# Patient Record
Sex: MALE | Race: WHITE
[De-identification: names, ages, dates, MRNs, and addresses within clinical notes are randomized per-mention and may not be internally consistent; named-entity substitution may affect disease eponyms.]

---

## 2019-07-09 ENCOUNTER — HOSPITAL ENCOUNTER (INPATIENT)
Dept: HOSPITAL 61 - ER | Age: 29
LOS: 14 days | Discharge: HOME | DRG: 871 | End: 2019-07-23
Attending: INTERNAL MEDICINE | Admitting: INTERNAL MEDICINE
Payer: SELF-PAY

## 2019-07-09 VITALS — BODY MASS INDEX: 36.98 KG/M2 | HEIGHT: 68 IN | WEIGHT: 244.01 LBS

## 2019-07-09 DIAGNOSIS — Z87.11: ICD-10-CM

## 2019-07-09 DIAGNOSIS — N49.2: ICD-10-CM

## 2019-07-09 DIAGNOSIS — G93.41: ICD-10-CM

## 2019-07-09 DIAGNOSIS — K21.9: ICD-10-CM

## 2019-07-09 DIAGNOSIS — Z82.49: ICD-10-CM

## 2019-07-09 DIAGNOSIS — N50.89: ICD-10-CM

## 2019-07-09 DIAGNOSIS — L03.113: ICD-10-CM

## 2019-07-09 DIAGNOSIS — N30.01: ICD-10-CM

## 2019-07-09 DIAGNOSIS — E87.1: ICD-10-CM

## 2019-07-09 DIAGNOSIS — T79.6XXA: ICD-10-CM

## 2019-07-09 DIAGNOSIS — I13.2: ICD-10-CM

## 2019-07-09 DIAGNOSIS — E88.2: ICD-10-CM

## 2019-07-09 DIAGNOSIS — F17.210: ICD-10-CM

## 2019-07-09 DIAGNOSIS — N17.9: ICD-10-CM

## 2019-07-09 DIAGNOSIS — T23.001A: ICD-10-CM

## 2019-07-09 DIAGNOSIS — F14.10: ICD-10-CM

## 2019-07-09 DIAGNOSIS — T23.321A: ICD-10-CM

## 2019-07-09 DIAGNOSIS — F03.90: ICD-10-CM

## 2019-07-09 DIAGNOSIS — E83.51: ICD-10-CM

## 2019-07-09 DIAGNOSIS — N43.3: ICD-10-CM

## 2019-07-09 DIAGNOSIS — F15.10: ICD-10-CM

## 2019-07-09 DIAGNOSIS — F32.9: ICD-10-CM

## 2019-07-09 DIAGNOSIS — F41.9: ICD-10-CM

## 2019-07-09 DIAGNOSIS — I50.9: ICD-10-CM

## 2019-07-09 DIAGNOSIS — E87.5: ICD-10-CM

## 2019-07-09 DIAGNOSIS — N18.6: ICD-10-CM

## 2019-07-09 DIAGNOSIS — K76.7: ICD-10-CM

## 2019-07-09 DIAGNOSIS — G47.00: ICD-10-CM

## 2019-07-09 DIAGNOSIS — A41.9: Primary | ICD-10-CM

## 2019-07-09 DIAGNOSIS — E43: ICD-10-CM

## 2019-07-09 LAB
% LYMPHS: 5 % (ref 24–48)
% MONOS: 5 % (ref 0–10)
% SEGS: 90 % (ref 35–66)
ALBUMIN SERPL-MCNC: 3.4 G/DL (ref 3.4–5)
ALBUMIN/GLOB SERPL: 0.9 {RATIO} (ref 1–1.7)
ALP SERPL-CCNC: 89 U/L (ref 46–116)
ALT SERPL-CCNC: 818 U/L (ref 16–63)
AMORPH SED URNS QL MICRO: PRESENT /HPF
AMPHETAMINE/METHAMPHETAMINE: (no result)
ANION GAP SERPL CALC-SCNC: 27 MMOL/L (ref 6–14)
ANISOCYTOSIS BLD QL SMEAR: SLIGHT
APTT BLD: 31 SEC (ref 24–38)
APTT PPP: (no result) S
BACTERIA #/AREA URNS HPF: 0 /HPF
BARBITURATES UR-MCNC: (no result) UG/ML
BASOPHILS # BLD AUTO: 0 X10^3/UL (ref 0–0.2)
BASOPHILS NFR BLD: 0 % (ref 0–3)
BENZODIAZ UR-MCNC: (no result) UG/L
BILIRUB SERPL-MCNC: 0.9 MG/DL (ref 0.2–1)
BILIRUB UR QL STRIP: (no result)
BUN SERPL-MCNC: 110 MG/DL (ref 8–26)
BUN/CREAT SERPL: 8 (ref 6–20)
CALCIUM SERPL-MCNC: 5.4 MG/DL (ref 8.5–10.1)
CANNABINOIDS UR-MCNC: (no result) UG/L
CHLORIDE SERPL-SCNC: 73 MMOL/L (ref 98–107)
CO2 SERPL-SCNC: 12 MMOL/L (ref 21–32)
COCAINE UR-MCNC: (no result) NG/ML
CREAT SERPL-MCNC: 13.4 MG/DL (ref 0.7–1.3)
EOSINOPHIL NFR BLD: 0 % (ref 0–3)
EOSINOPHIL NFR BLD: 0 X10^3/UL (ref 0–0.7)
ERYTHROCYTE [DISTWIDTH] IN BLOOD BY AUTOMATED COUNT: 13 % (ref 11.5–14.5)
FIBRINOGEN PPP-MCNC: CLEAR MG/DL
GFR SERPLBLD BASED ON 1.73 SQ M-ARVRAT: 4.4 ML/MIN
GLOBULIN SER-MCNC: 3.6 G/DL (ref 2.2–3.8)
GLUCOSE SERPL-MCNC: 81 MG/DL (ref 70–99)
HCT VFR BLD CALC: 39 % (ref 39–53)
HGB BLD-MCNC: 13.9 G/DL (ref 13–17.5)
LYMPHOCYTES # BLD: 0.5 X10^3/UL (ref 1–4.8)
LYMPHOCYTES NFR BLD AUTO: 3 % (ref 24–48)
MCH RBC QN AUTO: 29 PG (ref 25–35)
MCHC RBC AUTO-ENTMCNC: 36 G/DL (ref 31–37)
MCV RBC AUTO: 82 FL (ref 79–100)
METHADONE SERPL-MCNC: (no result) NG/ML
MONO #: 0.7 X10^3/UL (ref 0–1.1)
MONOCYTES NFR BLD: 4 % (ref 0–9)
NEUT #: 15.7 X10^3UL (ref 1.8–7.7)
NEUTROPHILS NFR BLD AUTO: 92 % (ref 31–73)
NITRITE UR QL STRIP: POSITIVE
OPIATES UR-MCNC: (no result) NG/ML
PCP SERPL-MCNC: (no result) MG/DL
PH UR STRIP: 6 [PH]
PLATELET # BLD AUTO: 320 X10^3/UL (ref 140–400)
PLATELET # BLD EST: ADEQUATE 10*3/UL
POLYCHROMASIA BLD QL SMEAR: SLIGHT
POTASSIUM SERPL-SCNC: 7 MMOL/L (ref 3.5–5.1)
PROT SERPL-MCNC: 7 G/DL (ref 6.4–8.2)
PROT UR STRIP-MCNC: >=300 MG/DL
PROTHROMBIN TIME: 14.7 SEC (ref 11.7–14)
RBC # BLD AUTO: 4.77 X10^6/UL (ref 4.3–5.7)
RBC #/AREA URNS HPF: (no result) /HPF (ref 0–2)
SODIUM SERPL-SCNC: 112 MMOL/L (ref 136–145)
SQUAMOUS #/AREA URNS LPF: (no result) /LPF
UROBILINOGEN UR-MCNC: 0.2 MG/DL
WBC # BLD AUTO: 17 X10^3/UL (ref 4–11)
WBC #/AREA URNS HPF: (no result) /HPF (ref 0–4)

## 2019-07-09 PROCEDURE — 85025 COMPLETE CBC W/AUTO DIFF WBC: CPT

## 2019-07-09 PROCEDURE — 87086 URINE CULTURE/COLONY COUNT: CPT

## 2019-07-09 PROCEDURE — 76937 US GUIDE VASCULAR ACCESS: CPT

## 2019-07-09 PROCEDURE — 83605 ASSAY OF LACTIC ACID: CPT

## 2019-07-09 PROCEDURE — 84484 ASSAY OF TROPONIN QUANT: CPT

## 2019-07-09 PROCEDURE — 62270 DX LMBR SPI PNXR: CPT

## 2019-07-09 PROCEDURE — 84300 ASSAY OF URINE SODIUM: CPT

## 2019-07-09 PROCEDURE — 87040 BLOOD CULTURE FOR BACTERIA: CPT

## 2019-07-09 PROCEDURE — 96375 TX/PRO/DX INJ NEW DRUG ADDON: CPT

## 2019-07-09 PROCEDURE — 80307 DRUG TEST PRSMV CHEM ANLYZR: CPT

## 2019-07-09 PROCEDURE — 36415 COLL VENOUS BLD VENIPUNCTURE: CPT

## 2019-07-09 PROCEDURE — 76870 US EXAM SCROTUM: CPT

## 2019-07-09 PROCEDURE — 89051 BODY FLUID CELL COUNT: CPT

## 2019-07-09 PROCEDURE — C1769 GUIDE WIRE: HCPCS

## 2019-07-09 PROCEDURE — 86705 HEP B CORE ANTIBODY IGM: CPT

## 2019-07-09 PROCEDURE — 83935 ASSAY OF URINE OSMOLALITY: CPT

## 2019-07-09 PROCEDURE — 85651 RBC SED RATE NONAUTOMATED: CPT

## 2019-07-09 PROCEDURE — 83690 ASSAY OF LIPASE: CPT

## 2019-07-09 PROCEDURE — 82570 ASSAY OF URINE CREATININE: CPT

## 2019-07-09 PROCEDURE — C9113 INJ PANTOPRAZOLE SODIUM, VIA: HCPCS

## 2019-07-09 PROCEDURE — 86704 HEP B CORE ANTIBODY TOTAL: CPT

## 2019-07-09 PROCEDURE — 36556 INSERT NON-TUNNEL CV CATH: CPT

## 2019-07-09 PROCEDURE — 86709 HEPATITIS A IGM ANTIBODY: CPT

## 2019-07-09 PROCEDURE — 87521 HEPATITIS C PROBE&RVRS TRNSC: CPT

## 2019-07-09 PROCEDURE — 86703 HIV-1/HIV-2 1 RESULT ANTBDY: CPT

## 2019-07-09 PROCEDURE — 76705 ECHO EXAM OF ABDOMEN: CPT

## 2019-07-09 PROCEDURE — 93005 ELECTROCARDIOGRAM TRACING: CPT

## 2019-07-09 PROCEDURE — 83916 OLIGOCLONAL BANDS: CPT

## 2019-07-09 PROCEDURE — 85730 THROMBOPLASTIN TIME PARTIAL: CPT

## 2019-07-09 PROCEDURE — 84443 ASSAY THYROID STIM HORMONE: CPT

## 2019-07-09 PROCEDURE — 84100 ASSAY OF PHOSPHORUS: CPT

## 2019-07-09 PROCEDURE — 71045 X-RAY EXAM CHEST 1 VIEW: CPT

## 2019-07-09 PROCEDURE — 80048 BASIC METABOLIC PNL TOTAL CA: CPT

## 2019-07-09 PROCEDURE — 72148 MRI LUMBAR SPINE W/O DYE: CPT

## 2019-07-09 PROCEDURE — 85027 COMPLETE CBC AUTOMATED: CPT

## 2019-07-09 PROCEDURE — 86038 ANTINUCLEAR ANTIBODIES: CPT

## 2019-07-09 PROCEDURE — 82607 VITAMIN B-12: CPT

## 2019-07-09 PROCEDURE — 72146 MRI CHEST SPINE W/O DYE: CPT

## 2019-07-09 PROCEDURE — 72141 MRI NECK SPINE W/O DYE: CPT

## 2019-07-09 PROCEDURE — 80053 COMPREHEN METABOLIC PANEL: CPT

## 2019-07-09 PROCEDURE — 81001 URINALYSIS AUTO W/SCOPE: CPT

## 2019-07-09 PROCEDURE — 99152 MOD SED SAME PHYS/QHP 5/>YRS: CPT

## 2019-07-09 PROCEDURE — 73130 X-RAY EXAM OF HAND: CPT

## 2019-07-09 PROCEDURE — 96374 THER/PROPH/DIAG INJ IV PUSH: CPT

## 2019-07-09 PROCEDURE — 87340 HEPATITIS B SURFACE AG IA: CPT

## 2019-07-09 PROCEDURE — 36581 REPLACE TUNNELED CV CATH: CPT

## 2019-07-09 PROCEDURE — 94640 AIRWAY INHALATION TREATMENT: CPT

## 2019-07-09 PROCEDURE — 82945 GLUCOSE OTHER FLUID: CPT

## 2019-07-09 PROCEDURE — C1892 INTRO/SHEATH,FIXED,PEEL-AWAY: HCPCS

## 2019-07-09 PROCEDURE — 85610 PROTHROMBIN TIME: CPT

## 2019-07-09 PROCEDURE — 82550 ASSAY OF CK (CPK): CPT

## 2019-07-09 PROCEDURE — 85014 HEMATOCRIT: CPT

## 2019-07-09 PROCEDURE — 85520 HEPARIN ASSAY: CPT

## 2019-07-09 PROCEDURE — 82310 ASSAY OF CALCIUM: CPT

## 2019-07-09 PROCEDURE — 83735 ASSAY OF MAGNESIUM: CPT

## 2019-07-09 PROCEDURE — 87071 CULTURE AEROBIC QUANT OTHER: CPT

## 2019-07-09 PROCEDURE — 84157 ASSAY OF PROTEIN OTHER: CPT

## 2019-07-09 PROCEDURE — 99153 MOD SED SAME PHYS/QHP EA: CPT

## 2019-07-09 PROCEDURE — 80069 RENAL FUNCTION PANEL: CPT

## 2019-07-09 PROCEDURE — 87641 MR-STAPH DNA AMP PROBE: CPT

## 2019-07-09 PROCEDURE — 77001 FLUOROGUIDE FOR VEIN DEVICE: CPT

## 2019-07-09 PROCEDURE — 85007 BL SMEAR W/DIFF WBC COUNT: CPT

## 2019-07-09 PROCEDURE — 85018 HEMOGLOBIN: CPT

## 2019-07-09 PROCEDURE — 82787 IGG 1 2 3 OR 4 EACH: CPT

## 2019-07-09 PROCEDURE — 70450 CT HEAD/BRAIN W/O DYE: CPT

## 2019-07-09 PROCEDURE — 96361 HYDRATE IV INFUSION ADD-ON: CPT

## 2019-07-09 PROCEDURE — 82962 GLUCOSE BLOOD TEST: CPT

## 2019-07-09 PROCEDURE — 86803 HEPATITIS C AB TEST: CPT

## 2019-07-09 PROCEDURE — 87075 CULTR BACTERIA EXCEPT BLOOD: CPT

## 2019-07-09 PROCEDURE — C1750 CATH, HEMODIALYSIS,LONG-TERM: HCPCS

## 2019-07-09 NOTE — PHYS DOC
Past Medical History


Past Medical History:  Anxiety, Depression


Past Surgical History:  No Surgical History


Smoking:  Quit Greater Than 1 Year


Alcohol Use:  None


Drug Use:  Methamphetamine





Adult General


Chief Complaint


Chief Complaint:  MULTIPLE COMPLAINTS





HPI


HPI





Patient is a 29  year old male who presents to the ER with multiple complaints. 

Patient is a poor historian. The patient states he got out of [facility] on Friday, 

and then went and smoked meth with a girl. Patient states he was sitting on the 

ground and on his leg and started having numbness going down his legs. The 

patient when asked again states he fell on Friday. States today he is having 

bilateral pain down his legs with reduced sensation, also states he's not had a 

bowel movement, or been able to urinate since Saturday. If his pain is 10 out of

10 in severity and pins and needles. Lower back pain is also having reduced 

sensation around that area. He mentions prior to arrival.





Review of Systems


Review of Systems





Constitutional: Denies fever or chills []


Eyes: Denies change in visual acuity, redness, or eye pain []


HENT: Denies nasal congestion or sore throat []


Respiratory: Denies cough or shortness of breath []


Cardiovascular: No additional information not addressed in HPI []


GI: Denies abdominal pain, nausea, vomiting, bloody stools or diarrhea []


: Denies dysuria or hematuria []


Musculoskeletal: Reports lower back pain, and reduced sensation bilaterally on 

legs.


Integument: Denies rash or skin lesions []


Neurologic: Denies headache, has weakness and sensory changes bilateral lower 

extremity. 


Endocrine: Denies polyuria or polydipsia []





Complete systems were reviewed and found to be within normal limits, except as 

documented in this note.





Current Medications


Current Medications





Current Medications








 Medications


  (Trade)  Dose


 Ordered  Sig/Travis  Start Time


 Stop Time Status Last Admin


Dose Admin


 


 Albuterol Sulfate


  (Ventolin Neb


 Soln)  10 mg  1X  ONCE  19 23:00


 19 23:01 DC 7/10/19 00:30


10 MG


 


 Calcium Gluconate


  (Calcium


 Gluconate)  1,000 mg  1X  ONCE  19 23:00


 19 23:01 DC 19 22:43


1,000 MG


 


 Ceftriaxone Sodium


  (Rocephin)  1 gm  1X  ONCE  19 23:00


 19 23:01 DC 19 22:43


1 GM


 


 Dextrose


  (Dextrose


 50%-Water Syringe)  25 gm  1X  ONCE  19 23:00


 19 23:01 DC 19 22:42


25 GM


 


 Insulin Human


 Regular


  (HumuLIN R VIAL)  10 unit  1X  ONCE  19 23:00


 19 23:01 DC 19 23:51


10 UNIT


 


 Morphine Sulfate


  (Morphine


 Sulfate)  4 mg  1X  ONCE  19 21:30


 19 21:31 DC 19 21:20


4 MG


 


 Sodium Bicarbonate


  (Sodium Bicarb


 Adult 8.4% Syr)  50 meq  1X  ONCE  19 23:00


 19 23:01 DC 19 23:49


50 MEQ


 


 Sodium Chloride  1,000 ml @ 


 1,000 mls/hr  1X  ONCE  19 23:00


 19 23:59 DC 19 23:50


1,000 MLS/HR











Allergies


Allergies





Allergies








Coded Allergies Type Severity Reaction Last Updated Verified


 


  No Known Drug Allergies    19 No











Physical Exam


Physical Exam





Constitutional: Well developed, well nourished, no acute distress, non-toxic 

appearance. []


HENT: Normocephalic, atraumatic, bilateral external ears normal, oropharynx 

moist, no oral exudates, nose normal. []


Eyes: PERRLA, EOMI, conjunctiva normal, no discharge. [] 


Neck: Normal range of motion, no tenderness, supple, no stridor. [] 


Cardiovascular:Heart rate regular rhythm, no murmur []


Lungs & Thorax:  Bilateral breath sounds clear to auscultation []


Abdomen: Bowel sounds normal, soft, no tenderness, no masses, no pulsatile 

masses. [] 


Skin: Warm, dry, no erythema, no rash. [] 


Back: Lumbar Tenderness, no CVA tenderness. [] 


Extremities: No tenderness, no cyanosis, no clubbing, ROM intact, no edema. [] 


Neurologic: Alert and oriented X 3, focal weakness walking fell today, reduced 

sensory bilateral legs, and perianal area. Reduced rectal tone. +Babinski 

reflex.


Psychologic: Affect normal, judgement normal, mood normal. []





Current Patient Data


Vital Signs





                                   Vital Signs








  Date Time  Temp Pulse Resp B/P (MAP) Pulse Ox O2 Delivery O2 Flow Rate FiO2


 


7/10/19 00:32      Room Air  


 


19 21:20   16  100   


 


19 20:19 98.1 103  116/78 (91)    





 98.1       








Lab Values





                                Laboratory Tests








Test


 19


20:50 19


21:00


 


White Blood Count


 17.0 x10^3/uL


(4.0-11.0)  H 





 


Red Blood Count


 4.77 x10^6/uL


(4.30-5.70) 





 


Hemoglobin


 13.9 g/dL


(13.0-17.5) 





 


Hematocrit


 39.0 %


(39.0-53.0) 





 


Mean Corpuscular Volume


 82 fL ()


 





 


Mean Corpuscular Hemoglobin 29 pg (25-35)   


 


Mean Corpuscular Hemoglobin


Concent 36 g/dL


(31-37) 





 


Red Cell Distribution Width


 13.0 %


(11.5-14.5) 





 


Platelet Count


 320 x10^3/uL


(140-400) 





 


Neutrophils (%) (Auto) 92 % (31-73)  H 


 


Lymphocytes (%) (Auto) 3 % (24-48)  L 


 


Monocytes (%) (Auto) 4 % (0-9)   


 


Eosinophils (%) (Auto) 0 % (0-3)   


 


Basophils (%) (Auto) 0 % (0-3)   


 


Neutrophils # (Auto)


 15.7 x10^3uL


(1.8-7.7)  H 





 


Lymphocytes # (Auto)


 0.5 x10^3/uL


(1.0-4.8)  L 





 


Monocytes # (Auto)


 0.7 x10^3/uL


(0.0-1.1) 





 


Eosinophils # (Auto)


 0.0 x10^3/uL


(0.0-0.7) 





 


Basophils # (Auto)


 0.0 x10^3/uL


(0.0-0.2) 





 


Segmented Neutrophils % 90 % (35-66)  H 


 


Lymphocytes % 5 % (24-48)  L 


 


Monocytes % 5 % (0-10)   


 


Platelet Estimate


 Adequate


(ADEQUATE) 





 


Polychromasia Slight   


 


Anisocytosis Slight   


 


Prothrombin Time


 14.7 SEC


(11.7-14.0)  H 





 


Prothrombin Time INR


 1.2 (0.8-1.1)


H 





 


PTT


 31 SEC (24-38)


 





 


Sodium Level


 112 mmol/L


(136-145)  *L 





 


Potassium Level


 7.0 mmol/L


(3.5-5.1)  *H 





 


Chloride Level


 73 mmol/L


()  L 





 


Carbon Dioxide Level


 12 mmol/L


(21-32)  L 





 


Anion Gap 27 (6-14)  H 


 


Blood Urea Nitrogen


 110 mg/dL


(8-26)  H 





 


Creatinine


 13.4 mg/dL


(0.7-1.3)  H 





 


Estimated GFR


(Cockcroft-Gault) 4.4  


 





 


BUN/Creatinine Ratio 8 (6-20)   


 


Glucose Level


 81 mg/dL


(70-99) 





 


Lactic Acid Level


 1.1 mmol/L


(0.4-2.0) 





 


Calcium Level


 5.4 mg/dL


(8.5-10.1)  *L 





 


Magnesium Level


 3.0 mg/dL


(1.8-2.4)  H 





 


Total Bilirubin


 0.9 mg/dL


(0.2-1.0) 





 


Aspartate Amino Transferase


(AST) 2923 U/L


(15-37)  H 





 


Alanine Aminotransferase (ALT)


 818 U/L


(16-63)  H 





 


Alkaline Phosphatase


 89 U/L


() 





 


Creatine Kinase


 > 160885 U/L


()  H 





 


Troponin I Quantitative


 0.352 ng/mL


(0.000-0.055) 





 


Total Protein


 7.0 g/dL


(6.4-8.2) 





 


Albumin


 3.4 g/dL


(3.4-5.0) 





 


Albumin/Globulin Ratio


 0.9 (1.0-1.7)


L 





 


Lipase


 972 U/L


()  H 





 


Ethyl Alcohol Level


 < 10 mg/dL


(0-10) 





 


Urine Color  Red  


 


Urine Clarity  Clear  


 


Urine pH  6.0  


 


Urine Specific Gravity  1.020  


 


Urine Protein


 


 >=300 mg/dL


(NEG-TRACE)


 


Urine Glucose (UA)


 


 100 mg/dL


(NEG)


 


Urine Ketones (Stick)


 


 Trace mg/dL


(NEG)


 


Urine Blood  Large (NEG)  


 


Urine Nitrite


 


 Positive (NEG)





 


Urine Bilirubin


 


 Moderate (NEG)





 


Urine Urobilinogen Dipstick


 


 0.2 mg/dL (0.2


mg/dL)


 


Urine Leukocyte Esterase  Small (NEG)  


 


Urine RBC


 


 3-5 /HPF (0-2)





 


Urine WBC


 


 Occ /HPF (0-4)





 


Urine Squamous Epithelial


Cells 


 None /LPF  





 


Urine Amorphous Sediment  Present /HPF  


 


Urine Bacteria


 


 0 /HPF (0-FEW)





 


Urine Random Creatinine


 


 199.6 mg/dL


(Not Establ.)


 


Urine Opiates Screen  Neg (NEG)  


 


Urine Methadone Screen  Neg (NEG)  


 


Urine Barbiturates  Neg (NEG)  


 


Urine Phencyclidine Screen  Neg (NEG)  


 


Urine


Amphetamine/Methamphetamine 


 Pos (NEG)  





 


Urine Benzodiazepines Screen  Neg (NEG)  


 


Urine Cocaine Screen  Neg (NEG)  


 


Urine Cannabinoids Screen  Neg (NEG)  


 


Urine Ethyl Alcohol  Neg (NEG)  





                                Laboratory Tests


19 20:50








                                Laboratory Tests


19 20:50











EKG


EKG


EKG interpreted by Dr. Salter 





Shows tachycardia with peaked t-waves with rate of around 120.[]





Radiology/Procedures


Radiology/Procedures


[]PATIENT: JONI GREENECCOUNT: [id number]#: [id number]


: 1990           LOCATION: ER              AGE: 29


SEX: M                    EXAM DT: 19         ACCESSION#: [id number]


STATUS: REG ER            ORD. PHYSICIAN: MERLIN SEPULVEDA   


REASON: elevated liver enzymes, abd pain


PROCEDURE: ABDOMEN LTD





Ultrasound the abdomen limited.


 


HISTORY: Elevated liver enzymes, abdominal pain


 


Ultrasound was used to evaluate the abdomen. Pancreas is poorly 


visualized. Liver is difficult to completely evaluate. A focal liver 


lesion was not identified. Liver is not enlarged. Gallbladder was 


distended. Gallstones were not identified. There was mild sludge in the 


gallbladder. Gallbladder wall was upper normal in thickness. Right kidney 


was 12.4 cm in length without a mass or hydronephrosis. Aorta and vena 


cava were poorly evaluated. Common duct was normal measuring 3.6 mm.


 


IMPRESSION:


1. Sludge noted in the gallbladder without gallstones.


2. Common duct normal in size.


3. Limited evaluation of the liver.


 


Electronically signed by: Watson Peters MD (2019 10:51 PM) [facility]














DICTATED and SIGNED BY:     WATSON PETERS MD


DATE:     19 2251








PATIENT: NBA GREENE    ACCOUNT: [id number]     MRN#: [id number]


: 1990           LOCATION: ER              AGE: 29


SEX: M                    EXAM DT: 19         ACCESSION#: [id number]


STATUS: REG ER            ORD. PHYSICIAN: MERLIN SEPULVEDA   


REASON: unable to urinate, back pain, decreased rectal tone,call [id number]

[id number]


PROCEDURE: LUMBAR SPINE WO CONTRAST





 


Indication: Back pain with difficulty with urination


 


Procedure: Multiplanar multisequence MRI images obtained through the 


lumbar spine without intravenous contrast.


 


Comparison: None.


 


Findings:


 


Edema to the subcutaneous soft tissues posteriorly.


No definite acute fracture.


There is prominent epidural fat identified at L5 as well as within the 


sacral region.


There is some regions of high T2 signal within the paraspinal musculature 


and right psoas.


 


T12-L1: No disc bulge.  No significant central canal or neuroforaminal 


stenosis. 


L1-L2: No disc bulge.  No significant central canal or neuroforaminal 


stenosis. Facet hypertrophy.


L2-L3: No disc bulge.  No significant central canal or neuroforaminal 


stenosis. Facet hypertrophy.


L3-L4:  No disc bulge.  No significant central canal or neuroforaminal 


stenosis. Facet hypertrophy with small joint effusion. Mild disc 


osteophyte complex. Thecal sac is 10 mm anterior to posterior.


L4-L5: Posterior disc protrusion with osteophyte formation and annular 


tear suspected. Ligament of flavum and facet hypertrophy with facet joint 


effusions. Effacement of the bilateral lateral recess. Neural foramina 


patent. Mild central canal narrowing. 


L5-S1:  Facet hypertrophy with facet joint effusions and ligamentum flavum


hypertrophy. Large amount of epidural fat with small size of thecal sac at


this level. Disc protrusion and annular tear. Mild right and mild to 


moderate left neural foraminal stenosis. 


 


Impression: 


 


There is evidence of degenerative changes the spine with disc protrusions 


and annular tears at L4-5 and L5-S1 as well as osteophyte formation at 


these vertebral body endplates and facet hypertrophy. This contributes to 


central canal and neural foraminal stenosis as detailed above.


 


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 


extending into the sacrum with resultant small size of the thecal sac.


 


High T2 signal is identified within the right greater than left paraspinal


musculature and psoas which can be seen with edema to these regions. Can 


be seen with causes such as myositis, muscle strain or neurological in 


nature from causes such as denervation associated edema.


 


Electronically signed by: Marcus Patiño MD (7/10/2019 12:04 AM) [facility]














DICTATED and SIGNED BY:     MARCUS PATIÑO MD


DATE:     07/10/19 0004





Course & Med Decision Making


Course & Med Decision Making


Pertinent Labs and Imaging studies reviewed. (See chart for details)





Patient is a poor historian, seems to have fallen while doing Meth on Friday. 

Has not urinated or had a bowel movement since Saturday. Fell again today. 

Bladder scan showed 901 mL of retained urine. Will have nursing place C[facility]. 

Will get MRI to rule out Cauda Equina as patient has reduced rectal tone and 

bowel/bladder changes. Will also get labs.





Labs show hepatorenal syndrome. Elevated liver enzymes, elevated BUN/Creatinine,

 potassium of 7.0, sodium of 112. 





Will page nephrology. Discussed case with Dr. Navarro and Dr. Powell. Dr. Powell will

 admit. Dr. Navarro (Nephrology) requests giving fluids, and then rechecking labs. 





Will page Dr. Garrett. Dr. Garrett's nurse practitioner Concetta called back at 

12:30. Discussed case with her who also discussed with Dr. Garrett. I discussed

 my concern for Cauda Equina. They requested Cervical and Thoracic spine MRI. 





Will admit to ICU. Will order 3rd liter of fluid and maintenance at 175/hr.





Dragon Disclaimer


Dragon Disclaimer


This electronic medical record was generated, in whole or in part, using a voice

 recognition dictation system.





Departure


Departure


Impression:  


   Primary Impression:  


   Hepatorenal syndrome


   Additional Impressions:  


   Elevated troponin


   Urinary tract infection


   Methamphetamine abuse


   Hyperkalemia


   Hyponatremia


   Urinary retention


   Rhabdomyolysis


   Neurological complaint


Disposition:   ADMITTED AS INPATIENT


Admitting Physician:  HIMS


Condition:  STABLE


Referrals:  


NO PCP (PCP)





Problem Qualifiers








   Additional Impressions:  


   Urinary tract infection


   Urinary tract infection type:  acute cystitis  Hematuria presence:  with 

   hematuria  Qualified Codes:  N30.01 - Acute cystitis with hematuria


   Rhabdomyolysis


   Rhabdomyolysis type:  traumatic  Encounter type:  initial encounter  

   Qualified Codes:  T79.6XXA - Traumatic ischemia of muscle, initial encounter








MERLIN SEPULVEDA           2019 21:04

## 2019-07-09 NOTE — RAD
Ultrasound the abdomen limited.

 

HISTORY: Elevated liver enzymes, abdominal pain

 

Ultrasound was used to evaluate the abdomen. Pancreas is poorly 

visualized. Liver is difficult to completely evaluate. A focal liver 

lesion was not identified. Liver is not enlarged. Gallbladder was 

distended. Gallstones were not identified. There was mild sludge in the 

gallbladder. Gallbladder wall was upper normal in thickness. Right kidney 

was 12.4 cm in length without a mass or hydronephrosis. Aorta and vena 

cava were poorly evaluated. Common duct was normal measuring 3.6 mm.

 

IMPRESSION:

1. Sludge noted in the gallbladder without gallstones.

2. Common duct normal in size.

3. Limited evaluation of the liver.

 

Electronically signed by: Watson Peters MD (7/9/2019 10:51 PM) [facility]

## 2019-07-10 VITALS — DIASTOLIC BLOOD PRESSURE: 78 MMHG | SYSTOLIC BLOOD PRESSURE: 135 MMHG

## 2019-07-10 VITALS — SYSTOLIC BLOOD PRESSURE: 129 MMHG | DIASTOLIC BLOOD PRESSURE: 68 MMHG

## 2019-07-10 VITALS — SYSTOLIC BLOOD PRESSURE: 146 MMHG | DIASTOLIC BLOOD PRESSURE: 95 MMHG

## 2019-07-10 VITALS — DIASTOLIC BLOOD PRESSURE: 54 MMHG | SYSTOLIC BLOOD PRESSURE: 109 MMHG

## 2019-07-10 VITALS — DIASTOLIC BLOOD PRESSURE: 52 MMHG | SYSTOLIC BLOOD PRESSURE: 102 MMHG

## 2019-07-10 VITALS — SYSTOLIC BLOOD PRESSURE: 141 MMHG | DIASTOLIC BLOOD PRESSURE: 76 MMHG

## 2019-07-10 VITALS — SYSTOLIC BLOOD PRESSURE: 103 MMHG | DIASTOLIC BLOOD PRESSURE: 48 MMHG

## 2019-07-10 VITALS — DIASTOLIC BLOOD PRESSURE: 79 MMHG | SYSTOLIC BLOOD PRESSURE: 130 MMHG

## 2019-07-10 VITALS — DIASTOLIC BLOOD PRESSURE: 76 MMHG | SYSTOLIC BLOOD PRESSURE: 139 MMHG

## 2019-07-10 VITALS — DIASTOLIC BLOOD PRESSURE: 55 MMHG | SYSTOLIC BLOOD PRESSURE: 109 MMHG

## 2019-07-10 VITALS — DIASTOLIC BLOOD PRESSURE: 49 MMHG | SYSTOLIC BLOOD PRESSURE: 108 MMHG

## 2019-07-10 VITALS — SYSTOLIC BLOOD PRESSURE: 110 MMHG | DIASTOLIC BLOOD PRESSURE: 59 MMHG

## 2019-07-10 VITALS — DIASTOLIC BLOOD PRESSURE: 74 MMHG | SYSTOLIC BLOOD PRESSURE: 133 MMHG

## 2019-07-10 VITALS — DIASTOLIC BLOOD PRESSURE: 50 MMHG | SYSTOLIC BLOOD PRESSURE: 102 MMHG

## 2019-07-10 VITALS — SYSTOLIC BLOOD PRESSURE: 114 MMHG | DIASTOLIC BLOOD PRESSURE: 72 MMHG

## 2019-07-10 LAB
ALBUMIN SERPL-MCNC: 2.6 G/DL (ref 3.4–5)
ALBUMIN/GLOB SERPL: 0.7 {RATIO} (ref 1–1.7)
ALP SERPL-CCNC: 71 U/L (ref 46–116)
ALT SERPL-CCNC: 603 U/L (ref 16–63)
ANION GAP SERPL CALC-SCNC: 18 MMOL/L (ref 6–14)
ANION GAP SERPL CALC-SCNC: 27 MMOL/L (ref 6–14)
ANION GAP SERPL CALC-SCNC: 27 MMOL/L (ref 6–14)
BASOPHILS # BLD AUTO: 0 X10^3/UL (ref 0–0.2)
BASOPHILS NFR BLD: 0 % (ref 0–3)
BILIRUB SERPL-MCNC: 0.6 MG/DL (ref 0.2–1)
BUN SERPL-MCNC: 112 MG/DL (ref 8–26)
BUN SERPL-MCNC: 115 MG/DL (ref 8–26)
BUN SERPL-MCNC: 69 MG/DL (ref 8–26)
BUN/CREAT SERPL: 8 (ref 6–20)
CALCIUM SERPL-MCNC: 5 MG/DL (ref 8.5–10.1)
CALCIUM SERPL-MCNC: 5.6 MG/DL (ref 8.5–10.1)
CALCIUM SERPL-MCNC: < 5 MG/DL (ref 8.5–10.1)
CHLORIDE SERPL-SCNC: 80 MMOL/L (ref 98–107)
CHLORIDE SERPL-SCNC: 81 MMOL/L (ref 98–107)
CHLORIDE SERPL-SCNC: 88 MMOL/L (ref 98–107)
CLARITY CSF: CLEAR
CO2 SERPL-SCNC: 10 MMOL/L (ref 21–32)
CO2 SERPL-SCNC: 21 MMOL/L (ref 21–32)
CO2 SERPL-SCNC: 9 MMOL/L (ref 21–32)
COLOR CSF: COLORLESS
CREAT SERPL-MCNC: 13.2 MG/DL (ref 0.7–1.3)
CREAT SERPL-MCNC: 13.4 MG/DL (ref 0.7–1.3)
CREAT SERPL-MCNC: 8.6 MG/DL (ref 0.7–1.3)
CSF RBC COUNT: 2 /CMM
CSF WBC COUNT: 3 /CMM
EOSINOPHIL NFR BLD: 0 % (ref 0–3)
EOSINOPHIL NFR BLD: 0 X10^3/UL (ref 0–0.7)
ERYTHROCYTE [DISTWIDTH] IN BLOOD BY AUTOMATED COUNT: 12.8 % (ref 11.5–14.5)
GFR SERPLBLD BASED ON 1.73 SQ M-ARVRAT: 4.4 ML/MIN
GFR SERPLBLD BASED ON 1.73 SQ M-ARVRAT: 4.5 ML/MIN
GFR SERPLBLD BASED ON 1.73 SQ M-ARVRAT: 7.3 ML/MIN
GLOBULIN SER-MCNC: 3.6 G/DL (ref 2.2–3.8)
GLUCOSE CSF-MCNC: 52 MG/DL (ref 37–70)
GLUCOSE SERPL-MCNC: 70 MG/DL (ref 70–99)
GLUCOSE SERPL-MCNC: 70 MG/DL (ref 70–99)
GLUCOSE SERPL-MCNC: 81 MG/DL (ref 70–99)
HCT VFR BLD CALC: 35.7 % (ref 39–53)
HGB BLD-MCNC: 12.8 G/DL (ref 13–17.5)
LYMPHOCYTES # BLD: 0.8 X10^3/UL (ref 1–4.8)
LYMPHOCYTES NFR BLD AUTO: 7 % (ref 24–48)
MCH RBC QN AUTO: 29 PG (ref 25–35)
MCHC RBC AUTO-ENTMCNC: 36 G/DL (ref 31–37)
MCV RBC AUTO: 82 FL (ref 79–100)
MONO #: 0.5 X10^3/UL (ref 0–1.1)
MONOCYTES NFR BLD: 4 % (ref 0–9)
NEUT #: 9.7 X10^3UL (ref 1.8–7.7)
NEUTROPHILS NFR BLD AUTO: 89 % (ref 31–73)
PLATELET # BLD AUTO: 217 X10^3/UL (ref 140–400)
POTASSIUM SERPL-SCNC: 3.5 MMOL/L (ref 3.5–5.1)
POTASSIUM SERPL-SCNC: 5.6 MMOL/L (ref 3.5–5.1)
POTASSIUM SERPL-SCNC: 6.6 MMOL/L (ref 3.5–5.1)
PROT CSF-MCNC: 53.2 MG/DL (ref 15–45)
PROT SERPL-MCNC: 6.2 G/DL (ref 6.4–8.2)
RBC # BLD AUTO: 4.37 X10^6/UL (ref 4.3–5.7)
SODIUM SERPL-SCNC: 117 MMOL/L (ref 136–145)
SODIUM SERPL-SCNC: 117 MMOL/L (ref 136–145)
SODIUM SERPL-SCNC: 127 MMOL/L (ref 136–145)
WBC # BLD AUTO: 11 X10^3/UL (ref 4–11)

## 2019-07-10 PROCEDURE — 5A1D70Z PERFORMANCE OF URINARY FILTRATION, INTERMITTENT, LESS THAN 6 HOURS PER DAY: ICD-10-PCS | Performed by: FAMILY MEDICINE

## 2019-07-10 PROCEDURE — 009U3ZX DRAINAGE OF SPINAL CANAL, PERCUTANEOUS APPROACH, DIAGNOSTIC: ICD-10-PCS

## 2019-07-10 PROCEDURE — B01B1ZZ FLUOROSCOPY OF SPINAL CORD USING LOW OSMOLAR CONTRAST: ICD-10-PCS

## 2019-07-10 RX ADMIN — DEXMEDETOMIDINE HYDROCHLORIDE PRN MLS/HR: 100 INJECTION, SOLUTION, CONCENTRATE INTRAVENOUS at 16:57

## 2019-07-10 RX ADMIN — SILVER SULFADIAZINE SCH APP: 10 CREAM TOPICAL at 20:27

## 2019-07-10 RX ADMIN — DEXMEDETOMIDINE HYDROCHLORIDE PRN MLS/HR: 100 INJECTION, SOLUTION, CONCENTRATE INTRAVENOUS at 14:47

## 2019-07-10 RX ADMIN — POLYETHYLENE GLYCOL 3350 SCH GM: 17 POWDER, FOR SOLUTION ORAL at 13:30

## 2019-07-10 RX ADMIN — SILVER SULFADIAZINE SCH APP: 10 CREAM TOPICAL at 12:49

## 2019-07-10 RX ADMIN — MORPHINE SULFATE PRN MG: 4 INJECTION, SOLUTION INTRAMUSCULAR; INTRAVENOUS at 15:49

## 2019-07-10 RX ADMIN — DEXMEDETOMIDINE HYDROCHLORIDE PRN MLS/HR: 100 INJECTION, SOLUTION, CONCENTRATE INTRAVENOUS at 20:27

## 2019-07-10 RX ADMIN — HALOPERIDOL LACTATE PRN MG: 5 INJECTION, SOLUTION INTRAMUSCULAR at 14:22

## 2019-07-10 RX ADMIN — MORPHINE SULFATE PRN MG: 4 INJECTION, SOLUTION INTRAMUSCULAR; INTRAVENOUS at 09:18

## 2019-07-10 RX ADMIN — MORPHINE SULFATE PRN MG: 4 INJECTION, SOLUTION INTRAMUSCULAR; INTRAVENOUS at 19:49

## 2019-07-10 NOTE — CONS
DATE OF CONSULTATION:  



ATTENDING PHYSICIAN:  Dr. Powell.



REASON FOR CONSULTATION:  Critical care management of metabolic acidosis,

rhabdomyolysis.



HISTORY OF PRESENT ILLNESS:  The patient is a 29-year-old male who has history

of substance abuse including marijuana and meth.  He was found smoking meth with

a girl.  The patient was noted to have numbness down his legs.  He became weak

as well and reportedly fell.  He has no shortness of breath, no cough, no fever,

no chills.  The patient's labs were highly abnormal with a bicarbonate of 10,

sodium of 117 and a BUN of 112 and creatinine of 13.  His bilirubin was 0.6 with

an AST of 2204.  CPK was 100,000.  He has been hydrated aggressively, so far

received 3 liters of IV fluids.  No chest x-ray has been done so far.  He had

thoracic spine MRI, which was abnormal for which Neurosurgery was consulted and

they did not think that there is anything surgically needs to be done. 

Neurology has been consulted and the lumbar puncture has been ordered.  I have

been asked to see him for further evaluation.  He is on room air and no obvious

respiratory distress.



PAST MEDICAL HISTORY:  History of anxiety, depression and polysubstance abuse.



PAST SURGICAL HISTORY:  No recent surgery.



ALLERGIES:  None.



MEDICATIONS:  Reviewed, as listed in the MRAD including antibiotics that he

received in the ER.



REVIEW OF SYSTEMS:  Twelve-point system obtained.  Pertinent positives discussed

in my history of present illness, otherwise noncontributory.  All systems that

were negative were reviewed as well.



SOCIAL HISTORY:  History of marijuana and meth use.  Denies IV cocaine use.



PHYSICAL EXAMINATION:

VITAL SIGNS:  Reviewed.  Blood pressure 146/95, pulse ox 98% on room air,

afebrile.

HEENT:  Sclerae nonicteric.

NECK:  Supple.

LUNGS:  Clear.

CARDIOVASCULAR:  Regular rate.

ABDOMEN:  Soft.

EXTREMITIES:  With edema in the right hand.  He has multiple punctate skin

lesions.  He has some ulceration in one of the right hand finger.  He has some

edema in the lower extremities.



LABORATORY DATA:  Reviewed.  Sodium 117, bicarbonate 10, BUN and creatinine of

112 and 13.  Lactic acid is 1.1.  Calcium is 5.0.  Albumin level of 2.6.  INR

1.2.  White cell count was 17,000; now 11.0.



IMPRESSION:

1.  Status post fall with acute rhabdomyolysis with markedly high CPKs and acute

renal failure.

2.  Acute kidney injury secondary to rhabdomyolysis.

3.  Severe metabolic acidosis secondary to acute kidney injury.  Clinically,

less likely sepsis.  Lactic acid is 1.1

4.  Hyponatremia.

5.  Moderate protein-calorie malnutrition.

6.  Leukocytosis, likely reactive.

7.  Status post fall with abnormal thoracic MRI.  Neurosurgery following.

8.  Abnormal liver function test secondary to hypoperfusion and rhabdomyolysis.

9.  Hypocalcemia.



RECOMMENDATIONS:

1.  Continue with present fluid resuscitation and monitor urine output closely.

2.  Follow Renal's recommendation.

3.  We will also recommend giving 2 amps of bicarbonate.

4.  Monitor sodium level.

5.  Follow Neurology and Neurosurgery's recommendation.

6.  Continue empiric antibiotic, although clinical suspicion for infection is

low.

7.  Follow CPKs.

8.  Correct calcium and follow the levels.

9.  Obtain chest x-ray.

10.  Discussed with RN and will follow along with you.



Critical care time 35 minutes.

 



______________________________

SIENA KHALIL MD



DR:  SVITLANA/osmin  JOB#:  [id number] / [id number]

DD:  07/10/2019 09:35  DT:  07/10/2019 10:03

## 2019-07-10 NOTE — PDOC2
GI CONSULT


Reason For Consult:


Transaminitis





HPI:


HPI:


28 y/o male who was recently released from [facility], then did meth, then had 

weakness/numbness in legs, fell, and came to ER.  Has multiple lab 

abnormalities.  Had multiple imaging studies and LP.





H/o GERD on pantoprazole QD.  Reports h/o "stomach ulcer" on EGD in [place], [place] ~2 years ago.  "They told me to take it easy."  No dysphagia.  No n/v.  No 

abd pain.  No diarrhea, hematochezia, or melena.  H/o intermittent constipation 

that is untreated.  Last stooled on Friday.  No weight loss.  No previous 

colonoscopy.  Thinks someone told him once there was something abnormal with his

liver - details unclear, he says "they did an x-ray."  Also unclear if any GB or

pancreas history - I asked and he said "yeah, they told me I had 'em and needed 

to watch 'em."





PMH:


PMH:


GERD, PUD, substance abuse


lumbar puncture





FH:


Family History:  Other (mother and father - back surgeries)





Social History:


Smoke:  <1 pack per day


ALCOHOL:  occassional


Drugs:  Crystal meth, Other (h/o IVDU)





ROS:


Per HPI.





Vitals:


Vitals:





                                   Vital Signs








  Date Time  Temp Pulse Resp B/P (MAP) Pulse Ox O2 Delivery O2 Flow Rate FiO2


 


7/10/19 09:18   20   Room Air  


 


7/10/19 05:00  105  146/95 (112) 98   


 


7/10/19 04:45 98.3      98.0 





 98.3       











Labs:


Labs:





Laboratory Tests








Test


 7/9/19


20:50 7/9/19


21:00 7/10/19


05:15 7/10/19


09:39


 


White Blood Count


 17.0 x10^3/uL


(4.0-11.0) 


 11.0 x10^3/uL


(4.0-11.0) 





 


Red Blood Count


 4.77 x10^6/uL


(4.30-5.70) 


 4.37 x10^6/uL


(4.30-5.70) 





 


Hemoglobin


 13.9 g/dL


(13.0-17.5) 


 12.8 g/dL


(13.0-17.5) 





 


Hematocrit


 39.0 %


(39.0-53.0) 


 35.7 %


(39.0-53.0) 





 


Mean Corpuscular Volume 82 fL ()   82 fL ()  


 


Mean Corpuscular Hemoglobin 29 pg (25-35)   29 pg (25-35)  


 


Mean Corpuscular Hemoglobin


Concent 36 g/dL


(31-37) 


 36 g/dL


(31-37) 





 


Red Cell Distribution Width


 13.0 %


(11.5-14.5) 


 12.8 %


(11.5-14.5) 





 


Platelet Count


 320 x10^3/uL


(140-400) 


 217 x10^3/uL


(140-400) 





 


Neutrophils (%) (Auto) 92 % (31-73)   89 % (31-73)  


 


Lymphocytes (%) (Auto) 3 % (24-48)   7 % (24-48)  


 


Monocytes (%) (Auto) 4 % (0-9)   4 % (0-9)  


 


Eosinophils (%) (Auto) 0 % (0-3)   0 % (0-3)  


 


Basophils (%) (Auto) 0 % (0-3)   0 % (0-3)  


 


Neutrophils # (Auto)


 15.7 x10^3uL


(1.8-7.7) 


 9.7 x10^3uL


(1.8-7.7) 





 


Lymphocytes # (Auto)


 0.5 x10^3/uL


(1.0-4.8) 


 0.8 x10^3/uL


(1.0-4.8) 





 


Monocytes # (Auto)


 0.7 x10^3/uL


(0.0-1.1) 


 0.5 x10^3/uL


(0.0-1.1) 





 


Eosinophils # (Auto)


 0.0 x10^3/uL


(0.0-0.7) 


 0.0 x10^3/uL


(0.0-0.7) 





 


Basophils # (Auto)


 0.0 x10^3/uL


(0.0-0.2) 


 0.0 x10^3/uL


(0.0-0.2) 





 


Segmented Neutrophils % 90 % (35-66)    


 


Lymphocytes % 5 % (24-48)    


 


Monocytes % 5 % (0-10)    


 


Platelet Estimate


 Adequate


(ADEQUATE) 


 


 





 


Polychromasia Slight    


 


Anisocytosis Slight    


 


Prothrombin Time


 14.7 SEC


(11.7-14.0) 


 


 





 


Prothromb Time International


Ratio 1.2 (0.8-1.1) 


 


 


 





 


Activated Partial


Thromboplast Time 31 SEC (24-38) 


 


 


 





 


Sodium Level


 112 mmol/L


(136-145) 


 117 mmol/L


(136-145) 117 mmol/L


(136-145)


 


Potassium Level


 7.0 mmol/L


(3.5-5.1) 


 5.6 mmol/L


(3.5-5.1) 6.6 mmol/L


(3.5-5.1)


 


Chloride Level


 73 mmol/L


() 


 80 mmol/L


() 81 mmol/L


()


 


Carbon Dioxide Level


 12 mmol/L


(21-32) 


 10 mmol/L


(21-32) 9 mmol/L


(21-32)


 


Anion Gap 27 (6-14)   27 (6-14)  27 (6-14) 


 


Blood Urea Nitrogen


 110 mg/dL


(8-26) 


 112 mg/dL


(8-26) 115 mg/dL


(8-26)


 


Creatinine


 13.4 mg/dL


(0.7-1.3) 


 13.2 mg/dL


(0.7-1.3) 13.4 mg/dL


(0.7-1.3)


 


Estimated GFR


(Cockcroft-Gault) 4.4 


 


 4.5 


 4.4 





 


BUN/Creatinine Ratio 8 (6-20)   8 (6-20)  


 


Glucose Level


 81 mg/dL


(70-99) 


 70 mg/dL


(70-99) 81 mg/dL


(70-99)


 


Lactic Acid Level


 1.1 mmol/L


(0.4-2.0) 


 


 





 


Calcium Level


 5.4 mg/dL


(8.5-10.1) 


 5.0 mg/dL


(8.5-10.1) < 5.0 mg/dL


(8.5-10.1)


 


Magnesium Level


 3.0 mg/dL


(1.8-2.4) 


 


 





 


Total Bilirubin


 0.9 mg/dL


(0.2-1.0) 


 0.6 mg/dL


(0.2-1.0) 





 


Aspartate Amino Transf


(AST/SGOT) 2923 U/L


(15-37) 


 2204 U/L


(15-37) 





 


Alanine Aminotransferase


(ALT/SGPT) 818 U/L


(16-63) 


 603 U/L


(16-63) 





 


Alkaline Phosphatase


 89 U/L


() 


 71 U/L


() 





 


Creatine Kinase


 > 312656 U/L


() 


 


 





 


Troponin I Quantitative


 0.352 ng/mL


(0.000-0.055) 


 


 





 


Total Protein


 7.0 g/dL


(6.4-8.2) 


 6.2 g/dL


(6.4-8.2) 





 


Albumin


 3.4 g/dL


(3.4-5.0) 


 2.6 g/dL


(3.4-5.0) 





 


Albumin/Globulin Ratio 0.9 (1.0-1.7)   0.7 (1.0-1.7)  


 


Lipase


 972 U/L


() 


 


 





 


Ethyl Alcohol Level


 < 10 mg/dL


(0-10) 


 


 





 


Urine Color  Red   


 


Urine Clarity  Clear   


 


Urine pH  6.0   


 


Urine Specific Gravity  1.020   


 


Urine Protein


 


 >=300 mg/dL


(NEG-TRACE) 


 





 


Urine Glucose (UA)


 


 100 mg/dL


(NEG) 


 





 


Urine Ketones (Stick)


 


 Trace mg/dL


(NEG) 


 





 


Urine Blood  Large (NEG)   


 


Urine Nitrite  Positive (NEG)   


 


Urine Bilirubin  Moderate (NEG)   


 


Urine Urobilinogen Dipstick


 


 0.2 mg/dL (0.2


mg/dL) 


 





 


Urine Leukocyte Esterase  Small (NEG)   


 


Urine RBC  3-5 /HPF (0-2)   


 


Urine WBC  Occ /HPF (0-4)   


 


Urine Squamous Epithelial


Cells 


 None /LPF 


 


 





 


Urine Amorphous Sediment  Present /HPF   


 


Urine Bacteria  0 /HPF (0-FEW)   


 


Urine Random Creatinine


 


 199.6 mg/dL


(Not Establ.) 


 





 


Urine Opiates Screen  Neg (NEG)   


 


Urine Methadone Screen  Neg (NEG)   


 


Urine Barbiturates  Neg (NEG)   


 


Urine Phencyclidine Screen  Neg (NEG)   


 


Urine


Amphetamine/Methamphetamine 


 Pos (NEG) 


 


 





 


Urine Benzodiazepines Screen  Neg (NEG)   


 


Urine Cocaine Screen  Neg (NEG)   


 


Urine Cannabinoids Screen  Neg (NEG)   


 


Urine Ethyl Alcohol  Neg (NEG)   


 


Erythrocyte Sedimentation Rate    35 (0-15) 


 


Vitamin B12 Level


 


 


 


 430 pg/mL


(247-911)


 


Thyroid Stimulating Hormone


(TSH) 


 


 


 2.175 uIU/mL


(0.358-3.74)


 


Test


 7/10/19


11:19 


 


 





 


CSF Glucose


 52 mg/dL


(37-70) 


 


 





 


CSF Total Protein


 53.2 mg/dL


(15.0-45.0) 


 


 














Allergies:


Coded Allergies:  


     No Known Drug Allergies (Unverified , 7/9/19)





Medications:





Current Medications








 Medications


  (Trade)  Dose


 Ordered  Sig/Travis


 Route


 PRN Reason  Start Time


 Stop Time Status Last Admin


Dose Admin


 


 Morphine Sulfate


  (Morphine


 Sulfate)  4 mg  1X  ONCE


 IV


   7/9/19 21:30


 7/9/19 21:31 DC 7/9/19 21:20





 


 Sodium Chloride  1,000 ml @ 


 1,000 mls/hr  1X  ONCE


 IV


   7/9/19 22:30


 7/9/19 23:29 DC 7/9/19 22:30





 


 Calcium Gluconate


  (Calcium


 Gluconate)  1,000 mg  1X  ONCE


 IVP


   7/9/19 23:00


 7/9/19 23:01 DC 7/9/19 22:43





 


 Insulin Human


 Regular


  (HumuLIN R VIAL)  10 unit  1X  ONCE


 IV


   7/9/19 23:00


 7/9/19 23:01 DC 7/9/19 23:51





 


 Dextrose


  (Dextrose


 50%-Water Syringe)  25 gm  1X  ONCE


 IV


   7/9/19 23:00


 7/9/19 23:01 DC 7/9/19 22:42





 


 Sodium Bicarbonate


  (Sodium Bicarb


 Adult 8.4% Syr)  50 meq  1X  ONCE


 IV


   7/9/19 23:00


 7/9/19 23:01 DC 7/9/19 23:49





 


 Sodium Chloride  1,000 ml @ 


 1,000 mls/hr  1X  ONCE


 IV


   7/9/19 23:00


 7/9/19 23:59 DC 7/9/19 23:50





 


 Albuterol Sulfate


  (Ventolin Neb


 Soln)  10 mg  1X  ONCE


 CONT NEB


   7/9/19 23:00


 7/9/19 23:01 DC 7/10/19 00:30





 


 Ceftriaxone Sodium


  (Rocephin)  1 gm  1X  ONCE


 IVP


   7/9/19 23:00


 7/9/19 23:01 DC 7/9/19 22:43





 


 Sodium Chloride  1,000 ml @ 


 1,000 mls/hr  1X  ONCE


 IV


   7/10/19 01:00


 7/10/19 01:59 DC 7/10/19 01:00





 


 Lorazepam


  (Ativan Inj)  0.5 mg  PRN Q6HRS  PRN


 IV


 ANXIETY / AGITATION  7/10/19 01:15


    7/10/19 11:00





 


 Morphine Sulfate


  (Morphine


 Sulfate)  4 mg  PRN Q4HRS  PRN


 IV


 SEVERE PAIN  7/10/19 09:00


    7/10/19 09:18





 


 Sodium Bicarbonate


  (Sodium Bicarb


 Adult 8.4% Syr)  100 meq  1X  ONCE


 IV


   7/10/19 10:00


 7/10/19 10:01 DC 7/10/19 10:02





 


 Sodium Chloride  1,000 ml @ 


 1,000 mls/hr  1X  ONCE


 IV


   7/10/19 10:00


 7/10/19 10:59 DC 7/10/19 09:56





 


 Ceftriaxone Sodium


  (Rocephin)  1 gm  Q24H


 IVP


   7/10/19 13:00


    7/10/19 12:49





 


 Silver


 Sulfadiazine


  (Silvadene)  1 mack  BID


 TP


   7/10/19 13:00


    7/10/19 12:49





 


 Sodium Chloride  1,000 ml @ 


 1,000 mls/hr  1X  ONCE


 IV


   7/10/19 12:45


 7/10/19 13:44  7/10/19 12:47














Imaging:


Imaging:


L-spine MRI


Impression: 


There is evidence of degenerative changes the spine with disc protrusions and 

annular tears at L4-5 and L5-S1 as well as osteophyte formation at these 

vertebral body endplates and facet hypertrophy. This contributes to central 

canal and neural foraminal stenosis as detailed above. Epidural lipomatosis is 

identified most severe in the lower lumbar spine extending into the sacrum with 

resultant small size of the thecal sac. High T2 signal is identified within the 

right greater than left paraspinal musculature and psoas which can be seen with 

edema to these regions. Can be seen with causes such as myositis, muscle strain 

or neurological in nature from causes such as denervation associated edema.





RUQ US


IMPRESSION:


1. Sludge noted in the gallbladder without gallstones.


2. Common duct normal in size.


3. Limited evaluation of the liver.





T-spine MRI


IMPRESSION:


1.   Very limited examination secondary to a large amount of patient motion. 

This examination can be repeated at a later time to better assess given this 

limitation.


2.  At the upper thoracic spine there is some regions of low T2 signal at the 

anterior aspect of the central canal. Is difficult to tell this is artifactual 

in nature or if there is a pathologic process within the region such as soft 

tissue or blood within the anterior aspect of the central canal contributing to 

this appearance. Would repeat this examination when the patient can better 

tolerate.  Would also recommend thin section axial T2 images when the patient 

can better tolerate.


3.  There is some regions of possible high T2 signal within the spinal cord. 

Again this examination is limited secondary to motion artifact however recommend

that the study be repeated with contrast to ensure that this does not persist to

suggest edema and also to ensure that there is no


foci of enhancement within the spinal cord to suggest causes such as demyelinat

ion


4.  There is some suspected degenerative changes with facet hypertrophy as well 

as disc osteophyte complex.


5.  There is some high T2 signal seen within the paraspinal musculature. Again 

this could be secondary to causes such as myositis, muscle tear or neurological 

in nature from causes such as denervation associated edema.


6.  At the posterior elements of the thoracic spine at the mid thoracic spine 

there is some apparent edema identified which also involves the adjacent 

paraspinal musculature. Again there is a large amount of motion therefore 

difficult to assess this region however posttraumatic etiology such as fracture 

or soft tissue remains within the differential  and further imaging is 

warranted. This could be obtained with repeat thoracic spine MRI when the 

patient can better tolerate and thoracic spine CT is an option to further 

evaluate for associated fracture.


7.  Focus of high T2 signal is seen at the right upper thorax posterior medially

as well. Could be secondary to some edema or fluid within the region and a 

follow-up CT could BE obtained to further evaluate to assess for fracture within

the region. This is near the region of the transverse process as well as the 

right rib.





C-spine MRI


IMPRESSION:


1. Exam is limited due to motion as well as very limited sequences able to be 

obtained. There is no significant cervical spinal stenosis, likely mild 

narrowing at C3-4 on developmental basis. No defined or expansile cervical cord 

signal abnormality is identified.





CXR


Impression: No evidence of acute cardiopulmonary process.





PE:





GEN: NAD


HEENT: Atraumatic, PERRL


LUNGS: room air, clear anteriorly


HEART: tachycardic


ABD: NABS, S/ND/NT


EXTREMITY: No edema


SKIN: wound/burn on index finger


NEURO/PSYCH: A & O 3, poor historian





A/P:


A/P:


+meth/substance abuse


LE weakness, fall - ?related to meth, neuro ruling out transverse myelitis


Leukocytosis (resolved), rhabdo, EL, hyponatremia, hyperkalemia, hypocalcemia, 

UTI


Transaminitis (better), mildly elevated lipase (no symptoms of pancreatitis)


GERD, h/o PUD - on PPI, says had EGD in [place], [place] ~2 years ago


CRC screen - average risk


H/o intermittent constipation


GB sludge





--


US unrevealing, AST and ALT improved, bili and AP remain WNL.


Hepatitis panel ordered per primary.


Add acid-reducer w/ h/o GERD and ulcer.











LIZ GARCIA         Jul 10, 2019 13:19

## 2019-07-10 NOTE — NUR
IV finally secured inLt AC and fluids restarted. Pt awake, occ confused but cooperative. MRI 
results were called to DR Garrett who ordered neuro consult,Dr Arrington here. LP ordered 
and pt agreeable signed consent. Pt with decreased movement andf sensation to lower ext. C/o 
pain back lower. DR ventura paged. DR Kelley consult placed. as well as DR Wild. Pt states 
no family but only friend at this time. Did not want him called.

## 2019-07-10 NOTE — PDOC2
CONSULT


Date of Consult


Date of Consult


DATE: 7/10/19 


TIME: 10:32





Reason for Consult


Reason for Consult:


Per ER provider" Hepatorenal Syndrome"





Referring Physician


Referring Physician:


ER- Physician assistant





Source


Source:  Chart review, Patient





History of Present Illness


Reason for Visit:


 The patient is a 29-year-old CM  with Hx substance abuse including marijuana 

and meth.  He was found smoking meth with


a girl. He  was noted to have numbness down his legs, became weak and reportedly

fell. Denies  shortness of breath, no cough, no fever,


no chills. 


Per ER provider last night- "Pt stable, BUN, Cr elevated, Increased K- No EKG 

changes. Nephrology consulted for " Hepatorenal syndrome" 


Bladder scan showed urine retention of 900 ml, helms was Placed. Advised 

aggressive IV Hydration 





This morning on review of chart Pt noted to have very high  CPK was 

>100,000,elevated LFT's, Low Bicarb  and Hx of  Meth use  . 


He has received 3 lts of fluid . No CxR done in the ER 





   He had thoracic spine MRI, which was abnormal for which Neurosurgery was 

consulted and


they did not think that there is anything surgically needs to be done. Neurology

has been consulted and the lumbar puncture has been ordered.  


Pt is being taken to Radiology for LP now . He is on room air and no obvious 

respiratory distress.





Family History


Family History:  Hypertension





Social History


<1 pack per day


ALCOHOL:  occassional


Drugs:  Crystal meth





Current Problem List


Problem List


Problems


Medical Problems:


(1) Elevated troponin


Status: Acute  





(2) Hepatorenal syndrome


Status: Acute  





(3) Hyperkalemia


Status: Acute  





(4) Hyponatremia


Status: Acute  





(5) Methamphetamine abuse


Status: Acute  





(6) Neurological complaint


Status: Acute  





(7) Rhabdomyolysis


Status: Acute  





(8) Urinary retention


Status: Acute  





(9) Urinary tract infection


Status: Acute  











Current Medications


Current Medications





Current Medications


Morphine Sulfate (Morphine Sulfate) 4 mg 1X  ONCE IV  Last administered on 

7/9/19at 21:20;  Start 7/9/19 at 21:30;  Stop 7/9/19 at 21:31;  Status DC


Sodium Chloride 1,000 ml @  1,000 mls/hr 1X  ONCE IV  Last administered on 

7/9/19at 22:30;  Start 7/9/19 at 22:30;  Stop 7/9/19 at 23:29;  Status DC


Calcium Gluconate (Calcium Gluconate) 1,000 mg 1X  ONCE IVP  Last administered 

on 7/9/19at 22:43;  Start 7/9/19 at 23:00;  Stop 7/9/19 at 23:01;  Status DC


Insulin Human Regular (HumuLIN R VIAL) 10 unit 1X  ONCE IV  Last administered on

7/9/19at 23:51;  Start 7/9/19 at 23:00;  Stop 7/9/19 at 23:01;  Status DC


Dextrose (Dextrose 50%-Water Syringe) 25 gm 1X  ONCE IV  Last administered on 

7/9/19at 22:42;  Start 7/9/19 at 23:00;  Stop 7/9/19 at 23:01;  Status DC


Sodium Bicarbonate (Sodium Bicarb Adult 8.4% Syr) 50 meq 1X  ONCE IV  Last 

administered on 7/9/19at 23:49;  Start 7/9/19 at 23:00;  Stop 7/9/19 at 23:01;  

Status DC


Sodium Chloride 1,000 ml @  1,000 mls/hr 1X  ONCE IV  Last administered on 

7/9/19at 23:50;  Start 7/9/19 at 23:00;  Stop 7/9/19 at 23:59;  Status DC


Albuterol Sulfate (Ventolin Neb Soln) 10 mg 1X  ONCE CONT NEB  Last administered

on 7/10/19at 00:30;  Start 7/9/19 at 23:00;  Stop 7/9/19 at 23:01;  Status DC


Ceftriaxone Sodium (Rocephin) 1 gm 1X  ONCE IVP  Last administered on 7/9/19at 

22:43;  Start 7/9/19 at 23:00;  Stop 7/9/19 at 23:01;  Status DC


Sodium Chloride 1,000 ml @  1,000 mls/hr 1X  ONCE IV  Last administered on 

7/10/19at 01:00;  Start 7/10/19 at 01:00;  Stop 7/10/19 at 01:59;  Status DC


Lorazepam (Ativan Inj) 0.5 mg PRN Q6HRS  PRN IV ANXIETY / AGITATION Last 

administered on 7/10/19at 03:05;  Start 7/10/19 at 01:15


Ondansetron HCl (Zofran) 4 mg PRN Q6HRS  PRN IV NAUSEA/VOMITING 1ST CHOICE;  

Start 7/10/19 at 01:15;  Stop 7/10/19 at 01:22;  Status DC


Sodium Chloride (Normal Saline Flush) 3 ml QSHIFT  PRN IV AFTER MEDS AND BLOOD 

DRAWS;  Start 7/10/19 at 01:15


Sodium Chloride 1,000 ml @  175 mls/hr Q5H43M IV ;  Start 7/10/19 at 01:08;  

Stop 7/10/19 at 01:23;  Status DC


Ondansetron HCl (Zofran) 4 mg PRN Q8HRS  PRN IV NAUSEA/VOMITING  1ST CHOICE;  

Start 7/10/19 at 01:15;  Stop 7/11/19 at 01:14


Sodium Chloride 1,000 ml @  175 mls/hr Q5H43M IV ;  Start 7/10/19 at 01:30;  

Stop 7/11/19 at 01:29


Morphine Sulfate (Morphine Sulfate) 4 mg PRN Q4HRS  PRN IV SEVERE PAIN Last 

administered on 7/10/19at 09:18;  Start 7/10/19 at 09:00


Sodium Bicarbonate (Sodium Bicarb Adult 8.4% Syr) 100 meq 1X  ONCE IV  Last 

administered on 7/10/19at 10:02;  Start 7/10/19 at 10:00;  Stop 7/10/19 at 

10:01;  Status DC


Sodium Chloride 1,000 ml @  1,000 mls/hr 1X  ONCE IV  Last administered on 

7/10/19at 09:56;  Start 7/10/19 at 10:00;  Stop 7/10/19 at 10:59





Allergies


Allergies:  


Coded Allergies:  


     No Known Drug Allergies (Unverified , 7/9/19)





ROS


Review of System


   Per HPI





Physical Exam


Physical Exam


GEN:    NAD 


HEENT:  Sclerae nonicteric, OM moist  


NECK:  Supple.


LUNGS:  Clear, No accessory muscle use 


CARDIOVASCULAR:  Regular rate.


ABDOMEN:  Soft, NT 


EXTREMITIES:   edema in the right hand, LE edema trace  


SKIN-He has multiple punctate skin,lesions.  He has some ulceration in one of 

the right hand finger.  


- Helms +


NEURO- per Neurologist exam , AXOX3


Vital Signs





Vital Signs








  Date Time  Temp Pulse Resp B/P (MAP) Pulse Ox O2 Delivery O2 Flow Rate FiO2


 


7/10/19 09:18   20   Room Air  


 


7/10/19 05:00  105  146/95 (112) 98   


 


7/10/19 04:45 98.3      98.0 





 98.3       








Assessment & Plan


EL - ATN2/2 Rhabdo , Meth use  


Uinary retention at Presentation, Decreased UP since 


Recd IVF, No signifciant improvement in renal function


HD today 





Rhabdo- Very high  CK 


Follow CPK, No significant diuresis with IVF  


HD  for multiple Metabolic abnorm  





Hyperkalemia- HD 





Hyponatremia- some improvement with IVF 





Severe Metabolic acidosis- 2/2 all above 





Transaminitis 





Hypocalcemia- severe 2/2  Rhabdo


Replace cautiously and Monitor closely for Hypercalcemia during recovery phase 





Discussed with Pt and RN at bedside





Labs


Labs





Laboratory Tests








Test


 7/9/19


20:50 7/9/19


21:00 7/10/19


05:15 7/10/19


09:39


 


White Blood Count


 17.0 x10^3/uL


(4.0-11.0) 


 11.0 x10^3/uL


(4.0-11.0) 





 


Red Blood Count


 4.77 x10^6/uL


(4.30-5.70) 


 4.37 x10^6/uL


(4.30-5.70) 





 


Hemoglobin


 13.9 g/dL


(13.0-17.5) 


 12.8 g/dL


(13.0-17.5) 





 


Hematocrit


 39.0 %


(39.0-53.0) 


 35.7 %


(39.0-53.0) 





 


Mean Corpuscular Volume 82 fL ()   82 fL ()  


 


Mean Corpuscular Hemoglobin 29 pg (25-35)   29 pg (25-35)  


 


Mean Corpuscular Hemoglobin


Concent 36 g/dL


(31-37) 


 36 g/dL


(31-37) 





 


Red Cell Distribution Width


 13.0 %


(11.5-14.5) 


 12.8 %


(11.5-14.5) 





 


Platelet Count


 320 x10^3/uL


(140-400) 


 217 x10^3/uL


(140-400) 





 


Neutrophils (%) (Auto) 92 % (31-73)   89 % (31-73)  


 


Lymphocytes (%) (Auto) 3 % (24-48)   7 % (24-48)  


 


Monocytes (%) (Auto) 4 % (0-9)   4 % (0-9)  


 


Eosinophils (%) (Auto) 0 % (0-3)   0 % (0-3)  


 


Basophils (%) (Auto) 0 % (0-3)   0 % (0-3)  


 


Neutrophils # (Auto)


 15.7 x10^3uL


(1.8-7.7) 


 9.7 x10^3uL


(1.8-7.7) 





 


Lymphocytes # (Auto)


 0.5 x10^3/uL


(1.0-4.8) 


 0.8 x10^3/uL


(1.0-4.8) 





 


Monocytes # (Auto)


 0.7 x10^3/uL


(0.0-1.1) 


 0.5 x10^3/uL


(0.0-1.1) 





 


Eosinophils # (Auto)


 0.0 x10^3/uL


(0.0-0.7) 


 0.0 x10^3/uL


(0.0-0.7) 





 


Basophils # (Auto)


 0.0 x10^3/uL


(0.0-0.2) 


 0.0 x10^3/uL


(0.0-0.2) 





 


Segmented Neutrophils % 90 % (35-66)    


 


Lymphocytes % 5 % (24-48)    


 


Monocytes % 5 % (0-10)    


 


Platelet Estimate


 Adequate


(ADEQUATE) 


 


 





 


Polychromasia Slight    


 


Anisocytosis Slight    


 


Prothrombin Time


 14.7 SEC


(11.7-14.0) 


 


 





 


Prothromb Time International


Ratio 1.2 (0.8-1.1) 


 


 


 





 


Activated Partial


Thromboplast Time 31 SEC (24-38) 


 


 


 





 


Sodium Level


 112 mmol/L


(136-145) 


 117 mmol/L


(136-145) 117 mmol/L


(136-145)


 


Potassium Level


 7.0 mmol/L


(3.5-5.1) 


 5.6 mmol/L


(3.5-5.1) 6.6 mmol/L


(3.5-5.1)


 


Chloride Level


 73 mmol/L


() 


 80 mmol/L


() 81 mmol/L


()


 


Carbon Dioxide Level


 12 mmol/L


(21-32) 


 10 mmol/L


(21-32) 9 mmol/L


(21-32)


 


Anion Gap 27 (6-14)   27 (6-14)  27 (6-14) 


 


Blood Urea Nitrogen


 110 mg/dL


(8-26) 


 112 mg/dL


(8-26) 115 mg/dL


(8-26)


 


Creatinine


 13.4 mg/dL


(0.7-1.3) 


 13.2 mg/dL


(0.7-1.3) 13.4 mg/dL


(0.7-1.3)


 


Estimated GFR


(Cockcroft-Gault) 4.4 


 


 4.5 


 4.4 





 


BUN/Creatinine Ratio 8 (6-20)   8 (6-20)  


 


Glucose Level


 81 mg/dL


(70-99) 


 70 mg/dL


(70-99) 81 mg/dL


(70-99)


 


Lactic Acid Level


 1.1 mmol/L


(0.4-2.0) 


 


 





 


Calcium Level


 5.4 mg/dL


(8.5-10.1) 


 5.0 mg/dL


(8.5-10.1) < 5.0 mg/dL


(8.5-10.1)


 


Magnesium Level


 3.0 mg/dL


(1.8-2.4) 


 


 





 


Total Bilirubin


 0.9 mg/dL


(0.2-1.0) 


 0.6 mg/dL


(0.2-1.0) 





 


Aspartate Amino Transf


(AST/SGOT) 2923 U/L


(15-37) 


 2204 U/L


(15-37) 





 


Alanine Aminotransferase


(ALT/SGPT) 818 U/L


(16-63) 


 603 U/L


(16-63) 





 


Alkaline Phosphatase


 89 U/L


() 


 71 U/L


() 





 


Creatine Kinase


 > 618888 U/L


() 


 


 





 


Troponin I Quantitative


 0.352 ng/mL


(0.000-0.055) 


 


 





 


Total Protein


 7.0 g/dL


(6.4-8.2) 


 6.2 g/dL


(6.4-8.2) 





 


Albumin


 3.4 g/dL


(3.4-5.0) 


 2.6 g/dL


(3.4-5.0) 





 


Albumin/Globulin Ratio 0.9 (1.0-1.7)   0.7 (1.0-1.7)  


 


Lipase


 972 U/L


() 


 


 





 


Ethyl Alcohol Level


 < 10 mg/dL


(0-10) 


 


 





 


Urine Color  Red   


 


Urine Clarity  Clear   


 


Urine pH  6.0   


 


Urine Specific Gravity  1.020   


 


Urine Protein


 


 >=300 mg/dL


(NEG-TRACE) 


 





 


Urine Glucose (UA)


 


 100 mg/dL


(NEG) 


 





 


Urine Ketones (Stick)


 


 Trace mg/dL


(NEG) 


 





 


Urine Blood  Large (NEG)   


 


Urine Nitrite  Positive (NEG)   


 


Urine Bilirubin  Moderate (NEG)   


 


Urine Urobilinogen Dipstick


 


 0.2 mg/dL (0.2


mg/dL) 


 





 


Urine Leukocyte Esterase  Small (NEG)   


 


Urine RBC  3-5 /HPF (0-2)   


 


Urine WBC  Occ /HPF (0-4)   


 


Urine Squamous Epithelial


Cells 


 None /LPF 


 


 





 


Urine Amorphous Sediment  Present /HPF   


 


Urine Bacteria  0 /HPF (0-FEW)   


 


Urine Random Creatinine


 


 199.6 mg/dL


(Not Establ.) 


 





 


Urine Opiates Screen  Neg (NEG)   


 


Urine Methadone Screen  Neg (NEG)   


 


Urine Barbiturates  Neg (NEG)   


 


Urine Phencyclidine Screen  Neg (NEG)   


 


Urine


Amphetamine/Methamphetamine 


 Pos (NEG) 


 


 





 


Urine Benzodiazepines Screen  Neg (NEG)   


 


Urine Cocaine Screen  Neg (NEG)   


 


Urine Cannabinoids Screen  Neg (NEG)   


 


Urine Ethyl Alcohol  Neg (NEG)   


 


Thyroid Stimulating Hormone


(TSH) 


 


 


 2.175 uIU/mL


(0.358-3.74)








Laboratory Tests








Test


 7/9/19


20:50 7/9/19


21:00 7/10/19


05:15 7/10/19


09:39


 


White Blood Count


 17.0 x10^3/uL


(4.0-11.0) 


 11.0 x10^3/uL


(4.0-11.0) 





 


Red Blood Count


 4.77 x10^6/uL


(4.30-5.70) 


 4.37 x10^6/uL


(4.30-5.70) 





 


Hemoglobin


 13.9 g/dL


(13.0-17.5) 


 12.8 g/dL


(13.0-17.5) 





 


Hematocrit


 39.0 %


(39.0-53.0) 


 35.7 %


(39.0-53.0) 





 


Mean Corpuscular Volume 82 fL ()   82 fL ()  


 


Mean Corpuscular Hemoglobin 29 pg (25-35)   29 pg (25-35)  


 


Mean Corpuscular Hemoglobin


Concent 36 g/dL


(31-37) 


 36 g/dL


(31-37) 





 


Red Cell Distribution Width


 13.0 %


(11.5-14.5) 


 12.8 %


(11.5-14.5) 





 


Platelet Count


 320 x10^3/uL


(140-400) 


 217 x10^3/uL


(140-400) 





 


Neutrophils (%) (Auto) 92 % (31-73)   89 % (31-73)  


 


Lymphocytes (%) (Auto) 3 % (24-48)   7 % (24-48)  


 


Monocytes (%) (Auto) 4 % (0-9)   4 % (0-9)  


 


Eosinophils (%) (Auto) 0 % (0-3)   0 % (0-3)  


 


Basophils (%) (Auto) 0 % (0-3)   0 % (0-3)  


 


Neutrophils # (Auto)


 15.7 x10^3uL


(1.8-7.7) 


 9.7 x10^3uL


(1.8-7.7) 





 


Lymphocytes # (Auto)


 0.5 x10^3/uL


(1.0-4.8) 


 0.8 x10^3/uL


(1.0-4.8) 





 


Monocytes # (Auto)


 0.7 x10^3/uL


(0.0-1.1) 


 0.5 x10^3/uL


(0.0-1.1) 





 


Eosinophils # (Auto)


 0.0 x10^3/uL


(0.0-0.7) 


 0.0 x10^3/uL


(0.0-0.7) 





 


Basophils # (Auto)


 0.0 x10^3/uL


(0.0-0.2) 


 0.0 x10^3/uL


(0.0-0.2) 





 


Segmented Neutrophils % 90 % (35-66)    


 


Lymphocytes % 5 % (24-48)    


 


Monocytes % 5 % (0-10)    


 


Platelet Estimate


 Adequate


(ADEQUATE) 


 


 





 


Polychromasia Slight    


 


Anisocytosis Slight    


 


Prothrombin Time


 14.7 SEC


(11.7-14.0) 


 


 





 


Prothromb Time International


Ratio 1.2 (0.8-1.1) 


 


 


 





 


Activated Partial


Thromboplast Time 31 SEC (24-38) 


 


 


 





 


Sodium Level


 112 mmol/L


(136-145) 


 117 mmol/L


(136-145) 117 mmol/L


(136-145)


 


Potassium Level


 7.0 mmol/L


(3.5-5.1) 


 5.6 mmol/L


(3.5-5.1) 6.6 mmol/L


(3.5-5.1)


 


Chloride Level


 73 mmol/L


() 


 80 mmol/L


() 81 mmol/L


()


 


Carbon Dioxide Level


 12 mmol/L


(21-32) 


 10 mmol/L


(21-32) 9 mmol/L


(21-32)


 


Anion Gap 27 (6-14)   27 (6-14)  27 (6-14) 


 


Blood Urea Nitrogen


 110 mg/dL


(8-26) 


 112 mg/dL


(8-26) 115 mg/dL


(8-26)


 


Creatinine


 13.4 mg/dL


(0.7-1.3) 


 13.2 mg/dL


(0.7-1.3) 13.4 mg/dL


(0.7-1.3)


 


Estimated GFR


(Cockcroft-Gault) 4.4 


 


 4.5 


 4.4 





 


BUN/Creatinine Ratio 8 (6-20)   8 (6-20)  


 


Glucose Level


 81 mg/dL


(70-99) 


 70 mg/dL


(70-99) 81 mg/dL


(70-99)


 


Lactic Acid Level


 1.1 mmol/L


(0.4-2.0) 


 


 





 


Calcium Level


 5.4 mg/dL


(8.5-10.1) 


 5.0 mg/dL


(8.5-10.1) < 5.0 mg/dL


(8.5-10.1)


 


Magnesium Level


 3.0 mg/dL


(1.8-2.4) 


 


 





 


Total Bilirubin


 0.9 mg/dL


(0.2-1.0) 


 0.6 mg/dL


(0.2-1.0) 





 


Aspartate Amino Transf


(AST/SGOT) 2923 U/L


(15-37) 


 2204 U/L


(15-37) 





 


Alanine Aminotransferase


(ALT/SGPT) 818 U/L


(16-63) 


 603 U/L


(16-63) 





 


Alkaline Phosphatase


 89 U/L


() 


 71 U/L


() 





 


Creatine Kinase


 > 303703 U/L


() 


 


 





 


Troponin I Quantitative


 0.352 ng/mL


(0.000-0.055) 


 


 





 


Total Protein


 7.0 g/dL


(6.4-8.2) 


 6.2 g/dL


(6.4-8.2) 





 


Albumin


 3.4 g/dL


(3.4-5.0) 


 2.6 g/dL


(3.4-5.0) 





 


Albumin/Globulin Ratio 0.9 (1.0-1.7)   0.7 (1.0-1.7)  


 


Lipase


 972 U/L


() 


 


 





 


Ethyl Alcohol Level


 < 10 mg/dL


(0-10) 


 


 





 


Urine Color  Red   


 


Urine Clarity  Clear   


 


Urine pH  6.0   


 


Urine Specific Gravity  1.020   


 


Urine Protein


 


 >=300 mg/dL


(NEG-TRACE) 


 





 


Urine Glucose (UA)


 


 100 mg/dL


(NEG) 


 





 


Urine Ketones (Stick)


 


 Trace mg/dL


(NEG) 


 





 


Urine Blood  Large (NEG)   


 


Urine Nitrite  Positive (NEG)   


 


Urine Bilirubin  Moderate (NEG)   


 


Urine Urobilinogen Dipstick


 


 0.2 mg/dL (0.2


mg/dL) 


 





 


Urine Leukocyte Esterase  Small (NEG)   


 


Urine RBC  3-5 /HPF (0-2)   


 


Urine WBC  Occ /HPF (0-4)   


 


Urine Squamous Epithelial


Cells 


 None /LPF 


 


 





 


Urine Amorphous Sediment  Present /HPF   


 


Urine Bacteria  0 /HPF (0-FEW)   


 


Urine Random Creatinine


 


 199.6 mg/dL


(Not Establ.) 


 





 


Urine Opiates Screen  Neg (NEG)   


 


Urine Methadone Screen  Neg (NEG)   


 


Urine Barbiturates  Neg (NEG)   


 


Urine Phencyclidine Screen  Neg (NEG)   


 


Urine


Amphetamine/Methamphetamine 


 Pos (NEG) 


 


 





 


Urine Benzodiazepines Screen  Neg (NEG)   


 


Urine Cocaine Screen  Neg (NEG)   


 


Urine Cannabinoids Screen  Neg (NEG)   


 


Urine Ethyl Alcohol  Neg (NEG)   


 


Thyroid Stimulating Hormone


(TSH) 


 


 


 2.175 uIU/mL


(0.358-3.74)








Review


All relevant outside records, renal labs, imaging studies, telemetry/EKG's were 

reviewed.











BENIGNO JOHNSON MD                Jul 10, 2019 10:32

## 2019-07-10 NOTE — RAD
INDICATION:  Loss of rectal tone with concern for neurologic origin.

 

COMPARISON: MRI lumbar earlier same day.

 

TECHNIQUE: Multiplanar, multisequence MRI images are obtained through the 

thoracic spine without intravenous contrast.

 

FINDINGS:

 

There is patient motion on numerous sequences since the patient was having

difficulty tolerating the examination at this time.

At the upper thoracic spine on the T2 sagittal images there is apparent 

low T2 signal at the anterior aspect of the central canal however this is 

in a region of motion artifact.

On the axial T2-weighted images there is also some low T2 signal intensity

material at the anterior aspect of the central canal at the upper thoracic

spine including at least the T2-T5 level.

There is some suspected facet hypertrophy as well as well. Suspected disc 

protrusion or osteophyte formation at the T3-4 level on the right.

There is some possible mild high T2 signal seen within the thoracic spinal

cord with patchy appearance.

Prominent epidural fat is identified throughout the thoracic spine.

 

There is high T2 signal seen within the right paraspinal musculature 

within the thoracic region as well.

 

IMPRESSION:

 

1.   Very limited examination secondary to a large amount of patient 

motion. This examination can be repeated at a later time to better assess 

given this limitation.

 

2.  At the upper thoracic spine there is some regions of low T2 signal at 

the anterior aspect of the central canal. Is difficult to tell this is 

artifactual in nature or if there is a pathologic process within the 

region such as soft tissue or blood within the anterior aspect of the 

central canal contributing to this appearance. Would repeat this 

examination when the patient can better tolerate.  Would also recommend 

thin section axial T2 images when the patient can better tolerate.

 

3.  There is some regions of possible high T2 signal within the spinal 

cord. Again this examination is limited secondary to motion artifact 

however recommend that the study be repeated with contrast to ensure that 

this does not persist to suggest edema and also to ensure that there is no

foci of enhancement within the spinal cord to suggest causes such as 

demyelination

 

4.  There is some suspected degenerative changes with facet hypertrophy as

well as disc osteophyte complex.

 

5.  There is some high T2 signal seen within the paraspinal musculature. 

Again this could be secondary to causes such as myositis, muscle tear or 

neurological in nature from causes such as denervation associated edema.

 

6.  At the posterior elements of the thoracic spine at the mid thoracic 

spine there is some apparent edema identified which also involves the 

adjacent paraspinal musculature. Again there is a large amount of motion 

therefore difficult to assess this region however posttraumatic etiology 

such as fracture or soft tissue remains within the differential  and 

further imaging is warranted. This could be obtained with repeat thoracic 

spine MRI when the patient can better tolerate and thoracic spine CT is an

option to further evaluate for associated fracture.

7.  Focus of high T2 signal is seen at the right upper thorax posterior 

medially as well. Could be secondary to some edema or fluid within the 

region and a follow-up CT could BE obtained to further evaluate to assess 

for fracture within the region. This is near the region of the transverse 

process as well as the right rib.

8.  Report was called to the patient's nurse on the floor at the time of 

dictation.

 

Electronically signed by: Bahman Hart MD (7/10/2019 6:30 AM) [id number]

## 2019-07-10 NOTE — RAD
Chest radiograph 7/10/2019 12:00 AM

 

INDICATION: Catheter placement

 

COMPARISON: July 10, 2019

 

TECHNIQUE: Frontal view of the chest is provided.

 

FINDINGS:

 

The cardiomediastinal silhouette is within normal limits. Double lumen 

right IJ central venous catheter is identified with the distal tip 

projecting over the right atrium.

 

There are no pleural effusions. Mild pulmonary vascular congestion. There 

is no pneumothorax.

 

The lungs are clear.

 

No significant osseous abnormality is identified.

 

IMPRESSION:

 

Mild pulmonary vascular congestion as may be seen with fluid overload 

state.

 

Right IJ double lumen central venous catheter is identified at the distal 

tip projecting over the right atrium.

 

Electronically signed by: Hollie Junior MD (7/10/2019 2:45 PM) 

[id number]

## 2019-07-10 NOTE — NUR
SS following for discharge planning. SS reviewed pt chart. Per chart pt is homeless and is 
currently on room air. Pt is self pay. [facility] following for self pay status. SS will continue 
to follow for discharge planning.

## 2019-07-10 NOTE — RAD
Indication: Back pain with difficulty with urination

 

Procedure: Multiplanar multisequence MRI images obtained through the 

lumbar spine without intravenous contrast.

 

Comparison: None.

 

Findings:

 

Edema to the subcutaneous soft tissues posteriorly.

No definite acute fracture.

There is prominent epidural fat identified at L5 as well as within the 

sacral region.

There is some regions of high T2 signal within the paraspinal musculature 

and right psoas.

 

T12-L1: No disc bulge.  No significant central canal or neuroforaminal 

stenosis. 

L1-L2: No disc bulge.  No significant central canal or neuroforaminal 

stenosis. Facet hypertrophy.

L2-L3: No disc bulge.  No significant central canal or neuroforaminal 

stenosis. Facet hypertrophy.

L3-L4:  No disc bulge.  No significant central canal or neuroforaminal 

stenosis. Facet hypertrophy with small joint effusion. Mild disc 

osteophyte complex. Thecal sac is 10 mm anterior to posterior.

L4-L5: Posterior disc protrusion with osteophyte formation and annular 

tear suspected. Ligament of flavum and facet hypertrophy with facet joint 

effusions. Effacement of the bilateral lateral recess. Neural foramina 

patent. Mild central canal narrowing. 

L5-S1:  Facet hypertrophy with facet joint effusions and ligamentum flavum

hypertrophy. Large amount of epidural fat with small size of thecal sac at

this level. Disc protrusion and annular tear. Mild right and mild to 

moderate left neural foraminal stenosis. 

 

Impression: 

 

There is evidence of degenerative changes the spine with disc protrusions 

and annular tears at L4-5 and L5-S1 as well as osteophyte formation at 

these vertebral body endplates and facet hypertrophy. This contributes to 

central canal and neural foraminal stenosis as detailed above.

 

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 

extending into the sacrum with resultant small size of the thecal sac.

 

High T2 signal is identified within the right greater than left paraspinal

musculature and psoas which can be seen with edema to these regions. Can 

be seen with causes such as myositis, muscle strain or neurological in 

nature from causes such as denervation associated edema.

 

Electronically signed by: Bahman Hart MD (7/10/2019 12:04 AM) [facility]

## 2019-07-10 NOTE — NUR
LP done in xray dept. with Ativan given during proc and able to compl;ete the proc. Will 
keep pt <30 degrees for 2 hr. NS boluses infusing. Consent signed for Temp dialysis cath and 
hemodialysis. Pt verbalized understanding of these proc. Allowed this nurse to call his 
mother or father who live in [facility]. No answer. Sips of water tolerated. Was released 
from [facility] on Friday.

## 2019-07-10 NOTE — RAD
MRI Cervical Spine Without Contrast

 

History: Loss of rectal tone

 

Technique: Limited noncontrast MR imaging was performed of the cervical 

spine. Patient could not tolerate further imaging, only sagittal T2 

sequence and markedly motion degraded sagittal STIR sequences acquired.

 

Comparison: None

 

Findings: Exam is limited due to motion and also limited sequences able to

be obtained. Cervical cord caliber is within normal limits without defined

or expansile signal abnormality. No significant cervical spinal stenosis 

is identified. There is no significant focal posterior disc abnormality of

cervical spine, likely mild narrowing 9 to 10 mm at C3-4 on developmental 

basis. Neural foramina are poorly characterized due to motion without any 

axial images, likely overall adequate.

 

 

IMPRESSION:

 

1. Exam is limited due to motion as well as very limited sequences able to

be obtained. There is no significant cervical spinal stenosis, likely mild

narrowing at C3-4 on developmental basis. No defined or expansile cervical

cord signal abnormality is identified.

 

Electronically signed by: Deangelo Campuzano MD (7/10/2019 12:30 PM) 

[id number]

## 2019-07-10 NOTE — NUR
Dialysis placement per Dr Hernandez and confirmed. Dialysis RN notified. Pt sleeping O2 sat 97 
on RA. No urine. VSS

## 2019-07-10 NOTE — PDOC1
History and Physical


Date of Admission


Date of Admission


DATE: 7/10/19 


TIME: 10:27





Identification/Chief Complaint


Chief Complaint


SEEN IN ER, 29  year old male who presents to the ER with multiple complaints. 

Patient is a poor historian. The patient states he got out of [facility] on Friday, 

and then went and smoked meth with a girl. Patient states he was sitting on the 

ground and on his leg and started having numbness going down his legs. The 

patient when asked again states he fell on Friday. States today he is having 

bilateral pain down his legs with reduced sensation, also states he's not had a 

bowel movement, or been able to urinate since Saturday. If his pain is 10 out of

10 in severity and pins and needles. Lower back pain NOTED  ARF ON LABS  burn 

noted right index finger





Past Medical History


Past Medical History:  Anxiety, Depression


Past Surgical History:  No Surgical History


Smoking:  Quit Greater Than 1 Year


Alcohol Use:  None


Drug Use:  Methamphetamine





FAMILY HX ANXIETY





Family History


Family History:  Hypertension





Social History


Smoke:  <1 pack per day


ALCOHOL:  occassional


Drugs:  Crystal meth, Other (smokes his meth)





Current Problem List


Problem List


Problems


Medical Problems:


(1) Elevated troponin


Status: Acute  





(2) Hepatorenal syndrome


Status: Acute  





(3) Hyperkalemia


Status: Acute  





(4) Hyponatremia


Status: Acute  





(5) Methamphetamine abuse


Status: Acute  





(6) Neurological complaint


Status: Acute  





(7) Rhabdomyolysis


Status: Acute  





(8) Urinary retention


Status: Acute  





(9) Urinary tract infection


Status: Acute  











Current Medications


Current Medications





Current Medications


Morphine Sulfate (Morphine Sulfate) 4 mg 1X  ONCE IV  Last administered on 7/9 /19at 21:20;  Start 7/9/19 at 21:30;  Stop 7/9/19 at 21:31;  Status DC


Sodium Chloride 1,000 ml @  1,000 mls/hr 1X  ONCE IV  Last administered on 

7/9/19at 22:30;  Start 7/9/19 at 22:30;  Stop 7/9/19 at 23:29;  Status DC


Calcium Gluconate (Calcium Gluconate) 1,000 mg 1X  ONCE IVP  Last administered 

on 7/9/19at 22:43;  Start 7/9/19 at 23:00;  Stop 7/9/19 at 23:01;  Status DC


Insulin Human Regular (HumuLIN R VIAL) 10 unit 1X  ONCE IV  Last administered on

7/9/19at 23:51;  Start 7/9/19 at 23:00;  Stop 7/9/19 at 23:01;  Status DC


Dextrose (Dextrose 50%-Water Syringe) 25 gm 1X  ONCE IV  Last administered on 

7/9/19at 22:42;  Start 7/9/19 at 23:00;  Stop 7/9/19 at 23:01;  Status DC


Sodium Bicarbonate (Sodium Bicarb Adult 8.4% Syr) 50 meq 1X  ONCE IV  Last 

administered on 7/9/19at 23:49;  Start 7/9/19 at 23:00;  Stop 7/9/19 at 23:01;  

Status DC


Sodium Chloride 1,000 ml @  1,000 mls/hr 1X  ONCE IV  Last administered on 

7/9/19at 23:50;  Start 7/9/19 at 23:00;  Stop 7/9/19 at 23:59;  Status DC


Albuterol Sulfate (Ventolin Neb Soln) 10 mg 1X  ONCE CONT NEB  Last administered

on 7/10/19at 00:30;  Start 7/9/19 at 23:00;  Stop 7/9/19 at 23:01;  Status DC


Ceftriaxone Sodium (Rocephin) 1 gm 1X  ONCE IVP  Last administered on 7/9/19at 

22:43;  Start 7/9/19 at 23:00;  Stop 7/9/19 at 23:01;  Status DC


Sodium Chloride 1,000 ml @  1,000 mls/hr 1X  ONCE IV  Last administered on 

7/10/19at 01:00;  Start 7/10/19 at 01:00;  Stop 7/10/19 at 01:59;  Status DC


Lorazepam (Ativan Inj) 0.5 mg PRN Q6HRS  PRN IV ANXIETY / AGITATION Last 

administered on 7/10/19at 03:05;  Start 7/10/19 at 01:15


Ondansetron HCl (Zofran) 4 mg PRN Q6HRS  PRN IV NAUSEA/VOMITING 1ST CHOICE;  

Start 7/10/19 at 01:15;  Stop 7/10/19 at 01:22;  Status DC


Sodium Chloride (Normal Saline Flush) 3 ml QSHIFT  PRN IV AFTER MEDS AND BLOOD 

DRAWS;  Start 7/10/19 at 01:15


Sodium Chloride 1,000 ml @  175 mls/hr Q5H43M IV ;  Start 7/10/19 at 01:08;  

Stop 7/10/19 at 01:23;  Status DC


Ondansetron HCl (Zofran) 4 mg PRN Q8HRS  PRN IV NAUSEA/VOMITING  1ST CHOICE;  

Start 7/10/19 at 01:15;  Stop 7/11/19 at 01:14


Sodium Chloride 1,000 ml @  175 mls/hr Q5H43M IV ;  Start 7/10/19 at 01:30;  

Stop 7/11/19 at 01:29


Morphine Sulfate (Morphine Sulfate) 4 mg PRN Q4HRS  PRN IV SEVERE PAIN Last 

administered on 7/10/19at 09:18;  Start 7/10/19 at 09:00


Sodium Bicarbonate (Sodium Bicarb Adult 8.4% Syr) 100 meq 1X  ONCE IV  Last 

administered on 7/10/19at 10:02;  Start 7/10/19 at 10:00;  Stop 7/10/19 at 

10:01;  Status DC


Sodium Chloride 1,000 ml @  1,000 mls/hr 1X  ONCE IV  Last administered on 

7/10/19at 09:56;  Start 7/10/19 at 10:00;  Stop 7/10/19 at 10:59





Allergies


Allergies:  


Coded Allergies:  


     No Known Drug Allergies (Unverified , 7/9/19)





ROS


Review of System


Review of Systems


Review of Systems





Constitutional: Denies fever or chills [] poor historian, sedated


Eyes: Denies change in visual acuity, redness, or eye pain []


HENT: Denies nasal congestion or sore throat []


Respiratory: Denies cough or shortness of breath []


Cardiovascular: No additional information not addressed in HPI []


GI: Denies abdominal pain, nausea, vomiting, bloody stools or diarrhea []


: Denies dysuria or hematuria []


Musculoskeletal: Reports lower back pain, and reduced sensation bilaterally on 

legs.


Integument: Denies rash or skin lesions []


Neurologic: Denies headache, has weakness and sensory changes bilateral lower 

extremity. 


Endocrine: Denies polyuria or polydipsia []





14 PT systems were reviewed and found to be within normal limits, except as 

documented


General:  YES: Fatigue, Malaise


Hematological and Lymphatic:  No: Bleeding Problems, Blood Clots, Blood 

Transfusions, Brusing, Night Sweats, Pallor, Swollen Lymph Nodes, Other


Gastrointestinal:  Yes Nausea


Neurological:  Yes Gait Disturbance, Yes Numbness/Tingling





Physical Exam


Physical Exam





Physical Exam


Physical Exam





Constitutional: Well developed, well nourished, no acute distress, non-toxic 

appearance. []  sedated  after LP


HENT: Normocephalic, atraumatic, bilateral external ears normal, oropharynx 

moist, no oral exudates, nose normal. []


Eyes: PERRLA, EOMI, conjunctiva normal, no discharge. [] 


Neck: Normal range of motion, no tenderness, supple, no stridor. [] 


Cardiovascular:Heart rate regular rhythm, no murmur []


Lungs & Thorax:  Bilateral breath sounds clear to auscultation []


Abdomen: Bowel sounds normal, soft, no tenderness, no masses, no pulsatile 

masses. [] 


Skin: Warm, dry, no erythema, no rash. [] 


Back: Lumbar Tenderness, no CVA tenderness. [] 


Extremities: No tenderness, no cyanosis, no clubbing, ROM intact, no edema. []  

BURN TO RIGHT INDEX FINGER , CIGARETTE BURN


Neurologic: Alert and oriented X 3, focal weakness walking fell today, reduced 

sensory bilateral legs, and perianal area. Reduced rectal tone. +Babinski 

reflex.


Psychologic: Affect normal, judgement normal, mood normal. []


General:  Cooperative, moderate distress


HEENT:  PERRLA, EOMI


Lungs:  Normal air movement


Heart:  RRR, no thrills


Breasts:  Not examined


Abdomen:  Normal bowel sounds, Soft


Rectal Exam:  not examined


PELVIC:  Examination not indicated


Extremities:  No cyanosis, Other (burn to right index finger APPEARS OLD  

associated  cellulitis)


Neuro:  Normal speech, Cranial nerves 3-12 NL


Psych/Mental Status:  Mental status NL





Vitals


Vitals





Vital Signs








  Date Time  Temp Pulse Resp B/P (MAP) Pulse Ox O2 Delivery O2 Flow Rate FiO2


 


7/10/19 09:18   20   Room Air  


 


7/10/19 05:00  105  146/95 (112) 98   


 


7/10/19 04:45 98.3      98.0 





 98.3       











Labs


Labs





Laboratory Tests








Test


 7/9/19


20:50 7/9/19


21:00 7/10/19


05:15 7/10/19


09:39


 


White Blood Count


 17.0 x10^3/uL


(4.0-11.0) 


 11.0 x10^3/uL


(4.0-11.0) 





 


Red Blood Count


 4.77 x10^6/uL


(4.30-5.70) 


 4.37 x10^6/uL


(4.30-5.70) 





 


Hemoglobin


 13.9 g/dL


(13.0-17.5) 


 12.8 g/dL


(13.0-17.5) 





 


Hematocrit


 39.0 %


(39.0-53.0) 


 35.7 %


(39.0-53.0) 





 


Mean Corpuscular Volume 82 fL ()   82 fL ()  


 


Mean Corpuscular Hemoglobin 29 pg (25-35)   29 pg (25-35)  


 


Mean Corpuscular Hemoglobin


Concent 36 g/dL


(31-37) 


 36 g/dL


(31-37) 





 


Red Cell Distribution Width


 13.0 %


(11.5-14.5) 


 12.8 %


(11.5-14.5) 





 


Platelet Count


 320 x10^3/uL


(140-400) 


 217 x10^3/uL


(140-400) 





 


Neutrophils (%) (Auto) 92 % (31-73)   89 % (31-73)  


 


Lymphocytes (%) (Auto) 3 % (24-48)   7 % (24-48)  


 


Monocytes (%) (Auto) 4 % (0-9)   4 % (0-9)  


 


Eosinophils (%) (Auto) 0 % (0-3)   0 % (0-3)  


 


Basophils (%) (Auto) 0 % (0-3)   0 % (0-3)  


 


Neutrophils # (Auto)


 15.7 x10^3uL


(1.8-7.7) 


 9.7 x10^3uL


(1.8-7.7) 





 


Lymphocytes # (Auto)


 0.5 x10^3/uL


(1.0-4.8) 


 0.8 x10^3/uL


(1.0-4.8) 





 


Monocytes # (Auto)


 0.7 x10^3/uL


(0.0-1.1) 


 0.5 x10^3/uL


(0.0-1.1) 





 


Eosinophils # (Auto)


 0.0 x10^3/uL


(0.0-0.7) 


 0.0 x10^3/uL


(0.0-0.7) 





 


Basophils # (Auto)


 0.0 x10^3/uL


(0.0-0.2) 


 0.0 x10^3/uL


(0.0-0.2) 





 


Segmented Neutrophils % 90 % (35-66)    


 


Lymphocytes % 5 % (24-48)    


 


Monocytes % 5 % (0-10)    


 


Platelet Estimate


 Adequate


(ADEQUATE) 


 


 





 


Polychromasia Slight    


 


Anisocytosis Slight    


 


Prothrombin Time


 14.7 SEC


(11.7-14.0) 


 


 





 


Prothromb Time International


Ratio 1.2 (0.8-1.1) 


 


 


 





 


Activated Partial


Thromboplast Time 31 SEC (24-38) 


 


 


 





 


Sodium Level


 112 mmol/L


(136-145) 


 117 mmol/L


(136-145) 117 mmol/L


(136-145)


 


Potassium Level


 7.0 mmol/L


(3.5-5.1) 


 5.6 mmol/L


(3.5-5.1) 6.6 mmol/L


(3.5-5.1)


 


Chloride Level


 73 mmol/L


() 


 80 mmol/L


() 81 mmol/L


()


 


Carbon Dioxide Level


 12 mmol/L


(21-32) 


 10 mmol/L


(21-32) 9 mmol/L


(21-32)


 


Anion Gap 27 (6-14)   27 (6-14)  27 (6-14) 


 


Blood Urea Nitrogen


 110 mg/dL


(8-26) 


 112 mg/dL


(8-26) 115 mg/dL


(8-26)


 


Creatinine


 13.4 mg/dL


(0.7-1.3) 


 13.2 mg/dL


(0.7-1.3) 13.4 mg/dL


(0.7-1.3)


 


Estimated GFR


(Cockcroft-Gault) 4.4 


 


 4.5 


 4.4 





 


BUN/Creatinine Ratio 8 (6-20)   8 (6-20)  


 


Glucose Level


 81 mg/dL


(70-99) 


 70 mg/dL


(70-99) 81 mg/dL


(70-99)


 


Lactic Acid Level


 1.1 mmol/L


(0.4-2.0) 


 


 





 


Calcium Level


 5.4 mg/dL


(8.5-10.1) 


 5.0 mg/dL


(8.5-10.1) < 5.0 mg/dL


(8.5-10.1)


 


Magnesium Level


 3.0 mg/dL


(1.8-2.4) 


 


 





 


Total Bilirubin


 0.9 mg/dL


(0.2-1.0) 


 0.6 mg/dL


(0.2-1.0) 





 


Aspartate Amino Transf


(AST/SGOT) 2923 U/L


(15-37) 


 2204 U/L


(15-37) 





 


Alanine Aminotransferase


(ALT/SGPT) 818 U/L


(16-63) 


 603 U/L


(16-63) 





 


Alkaline Phosphatase


 89 U/L


() 


 71 U/L


() 





 


Creatine Kinase


 > 125177 U/L


() 


 


 





 


Troponin I Quantitative


 0.352 ng/mL


(0.000-0.055) 


 


 





 


Total Protein


 7.0 g/dL


(6.4-8.2) 


 6.2 g/dL


(6.4-8.2) 





 


Albumin


 3.4 g/dL


(3.4-5.0) 


 2.6 g/dL


(3.4-5.0) 





 


Albumin/Globulin Ratio 0.9 (1.0-1.7)   0.7 (1.0-1.7)  


 


Lipase


 972 U/L


() 


 


 





 


Ethyl Alcohol Level


 < 10 mg/dL


(0-10) 


 


 





 


Urine Color  Red   


 


Urine Clarity  Clear   


 


Urine pH  6.0   


 


Urine Specific Gravity  1.020   


 


Urine Protein


 


 >=300 mg/dL


(NEG-TRACE) 


 





 


Urine Glucose (UA)


 


 100 mg/dL


(NEG) 


 





 


Urine Ketones (Stick)


 


 Trace mg/dL


(NEG) 


 





 


Urine Blood  Large (NEG)   


 


Urine Nitrite  Positive (NEG)   


 


Urine Bilirubin  Moderate (NEG)   


 


Urine Urobilinogen Dipstick


 


 0.2 mg/dL (0.2


mg/dL) 


 





 


Urine Leukocyte Esterase  Small (NEG)   


 


Urine RBC  3-5 /HPF (0-2)   


 


Urine WBC  Occ /HPF (0-4)   


 


Urine Squamous Epithelial


Cells 


 None /LPF 


 


 





 


Urine Amorphous Sediment  Present /HPF   


 


Urine Bacteria  0 /HPF (0-FEW)   


 


Urine Random Creatinine


 


 199.6 mg/dL


(Not Establ.) 


 





 


Urine Opiates Screen  Neg (NEG)   


 


Urine Methadone Screen  Neg (NEG)   


 


Urine Barbiturates  Neg (NEG)   


 


Urine Phencyclidine Screen  Neg (NEG)   


 


Urine


Amphetamine/Methamphetamine 


 Pos (NEG) 


 


 





 


Urine Benzodiazepines Screen  Neg (NEG)   


 


Urine Cocaine Screen  Neg (NEG)   


 


Urine Cannabinoids Screen  Neg (NEG)   


 


Urine Ethyl Alcohol  Neg (NEG)   








Laboratory Tests








Test


 7/9/19


20:50 7/9/19


21:00 7/10/19


05:15 7/10/19


09:39


 


White Blood Count


 17.0 x10^3/uL


(4.0-11.0) 


 11.0 x10^3/uL


(4.0-11.0) 





 


Red Blood Count


 4.77 x10^6/uL


(4.30-5.70) 


 4.37 x10^6/uL


(4.30-5.70) 





 


Hemoglobin


 13.9 g/dL


(13.0-17.5) 


 12.8 g/dL


(13.0-17.5) 





 


Hematocrit


 39.0 %


(39.0-53.0) 


 35.7 %


(39.0-53.0) 





 


Mean Corpuscular Volume 82 fL ()   82 fL ()  


 


Mean Corpuscular Hemoglobin 29 pg (25-35)   29 pg (25-35)  


 


Mean Corpuscular Hemoglobin


Concent 36 g/dL


(31-37) 


 36 g/dL


(31-37) 





 


Red Cell Distribution Width


 13.0 %


(11.5-14.5) 


 12.8 %


(11.5-14.5) 





 


Platelet Count


 320 x10^3/uL


(140-400) 


 217 x10^3/uL


(140-400) 





 


Neutrophils (%) (Auto) 92 % (31-73)   89 % (31-73)  


 


Lymphocytes (%) (Auto) 3 % (24-48)   7 % (24-48)  


 


Monocytes (%) (Auto) 4 % (0-9)   4 % (0-9)  


 


Eosinophils (%) (Auto) 0 % (0-3)   0 % (0-3)  


 


Basophils (%) (Auto) 0 % (0-3)   0 % (0-3)  


 


Neutrophils # (Auto)


 15.7 x10^3uL


(1.8-7.7) 


 9.7 x10^3uL


(1.8-7.7) 





 


Lymphocytes # (Auto)


 0.5 x10^3/uL


(1.0-4.8) 


 0.8 x10^3/uL


(1.0-4.8) 





 


Monocytes # (Auto)


 0.7 x10^3/uL


(0.0-1.1) 


 0.5 x10^3/uL


(0.0-1.1) 





 


Eosinophils # (Auto)


 0.0 x10^3/uL


(0.0-0.7) 


 0.0 x10^3/uL


(0.0-0.7) 





 


Basophils # (Auto)


 0.0 x10^3/uL


(0.0-0.2) 


 0.0 x10^3/uL


(0.0-0.2) 





 


Segmented Neutrophils % 90 % (35-66)    


 


Lymphocytes % 5 % (24-48)    


 


Monocytes % 5 % (0-10)    


 


Platelet Estimate


 Adequate


(ADEQUATE) 


 


 





 


Polychromasia Slight    


 


Anisocytosis Slight    


 


Prothrombin Time


 14.7 SEC


(11.7-14.0) 


 


 





 


Prothromb Time International


Ratio 1.2 (0.8-1.1) 


 


 


 





 


Activated Partial


Thromboplast Time 31 SEC (24-38) 


 


 


 





 


Sodium Level


 112 mmol/L


(136-145) 


 117 mmol/L


(136-145) 117 mmol/L


(136-145)


 


Potassium Level


 7.0 mmol/L


(3.5-5.1) 


 5.6 mmol/L


(3.5-5.1) 6.6 mmol/L


(3.5-5.1)


 


Chloride Level


 73 mmol/L


() 


 80 mmol/L


() 81 mmol/L


()


 


Carbon Dioxide Level


 12 mmol/L


(21-32) 


 10 mmol/L


(21-32) 9 mmol/L


(21-32)


 


Anion Gap 27 (6-14)   27 (6-14)  27 (6-14) 


 


Blood Urea Nitrogen


 110 mg/dL


(8-26) 


 112 mg/dL


(8-26) 115 mg/dL


(8-26)


 


Creatinine


 13.4 mg/dL


(0.7-1.3) 


 13.2 mg/dL


(0.7-1.3) 13.4 mg/dL


(0.7-1.3)


 


Estimated GFR


(Cockcroft-Gault) 4.4 


 


 4.5 


 4.4 





 


BUN/Creatinine Ratio 8 (6-20)   8 (6-20)  


 


Glucose Level


 81 mg/dL


(70-99) 


 70 mg/dL


(70-99) 81 mg/dL


(70-99)


 


Lactic Acid Level


 1.1 mmol/L


(0.4-2.0) 


 


 





 


Calcium Level


 5.4 mg/dL


(8.5-10.1) 


 5.0 mg/dL


(8.5-10.1) < 5.0 mg/dL


(8.5-10.1)


 


Magnesium Level


 3.0 mg/dL


(1.8-2.4) 


 


 





 


Total Bilirubin


 0.9 mg/dL


(0.2-1.0) 


 0.6 mg/dL


(0.2-1.0) 





 


Aspartate Amino Transf


(AST/SGOT) 2923 U/L


(15-37) 


 2204 U/L


(15-37) 





 


Alanine Aminotransferase


(ALT/SGPT) 818 U/L


(16-63) 


 603 U/L


(16-63) 





 


Alkaline Phosphatase


 89 U/L


() 


 71 U/L


() 





 


Creatine Kinase


 > 132725 U/L


() 


 


 





 


Troponin I Quantitative


 0.352 ng/mL


(0.000-0.055) 


 


 





 


Total Protein


 7.0 g/dL


(6.4-8.2) 


 6.2 g/dL


(6.4-8.2) 





 


Albumin


 3.4 g/dL


(3.4-5.0) 


 2.6 g/dL


(3.4-5.0) 





 


Albumin/Globulin Ratio 0.9 (1.0-1.7)   0.7 (1.0-1.7)  


 


Lipase


 972 U/L


() 


 


 





 


Ethyl Alcohol Level


 < 10 mg/dL


(0-10) 


 


 





 


Urine Color  Red   


 


Urine Clarity  Clear   


 


Urine pH  6.0   


 


Urine Specific Gravity  1.020   


 


Urine Protein


 


 >=300 mg/dL


(NEG-TRACE) 


 





 


Urine Glucose (UA)


 


 100 mg/dL


(NEG) 


 





 


Urine Ketones (Stick)


 


 Trace mg/dL


(NEG) 


 





 


Urine Blood  Large (NEG)   


 


Urine Nitrite  Positive (NEG)   


 


Urine Bilirubin  Moderate (NEG)   


 


Urine Urobilinogen Dipstick


 


 0.2 mg/dL (0.2


mg/dL) 


 





 


Urine Leukocyte Esterase  Small (NEG)   


 


Urine RBC  3-5 /HPF (0-2)   


 


Urine WBC  Occ /HPF (0-4)   


 


Urine Squamous Epithelial


Cells 


 None /LPF 


 


 





 


Urine Amorphous Sediment  Present /HPF   


 


Urine Bacteria  0 /HPF (0-FEW)   


 


Urine Random Creatinine


 


 199.6 mg/dL


(Not Establ.) 


 





 


Urine Opiates Screen  Neg (NEG)   


 


Urine Methadone Screen  Neg (NEG)   


 


Urine Barbiturates  Neg (NEG)   


 


Urine Phencyclidine Screen  Neg (NEG)   


 


Urine


Amphetamine/Methamphetamine 


 Pos (NEG) 


 


 





 


Urine Benzodiazepines Screen  Neg (NEG)   


 


Urine Cocaine Screen  Neg (NEG)   


 


Urine Cannabinoids Screen  Neg (NEG)   


 


Urine Ethyl Alcohol  Neg (NEG)   











Images


Images


 


Ultrasound the abdomen limited.


 


HISTORY: Elevated liver enzymes, abdominal pain


 


Ultrasound was used to evaluate the abdomen. Pancreas is poorly 


visualized. Liver is difficult to completely evaluate. A focal liver 


lesion was not identified. Liver is not enlarged. Gallbladder was 


distended. Gallstones were not identified. There was mild sludge in the 


gallbladder. Gallbladder wall was upper normal in thickness. Right kidney 


was 12.4 cm in length without a mass or hydronephrosis. Aorta and vena 


cava were poorly evaluated. Common duct was normal measuring 3.6 mm.


 


IMPRESSION:


1. Sludge noted in the gallbladder without gallstones.


2. Common duct normal in size.


3. Limited evaluation of the liver.


 


Electronically signed by: Watson Peters MD (7/9/2019 10:51 PM) [facility]








Indication: Back pain with difficulty with urination


 


Procedure: Multiplanar multisequence MRI images obtained through the 


lumbar spine without intravenous contrast.


 


Comparison: None.


 


Findings:


 


Edema to the subcutaneous soft tissues posteriorly.


No definite acute fracture.


There is prominent epidural fat identified at L5 as well as within the 


sacral region.


There is some regions of high T2 signal within the paraspinal musculature 


and right psoas.


 


T12-L1: No disc bulge.  No significant central canal or neuroforaminal 


stenosis. 


L1-L2: No disc bulge.  No significant central canal or neuroforaminal 


stenosis. Facet hypertrophy.


L2-L3: No disc bulge.  No significant central canal or neuroforaminal 


stenosis. Facet hypertrophy.


L3-L4:  No disc bulge.  No significant central canal or neuroforaminal 


stenosis. Facet hypertrophy with small joint effusion. Mild disc 


osteophyte complex. Thecal sac is 10 mm anterior to posterior.


L4-L5: Posterior disc protrusion with osteophyte formation and annular 


tear suspected. Ligament of flavum and facet hypertrophy with facet joint 


effusions. Effacement of the bilateral lateral recess. Neural foramina 


patent. Mild central canal narrowing. 


L5-S1:  Facet hypertrophy with facet joint effusions and ligamentum flavum


hypertrophy. Large amount of epidural fat with small size of thecal sac at


this level. Disc protrusion and annular tear. Mild right and mild to 


moderate left neural foraminal stenosis. 


 


Impression: 


 


There is evidence of degenerative changes the spine with disc protrusions 


and annular tears at L4-5 and L5-S1 as well as osteophyte formation at 


these vertebral body endplates and facet hypertrophy. This contributes to 


central canal and neural foraminal stenosis as detailed above.


 


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 


extending into the sacrum with resultant small size of the thecal sac.


 


High T2 signal is identified within the right greater than left paraspinal


musculature and psoas which can be seen with edema to these regions. Can 


be seen with causes such as myositis, muscle strain or neurological in 


nature from causes such as denervation associated edema.


 


Electronically signed by: Bahman Hart MD (7/10/2019 12:04 AM) [id number]














REASON: neuro changes, loss of rectal tone  //repeats due to patients inability


PROCEDURE: THORACIC SPINE WO CONTRAST





 


INDICATION:  Loss of rectal tone with concern for neurologic origin.


 


COMPARISON: MRI lumbar earlier same day.


 


TECHNIQUE: Multiplanar, multisequence MRI images are obtained through the 


thoracic spine without intravenous contrast.


 


FINDINGS:


 


There is patient motion on numerous sequences since the patient was having


difficulty tolerating the examination at this time.


At the upper thoracic spine on the T2 sagittal images there is apparent 


low T2 signal at the anterior aspect of the central canal however this is 


in a region of motion artifact.


On the axial T2-weighted images there is also some low T2 signal intensity


material at the anterior aspect of the central canal at the upper thoracic


spine including at least the T2-T5 level.


There is some suspected facet hypertrophy as well as well. Suspected disc 


protrusion or osteophyte formation at the T3-4 level on the right.


There is some possible mild high T2 signal seen within the thoracic spinal


cord with patchy appearance.


Prominent epidural fat is identified throughout the thoracic spine.


 


There is high T2 signal seen within the right paraspinal musculature 


within the thoracic region as well.


 


IMPRESSION:


 


1.   Very limited examination secondary to a large amount of patient 


motion. This examination can be repeated at a later time to better assess 


given this limitation.


 


2.  At the upper thoracic spine there is some regions of low T2 signal at 


the anterior aspect of the central canal. Is difficult to tell this is 


artifactual in nature or if there is a pathologic process within the 


region such as soft tissue or blood within the anterior aspect of the 


central canal contributing to this appearance. Would repeat this 


examination when the patient can better tolerate.  Would also recommend 


thin section axial T2 images when the patient can better tolerate.


 


3.  There is some regions of possible high T2 signal within the spinal 


cord. Again this examination is limited secondary to motion artifact 


however recommend that the study be repeated with contrast to ensure that 


this does not persist to suggest edema and also to ensure that there is no


foci of enhancement within the spinal cord to suggest causes such as 


demyelination


 


4.  There is some suspected degenerative changes with facet hypertrophy as


well as disc osteophyte complex.


 


5.  There is some high T2 signal seen within the paraspinal musculature. 


Again this could be secondary to causes such as myositis, muscle tear or 


neurological in nature from causes such as denervation associated edema.


 


6.  At the posterior elements of the thoracic spine at the mid thoracic 


spine there is some apparent edema identified which also involves the 


adjacent paraspinal musculature. Again there is a large amount of motion 


therefore difficult to assess this region however posttraumatic etiology 


such as fracture or soft tissue remains within the differential  and 


further imaging is warranted. This could be obtained with repeat thoracic 


spine MRI when the patient can better tolerate and thoracic spine CT is an


option to further evaluate for associated fracture.


7.  Focus of high T2 signal is seen at the right upper thorax posterior 


medially as well. Could be secondary to some edema or fluid within the 


region and a follow-up CT could BE obtained to further evaluate to assess 


for fracture within the region. This is near the region of the transverse 


process as well as the right rib.


8.  Report was called to the patient's nurse on the floor at the time of 


dictation.


 


Electronically signed by: Bahman Hart MD (7/10/2019 6:30 AM) [facility]





VTE Prophylaxis Ordered


VTE Prophylaxis Devices:  No


VTE Pharmacological Prophylaxi:  Contraindicated





Assessment/Plan


Assessment/Plan


 Impression: 


 


   Hepatorenal syndrome WITH TRANSAMINITIS


   Elevated troponin SUSPECT Stress induced


   Urinary tract infection


   Methamphetamine abuse, SEVERE


   Hyperkalemia


   Hyponatremia


   hypocalcemia


   Urinary retention


   Rhabdomyolysis


   paresthesia


   evidence of degenerative changes the spine with disc protrusions and annular 

   tears at L4-5 and L5-S1 as well as osteophyte formation at 


               vertebral body endplates and facet hypertrophy. This contributes 

to central canal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

## 2019-07-10 NOTE — NUR
Pt becoming increasing agitated and less cooperative. Ativan and Haldol given as IR is at 
bedside to place temp dialysis cath. Pt stated he had IV cocaine a few days ago. Dr Wild and 
Sammie updated. Mother called back earlier and updated and was tearful on phone. Lives in 
[place]

## 2019-07-10 NOTE — EKG
[facility]

              [address] [place] [address]

Test Date:    2019               Test Time:    22:40:59

Pat Name:     NBA GREENE            Department:   

Patient ID:   [id number]           Room:          

Gender:       M                        Technician:   

:          1990               Requested By: MERLIN SEPULVEDA

Order Number: [id number]           Reading MD:     

                                 Measurements

Intervals                              Axis          

Rate:         196                      P:            

AK:                                    QRS:          0

QRSD:         56                       T:            -22

QT:           220                                    

QTc:          400                                    

                           Interpretive Statements

IRREGULAR RHYTHM, NO P-WAVE FOUND

VENTRICULAR PREMATURE COMPLEX(ES), BIGEMINY

R-S TRANSITION ZONE IN V LEADS DISPLACED TO THE LEFT

LOW VOLTAGE

CONSIDER RIGHT VENTRICULAR HYPERTROPHY

QRS(T) CONTOUR ABNORMALITY

CONSIDER ANTEROSEPTAL MYOCARDIAL DAMAGE

ST ABNORMALITY, POSSIBLE

ANTERIOR SUBENDOCARDIAL INJURY

INFEROLATERAL SUBENDOCARDIAL INJURY

ABNORMAL ECG

RI6.01

No previous ECG available for comparison

## 2019-07-10 NOTE — RAD
Single view of the chest. 7/10/2019 9:47 AM

 

Indication: Chest pain

 

Comparison: None

 

Findings: There is no focal consolidation. There is no pleural effusion or

pneumothorax. The cardiomediastinal silhouette and pulmonary vasculature 

are within normal limits. No acute osseous abnormalities are seen.

 

Impression: No evidence of acute cardiopulmonary process. 

 

Electronically signed by: Mulugeta Hernandez MD (7/10/2019 12:14 PM) [id number]

## 2019-07-10 NOTE — PDOC2
NEUROLOGY CONSULT


Date of Admission


Date of Admission


DATE: 7/10/19 


TIME: 10:36





Reason for Consult


Reason for Consult:


Leg weakness





Referring Physician


Referring Physician:


Dr. Cochran





Source


Source:  Chart review, Patient





History of Present Illness


History of Present Illness


The patient is a 29-year-old right-handed male who got out of [facility] on 7/5 and 

decided to celebrate by smoking methamphetamine. He developed some pain and 

numbness in the legs and right hip. He fell. He presented to our emergency room 

yesterday. He is had MRI studies which are inhibited by movement artifacts. He 

denies any prior history of neurological issues.





Past Medical History


GI:  GERD


Psych:  Anxiety, Addictions, Depression





Past Surgical History


Past Surgical History:  No pertinent history





Family History


Family History:  No pertinent hx





Social History


Social History


Unemployed, uses methamphetamine, smokes occasionally, rare alcohol





Current Medications


Current Medications





Current Medications


Morphine Sulfate (Morphine Sulfate) 4 mg 1X  ONCE IV  Last administered on 

7/9/19at 21:20;  Start 7/9/19 at 21:30;  Stop 7/9/19 at 21:31;  Status DC


Sodium Chloride 1,000 ml @  1,000 mls/hr 1X  ONCE IV  Last administered on 

7/9/19at 22:30;  Start 7/9/19 at 22:30;  Stop 7/9/19 at 23:29;  Status DC


Calcium Gluconate (Calcium Gluconate) 1,000 mg 1X  ONCE IVP  Last administered 

on 7/9/19at 22:43;  Start 7/9/19 at 23:00;  Stop 7/9/19 at 23:01;  Status DC


Insulin Human Regular (HumuLIN R VIAL) 10 unit 1X  ONCE IV  Last administered on

7/9/19at 23:51;  Start 7/9/19 at 23:00;  Stop 7/9/19 at 23:01;  Status DC


Dextrose (Dextrose 50%-Water Syringe) 25 gm 1X  ONCE IV  Last administered on 

7/9/19at 22:42;  Start 7/9/19 at 23:00;  Stop 7/9/19 at 23:01;  Status DC


Sodium Bicarbonate (Sodium Bicarb Adult 8.4% Syr) 50 meq 1X  ONCE IV  Last 

administered on 7/9/19at 23:49;  Start 7/9/19 at 23:00;  Stop 7/9/19 at 23:01;  

Status DC


Sodium Chloride 1,000 ml @  1,000 mls/hr 1X  ONCE IV  Last administered on 

7/9/19at 23:50;  Start 7/9/19 at 23:00;  Stop 7/9/19 at 23:59;  Status DC


Albuterol Sulfate (Ventolin Neb Soln) 10 mg 1X  ONCE CONT NEB  Last administered

on 7/10/19at 00:30;  Start 7/9/19 at 23:00;  Stop 7/9/19 at 23:01;  Status DC


Ceftriaxone Sodium (Rocephin) 1 gm 1X  ONCE IVP  Last administered on 7/9/19at 2

2:43;  Start 7/9/19 at 23:00;  Stop 7/9/19 at 23:01;  Status DC


Sodium Chloride 1,000 ml @  1,000 mls/hr 1X  ONCE IV  Last administered on 

7/10/19at 01:00;  Start 7/10/19 at 01:00;  Stop 7/10/19 at 01:59;  Status DC


Lorazepam (Ativan Inj) 0.5 mg PRN Q6HRS  PRN IV ANXIETY / AGITATION Last 

administered on 7/10/19at 03:05;  Start 7/10/19 at 01:15


Ondansetron HCl (Zofran) 4 mg PRN Q6HRS  PRN IV NAUSEA/VOMITING 1ST CHOICE;  

Start 7/10/19 at 01:15;  Stop 7/10/19 at 01:22;  Status DC


Sodium Chloride (Normal Saline Flush) 3 ml QSHIFT  PRN IV AFTER MEDS AND BLOOD 

DRAWS;  Start 7/10/19 at 01:15


Sodium Chloride 1,000 ml @  175 mls/hr Q5H43M IV ;  Start 7/10/19 at 01:08;  

Stop 7/10/19 at 01:23;  Status DC


Ondansetron HCl (Zofran) 4 mg PRN Q8HRS  PRN IV NAUSEA/VOMITING  1ST CHOICE;  

Start 7/10/19 at 01:15;  Stop 7/11/19 at 01:14


Sodium Chloride 1,000 ml @  175 mls/hr Q5H43M IV ;  Start 7/10/19 at 01:30;  

Stop 7/11/19 at 01:29


Morphine Sulfate (Morphine Sulfate) 4 mg PRN Q4HRS  PRN IV SEVERE PAIN Last 

administered on 7/10/19at 09:18;  Start 7/10/19 at 09:00


Sodium Bicarbonate (Sodium Bicarb Adult 8.4% Syr) 100 meq 1X  ONCE IV  Last 

administered on 7/10/19at 10:02;  Start 7/10/19 at 10:00;  Stop 7/10/19 at 

10:01;  Status DC


Sodium Chloride 1,000 ml @  1,000 mls/hr 1X  ONCE IV  Last administered on 

7/10/19at 09:56;  Start 7/10/19 at 10:00;  Stop 7/10/19 at 10:59





Allergies


Allergies:  


Coded Allergies:  


     No Known Drug Allergies (Unverified , 7/9/19)





ROS


Review of System


Negative for fever, chills, weight loss, shortness of breath, chest pain, 

indigestion, hematochezia, melena, and dysuria.  Full 14-point review of systems

is negative.





Physical Exam


Physical Examination


General:


Well-developed, well-nourished white male in no acute distress


HEENT:


Normocephalic andatraumaticTemporal arteriespulsatile and nontender.


Neck:


Supple without bruit, no meningismus


Musculoskeletal:


Stability:see neurologic. Gait exam:see neurologic. Tone:see 

neurologic.Strength:see neurologic.


Neurological:


Mental Status:intact, orientation, memory, attention span/concentration, 

language, fund of knowledge normal. Cranial Nerves:Pupils equal and reactive to

light, extraocular movements areintact, visual fields are full to 

confrontation. Facial sensation is normal. There is no facial asymmetry. 

Vestibulo-ocular reflex is intact. Palate elevates and tongue protrudes in 

midline. All other cranial related problems are negative except as mentioned 

before.Reflexes:2+ and symmetric with flexor plantar responses. Motor:In 

arms, 5/5 strength with normal tone and bulk. In legs, he says that he cannot 

move them, but I arrived with his knees flexed. He then says he cannot move his 

toes, but they do move, he says that he cannot wiggle them.. 

Coordination:Finger-nose finger is normal. Rapid alternating movements and fine

finger movements are intact. Gait: Not tested. Sensory: inconsistent exam, at 

first he says of the cannot feel anything below his waist, then he admits he 

does have some pinprick sensation, the sensory level varies between 

approximately T7 and L1





Vitals


VITALS





Vital Signs








  Date Time  Temp Pulse Resp B/P (MAP) Pulse Ox O2 Delivery O2 Flow Rate FiO2


 


7/10/19 09:18   20   Room Air  


 


7/10/19 05:00  105  146/95 (112) 98   


 


7/10/19 04:45 98.3      98.0 





 98.3       











Labs


Labs





Laboratory Tests








Test


 7/9/19


20:50 7/9/19


21:00 7/10/19


05:15 7/10/19


09:39


 


White Blood Count


 17.0 x10^3/uL


(4.0-11.0) 


 11.0 x10^3/uL


(4.0-11.0) 





 


Red Blood Count


 4.77 x10^6/uL


(4.30-5.70) 


 4.37 x10^6/uL


(4.30-5.70) 





 


Hemoglobin


 13.9 g/dL


(13.0-17.5) 


 12.8 g/dL


(13.0-17.5) 





 


Hematocrit


 39.0 %


(39.0-53.0) 


 35.7 %


(39.0-53.0) 





 


Mean Corpuscular Volume 82 fL ()   82 fL ()  


 


Mean Corpuscular Hemoglobin 29 pg (25-35)   29 pg (25-35)  


 


Mean Corpuscular Hemoglobin


Concent 36 g/dL


(31-37) 


 36 g/dL


(31-37) 





 


Red Cell Distribution Width


 13.0 %


(11.5-14.5) 


 12.8 %


(11.5-14.5) 





 


Platelet Count


 320 x10^3/uL


(140-400) 


 217 x10^3/uL


(140-400) 





 


Neutrophils (%) (Auto) 92 % (31-73)   89 % (31-73)  


 


Lymphocytes (%) (Auto) 3 % (24-48)   7 % (24-48)  


 


Monocytes (%) (Auto) 4 % (0-9)   4 % (0-9)  


 


Eosinophils (%) (Auto) 0 % (0-3)   0 % (0-3)  


 


Basophils (%) (Auto) 0 % (0-3)   0 % (0-3)  


 


Neutrophils # (Auto)


 15.7 x10^3uL


(1.8-7.7) 


 9.7 x10^3uL


(1.8-7.7) 





 


Lymphocytes # (Auto)


 0.5 x10^3/uL


(1.0-4.8) 


 0.8 x10^3/uL


(1.0-4.8) 





 


Monocytes # (Auto)


 0.7 x10^3/uL


(0.0-1.1) 


 0.5 x10^3/uL


(0.0-1.1) 





 


Eosinophils # (Auto)


 0.0 x10^3/uL


(0.0-0.7) 


 0.0 x10^3/uL


(0.0-0.7) 





 


Basophils # (Auto)


 0.0 x10^3/uL


(0.0-0.2) 


 0.0 x10^3/uL


(0.0-0.2) 





 


Segmented Neutrophils % 90 % (35-66)    


 


Lymphocytes % 5 % (24-48)    


 


Monocytes % 5 % (0-10)    


 


Platelet Estimate


 Adequate


(ADEQUATE) 


 


 





 


Polychromasia Slight    


 


Anisocytosis Slight    


 


Prothrombin Time


 14.7 SEC


(11.7-14.0) 


 


 





 


Prothromb Time International


Ratio 1.2 (0.8-1.1) 


 


 


 





 


Activated Partial


Thromboplast Time 31 SEC (24-38) 


 


 


 





 


Sodium Level


 112 mmol/L


(136-145) 


 117 mmol/L


(136-145) 117 mmol/L


(136-145)


 


Potassium Level


 7.0 mmol/L


(3.5-5.1) 


 5.6 mmol/L


(3.5-5.1) 6.6 mmol/L


(3.5-5.1)


 


Chloride Level


 73 mmol/L


() 


 80 mmol/L


() 81 mmol/L


()


 


Carbon Dioxide Level


 12 mmol/L


(21-32) 


 10 mmol/L


(21-32) 9 mmol/L


(21-32)


 


Anion Gap 27 (6-14)   27 (6-14)  27 (6-14) 


 


Blood Urea Nitrogen


 110 mg/dL


(8-26) 


 112 mg/dL


(8-26) 115 mg/dL


(8-26)


 


Creatinine


 13.4 mg/dL


(0.7-1.3) 


 13.2 mg/dL


(0.7-1.3) 13.4 mg/dL


(0.7-1.3)


 


Estimated GFR


(Cockcroft-Gault) 4.4 


 


 4.5 


 4.4 





 


BUN/Creatinine Ratio 8 (6-20)   8 (6-20)  


 


Glucose Level


 81 mg/dL


(70-99) 


 70 mg/dL


(70-99) 81 mg/dL


(70-99)


 


Lactic Acid Level


 1.1 mmol/L


(0.4-2.0) 


 


 





 


Calcium Level


 5.4 mg/dL


(8.5-10.1) 


 5.0 mg/dL


(8.5-10.1) < 5.0 mg/dL


(8.5-10.1)


 


Magnesium Level


 3.0 mg/dL


(1.8-2.4) 


 


 





 


Total Bilirubin


 0.9 mg/dL


(0.2-1.0) 


 0.6 mg/dL


(0.2-1.0) 





 


Aspartate Amino Transf


(AST/SGOT) 2923 U/L


(15-37) 


 2204 U/L


(15-37) 





 


Alanine Aminotransferase


(ALT/SGPT) 818 U/L


(16-63) 


 603 U/L


(16-63) 





 


Alkaline Phosphatase


 89 U/L


() 


 71 U/L


() 





 


Creatine Kinase


 > 992427 U/L


() 


 


 





 


Troponin I Quantitative


 0.352 ng/mL


(0.000-0.055) 


 


 





 


Total Protein


 7.0 g/dL


(6.4-8.2) 


 6.2 g/dL


(6.4-8.2) 





 


Albumin


 3.4 g/dL


(3.4-5.0) 


 2.6 g/dL


(3.4-5.0) 





 


Albumin/Globulin Ratio 0.9 (1.0-1.7)   0.7 (1.0-1.7)  


 


Lipase


 972 U/L


() 


 


 





 


Ethyl Alcohol Level


 < 10 mg/dL


(0-10) 


 


 





 


Urine Color  Red   


 


Urine Clarity  Clear   


 


Urine pH  6.0   


 


Urine Specific Gravity  1.020   


 


Urine Protein


 


 >=300 mg/dL


(NEG-TRACE) 


 





 


Urine Glucose (UA)


 


 100 mg/dL


(NEG) 


 





 


Urine Ketones (Stick)


 


 Trace mg/dL


(NEG) 


 





 


Urine Blood  Large (NEG)   


 


Urine Nitrite  Positive (NEG)   


 


Urine Bilirubin  Moderate (NEG)   


 


Urine Urobilinogen Dipstick


 


 0.2 mg/dL (0.2


mg/dL) 


 





 


Urine Leukocyte Esterase  Small (NEG)   


 


Urine RBC  3-5 /HPF (0-2)   


 


Urine WBC  Occ /HPF (0-4)   


 


Urine Squamous Epithelial


Cells 


 None /LPF 


 


 





 


Urine Amorphous Sediment  Present /HPF   


 


Urine Bacteria  0 /HPF (0-FEW)   


 


Urine Random Creatinine


 


 199.6 mg/dL


(Not Establ.) 


 





 


Urine Opiates Screen  Neg (NEG)   


 


Urine Methadone Screen  Neg (NEG)   


 


Urine Barbiturates  Neg (NEG)   


 


Urine Phencyclidine Screen  Neg (NEG)   


 


Urine


Amphetamine/Methamphetamine 


 Pos (NEG) 


 


 





 


Urine Benzodiazepines Screen  Neg (NEG)   


 


Urine Cocaine Screen  Neg (NEG)   


 


Urine Cannabinoids Screen  Neg (NEG)   


 


Urine Ethyl Alcohol  Neg (NEG)   


 


Vitamin B12 Level


 


 


 


 430 pg/mL


(247-911)


 


Thyroid Stimulating Hormone


(TSH) 


 


 


 2.175 uIU/mL


(0.358-3.74)








Laboratory Tests








Test


 7/9/19


20:50 7/9/19


21:00 7/10/19


05:15 7/10/19


09:39


 


White Blood Count


 17.0 x10^3/uL


(4.0-11.0) 


 11.0 x10^3/uL


(4.0-11.0) 





 


Red Blood Count


 4.77 x10^6/uL


(4.30-5.70) 


 4.37 x10^6/uL


(4.30-5.70) 





 


Hemoglobin


 13.9 g/dL


(13.0-17.5) 


 12.8 g/dL


(13.0-17.5) 





 


Hematocrit


 39.0 %


(39.0-53.0) 


 35.7 %


(39.0-53.0) 





 


Mean Corpuscular Volume 82 fL ()   82 fL ()  


 


Mean Corpuscular Hemoglobin 29 pg (25-35)   29 pg (25-35)  


 


Mean Corpuscular Hemoglobin


Concent 36 g/dL


(31-37) 


 36 g/dL


(31-37) 





 


Red Cell Distribution Width


 13.0 %


(11.5-14.5) 


 12.8 %


(11.5-14.5) 





 


Platelet Count


 320 x10^3/uL


(140-400) 


 217 x10^3/uL


(140-400) 





 


Neutrophils (%) (Auto) 92 % (31-73)   89 % (31-73)  


 


Lymphocytes (%) (Auto) 3 % (24-48)   7 % (24-48)  


 


Monocytes (%) (Auto) 4 % (0-9)   4 % (0-9)  


 


Eosinophils (%) (Auto) 0 % (0-3)   0 % (0-3)  


 


Basophils (%) (Auto) 0 % (0-3)   0 % (0-3)  


 


Neutrophils # (Auto)


 15.7 x10^3uL


(1.8-7.7) 


 9.7 x10^3uL


(1.8-7.7) 





 


Lymphocytes # (Auto)


 0.5 x10^3/uL


(1.0-4.8) 


 0.8 x10^3/uL


(1.0-4.8) 





 


Monocytes # (Auto)


 0.7 x10^3/uL


(0.0-1.1) 


 0.5 x10^3/uL


(0.0-1.1) 





 


Eosinophils # (Auto)


 0.0 x10^3/uL


(0.0-0.7) 


 0.0 x10^3/uL


(0.0-0.7) 





 


Basophils # (Auto)


 0.0 x10^3/uL


(0.0-0.2) 


 0.0 x10^3/uL


(0.0-0.2) 





 


Segmented Neutrophils % 90 % (35-66)    


 


Lymphocytes % 5 % (24-48)    


 


Monocytes % 5 % (0-10)    


 


Platelet Estimate


 Adequate


(ADEQUATE) 


 


 





 


Polychromasia Slight    


 


Anisocytosis Slight    


 


Prothrombin Time


 14.7 SEC


(11.7-14.0) 


 


 





 


Prothromb Time International


Ratio 1.2 (0.8-1.1) 


 


 


 





 


Activated Partial


Thromboplast Time 31 SEC (24-38) 


 


 


 





 


Sodium Level


 112 mmol/L


(136-145) 


 117 mmol/L


(136-145) 117 mmol/L


(136-145)


 


Potassium Level


 7.0 mmol/L


(3.5-5.1) 


 5.6 mmol/L


(3.5-5.1) 6.6 mmol/L


(3.5-5.1)


 


Chloride Level


 73 mmol/L


() 


 80 mmol/L


() 81 mmol/L


()


 


Carbon Dioxide Level


 12 mmol/L


(21-32) 


 10 mmol/L


(21-32) 9 mmol/L


(21-32)


 


Anion Gap 27 (6-14)   27 (6-14)  27 (6-14) 


 


Blood Urea Nitrogen


 110 mg/dL


(8-26) 


 112 mg/dL


(8-26) 115 mg/dL


(8-26)


 


Creatinine


 13.4 mg/dL


(0.7-1.3) 


 13.2 mg/dL


(0.7-1.3) 13.4 mg/dL


(0.7-1.3)


 


Estimated GFR


(Cockcroft-Gault) 4.4 


 


 4.5 


 4.4 





 


BUN/Creatinine Ratio 8 (6-20)   8 (6-20)  


 


Glucose Level


 81 mg/dL


(70-99) 


 70 mg/dL


(70-99) 81 mg/dL


(70-99)


 


Lactic Acid Level


 1.1 mmol/L


(0.4-2.0) 


 


 





 


Calcium Level


 5.4 mg/dL


(8.5-10.1) 


 5.0 mg/dL


(8.5-10.1) < 5.0 mg/dL


(8.5-10.1)


 


Magnesium Level


 3.0 mg/dL


(1.8-2.4) 


 


 





 


Total Bilirubin


 0.9 mg/dL


(0.2-1.0) 


 0.6 mg/dL


(0.2-1.0) 





 


Aspartate Amino Transf


(AST/SGOT) 2923 U/L


(15-37) 


 2204 U/L


(15-37) 





 


Alanine Aminotransferase


(ALT/SGPT) 818 U/L


(16-63) 


 603 U/L


(16-63) 





 


Alkaline Phosphatase


 89 U/L


() 


 71 U/L


() 





 


Creatine Kinase


 > 276890 U/L


() 


 


 





 


Troponin I Quantitative


 0.352 ng/mL


(0.000-0.055) 


 


 





 


Total Protein


 7.0 g/dL


(6.4-8.2) 


 6.2 g/dL


(6.4-8.2) 





 


Albumin


 3.4 g/dL


(3.4-5.0) 


 2.6 g/dL


(3.4-5.0) 





 


Albumin/Globulin Ratio 0.9 (1.0-1.7)   0.7 (1.0-1.7)  


 


Lipase


 972 U/L


() 


 


 





 


Ethyl Alcohol Level


 < 10 mg/dL


(0-10) 


 


 





 


Urine Color  Red   


 


Urine Clarity  Clear   


 


Urine pH  6.0   


 


Urine Specific Gravity  1.020   


 


Urine Protein


 


 >=300 mg/dL


(NEG-TRACE) 


 





 


Urine Glucose (UA)


 


 100 mg/dL


(NEG) 


 





 


Urine Ketones (Stick)


 


 Trace mg/dL


(NEG) 


 





 


Urine Blood  Large (NEG)   


 


Urine Nitrite  Positive (NEG)   


 


Urine Bilirubin  Moderate (NEG)   


 


Urine Urobilinogen Dipstick


 


 0.2 mg/dL (0.2


mg/dL) 


 





 


Urine Leukocyte Esterase  Small (NEG)   


 


Urine RBC  3-5 /HPF (0-2)   


 


Urine WBC  Occ /HPF (0-4)   


 


Urine Squamous Epithelial


Cells 


 None /LPF 


 


 





 


Urine Amorphous Sediment  Present /HPF   


 


Urine Bacteria  0 /HPF (0-FEW)   


 


Urine Random Creatinine


 


 199.6 mg/dL


(Not Establ.) 


 





 


Urine Opiates Screen  Neg (NEG)   


 


Urine Methadone Screen  Neg (NEG)   


 


Urine Barbiturates  Neg (NEG)   


 


Urine Phencyclidine Screen  Neg (NEG)   


 


Urine


Amphetamine/Methamphetamine 


 Pos (NEG) 


 


 





 


Urine Benzodiazepines Screen  Neg (NEG)   


 


Urine Cocaine Screen  Neg (NEG)   


 


Urine Cannabinoids Screen  Neg (NEG)   


 


Urine Ethyl Alcohol  Neg (NEG)   


 


Vitamin B12 Level


 


 


 


 430 pg/mL


(247-911)


 


Thyroid Stimulating Hormone


(TSH) 


 


 


 2.175 uIU/mL


(0.358-3.74)











Images


Images


I reviewed the cervical and thoracic MRI studies. There is some motion 

degradation, but I see no gross abnormality. The lumbar MRI images are very 

clear, I agree with the radiology report. I do not see any abnormalities of the 

conus





UMBAR SPINE WO CONTRAST





 


Indication: Back pain with difficulty with urination


 


Procedure: Multiplanar multisequence MRI images obtained through the 


lumbar spine without intravenous contrast.


 


Comparison: None.


 


Findings:


 


Edema to the subcutaneous soft tissues posteriorly.


No definite acute fracture.


There is prominent epidural fat identified at L5 as well as within the 


sacral region.


There is some regions of high T2 signal within the paraspinal musculature 


and right psoas.


 


T12-L1: No disc bulge.  No significant central canal or neuroforaminal 


stenosis. 


L1-L2: No disc bulge.  No significant central canal or neuroforaminal 


stenosis. Facet hypertrophy.


L2-L3: No disc bulge.  No significant central canal or neuroforaminal 


stenosis. Facet hypertrophy.


L3-L4:  No disc bulge.  No significant central canal or neuroforaminal 


stenosis. Facet hypertrophy with small joint effusion. Mild disc 


osteophyte complex. Thecal sac is 10 mm anterior to posterior.


L4-L5: Posterior disc protrusion with osteophyte formation and annular 


tear suspected. Ligament of flavum and facet hypertrophy with facet joint 


effusions. Effacement of the bilateral lateral recess. Neural foramina 


patent. Mild central canal narrowing. 


L5-S1:  Facet hypertrophy with facet joint effusions and ligamentum flavum


hypertrophy. Large amount of epidural fat with small size of thecal sac at


this level. Disc protrusion and annular tear. Mild right and mild to 


moderate left neural foraminal stenosis. 


 


Impression: 


 


There is evidence of degenerative changes the spine with disc protrusions 


and annular tears at L4-5 and L5-S1 as well as osteophyte formation at 


these vertebral body endplates and facet hypertrophy. This contributes to 


central canal and neural foraminal stenosis as detailed above.


 


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 


extending into the sacrum with resultant small size of the thecal sac.


 


High T2 signal is identified within the right greater than left paraspinal


musculature and psoas which can be seen with edema to these regions. Can 


be seen with causes such as myositis, muscle strain or neurological in 


nature from causes such as denervation associated edema.





Assessment/Plan


Assessment/Plan


Impression:


Bilateral leg weakness and numbness, examination points to nonorganic features. 

He deftly of course has rhabdomyolysis and some edema of the psoas muscle on the

MRI, so this may all be musculoskeletal from his fall after ingesting 

methamphetamines. There is still a possibility of transverse myelitis. Sensory 

level as I document above is very variable, all the way from lower thoracic to 

upper lumbar at times. I find no evidence of cervical or intracranial disease.





Recommendations:


Hold on repeat MRI studies


Lumbar puncture, discuss risks, benefits, alternatives.


Support of ICU care for rhabdomyolysis, renal insufficiency, and other medical 

problems.


Physical and occupational therapy.





Thank you for letting me help with the patient's care.











BATSHEVA WARREN MD           Jul 10, 2019 10:44

## 2019-07-10 NOTE — RAD
Examination: LUMBAR PUNCTURE

 

History: Back pain. Transverse myelitis.

 

Comparison/Correlation: MRI lumbar spine 7/9/2019

 

Findings: Risks and benefits of fluoroscopically guided lumbar puncture 

were discussed with the patient and informed consent was obtained.

 

Patient was placed in the [place] position. Cleansing with Betadine at the 

L3-4 after initial localization with fluoroscopic imaging was performed. 

This level was selected based on review of the MRI of the lumbar spine 

performed the previous day which demonstrated epidural lipomatosis of the 

more inferior level of the lumbar spinal canal with associated narrowing 

thecal sac diameter. Fluoroscopy was utilized for 0.6 minutes. A total of 

2 images were acquired.

 

4 cc 1 percent lidocaine was administered along the expected course of the

spinal needle track.

 

A 20-gauge spinal needle was advanced under fluoroscopic guidance into the

thecal sac. 11.5 cc clear CSF was withdrawn and sent to the lab in 4 

separate vials. The patient tolerated procedure well without immediate 

complications.

 

 

Impression:

Successful lumbar puncture under fluoroscopic guidance. Specimens sent to 

lab.

 

Electronically signed by: Marshall Hardy MD (7/10/2019 12:49 PM) [facility]

## 2019-07-11 VITALS — SYSTOLIC BLOOD PRESSURE: 111 MMHG | DIASTOLIC BLOOD PRESSURE: 53 MMHG

## 2019-07-11 VITALS — DIASTOLIC BLOOD PRESSURE: 67 MMHG | SYSTOLIC BLOOD PRESSURE: 129 MMHG

## 2019-07-11 VITALS — SYSTOLIC BLOOD PRESSURE: 116 MMHG | DIASTOLIC BLOOD PRESSURE: 51 MMHG

## 2019-07-11 VITALS — SYSTOLIC BLOOD PRESSURE: 126 MMHG | DIASTOLIC BLOOD PRESSURE: 66 MMHG

## 2019-07-11 VITALS — SYSTOLIC BLOOD PRESSURE: 135 MMHG | DIASTOLIC BLOOD PRESSURE: 66 MMHG

## 2019-07-11 VITALS — DIASTOLIC BLOOD PRESSURE: 62 MMHG | SYSTOLIC BLOOD PRESSURE: 115 MMHG

## 2019-07-11 VITALS — SYSTOLIC BLOOD PRESSURE: 130 MMHG | DIASTOLIC BLOOD PRESSURE: 69 MMHG

## 2019-07-11 VITALS — SYSTOLIC BLOOD PRESSURE: 96 MMHG | DIASTOLIC BLOOD PRESSURE: 43 MMHG

## 2019-07-11 VITALS — DIASTOLIC BLOOD PRESSURE: 68 MMHG | SYSTOLIC BLOOD PRESSURE: 129 MMHG

## 2019-07-11 VITALS — DIASTOLIC BLOOD PRESSURE: 50 MMHG | SYSTOLIC BLOOD PRESSURE: 110 MMHG

## 2019-07-11 VITALS — DIASTOLIC BLOOD PRESSURE: 44 MMHG | SYSTOLIC BLOOD PRESSURE: 105 MMHG

## 2019-07-11 VITALS — SYSTOLIC BLOOD PRESSURE: 106 MMHG | DIASTOLIC BLOOD PRESSURE: 52 MMHG

## 2019-07-11 VITALS — SYSTOLIC BLOOD PRESSURE: 119 MMHG | DIASTOLIC BLOOD PRESSURE: 63 MMHG

## 2019-07-11 VITALS — SYSTOLIC BLOOD PRESSURE: 120 MMHG | DIASTOLIC BLOOD PRESSURE: 60 MMHG

## 2019-07-11 VITALS — DIASTOLIC BLOOD PRESSURE: 52 MMHG | SYSTOLIC BLOOD PRESSURE: 105 MMHG

## 2019-07-11 VITALS — DIASTOLIC BLOOD PRESSURE: 49 MMHG | SYSTOLIC BLOOD PRESSURE: 100 MMHG

## 2019-07-11 VITALS — SYSTOLIC BLOOD PRESSURE: 104 MMHG | DIASTOLIC BLOOD PRESSURE: 49 MMHG

## 2019-07-11 VITALS — DIASTOLIC BLOOD PRESSURE: 73 MMHG | SYSTOLIC BLOOD PRESSURE: 100 MMHG

## 2019-07-11 VITALS — SYSTOLIC BLOOD PRESSURE: 105 MMHG | DIASTOLIC BLOOD PRESSURE: 52 MMHG

## 2019-07-11 VITALS — SYSTOLIC BLOOD PRESSURE: 134 MMHG | DIASTOLIC BLOOD PRESSURE: 65 MMHG

## 2019-07-11 VITALS — DIASTOLIC BLOOD PRESSURE: 62 MMHG | SYSTOLIC BLOOD PRESSURE: 125 MMHG

## 2019-07-11 VITALS — DIASTOLIC BLOOD PRESSURE: 67 MMHG | SYSTOLIC BLOOD PRESSURE: 135 MMHG

## 2019-07-11 LAB
ALBUMIN SERPL-MCNC: 2 G/DL (ref 3.4–5)
ALBUMIN/GLOB SERPL: 0.6 {RATIO} (ref 1–1.7)
ALP SERPL-CCNC: 65 U/L (ref 46–116)
ALT SERPL-CCNC: 412 U/L (ref 16–63)
ANION GAP SERPL CALC-SCNC: 17 MMOL/L (ref 6–14)
AST SERPL-CCNC: 1017 U/L (ref 15–37)
BASOPHILS # BLD AUTO: 0 X10^3/UL (ref 0–0.2)
BASOPHILS NFR BLD: 0 % (ref 0–3)
BILIRUB SERPL-MCNC: 0.5 MG/DL (ref 0.2–1)
BUN SERPL-MCNC: 84 MG/DL (ref 8–26)
BUN/CREAT SERPL: 8 (ref 6–20)
CALCIUM SERPL-MCNC: 5.5 MG/DL (ref 8.5–10.1)
CHLORIDE SERPL-SCNC: 87 MMOL/L (ref 98–107)
CK SERPL-CCNC: (no result) U/L (ref 39–308)
CO2 SERPL-SCNC: 20 MMOL/L (ref 21–32)
CREAT SERPL-MCNC: 10.4 MG/DL (ref 0.7–1.3)
EOSINOPHIL NFR BLD: 0.1 X10^3/UL (ref 0–0.7)
EOSINOPHIL NFR BLD: 1 % (ref 0–3)
ERYTHROCYTE [DISTWIDTH] IN BLOOD BY AUTOMATED COUNT: 13.1 % (ref 11.5–14.5)
GFR SERPLBLD BASED ON 1.73 SQ M-ARVRAT: 5.9 ML/MIN
GLOBULIN SER-MCNC: 3.2 G/DL (ref 2.2–3.8)
GLUCOSE SERPL-MCNC: 91 MG/DL (ref 70–99)
HCT VFR BLD CALC: 30.6 % (ref 39–53)
HGB BLD-MCNC: 10.9 G/DL (ref 13–17.5)
LYMPHOCYTES # BLD: 0.8 X10^3/UL (ref 1–4.8)
LYMPHOCYTES NFR BLD AUTO: 11 % (ref 24–48)
MAGNESIUM SERPL-MCNC: 2.1 MG/DL (ref 1.8–2.4)
MCH RBC QN AUTO: 30 PG (ref 25–35)
MCHC RBC AUTO-ENTMCNC: 36 G/DL (ref 31–37)
MCV RBC AUTO: 83 FL (ref 79–100)
MONO #: 0.4 X10^3/UL (ref 0–1.1)
MONOCYTES NFR BLD: 6 % (ref 0–9)
NEUT #: 6.2 X10^3/UL (ref 1.8–7.7)
NEUTROPHILS NFR BLD AUTO: 82 % (ref 31–73)
OSMOLALITY UR: 324 MOSMOL/KG
PLATELET # BLD AUTO: 148 X10^3/UL (ref 140–400)
POTASSIUM SERPL-SCNC: 4.3 MMOL/L (ref 3.5–5.1)
PROT SERPL-MCNC: 5.2 G/DL (ref 6.4–8.2)
RBC # BLD AUTO: 3.69 X10^6/UL (ref 4.3–5.7)
SODIUM SERPL-SCNC: 124 MMOL/L (ref 136–145)
WBC # BLD AUTO: 7.6 X10^3/UL (ref 4–11)

## 2019-07-11 PROCEDURE — 5A1D70Z PERFORMANCE OF URINARY FILTRATION, INTERMITTENT, LESS THAN 6 HOURS PER DAY: ICD-10-PCS | Performed by: FAMILY MEDICINE

## 2019-07-11 PROCEDURE — 02HV33Z INSERTION OF INFUSION DEVICE INTO SUPERIOR VENA CAVA, PERCUTANEOUS APPROACH: ICD-10-PCS | Performed by: RADIOLOGY

## 2019-07-11 PROCEDURE — B548ZZA ULTRASONOGRAPHY OF SUPERIOR VENA CAVA, GUIDANCE: ICD-10-PCS | Performed by: RADIOLOGY

## 2019-07-11 RX ADMIN — DEXMEDETOMIDINE HYDROCHLORIDE PRN MLS/HR: 100 INJECTION, SOLUTION, CONCENTRATE INTRAVENOUS at 04:26

## 2019-07-11 RX ADMIN — DEXMEDETOMIDINE HYDROCHLORIDE PRN MLS/HR: 100 INJECTION, SOLUTION, CONCENTRATE INTRAVENOUS at 00:26

## 2019-07-11 RX ADMIN — CEFTRIAXONE SODIUM SCH GM: 2 INJECTION, POWDER, FOR SOLUTION INTRAMUSCULAR; INTRAVENOUS at 14:39

## 2019-07-11 RX ADMIN — DEXMEDETOMIDINE HYDROCHLORIDE PRN MLS/HR: 100 INJECTION, SOLUTION, CONCENTRATE INTRAVENOUS at 18:13

## 2019-07-11 RX ADMIN — LINEZOLID SCH MG: 600 TABLET, FILM COATED ORAL at 21:00

## 2019-07-11 RX ADMIN — DEXMEDETOMIDINE HYDROCHLORIDE PRN MLS/HR: 100 INJECTION, SOLUTION, CONCENTRATE INTRAVENOUS at 06:27

## 2019-07-11 RX ADMIN — DEXMEDETOMIDINE HYDROCHLORIDE PRN MLS/HR: 100 INJECTION, SOLUTION, CONCENTRATE INTRAVENOUS at 15:28

## 2019-07-11 RX ADMIN — POLYETHYLENE GLYCOL 3350 SCH GM: 17 POWDER, FOR SOLUTION ORAL at 09:00

## 2019-07-11 RX ADMIN — DEXMEDETOMIDINE HYDROCHLORIDE PRN MLS/HR: 100 INJECTION, SOLUTION, CONCENTRATE INTRAVENOUS at 20:27

## 2019-07-11 RX ADMIN — DEXMEDETOMIDINE HYDROCHLORIDE PRN MLS/HR: 100 INJECTION, SOLUTION, CONCENTRATE INTRAVENOUS at 02:05

## 2019-07-11 RX ADMIN — MORPHINE SULFATE PRN MG: 4 INJECTION, SOLUTION INTRAMUSCULAR; INTRAVENOUS at 23:46

## 2019-07-11 RX ADMIN — MORPHINE SULFATE PRN MG: 4 INJECTION, SOLUTION INTRAMUSCULAR; INTRAVENOUS at 04:26

## 2019-07-11 RX ADMIN — MORPHINE SULFATE PRN MG: 4 INJECTION, SOLUTION INTRAMUSCULAR; INTRAVENOUS at 13:35

## 2019-07-11 RX ADMIN — MORPHINE SULFATE PRN MG: 4 INJECTION, SOLUTION INTRAMUSCULAR; INTRAVENOUS at 19:44

## 2019-07-11 RX ADMIN — LINEZOLID SCH MG: 600 TABLET, FILM COATED ORAL at 14:14

## 2019-07-11 RX ADMIN — DEXMEDETOMIDINE HYDROCHLORIDE PRN MLS/HR: 100 INJECTION, SOLUTION, CONCENTRATE INTRAVENOUS at 23:21

## 2019-07-11 RX ADMIN — DEXMEDETOMIDINE HYDROCHLORIDE PRN MLS/HR: 100 INJECTION, SOLUTION, CONCENTRATE INTRAVENOUS at 08:47

## 2019-07-11 RX ADMIN — MORPHINE SULFATE PRN MG: 4 INJECTION, SOLUTION INTRAMUSCULAR; INTRAVENOUS at 00:36

## 2019-07-11 RX ADMIN — PANTOPRAZOLE SODIUM SCH MG: 40 INJECTION, POWDER, FOR SOLUTION INTRAVENOUS at 07:45

## 2019-07-11 RX ADMIN — HALOPERIDOL LACTATE PRN MG: 5 INJECTION, SOLUTION INTRAMUSCULAR at 00:35

## 2019-07-11 RX ADMIN — DEXMEDETOMIDINE HYDROCHLORIDE PRN MLS/HR: 100 INJECTION, SOLUTION, CONCENTRATE INTRAVENOUS at 12:57

## 2019-07-11 RX ADMIN — SILVER SULFADIAZINE SCH APP: 10 CREAM TOPICAL at 10:06

## 2019-07-11 NOTE — PDOC
SUBJECTIVE


ROS


No acute events overnight





OBJECTIVE


Vital Signs





Vital Signs








  Date Time  Temp Pulse Resp B/P (MAP) Pulse Ox O2 Delivery O2 Flow Rate FiO2


 


7/11/19 06:00  69 16 104/49 (67) 96 Room Air  


 


7/11/19 04:00 98.1       





 98.1       








I & 0











Intake and Output 


 


 7/11/19





 07:00


 


Intake Total 3326 ml


 


Output Total 75 ml


 


Balance 3251 ml


 


 


 


Intake Oral 100 ml


 


IV Total 3226 ml


 


Output Urine Total 75 ml











PHYSICAL EXAM


Physical Exam


GEN:    NAD 


HEENT:  Sclerae nonicteric, OM moist  


NECK:  Supple.


LUNGS:  Clear, No accessory muscle use 


CARDIOVASCULAR:  Regular rate.


ABDOMEN:  Soft, NT 


EXTREMITIES:   edema in the right hand, LE edema trace  


SKIN-He has multiple punctate skin,lesions.  He has some ulceration in one of 

the right hand finger.  


- Culp +


NEURO- per Neurologist exam , AXOX3





DIAGNOSIS/ASSESSMENT


Assessment & Plan


EL - ATN2/2 Rhabdo , Meth use  


Urinary retention at [facility],Anuric now  


No response to IVF 


Requiring  HD - 1 st Tx  7/10  


Seen on HD  , tolerating, continue as  Ordered, [facility] R[facility] 





Rhabdo- Very high  CK 


Follow CPK, No significant diuresis with IVF  





Hyperkalemia- Normal K 





Hyponatremia- Improved 


Holding IVF 





Severe Metabolic acidosis- 2/2 all above 


Resolved  





Transaminitis 





Hypocalcemia- severe 2/2  Rhabdo


Replace cautiously and Monitor closely for Hypercalcemia during recovery phase 





Discussed with  RN





COMMENT/RELEVANT DATA


Meds





Current Medications








 Medications


  (Trade)  Dose


 Ordered  Sig/Travis  Start Time


 Stop Time Status Last Admin


Dose Admin


 


 Albuterol Sulfate


  (Ventolin Neb


 Soln)  10 mg  1X  ONCE  7/9/19 23:00


 7/9/19 23:01 DC 7/10/19 00:30


10 MG


 


 Atropine Sulfate


  (ATROPINE 0.5mg


 SYRINGE)  0.5 mg  PRN Q5MIN  PRN  7/10/19 14:15


     





 


 Calcium Gluconate


  (Calcium


 Gluconate)  2,000 mg  1X  ONCE  7/10/19 12:00


 7/10/19 12:01 DC 7/10/19 13:38


2,000 MG


 


 Calcium Gluconate


 2000 mg/Dextrose  120 ml @ 


 220 mls/hr  1X  ONCE  7/11/19 10:00


 7/11/19 10:32   





 


 Ceftriaxone Sodium


  (Rocephin)  2 gm  Q24H  7/11/19 13:00


     





 


 Dexmedetomidine


 HCl 200 mcg/


 Sodium Chloride  50 ml @ 0


 mls/hr  CONT  PRN  7/10/19 14:15


    7/11/19 08:47


12 MLS/HR


 


 Dextrose


  (Dextrose


 50%-Water Syringe)  25 gm  1X  ONCE  7/9/19 23:00


 7/9/19 23:01 DC 7/9/19 22:42


25 GM


 


 Diphenhydramine


 HCl


  (Benadryl)  25 mg  1X PRN  PRN  7/10/19 15:15


 7/11/19 15:14   





 


 Haloperidol


 Lactate


  (Haldol Inj)  1 mg  PRN Q8HRS  PRN  7/10/19 14:00


    7/11/19 00:35


1 MG


 


 Info


  (PHARMACY


 MONITORING -- do


 not chart)  1 each  PRN DAILY  PRN  7/10/19 15:15


     





 


 Insulin Human


 Regular


  (HumuLIN R VIAL)  10 unit  1X  ONCE  7/9/19 23:00


 7/9/19 23:01 DC 7/9/19 23:51


10 UNIT


 


 Lidocaine/Sodium


 Bicarbonate


  (Buffered


 Lidocaine 1%)  6 ml  1X  ONCE  7/10/19 14:15


 7/10/19 14:16 DC  





 


 Linezolid


  (Zyvox)  600 mg  BID  7/11/19 09:00


     





 


 Lorazepam


  (Ativan Inj)  2 mg  PRN Q2HRS  PRN  7/10/19 14:00


    7/11/19 04:25


2 MG


 


 Morphine Sulfate


  (Morphine


 Sulfate)  4 mg  PRN Q4HRS  PRN  7/10/19 09:00


    7/11/19 04:26


4 MG


 


 Ondansetron HCl


  (Zofran)  4 mg  PRN Q8HRS  PRN  7/10/19 01:15


 7/11/19 01:14 DC  





 


 Pantoprazole


 Sodium


  (PROTONIX VIAL


 for IV PUSH)  40 mg  DAILYAC  7/11/19 07:30


    7/11/19 07:45


40 MG


 


 Pantoprazole


 Sodium


  (Protonix)  40 mg  DAILYAC  7/10/19 13:30


 7/10/19 19:22 DC  





 


 Polyethylene


 Glycol


  (miraLAX PACKET)  17 gm  DAILY  7/10/19 13:30


     





 


 Silver


 Sulfadiazine


  (Silvadene)  1 mack  BID  7/10/19 13:00


    7/10/19 20:27


1 MACK


 


 Sodium Bicarbonate


  (Sodium Bicarb


 Adult 8.4% Syr)  100 meq  1X  ONCE  7/10/19 10:00


 7/10/19 10:01 DC 7/10/19 10:02


100 MEQ


 


 Sodium Chloride  1,000 ml @ 


 400 mls/hr  Q2H30M PRN  7/10/19 15:08


 7/11/19 03:07 DC  





 


 Sodium Chloride


  (Normal Saline


 Flush)  3 ml  QSHIFT  PRN  7/10/19 01:15


     











Lab





Laboratory Tests








Test


 7/10/19


09:39 7/10/19


11:19 7/10/19


15:15 7/10/19


18:00


 


Erythrocyte Sedimentation Rate 35 (0-15)    


 


Sodium Level


 117 mmol/L


(136-145) 


 


 127 mmol/L


(136-145)


 


Potassium Level


 6.6 mmol/L


(3.5-5.1) 


 


 3.5 mmol/L


(3.5-5.1)


 


Chloride Level


 81 mmol/L


() 


 


 88 mmol/L


()


 


Carbon Dioxide Level


 9 mmol/L


(21-32) 


 


 21 mmol/L


(21-32)


 


Anion Gap 27 (6-14)    18 (6-14) 


 


Blood Urea Nitrogen


 115 mg/dL


(8-26) 


 


 69 mg/dL


(8-26)


 


Creatinine


 13.4 mg/dL


(0.7-1.3) 


 


 8.6 mg/dL


(0.7-1.3)


 


Estimated GFR


(Cockcroft-Gault) 4.4 


 


 


 7.3 





 


Glucose Level


 81 mg/dL


(70-99) 


 


 70 mg/dL


(70-99)


 


Calcium Level


 < 5.0 mg/dL


(8.5-10.1) 


 


 5.6 mg/dL


(8.5-10.1)


 


Vitamin B12 Level


 430 pg/mL


(247-911) 


 


 





 


Thyroid Stimulating Hormone


(TSH) 2.175 uIU/mL


(0.358-3.74) 


 


 





 


CSF Tube Number  4   


 


CSF Volume  3.0   


 


CSF Color  Colorless   


 


CSF Clarity  Clear   


 


CSF WBC


 


 3 /cmm (Not


Established) 


 





 


CSF RBC


 


 2 /cmm (Not


Established) 


 





 


CSF Glucose


 


 52 mg/dL


(37-70) 


 





 


CSF Total Protein


 


 53.2 mg/dL


(15.0-45.0) 


 





 


Hepatitis A IgM Antibody


 


 


 Nonreactive


(Nonreactive) 





 


Hepatitis B Surface Antigen


 


 


 Nonreactive


(Nonreactive) 





 


Hepatitis B Core Total


Antibody 


 


 Nonreactive


(Nonreactive) 





 


Hepatitis B Core IgM Antibody


 


 


 Nonreactive


(Nonreactive) 





 


Hepatitis C IgG Antibody


 


 


 Equivocal


(Nonreactive) 





 


Test


 7/10/19


21:20 7/11/19


06:15 


 





 


Ionized Calcium


 0.70 mmol/L


(1.13-1.32) 


 


 





 


White Blood Count


 


 7.6 x10^3/uL


(4.0-11.0) 


 





 


Red Blood Count


 


 3.69 x10^6/uL


(4.30-5.70) 


 





 


Hemoglobin


 


 10.9 g/dL


(13.0-17.5) 


 





 


Hematocrit


 


 30.6 %


(39.0-53.0) 


 





 


Mean Corpuscular Volume  83 fL ()   


 


Mean Corpuscular Hemoglobin  30 pg (25-35)   


 


Mean Corpuscular Hemoglobin


Concent 


 36 g/dL


(31-37) 


 





 


Red Cell Distribution Width


 


 13.1 %


(11.5-14.5) 


 





 


Platelet Count


 


 148 x10^3/uL


(140-400) 


 





 


Neutrophils (%) (Auto)  82 % (31-73)   


 


Lymphocytes (%) (Auto)  11 % (24-48)   


 


Monocytes (%) (Auto)  6 % (0-9)   


 


Eosinophils (%) (Auto)  1 % (0-3)   


 


Basophils (%) (Auto)  0 % (0-3)   


 


Neutrophils # (Auto)


 


 6.2 x10^3/uL


(1.8-7.7) 


 





 


Lymphocytes # (Auto)


 


 0.8 x10^3/uL


(1.0-4.8) 


 





 


Monocytes # (Auto)


 


 0.4 x10^3/uL


(0.0-1.1) 


 





 


Eosinophils # (Auto)


 


 0.1 x10^3/uL


(0.0-0.7) 


 





 


Basophils # (Auto)


 


 0.0 x10^3/uL


(0.0-0.2) 


 





 


Sodium Level


 


 124 mmol/L


(136-145) 


 





 


Potassium Level


 


 4.3 mmol/L


(3.5-5.1) 


 





 


Chloride Level


 


 87 mmol/L


() 


 





 


Carbon Dioxide Level


 


 20 mmol/L


(21-32) 


 





 


Anion Gap  17 (6-14)   


 


Blood Urea Nitrogen


 


 84 mg/dL


(8-26) 


 





 


Creatinine


 


 10.4 mg/dL


(0.7-1.3) 


 





 


Estimated GFR


(Cockcroft-Gault) 


 5.9 


 


 





 


BUN/Creatinine Ratio  8 (6-20)   


 


Glucose Level


 


 91 mg/dL


(70-99) 


 





 


Calcium Level


 


 5.5 mg/dL


(8.5-10.1) 


 





 


Magnesium Level


 


 2.1 mg/dL


(1.8-2.4) 


 





 


Total Bilirubin


 


 0.5 mg/dL


(0.2-1.0) 


 





 


Aspartate Amino Transf


(AST/SGOT) 


 1017 U/L


(15-37) 


 





 


Alanine Aminotransferase


(ALT/SGPT) 


 412 U/L


(16-63) 


 





 


Alkaline Phosphatase


 


 65 U/L


() 


 





 


Total Protein


 


 5.2 g/dL


(6.4-8.2) 


 





 


Albumin


 


 2.0 g/dL


(3.4-5.0) 


 





 


Albumin/Globulin Ratio  0.6 (1.0-1.7)   








Results


All relevant outside records, renal labs, imaging studies, telemetry/EKG's were 

reviewed.











BENIGNO JOHNSON MD                Jul 11, 2019 09:40

## 2019-07-11 NOTE — PDOC
PULMONARY PROGRESS NOTES


Subjective


no soa


Vitals





Vital Signs








  Date Time  Temp Pulse Resp B/P (MAP) Pulse Ox O2 Delivery O2 Flow Rate FiO2


 


7/11/19 10:00  81 23 110/50 (70) 96 Room Air  


 


7/11/19 08:00 97.8       





 97.8       








General:  No acute distress


Lungs:  Clear


Cardiovascular:  S1


Abdomen:  Soft


Extremities:  Other (1+edema,)


Labs





Laboratory Tests








Test


 7/9/19


20:50 7/9/19


21:00 7/10/19


05:15 7/10/19


07:00


 


White Blood Count


 17.0 x10^3/uL


(4.0-11.0) 


 11.0 x10^3/uL


(4.0-11.0) 





 


Red Blood Count


 4.77 x10^6/uL


(4.30-5.70) 


 4.37 x10^6/uL


(4.30-5.70) 





 


Hemoglobin


 13.9 g/dL


(13.0-17.5) 


 12.8 g/dL


(13.0-17.5) 





 


Hematocrit


 39.0 %


(39.0-53.0) 


 35.7 %


(39.0-53.0) 





 


Mean Corpuscular Volume 82 fL ()   82 fL ()  


 


Mean Corpuscular Hemoglobin 29 pg (25-35)   29 pg (25-35)  


 


Mean Corpuscular Hemoglobin


Concent 36 g/dL


(31-37) 


 36 g/dL


(31-37) 





 


Red Cell Distribution Width


 13.0 %


(11.5-14.5) 


 12.8 %


(11.5-14.5) 





 


Platelet Count


 320 x10^3/uL


(140-400) 


 217 x10^3/uL


(140-400) 





 


Neutrophils (%) (Auto) 92 % (31-73)   89 % (31-73)  


 


Lymphocytes (%) (Auto) 3 % (24-48)   7 % (24-48)  


 


Monocytes (%) (Auto) 4 % (0-9)   4 % (0-9)  


 


Eosinophils (%) (Auto) 0 % (0-3)   0 % (0-3)  


 


Basophils (%) (Auto) 0 % (0-3)   0 % (0-3)  


 


Neutrophils # (Auto)


 15.7 x10^3uL


(1.8-7.7) 


 9.7 x10^3uL


(1.8-7.7) 





 


Lymphocytes # (Auto)


 0.5 x10^3/uL


(1.0-4.8) 


 0.8 x10^3/uL


(1.0-4.8) 





 


Monocytes # (Auto)


 0.7 x10^3/uL


(0.0-1.1) 


 0.5 x10^3/uL


(0.0-1.1) 





 


Eosinophils # (Auto)


 0.0 x10^3/uL


(0.0-0.7) 


 0.0 x10^3/uL


(0.0-0.7) 





 


Basophils # (Auto)


 0.0 x10^3/uL


(0.0-0.2) 


 0.0 x10^3/uL


(0.0-0.2) 





 


Segmented Neutrophils % 90 % (35-66)    


 


Lymphocytes % 5 % (24-48)    


 


Monocytes % 5 % (0-10)    


 


Platelet Estimate


 Adequate


(ADEQUATE) 


 


 





 


Polychromasia Slight    


 


Anisocytosis Slight    


 


Prothrombin Time


 14.7 SEC


(11.7-14.0) 


 


 





 


Prothromb Time International


Ratio 1.2 (0.8-1.1) 


 


 


 





 


Activated Partial


Thromboplast Time 31 SEC (24-38) 


 


 


 





 


Sodium Level


 112 mmol/L


(136-145) 


 117 mmol/L


(136-145) 





 


Potassium Level


 7.0 mmol/L


(3.5-5.1) 


 5.6 mmol/L


(3.5-5.1) 





 


Chloride Level


 73 mmol/L


() 


 80 mmol/L


() 





 


Carbon Dioxide Level


 12 mmol/L


(21-32) 


 10 mmol/L


(21-32) 





 


Anion Gap 27 (6-14)   27 (6-14)  


 


Blood Urea Nitrogen


 110 mg/dL


(8-26) 


 112 mg/dL


(8-26) 





 


Creatinine


 13.4 mg/dL


(0.7-1.3) 


 13.2 mg/dL


(0.7-1.3) 





 


Estimated GFR


(Cockcroft-Gault) 4.4 


 


 4.5 


 





 


BUN/Creatinine Ratio 8 (6-20)   8 (6-20)  


 


Glucose Level


 81 mg/dL


(70-99) 


 70 mg/dL


(70-99) 





 


Lactic Acid Level


 1.1 mmol/L


(0.4-2.0) 


 


 





 


Calcium Level


 5.4 mg/dL


(8.5-10.1) 


 5.0 mg/dL


(8.5-10.1) 





 


Magnesium Level


 3.0 mg/dL


(1.8-2.4) 


 


 





 


Total Bilirubin


 0.9 mg/dL


(0.2-1.0) 


 0.6 mg/dL


(0.2-1.0) 





 


Aspartate Amino Transf


(AST/SGOT) 2923 U/L


(15-37) 


 2204 U/L


(15-37) 





 


Alanine Aminotransferase


(ALT/SGPT) 818 U/L


(16-63) 


 603 U/L


(16-63) 





 


Alkaline Phosphatase


 89 U/L


() 


 71 U/L


() 





 


Creatine Kinase


 > 910005 U/L


() 


 


 





 


Troponin I Quantitative


 0.352 ng/mL


(0.000-0.055) 


 


 





 


Total Protein


 7.0 g/dL


(6.4-8.2) 


 6.2 g/dL


(6.4-8.2) 





 


Albumin


 3.4 g/dL


(3.4-5.0) 


 2.6 g/dL


(3.4-5.0) 





 


Albumin/Globulin Ratio 0.9 (1.0-1.7)   0.7 (1.0-1.7)  


 


Lipase


 972 U/L


() 


 


 





 


Ethyl Alcohol Level


 < 10 mg/dL


(0-10) 


 


 





 


Urine Color  Red   


 


Urine Clarity  Clear   


 


Urine pH  6.0   


 


Urine Specific Gravity  1.020   


 


Urine Protein


 


 >=300 mg/dL


(NEG-TRACE) 


 





 


Urine Glucose (UA)


 


 100 mg/dL


(NEG) 


 





 


Urine Ketones (Stick)


 


 Trace mg/dL


(NEG) 


 





 


Urine Blood  Large (NEG)   


 


Urine Nitrite  Positive (NEG)   


 


Urine Bilirubin  Moderate (NEG)   


 


Urine Urobilinogen Dipstick


 


 0.2 mg/dL (0.2


mg/dL) 


 





 


Urine Leukocyte Esterase  Small (NEG)   


 


Urine RBC  3-5 /HPF (0-2)   


 


Urine WBC  Occ /HPF (0-4)   


 


Urine Squamous Epithelial


Cells 


 None /LPF 


 


 





 


Urine Amorphous Sediment  Present /HPF   


 


Urine Bacteria  0 /HPF (0-FEW)   


 


Urine Random Creatinine


 


 199.6 mg/dL


(Not Establ.) 


 





 


Urine Random Sodium


 


 <60 mmol/L


(Not Estab.) 


 





 


Urine Opiates Screen  Neg (NEG)   


 


Urine Methadone Screen  Neg (NEG)   


 


Urine Barbiturates  Neg (NEG)   


 


Urine Phencyclidine Screen  Neg (NEG)   


 


Urine


Amphetamine/Methamphetamine 


 Pos (NEG) 


 


 





 


Urine Benzodiazepines Screen  Neg (NEG)   


 


Urine Cocaine Screen  Neg (NEG)   


 


Urine Cannabinoids Screen  Neg (NEG)   


 


Urine Ethyl Alcohol  Neg (NEG)   


 


Nasal Screen MRSA (PCR)


 


 


 


 Positive


(Negative)


 


Test


 7/10/19


09:39 7/10/19


11:19 7/10/19


15:15 7/10/19


18:00


 


Erythrocyte Sedimentation Rate 35 (0-15)    


 


Sodium Level


 117 mmol/L


(136-145) 


 


 127 mmol/L


(136-145)


 


Potassium Level


 6.6 mmol/L


(3.5-5.1) 


 


 3.5 mmol/L


(3.5-5.1)


 


Chloride Level


 81 mmol/L


() 


 


 88 mmol/L


()


 


Carbon Dioxide Level


 9 mmol/L


(21-32) 


 


 21 mmol/L


(21-32)


 


Anion Gap 27 (6-14)    18 (6-14) 


 


Blood Urea Nitrogen


 115 mg/dL


(8-26) 


 


 69 mg/dL


(8-26)


 


Creatinine


 13.4 mg/dL


(0.7-1.3) 


 


 8.6 mg/dL


(0.7-1.3)


 


Estimated GFR


(Cockcroft-Gault) 4.4 


 


 


 7.3 





 


Glucose Level


 81 mg/dL


(70-99) 


 


 70 mg/dL


(70-99)


 


Calcium Level


 < 5.0 mg/dL


(8.5-10.1) 


 


 5.6 mg/dL


(8.5-10.1)


 


Vitamin B12 Level


 430 pg/mL


(247-911) 


 


 





 


Thyroid Stimulating Hormone


(TSH) 2.175 uIU/mL


(0.358-3.74) 


 


 





 


CSF Tube Number  4   


 


CSF Volume  3.0   


 


CSF Color  Colorless   


 


CSF Clarity  Clear   


 


CSF WBC


 


 3 /cmm (Not


Established) 


 





 


CSF RBC


 


 2 /cmm (Not


Established) 


 





 


CSF Glucose


 


 52 mg/dL


(37-70) 


 





 


CSF Total Protein


 


 53.2 mg/dL


(15.0-45.0) 


 





 


Hepatitis A IgM Antibody


 


 


 Nonreactive


(Nonreactive) 





 


Hepatitis B Surface Antigen


 


 


 Nonreactive


(Nonreactive) 





 


Hepatitis B Core Total


Antibody 


 


 Nonreactive


(Nonreactive) 





 


Hepatitis B Core IgM Antibody


 


 


 Nonreactive


(Nonreactive) 





 


Hepatitis C IgG Antibody


 


 


 Equivocal


(Nonreactive) 





 


Test


 7/10/19


21:20 7/11/19


06:15 


 





 


Ionized Calcium


 0.70 mmol/L


(1.13-1.32) 


 


 





 


White Blood Count


 


 7.6 x10^3/uL


(4.0-11.0) 


 





 


Red Blood Count


 


 3.69 x10^6/uL


(4.30-5.70) 


 





 


Hemoglobin


 


 10.9 g/dL


(13.0-17.5) 


 





 


Hematocrit


 


 30.6 %


(39.0-53.0) 


 





 


Mean Corpuscular Volume  83 fL ()   


 


Mean Corpuscular Hemoglobin  30 pg (25-35)   


 


Mean Corpuscular Hemoglobin


Concent 


 36 g/dL


(31-37) 


 





 


Red Cell Distribution Width


 


 13.1 %


(11.5-14.5) 


 





 


Platelet Count


 


 148 x10^3/uL


(140-400) 


 





 


Neutrophils (%) (Auto)  82 % (31-73)   


 


Lymphocytes (%) (Auto)  11 % (24-48)   


 


Monocytes (%) (Auto)  6 % (0-9)   


 


Eosinophils (%) (Auto)  1 % (0-3)   


 


Basophils (%) (Auto)  0 % (0-3)   


 


Neutrophils # (Auto)


 


 6.2 x10^3/uL


(1.8-7.7) 


 





 


Lymphocytes # (Auto)


 


 0.8 x10^3/uL


(1.0-4.8) 


 





 


Monocytes # (Auto)


 


 0.4 x10^3/uL


(0.0-1.1) 


 





 


Eosinophils # (Auto)


 


 0.1 x10^3/uL


(0.0-0.7) 


 





 


Basophils # (Auto)


 


 0.0 x10^3/uL


(0.0-0.2) 


 





 


Sodium Level


 


 124 mmol/L


(136-145) 


 





 


Potassium Level


 


 4.3 mmol/L


(3.5-5.1) 


 





 


Chloride Level


 


 87 mmol/L


() 


 





 


Carbon Dioxide Level


 


 20 mmol/L


(21-32) 


 





 


Anion Gap  17 (6-14)   


 


Blood Urea Nitrogen


 


 84 mg/dL


(8-26) 


 





 


Creatinine


 


 10.4 mg/dL


(0.7-1.3) 


 





 


Estimated GFR


(Cockcroft-Gault) 


 5.9 


 


 





 


BUN/Creatinine Ratio  8 (6-20)   


 


Glucose Level


 


 91 mg/dL


(70-99) 


 





 


Calcium Level


 


 5.5 mg/dL


(8.5-10.1) 


 





 


Magnesium Level


 


 2.1 mg/dL


(1.8-2.4) 


 





 


Total Bilirubin


 


 0.5 mg/dL


(0.2-1.0) 


 





 


Aspartate Amino Transf


(AST/SGOT) 


 1017 U/L


(15-37) 


 





 


Alanine Aminotransferase


(ALT/SGPT) 


 412 U/L


(16-63) 


 





 


Alkaline Phosphatase


 


 65 U/L


() 


 





 


Creatine Kinase


 


 > 063399 U/L


() 


 





 


Total Protein


 


 5.2 g/dL


(6.4-8.2) 


 





 


Albumin


 


 2.0 g/dL


(3.4-5.0) 


 





 


Albumin/Globulin Ratio  0.6 (1.0-1.7)   








Laboratory Tests








Test


 7/10/19


11:19 7/10/19


15:15 7/10/19


18:00 7/10/19


21:20


 


CSF Tube Number 4    


 


CSF Volume 3.0    


 


CSF Color Colorless    


 


CSF Clarity Clear    


 


CSF WBC


 3 /cmm (Not


Established) 


 


 





 


CSF RBC


 2 /cmm (Not


Established) 


 


 





 


CSF Glucose


 52 mg/dL


(37-70) 


 


 





 


CSF Total Protein


 53.2 mg/dL


(15.0-45.0) 


 


 





 


Hepatitis A IgM Antibody


 


 Nonreactive


(Nonreactive) 


 





 


Hepatitis B Surface Antigen


 


 Nonreactive


(Nonreactive) 


 





 


Hepatitis B Core Total


Antibody 


 Nonreactive


(Nonreactive) 


 





 


Hepatitis B Core IgM Antibody


 


 Nonreactive


(Nonreactive) 


 





 


Hepatitis C IgG Antibody


 


 Equivocal


(Nonreactive) 


 





 


Sodium Level


 


 


 127 mmol/L


(136-145) 





 


Potassium Level


 


 


 3.5 mmol/L


(3.5-5.1) 





 


Chloride Level


 


 


 88 mmol/L


() 





 


Carbon Dioxide Level


 


 


 21 mmol/L


(21-32) 





 


Anion Gap   18 (6-14)  


 


Blood Urea Nitrogen


 


 


 69 mg/dL


(8-26) 





 


Creatinine


 


 


 8.6 mg/dL


(0.7-1.3) 





 


Estimated GFR


(Cockcroft-Gault) 


 


 7.3 


 





 


Glucose Level


 


 


 70 mg/dL


(70-99) 





 


Calcium Level


 


 


 5.6 mg/dL


(8.5-10.1) 





 


Ionized Calcium


 


 


 


 0.70 mmol/L


(1.13-1.32)


 


Test


 7/11/19


06:15 


 


 





 


White Blood Count


 7.6 x10^3/uL


(4.0-11.0) 


 


 





 


Red Blood Count


 3.69 x10^6/uL


(4.30-5.70) 


 


 





 


Hemoglobin


 10.9 g/dL


(13.0-17.5) 


 


 





 


Hematocrit


 30.6 %


(39.0-53.0) 


 


 





 


Mean Corpuscular Volume 83 fL ()    


 


Mean Corpuscular Hemoglobin 30 pg (25-35)    


 


Mean Corpuscular Hemoglobin


Concent 36 g/dL


(31-37) 


 


 





 


Red Cell Distribution Width


 13.1 %


(11.5-14.5) 


 


 





 


Platelet Count


 148 x10^3/uL


(140-400) 


 


 





 


Neutrophils (%) (Auto) 82 % (31-73)    


 


Lymphocytes (%) (Auto) 11 % (24-48)    


 


Monocytes (%) (Auto) 6 % (0-9)    


 


Eosinophils (%) (Auto) 1 % (0-3)    


 


Basophils (%) (Auto) 0 % (0-3)    


 


Neutrophils # (Auto)


 6.2 x10^3/uL


(1.8-7.7) 


 


 





 


Lymphocytes # (Auto)


 0.8 x10^3/uL


(1.0-4.8) 


 


 





 


Monocytes # (Auto)


 0.4 x10^3/uL


(0.0-1.1) 


 


 





 


Eosinophils # (Auto)


 0.1 x10^3/uL


(0.0-0.7) 


 


 





 


Basophils # (Auto)


 0.0 x10^3/uL


(0.0-0.2) 


 


 





 


Sodium Level


 124 mmol/L


(136-145) 


 


 





 


Potassium Level


 4.3 mmol/L


(3.5-5.1) 


 


 





 


Chloride Level


 87 mmol/L


() 


 


 





 


Carbon Dioxide Level


 20 mmol/L


(21-32) 


 


 





 


Anion Gap 17 (6-14)    


 


Blood Urea Nitrogen


 84 mg/dL


(8-26) 


 


 





 


Creatinine


 10.4 mg/dL


(0.7-1.3) 


 


 





 


Estimated GFR


(Cockcroft-Gault) 5.9 


 


 


 





 


BUN/Creatinine Ratio 8 (6-20)    


 


Glucose Level


 91 mg/dL


(70-99) 


 


 





 


Calcium Level


 5.5 mg/dL


(8.5-10.1) 


 


 





 


Magnesium Level


 2.1 mg/dL


(1.8-2.4) 


 


 





 


Total Bilirubin


 0.5 mg/dL


(0.2-1.0) 


 


 





 


Aspartate Amino Transf


(AST/SGOT) 1017 U/L


(15-37) 


 


 





 


Alanine Aminotransferase


(ALT/SGPT) 412 U/L


(16-63) 


 


 





 


Alkaline Phosphatase


 65 U/L


() 


 


 





 


Creatine Kinase


 > 054582 U/L


() 


 


 





 


Total Protein


 5.2 g/dL


(6.4-8.2) 


 


 





 


Albumin


 2.0 g/dL


(3.4-5.0) 


 


 





 


Albumin/Globulin Ratio 0.6 (1.0-1.7)    








Medications





Active Scripts








 Medications  Dose


 Route/Sig


 Max Daily Dose Days Date Category


 


 Protonix


  (Pantoprazole


 Sodium) 20 Mg


 Tablet.dr  2 Tab


 PO DAILY


   7/10/19 Reported


 


 Zyprexa


  (Olanzapine) 20


 Mg Tablet  2 Tab


 PO QHS


   7/10/19 Reported


 


 Buspirone Hcl 30


 Mg Tablet  1 Tab


 PO BID


   7/10/19 Reported











Impression


.


1.  Status post fall with acute rhabdomyolysis with markedly high CPKs and acute


renal failure.


2.  Acute kidney injury secondary to rhabdomyolysis.


3.  Severe metabolic acidosis secondary to acute kidney injury.  Clinically,


less likely sepsis.  Lactic acid is 1.1


4.  Hyponatremia.


5.  Moderate protein-calorie malnutrition.


6.  Leukocytosis, likely reactive.


7.  Status post fall with abnormal thoracic MRI.  Neurosurgery following.


8.  Abnormal liver function test secondary to hypoperfusion and rhabdomyolysis.


9.  Hypocalcemia.





Plan


.





1.  s/p fluid resuscitation , started on HD


2.  Follow Renal's recommendation.


3.  prn bicarbonate.


4.  Monitor sodium level.


5.  Follow Neurology and Neurosurgery's recommendation.


6.  Continue empiric antibiotic, although clinical suspicion for infection is


low.


7.  Follow CPKs.


8.  Correct calcium and follow the levels.


9.  clear chest x-ray.


10.  Discussed with RN and will follow along with you.











SIENA KHALIL MD                 Jul 11, 2019 11:17

## 2019-07-11 NOTE — PDOC
PROGRESS NOTES


Assessment


Problems


Medical Problems:


(1) Elevated troponin


Status: Acute  





(2) Hepatorenal syndrome


Status: Acute  





(3) Hyperkalemia


Status: Acute  





(4) Hyponatremia


Status: Acute  





(5) Methamphetamine abuse


Status: Acute  





(6) Neurological complaint


Status: Acute  





(7) Rhabdomyolysis


Status: Acute  





(8) Urinary retention


Status: Acute  





(9) Urinary tract infection


Status: Acute  





Bilateral leg weakness and numbness, factitious disorder


But he has rhabdomyolysis and some edema of the psoas muscle on the MRI,


Methamphetamine abuse.


Plan


Hold on repeat MRI studies


Supportive ICU care for rhabdomyolysis, renal insufficiency, and other medical 

problems.


Physical and occupational therapy.


Subjective


denies pain, says he can't move


Objective





Vital Signs








  Date Time  Temp Pulse Resp B/P (MAP) Pulse Ox O2 Delivery O2 Flow Rate FiO2


 


7/11/19 14:05   17  95 Room Air  


 


7/11/19 14:00  104  129/68 (88)    


 


7/11/19 12:00 98.1       





 98.1       














Intake and Output 


 


 7/11/19





 07:00


 


Intake Total 3326 ml


 


Output Total 75 ml


 


Balance 3251 ml


 


 


 


Intake Oral 100 ml


 


IV Total 3226 ml


 


Output Urine Total 75 ml








PHYSICAL EXAM


Alert. Oriented to time, place and person.


PERRL.


EOMI.


CN: no focal findings.


Muscle tone: normal.


Muscle strength: Says he can't move at all, just a few minutes before he was 

standing at the bedside trying to get out of bed


DTR: 2+


Plantar reflex: Flexor


Gait: not examined in bed.


Sensory exam: Complete anesthesia to the neck


No cerebellar signs elicited.


Review of Relevant


I have reviewed the following items estrella (where applicable) has been applied.


Labs





Laboratory Tests








Test


 7/9/19


20:50 7/9/19


21:00 7/10/19


05:15 7/10/19


07:00


 


White Blood Count


 17.0 x10^3/uL


(4.0-11.0) 


 11.0 x10^3/uL


(4.0-11.0) 





 


Red Blood Count


 4.77 x10^6/uL


(4.30-5.70) 


 4.37 x10^6/uL


(4.30-5.70) 





 


Hemoglobin


 13.9 g/dL


(13.0-17.5) 


 12.8 g/dL


(13.0-17.5) 





 


Hematocrit


 39.0 %


(39.0-53.0) 


 35.7 %


(39.0-53.0) 





 


Mean Corpuscular Volume 82 fL ()   82 fL ()  


 


Mean Corpuscular Hemoglobin 29 pg (25-35)   29 pg (25-35)  


 


Mean Corpuscular Hemoglobin


Concent 36 g/dL


(31-37) 


 36 g/dL


(31-37) 





 


Red Cell Distribution Width


 13.0 %


(11.5-14.5) 


 12.8 %


(11.5-14.5) 





 


Platelet Count


 320 x10^3/uL


(140-400) 


 217 x10^3/uL


(140-400) 





 


Neutrophils (%) (Auto) 92 % (31-73)   89 % (31-73)  


 


Lymphocytes (%) (Auto) 3 % (24-48)   7 % (24-48)  


 


Monocytes (%) (Auto) 4 % (0-9)   4 % (0-9)  


 


Eosinophils (%) (Auto) 0 % (0-3)   0 % (0-3)  


 


Basophils (%) (Auto) 0 % (0-3)   0 % (0-3)  


 


Neutrophils # (Auto)


 15.7 x10^3uL


(1.8-7.7) 


 9.7 x10^3uL


(1.8-7.7) 





 


Lymphocytes # (Auto)


 0.5 x10^3/uL


(1.0-4.8) 


 0.8 x10^3/uL


(1.0-4.8) 





 


Monocytes # (Auto)


 0.7 x10^3/uL


(0.0-1.1) 


 0.5 x10^3/uL


(0.0-1.1) 





 


Eosinophils # (Auto)


 0.0 x10^3/uL


(0.0-0.7) 


 0.0 x10^3/uL


(0.0-0.7) 





 


Basophils # (Auto)


 0.0 x10^3/uL


(0.0-0.2) 


 0.0 x10^3/uL


(0.0-0.2) 





 


Segmented Neutrophils % 90 % (35-66)    


 


Lymphocytes % 5 % (24-48)    


 


Monocytes % 5 % (0-10)    


 


Platelet Estimate


 Adequate


(ADEQUATE) 


 


 





 


Polychromasia Slight    


 


Anisocytosis Slight    


 


Prothrombin Time


 14.7 SEC


(11.7-14.0) 


 


 





 


Prothromb Time International


Ratio 1.2 (0.8-1.1) 


 


 


 





 


Activated Partial


Thromboplast Time 31 SEC (24-38) 


 


 


 





 


Sodium Level


 112 mmol/L


(136-145) 


 117 mmol/L


(136-145) 





 


Potassium Level


 7.0 mmol/L


(3.5-5.1) 


 5.6 mmol/L


(3.5-5.1) 





 


Chloride Level


 73 mmol/L


() 


 80 mmol/L


() 





 


Carbon Dioxide Level


 12 mmol/L


(21-32) 


 10 mmol/L


(21-32) 





 


Anion Gap 27 (6-14)   27 (6-14)  


 


Blood Urea Nitrogen


 110 mg/dL


(8-26) 


 112 mg/dL


(8-26) 





 


Creatinine


 13.4 mg/dL


(0.7-1.3) 


 13.2 mg/dL


(0.7-1.3) 





 


Estimated GFR


(Cockcroft-Gault) 4.4 


 


 4.5 


 





 


BUN/Creatinine Ratio 8 (6-20)   8 (6-20)  


 


Glucose Level


 81 mg/dL


(70-99) 


 70 mg/dL


(70-99) 





 


Lactic Acid Level


 1.1 mmol/L


(0.4-2.0) 


 


 





 


Calcium Level


 5.4 mg/dL


(8.5-10.1) 


 5.0 mg/dL


(8.5-10.1) 





 


Magnesium Level


 3.0 mg/dL


(1.8-2.4) 


 


 





 


Total Bilirubin


 0.9 mg/dL


(0.2-1.0) 


 0.6 mg/dL


(0.2-1.0) 





 


Aspartate Amino Transf


(AST/SGOT) 2923 U/L


(15-37) 


 2204 U/L


(15-37) 





 


Alanine Aminotransferase


(ALT/SGPT) 818 U/L


(16-63) 


 603 U/L


(16-63) 





 


Alkaline Phosphatase


 89 U/L


() 


 71 U/L


() 





 


Creatine Kinase


 > 662266 U/L


() 


 


 





 


Troponin I Quantitative


 0.352 ng/mL


(0.000-0.055) 


 


 





 


Total Protein


 7.0 g/dL


(6.4-8.2) 


 6.2 g/dL


(6.4-8.2) 





 


Albumin


 3.4 g/dL


(3.4-5.0) 


 2.6 g/dL


(3.4-5.0) 





 


Albumin/Globulin Ratio 0.9 (1.0-1.7)   0.7 (1.0-1.7)  


 


Lipase


 972 U/L


() 


 


 





 


Ethyl Alcohol Level


 < 10 mg/dL


(0-10) 


 


 





 


Urine Color  Red   


 


Urine Clarity  Clear   


 


Urine pH  6.0   


 


Urine Specific Gravity  1.020   


 


Urine Protein


 


 >=300 mg/dL


(NEG-TRACE) 


 





 


Urine Glucose (UA)


 


 100 mg/dL


(NEG) 


 





 


Urine Ketones (Stick)


 


 Trace mg/dL


(NEG) 


 





 


Urine Blood  Large (NEG)   


 


Urine Nitrite  Positive (NEG)   


 


Urine Bilirubin  Moderate (NEG)   


 


Urine Urobilinogen Dipstick


 


 0.2 mg/dL (0.2


mg/dL) 


 





 


Urine Leukocyte Esterase  Small (NEG)   


 


Urine RBC  3-5 /HPF (0-2)   


 


Urine WBC  Occ /HPF (0-4)   


 


Urine Squamous Epithelial


Cells 


 None /LPF 


 


 





 


Urine Amorphous Sediment  Present /HPF   


 


Urine Bacteria  0 /HPF (0-FEW)   


 


Urine Osmolality


 


 324 mOsmol/kg


(.) 


 





 


Urine Random Creatinine


 


 199.6 mg/dL


(Not Establ.) 


 





 


Urine Random Sodium


 


 <60 mmol/L


(Not Estab.) 


 





 


Urine Opiates Screen  Neg (NEG)   


 


Urine Methadone Screen  Neg (NEG)   


 


Urine Barbiturates  Neg (NEG)   


 


Urine Phencyclidine Screen  Neg (NEG)   


 


Urine


Amphetamine/Methamphetamine 


 Pos (NEG) 


 


 





 


Urine Benzodiazepines Screen  Neg (NEG)   


 


Urine Cocaine Screen  Neg (NEG)   


 


Urine Cannabinoids Screen  Neg (NEG)   


 


Urine Ethyl Alcohol  Neg (NEG)   


 


Nasal Screen MRSA (PCR)


 


 


 


 Positive


(Negative)


 


Test


 7/10/19


09:39 7/10/19


11:19 7/10/19


15:15 7/10/19


18:00


 


Erythrocyte Sedimentation Rate 35 (0-15)    


 


Sodium Level


 117 mmol/L


(136-145) 


 


 127 mmol/L


(136-145)


 


Potassium Level


 6.6 mmol/L


(3.5-5.1) 


 


 3.5 mmol/L


(3.5-5.1)


 


Chloride Level


 81 mmol/L


() 


 


 88 mmol/L


()


 


Carbon Dioxide Level


 9 mmol/L


(21-32) 


 


 21 mmol/L


(21-32)


 


Anion Gap 27 (6-14)    18 (6-14) 


 


Blood Urea Nitrogen


 115 mg/dL


(8-26) 


 


 69 mg/dL


(8-26)


 


Creatinine


 13.4 mg/dL


(0.7-1.3) 


 


 8.6 mg/dL


(0.7-1.3)


 


Estimated GFR


(Cockcroft-Gault) 4.4 


 


 


 7.3 





 


Glucose Level


 81 mg/dL


(70-99) 


 


 70 mg/dL


(70-99)


 


Calcium Level


 < 5.0 mg/dL


(8.5-10.1) 


 


 5.6 mg/dL


(8.5-10.1)


 


Vitamin B12 Level


 430 pg/mL


(247-911) 


 


 





 


Thyroid Stimulating Hormone


(TSH) 2.175 uIU/mL


(0.358-3.74) 


 


 





 


CSF Tube Number  4   


 


CSF Volume  3.0   


 


CSF Color  Colorless   


 


CSF Clarity  Clear   


 


CSF WBC


 


 3 /cmm (Not


Established) 


 





 


CSF RBC


 


 2 /cmm (Not


Established) 


 





 


CSF Glucose


 


 52 mg/dL


(37-70) 


 





 


CSF Total Protein


 


 53.2 mg/dL


(15.0-45.0) 


 





 


Hepatitis A IgM Antibody


 


 


 Nonreactive


(Nonreactive) 





 


Hepatitis B Surface Antigen


 


 


 Nonreactive


(Nonreactive) 





 


Hepatitis B Surface Antibody,


Quant 


 


 <3.1 mIU/mL


(Immunity>9.9) 





 


Hepatitis B Core Total


Antibody 


 


 Nonreactive


(Nonreactive) 





 


Hepatitis B Core IgM Antibody


 


 


 Nonreactive


(Nonreactive) 





 


Hepatitis C IgG Antibody


 


 


 Equivocal


(Nonreactive) 





 


Test


 7/10/19


21:20 7/11/19


06:15 


 





 


Ionized Calcium


 0.70 mmol/L


(1.13-1.32) 


 


 





 


White Blood Count


 


 7.6 x10^3/uL


(4.0-11.0) 


 





 


Red Blood Count


 


 3.69 x10^6/uL


(4.30-5.70) 


 





 


Hemoglobin


 


 10.9 g/dL


(13.0-17.5) 


 





 


Hematocrit


 


 30.6 %


(39.0-53.0) 


 





 


Mean Corpuscular Volume  83 fL ()   


 


Mean Corpuscular Hemoglobin  30 pg (25-35)   


 


Mean Corpuscular Hemoglobin


Concent 


 36 g/dL


(31-37) 


 





 


Red Cell Distribution Width


 


 13.1 %


(11.5-14.5) 


 





 


Platelet Count


 


 148 x10^3/uL


(140-400) 


 





 


Neutrophils (%) (Auto)  82 % (31-73)   


 


Lymphocytes (%) (Auto)  11 % (24-48)   


 


Monocytes (%) (Auto)  6 % (0-9)   


 


Eosinophils (%) (Auto)  1 % (0-3)   


 


Basophils (%) (Auto)  0 % (0-3)   


 


Neutrophils # (Auto)


 


 6.2 x10^3/uL


(1.8-7.7) 


 





 


Lymphocytes # (Auto)


 


 0.8 x10^3/uL


(1.0-4.8) 


 





 


Monocytes # (Auto)


 


 0.4 x10^3/uL


(0.0-1.1) 


 





 


Eosinophils # (Auto)


 


 0.1 x10^3/uL


(0.0-0.7) 


 





 


Basophils # (Auto)


 


 0.0 x10^3/uL


(0.0-0.2) 


 





 


Sodium Level


 


 124 mmol/L


(136-145) 


 





 


Potassium Level


 


 4.3 mmol/L


(3.5-5.1) 


 





 


Chloride Level


 


 87 mmol/L


() 


 





 


Carbon Dioxide Level


 


 20 mmol/L


(21-32) 


 





 


Anion Gap  17 (6-14)   


 


Blood Urea Nitrogen


 


 84 mg/dL


(8-26) 


 





 


Creatinine


 


 10.4 mg/dL


(0.7-1.3) 


 





 


Estimated GFR


(Cockcroft-Gault) 


 5.9 


 


 





 


BUN/Creatinine Ratio  8 (6-20)   


 


Glucose Level


 


 91 mg/dL


(70-99) 


 





 


Calcium Level


 


 5.5 mg/dL


(8.5-10.1) 


 





 


Magnesium Level


 


 2.1 mg/dL


(1.8-2.4) 


 





 


Total Bilirubin


 


 0.5 mg/dL


(0.2-1.0) 


 





 


Aspartate Amino Transf


(AST/SGOT) 


 1017 U/L


(15-37) 


 





 


Alanine Aminotransferase


(ALT/SGPT) 


 412 U/L


(16-63) 


 





 


Alkaline Phosphatase


 


 65 U/L


() 


 





 


Creatine Kinase


 


 > 348639 U/L


() 


 





 


Total Protein


 


 5.2 g/dL


(6.4-8.2) 


 





 


Albumin


 


 2.0 g/dL


(3.4-5.0) 


 





 


Albumin/Globulin Ratio  0.6 (1.0-1.7)   








Laboratory Tests








Test


 7/10/19


15:15 7/10/19


18:00 7/10/19


21:20 7/11/19


06:15


 


Hepatitis A IgM Antibody


 Nonreactive


(Nonreactive) 


 


 





 


Hepatitis B Surface Antigen


 Nonreactive


(Nonreactive) 


 


 





 


Hepatitis B Surface Antibody,


Quant <3.1 mIU/mL


(Immunity>9.9) 


 


 





 


Hepatitis B Core Total


Antibody Nonreactive


(Nonreactive) 


 


 





 


Hepatitis B Core IgM Antibody


 Nonreactive


(Nonreactive) 


 


 





 


Hepatitis C IgG Antibody


 Equivocal


(Nonreactive) 


 


 





 


Sodium Level


 


 127 mmol/L


(136-145) 


 124 mmol/L


(136-145)


 


Potassium Level


 


 3.5 mmol/L


(3.5-5.1) 


 4.3 mmol/L


(3.5-5.1)


 


Chloride Level


 


 88 mmol/L


() 


 87 mmol/L


()


 


Carbon Dioxide Level


 


 21 mmol/L


(21-32) 


 20 mmol/L


(21-32)


 


Anion Gap  18 (6-14)   17 (6-14) 


 


Blood Urea Nitrogen


 


 69 mg/dL


(8-26) 


 84 mg/dL


(8-26)


 


Creatinine


 


 8.6 mg/dL


(0.7-1.3) 


 10.4 mg/dL


(0.7-1.3)


 


Estimated GFR


(Cockcroft-Gault) 


 7.3 


 


 5.9 





 


Glucose Level


 


 70 mg/dL


(70-99) 


 91 mg/dL


(70-99)


 


Calcium Level


 


 5.6 mg/dL


(8.5-10.1) 


 5.5 mg/dL


(8.5-10.1)


 


Ionized Calcium


 


 


 0.70 mmol/L


(1.13-1.32) 





 


White Blood Count


 


 


 


 7.6 x10^3/uL


(4.0-11.0)


 


Red Blood Count


 


 


 


 3.69 x10^6/uL


(4.30-5.70)


 


Hemoglobin


 


 


 


 10.9 g/dL


(13.0-17.5)


 


Hematocrit


 


 


 


 30.6 %


(39.0-53.0)


 


Mean Corpuscular Volume    83 fL () 


 


Mean Corpuscular Hemoglobin    30 pg (25-35) 


 


Mean Corpuscular Hemoglobin


Concent 


 


 


 36 g/dL


(31-37)


 


Red Cell Distribution Width


 


 


 


 13.1 %


(11.5-14.5)


 


Platelet Count


 


 


 


 148 x10^3/uL


(140-400)


 


Neutrophils (%) (Auto)    82 % (31-73) 


 


Lymphocytes (%) (Auto)    11 % (24-48) 


 


Monocytes (%) (Auto)    6 % (0-9) 


 


Eosinophils (%) (Auto)    1 % (0-3) 


 


Basophils (%) (Auto)    0 % (0-3) 


 


Neutrophils # (Auto)


 


 


 


 6.2 x10^3/uL


(1.8-7.7)


 


Lymphocytes # (Auto)


 


 


 


 0.8 x10^3/uL


(1.0-4.8)


 


Monocytes # (Auto)


 


 


 


 0.4 x10^3/uL


(0.0-1.1)


 


Eosinophils # (Auto)


 


 


 


 0.1 x10^3/uL


(0.0-0.7)


 


Basophils # (Auto)


 


 


 


 0.0 x10^3/uL


(0.0-0.2)


 


BUN/Creatinine Ratio    8 (6-20) 


 


Magnesium Level


 


 


 


 2.1 mg/dL


(1.8-2.4)


 


Total Bilirubin


 


 


 


 0.5 mg/dL


(0.2-1.0)


 


Aspartate Amino Transf


(AST/SGOT) 


 


 


 1017 U/L


(15-37)


 


Alanine Aminotransferase


(ALT/SGPT) 


 


 


 412 U/L


(16-63)


 


Alkaline Phosphatase


 


 


 


 65 U/L


()


 


Creatine Kinase


 


 


 


 > 139886 U/L


()


 


Total Protein


 


 


 


 5.2 g/dL


(6.4-8.2)


 


Albumin


 


 


 


 2.0 g/dL


(3.4-5.0)


 


Albumin/Globulin Ratio    0.6 (1.0-1.7) 








Microbiology


7/10/19 Blood Culture - Preliminary, Resulted


          NO GROWTH AFTER 1 DAY


7/10/19 CSF Gram Stain - Final, Complete


Medications





Current Medications


Morphine Sulfate (Morphine Sulfate) 4 mg 1X  ONCE IV  Last administered on 

7/9/19at 21:20;  Start 7/9/19 at 21:30;  Stop 7/9/19 at 21:31;  Status DC


Sodium Chloride 1,000 ml @  1,000 mls/hr 1X  ONCE IV  Last administered on 

7/9/19at 22:30;  Start 7/9/19 at 22:30;  Stop 7/9/19 at 23:29;  Status DC


Calcium Gluconate (Calcium Gluconate) 1,000 mg 1X  ONCE IVP  Last administered 

on 7/9/19at 22:43;  Start 7/9/19 at 23:00;  Stop 7/9/19 at 23:01;  Status DC


Insulin Human Regular (HumuLIN R VIAL) 10 unit 1X  ONCE IV  Last administered on

7/9/19at 23:51;  Start 7/9/19 at 23:00;  Stop 7/9/19 at 23:01;  Status DC


Dextrose (Dextrose 50%-Water Syringe) 25 gm 1X  ONCE IV  Last administered on 

7/9/19at 22:42;  Start 7/9/19 at 23:00;  Stop 7/9/19 at 23:01;  Status DC


Sodium Bicarbonate (Sodium Bicarb Adult 8.4% Syr) 50 meq 1X  ONCE IV  Last 

administered on 7/9/19at 23:49;  Start 7/9/19 at 23:00;  Stop 7/9/19 at 23:01;  

Status DC


Sodium Chloride 1,000 ml @  1,000 mls/hr 1X  ONCE IV  Last administered on 

7/9/19at 23:50;  Start 7/9/19 at 23:00;  Stop 7/9/19 at 23:59;  Status DC


Albuterol Sulfate (Ventolin Neb Soln) 10 mg 1X  ONCE CONT NEB  Last administered

on 7/10/19at 00:30;  Start 7/9/19 at 23:00;  Stop 7/9/19 at 23:01;  Status DC


Ceftriaxone Sodium (Rocephin) 1 gm 1X  ONCE IVP  Last administered on 7/9/19at 

22:43;  Start 7/9/19 at 23:00;  Stop 7/9/19 at 23:01;  Status DC


Sodium Chloride 1,000 ml @  1,000 mls/hr 1X  ONCE IV  Last administered on 

7/10/19at 01:00;  Start 7/10/19 at 01:00;  Stop 7/10/19 at 01:59;  Status DC


Lorazepam (Ativan Inj) 0.5 mg PRN Q6HRS  PRN IV ANXIETY / AGITATION Last 

administered on 7/10/19at 11:00;  Start 7/10/19 at 01:15;  Stop 7/11/19 at 

08:48;  Status DC


Ondansetron HCl (Zofran) 4 mg PRN Q6HRS  PRN IV NAUSEA/VOMITING 1ST CHOICE;  

Start 7/10/19 at 01:15;  Stop 7/10/19 at 01:22;  Status DC


Sodium Chloride (Normal Saline Flush) 3 ml QSHIFT  PRN IV AFTER MEDS AND BLOOD 

DRAWS;  Start 7/10/19 at 01:15


Sodium Chloride 1,000 ml @  175 mls/hr Q5H43M IV ;  Start 7/10/19 at 01:08;  

Stop 7/10/19 at 01:23;  Status DC


Ondansetron HCl (Zofran) 4 mg PRN Q8HRS  PRN IV NAUSEA/VOMITING  1ST CHOICE;  

Start 7/10/19 at 01:15;  Stop 7/11/19 at 01:14;  Status DC


Sodium Chloride 1,000 ml @  150 mls/hr Q6H40M IV ;  Start 7/10/19 at 01:30;  

Stop 7/10/19 at 18:49;  Status DC


Morphine Sulfate (Morphine Sulfate) 4 mg PRN Q4HRS  PRN IV SEVERE PAIN Last 

administered on 7/11/19at 13:35;  Start 7/10/19 at 09:00


Sodium Bicarbonate (Sodium Bicarb Adult 8.4% Syr) 100 meq 1X  ONCE IV  Last 

administered on 7/10/19at 10:02;  Start 7/10/19 at 10:00;  Stop 7/10/19 at 

10:01;  Status DC


Sodium Chloride 1,000 ml @  1,000 mls/hr 1X  ONCE IV  Last administered on 

7/10/19at 09:56;  Start 7/10/19 at 10:00;  Stop 7/10/19 at 10:59;  Status DC


Lidocaine/Sodium Bicarbonate (Buffered Lidocaine 1%) 3 ml STK-MED ONCE .ROUTE ; 

Start 7/10/19 at 11:07;  Stop 7/10/19 at 11:08;  Status DC


Calcium Gluconate (Calcium Gluconate) 2,000 mg 1X  ONCE IVP  Last administered 

on 7/10/19at 13:38;  Start 7/10/19 at 12:00;  Stop 7/10/19 at 12:01;  Status DC


Ceftriaxone Sodium (Rocephin) 1 gm Q24H IVP  Last administered on 7/10/19at 

12:49;  Start 7/10/19 at 13:00;  Stop 7/11/19 at 07:45;  Status DC


Silver Sulfadiazine (Silvadene) 1 mack BID TP  Last administered on 7/11/19at 

10:06;  Start 7/10/19 at 13:00


Sodium Chloride 1,000 ml @  1,000 mls/hr 1X  ONCE IV  Last administered on 

7/10/19at 12:47;  Start 7/10/19 at 12:45;  Stop 7/10/19 at 13:44;  Status DC


Pantoprazole Sodium (Protonix) 40 mg DAILYAC PO ;  Start 7/10/19 at 13:30;  Stop

7/10/19 at 19:22;  Status DC


Polyethylene Glycol (miraLAX PACKET) 17 gm DAILY PO ;  Start 7/10/19 at 13:30


Lidocaine/Sodium Bicarbonate (Buffered Lidocaine 1%) 3 ml STK-MED ONCE .ROUTE ; 

Start 7/10/19 at 13:48;  Stop 7/10/19 at 13:49;  Status DC


Haloperidol Lactate (Haldol Inj) 1 mg PRN Q8HRS  PRN IVP AGITATION Last 

administered on 7/11/19at 00:35;  Start 7/10/19 at 14:00


Lorazepam (Ativan Inj) 2 mg PRN Q2HRS  PRN IV ANXIETY / AGITATION Last 

administered on 7/11/19at 04:25;  Start 7/10/19 at 14:00


Lidocaine/Sodium Bicarbonate (Buffered Lidocaine 1%) 6 ml 1X  ONCE INJ ;  Start 

7/10/19 at 14:15;  Stop 7/10/19 at 14:16;  Status DC


Dexmedetomidine HCl 200 mcg/ Sodium Chloride 50 ml @ 0 mls/hr CONT  PRN IV PER 

PROTOCOL Last administered on 7/11/19at 12:57;  Start 7/10/19 at 14:15


Sodium Chloride 500 ml @  500 mls/hr 1X PRN  PRN IV SEE COMMENTS;  Start 7/10/19

at 14:15


Atropine Sulfate (ATROPINE 0.5mg SYRINGE) 0.5 mg PRN Q5MIN  PRN IV SEE COMMENTS;

 Start 7/10/19 at 14:15


Sodium Chloride 1,000 ml @  1,000 mls/hr Q1H PRN IV hypotension;  Start 7/10/19 

at 15:08;  Stop 7/10/19 at 21:07;  Status DC


Diphenhydramine HCl (Benadryl) 25 mg 1X PRN  PRN IV ITCHING;  Start 7/10/19 at 

15:15;  Stop 7/11/19 at 11:19;  Status DC


Diphenhydramine HCl (Benadryl) 25 mg 1X PRN  PRN IV ITCHING;  Start 7/10/19 at 

15:15;  Stop 7/11/19 at 11:19;  Status DC


Sodium Chloride 1,000 ml @  400 mls/hr Q2H30M PRN IV PATENCY;  Start 7/10/19 at 

15:08;  Stop 7/11/19 at 03:07;  Status DC


Info (PHARMACY MONITORING -- do not chart) 1 each PRN DAILY  PRN MC SEE 

COMMENTS;  Start 7/10/19 at 15:15;  Stop 7/11/19 at 11:18;  Status DC


Pantoprazole Sodium (PROTONIX VIAL for IV PUSH) 40 mg DAILYAC IVP  Last 

administered on 7/11/19at 07:45;  Start 7/11/19 at 07:30


Linezolid (Zyvox) 600 mg BID PO  Last administered on 7/11/19at 14:14;  Start 

7/11/19 at 09:00


Ceftriaxone Sodium (Rocephin) 2 gm Q24H IVP  Last administered on 7/11/19at 

14:39;  Start 7/11/19 at 13:00


Calcium Gluconate 2000 mg/Dextrose 120 ml @  220 mls/hr 1X  ONCE IV  Last 

administered on 7/11/19at 10:05;  Start 7/11/19 at 10:00;  Stop 7/11/19 at 

10:32;  Status DC


Sodium Chloride 1,000 ml @  1,000 mls/hr Q1H PRN IV hypotension;  Start 7/11/19 

at 11:06;  Stop 7/11/19 at 17:05


Diphenhydramine HCl (Benadryl) 25 mg 1X PRN  PRN IV ITCHING;  Start 7/11/19 at 

11:15;  Stop 7/12/19 at 11:14


Diphenhydramine HCl (Benadryl) 25 mg 1X PRN  PRN IV ITCHING;  Start 7/11/19 at 

11:15;  Stop 7/12/19 at 11:14


Sodium Chloride 1,000 ml @  400 mls/hr Q2H30M PRN IV PATENCY;  Start 7/11/19 at 

11:06;  Stop 7/11/19 at 23:05


Info (PHARMACY MONITORING -- do not chart) 1 each PRN DAILY  PRN MC SEE 

COMMENTS;  Start 7/11/19 at 11:15





Active Scripts


Active


Reported


Protonix (Pantoprazole Sodium) 20 Mg Tablet.dr 2 Tab PO DAILY


Zyprexa (Olanzapine) 20 Mg Tablet 2 Tab PO QHS


Buspirone Hcl 30 Mg Tablet 1 Tab PO BID


Vitals/I & O





Vital Sign - Last 24 Hours








 7/10/19 7/10/19 7/10/19 7/10/19





 15:15 15:49 16:00 16:00


 


Pulse   92 


 


Resp 20 18 20 


 


B/P (MAP) 129/68 (88)  110/59 (76) 


 


Pulse Ox 98  97 


 


O2 Delivery Room Air Room Air Room Air Room Air





 7/10/19 7/10/19 7/10/19 7/10/19





 17:00 18:00 19:00 19:49


 


Temp 98.1   





 98.1   


 


Pulse 70 88 86 


 


Resp 20 20 16 15


 


B/P (MAP) 139/76 (97) 108/49 (68) 102/52 (69) 


 


Pulse Ox 97 97 98 98


 


O2 Delivery Room Air Room Air Room Air Room Air


 


    





    





 7/10/19 7/10/19 7/10/19 7/10/19





 20:00 20:00 21:00 22:00


 


Temp 98.2   





 98.2   


 


Pulse 89  86 88


 


Resp 20  15 16


 


B/P (MAP) 103/48 (66)  102/50 (67) 109/55 (73)


 


Pulse Ox 95  97 96


 


O2 Delivery Room Air Room Air Room Air Room Air


 


    





    





 7/10/19 7/10/19 7/11/19 7/11/19





 23:00 23:44 00:00 00:36


 


Temp   98.1 





   98.1 


 


Pulse 88  89 


 


Resp 15  15 19


 


B/P (MAP) 109/54 (72)  96/43 (60) 


 


Pulse Ox 96  96 96


 


O2 Delivery Room Air Room Air Room Air Room Air


 


    





    





 7/11/19 7/11/19 7/11/19 7/11/19





 01:00 02:00 03:00 03:37


 


Pulse 89 84 87 


 


Resp 15 17 15 


 


B/P (MAP) 115/62 (79) 129/67 (87) 105/52 (69) 


 


Pulse Ox 94 95 96 


 


O2 Delivery Room Air Room Air Room Air Room Air





 7/11/19 7/11/19 7/11/19 7/11/19





 04:00 04:26 05:00 06:00


 


Temp 98.1   





 98.1   


 


Pulse 86  86 69


 


Resp 15 16 16 16


 


B/P (MAP) 105/52 (69)  100/49 (66) 104/49 (67)


 


Pulse Ox 95 95 95 96


 


O2 Delivery Room Air Room Air Room Air Room Air


 


    





    





 7/11/19 7/11/19 7/11/19 7/11/19





 07:00 07:45 08:00 09:00


 


Temp   97.8 





   97.8 


 


Pulse 83  83 82


 


Resp 17  17 17


 


B/P (MAP) 106/52 (70)  105/44 (64) 111/53 (72)


 


Pulse Ox 92  92 93


 


O2 Delivery Room Air Room Air Room Air Room Air


 


    





    





 7/11/19 7/11/19 7/11/19 7/11/19





 10:00 11:00 12:00 12:00


 


Temp    98.1





    98.1


 


Pulse 81 82  82


 


Resp 23 14  15


 


B/P (MAP) 110/50 (70) 100/73 (82)  134/65 (88)


 


Pulse Ox 96 96  94


 


O2 Delivery Room Air Room Air Room Air Room Air


 


    





    





 7/11/19 7/11/19 7/11/19 7/11/19





 13:00 13:35 14:00 14:05


 


Pulse 88  104 


 


Resp 14 17 28 17


 


B/P (MAP) 116/51 (72)  129/68 (88) 


 


Pulse Ox 95 94 94 95


 


O2 Delivery Room Air Room Air Room Air Room Air














Intake and Output   


 


 7/10/19 7/10/19 7/11/19





 15:00 23:00 07:00


 


Intake Total 2100 ml 1060 ml 166 ml


 


Output Total 50 ml 20 ml 5 ml


 


Balance 2050 ml 1040 ml 161 ml

















BATSHEVA WARREN MD           Jul 11, 2019 15:06
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
## 2019-07-11 NOTE — RAD
HAND RIGHT 3V 7/11/2019 7:17 AM

 

INDICATION: [place]

 

COMPARISON: None available.

 

TECHNIQUE:  3 views the right hand are provided.

 

FINDINGS:

 

Phalanges are contracted limiting evaluation. There is no acute fracture 

or dislocation. Bone mineralization is within normal limits. Joint spaces 

are maintained. Regional soft tissues are within normal limits. There is 

no soft tissue gas or osseous erosion.

 

IMPRESSION:

 

No acute fracture or dislocation although evaluation is limited by 

positioning.

 

Electronically signed by: Hollie Junior MD (7/11/2019 9:44 AM) 

[id number]

## 2019-07-11 NOTE — NUR
Patient pulls off gown, pulls off transparent dressing on trialysis catheter, mitts placed 
bilaterally, trialysis cath site cleansed in sterile fashion with mask, adherent pad over 
side of patient's face to prevent breathing on the open site, sterile transparent dressing 
applied with disc around tube. To monitor.

## 2019-07-12 VITALS — SYSTOLIC BLOOD PRESSURE: 134 MMHG | DIASTOLIC BLOOD PRESSURE: 62 MMHG

## 2019-07-12 VITALS — SYSTOLIC BLOOD PRESSURE: 117 MMHG | DIASTOLIC BLOOD PRESSURE: 61 MMHG

## 2019-07-12 VITALS — SYSTOLIC BLOOD PRESSURE: 135 MMHG | DIASTOLIC BLOOD PRESSURE: 76 MMHG

## 2019-07-12 VITALS — DIASTOLIC BLOOD PRESSURE: 65 MMHG | SYSTOLIC BLOOD PRESSURE: 123 MMHG

## 2019-07-12 VITALS — DIASTOLIC BLOOD PRESSURE: 84 MMHG | SYSTOLIC BLOOD PRESSURE: 127 MMHG

## 2019-07-12 VITALS — SYSTOLIC BLOOD PRESSURE: 123 MMHG | DIASTOLIC BLOOD PRESSURE: 69 MMHG

## 2019-07-12 VITALS — SYSTOLIC BLOOD PRESSURE: 122 MMHG | DIASTOLIC BLOOD PRESSURE: 72 MMHG

## 2019-07-12 VITALS — SYSTOLIC BLOOD PRESSURE: 140 MMHG | DIASTOLIC BLOOD PRESSURE: 65 MMHG

## 2019-07-12 VITALS — SYSTOLIC BLOOD PRESSURE: 136 MMHG | DIASTOLIC BLOOD PRESSURE: 72 MMHG

## 2019-07-12 VITALS — SYSTOLIC BLOOD PRESSURE: 139 MMHG | DIASTOLIC BLOOD PRESSURE: 79 MMHG

## 2019-07-12 VITALS — SYSTOLIC BLOOD PRESSURE: 140 MMHG | DIASTOLIC BLOOD PRESSURE: 62 MMHG

## 2019-07-12 VITALS — DIASTOLIC BLOOD PRESSURE: 65 MMHG | SYSTOLIC BLOOD PRESSURE: 119 MMHG

## 2019-07-12 VITALS — SYSTOLIC BLOOD PRESSURE: 113 MMHG | DIASTOLIC BLOOD PRESSURE: 68 MMHG

## 2019-07-12 VITALS — SYSTOLIC BLOOD PRESSURE: 111 MMHG | DIASTOLIC BLOOD PRESSURE: 54 MMHG

## 2019-07-12 VITALS — SYSTOLIC BLOOD PRESSURE: 135 MMHG | DIASTOLIC BLOOD PRESSURE: 72 MMHG

## 2019-07-12 VITALS — DIASTOLIC BLOOD PRESSURE: 80 MMHG | SYSTOLIC BLOOD PRESSURE: 145 MMHG

## 2019-07-12 VITALS — SYSTOLIC BLOOD PRESSURE: 127 MMHG | DIASTOLIC BLOOD PRESSURE: 80 MMHG

## 2019-07-12 VITALS — SYSTOLIC BLOOD PRESSURE: 145 MMHG | DIASTOLIC BLOOD PRESSURE: 76 MMHG

## 2019-07-12 VITALS — DIASTOLIC BLOOD PRESSURE: 81 MMHG | SYSTOLIC BLOOD PRESSURE: 149 MMHG

## 2019-07-12 VITALS — SYSTOLIC BLOOD PRESSURE: 127 MMHG | DIASTOLIC BLOOD PRESSURE: 72 MMHG

## 2019-07-12 VITALS — SYSTOLIC BLOOD PRESSURE: 137 MMHG | DIASTOLIC BLOOD PRESSURE: 70 MMHG

## 2019-07-12 VITALS — DIASTOLIC BLOOD PRESSURE: 81 MMHG | SYSTOLIC BLOOD PRESSURE: 132 MMHG

## 2019-07-12 VITALS — DIASTOLIC BLOOD PRESSURE: 68 MMHG | SYSTOLIC BLOOD PRESSURE: 134 MMHG

## 2019-07-12 LAB
ALBUMIN SERPL-MCNC: 1.8 G/DL (ref 3.4–5)
ALBUMIN/GLOB SERPL: 0.6 {RATIO} (ref 1–1.7)
ALP SERPL-CCNC: 57 U/L (ref 46–116)
ALT SERPL-CCNC: 307 U/L (ref 16–63)
ANION GAP SERPL CALC-SCNC: 11 MMOL/L (ref 6–14)
ANION GAP SERPL CALC-SCNC: 16 MMOL/L (ref 6–14)
APTT BLD: 35 SEC (ref 24–38)
AST SERPL-CCNC: 653 U/L (ref 15–37)
BASOPHILS # BLD AUTO: 0 X10^3/UL (ref 0–0.2)
BASOPHILS # BLD AUTO: 0 X10^3/UL (ref 0–0.2)
BASOPHILS NFR BLD: 0 % (ref 0–3)
BASOPHILS NFR BLD: 1 % (ref 0–3)
BILIRUB SERPL-MCNC: 0.5 MG/DL (ref 0.2–1)
BUN SERPL-MCNC: 57 MG/DL (ref 8–26)
BUN SERPL-MCNC: 59 MG/DL (ref 8–26)
BUN/CREAT SERPL: 7 (ref 6–20)
CALCIUM SERPL-MCNC: 6.6 MG/DL (ref 8.5–10.1)
CALCIUM SERPL-MCNC: 6.7 MG/DL (ref 8.5–10.1)
CHLORIDE SERPL-SCNC: 88 MMOL/L (ref 98–107)
CHLORIDE SERPL-SCNC: 91 MMOL/L (ref 98–107)
CO2 SERPL-SCNC: 24 MMOL/L (ref 21–32)
CO2 SERPL-SCNC: 27 MMOL/L (ref 21–32)
CREAT SERPL-MCNC: 8.4 MG/DL (ref 0.7–1.3)
CREAT SERPL-MCNC: 8.7 MG/DL (ref 0.7–1.3)
EOSINOPHIL NFR BLD: 0.2 X10^3/UL (ref 0–0.7)
EOSINOPHIL NFR BLD: 0.2 X10^3/UL (ref 0–0.7)
EOSINOPHIL NFR BLD: 2 % (ref 0–3)
EOSINOPHIL NFR BLD: 3 % (ref 0–3)
ERYTHROCYTE [DISTWIDTH] IN BLOOD BY AUTOMATED COUNT: 12.9 % (ref 11.5–14.5)
ERYTHROCYTE [DISTWIDTH] IN BLOOD BY AUTOMATED COUNT: 13.2 % (ref 11.5–14.5)
GFR SERPLBLD BASED ON 1.73 SQ M-ARVRAT: 7.2 ML/MIN
GFR SERPLBLD BASED ON 1.73 SQ M-ARVRAT: 7.5 ML/MIN
GLOBULIN SER-MCNC: 3.2 G/DL (ref 2.2–3.8)
GLUCOSE SERPL-MCNC: 79 MG/DL (ref 70–99)
GLUCOSE SERPL-MCNC: 80 MG/DL (ref 70–99)
HCT VFR BLD CALC: 32.6 % (ref 39–53)
HCT VFR BLD CALC: 32.6 % (ref 39–53)
HGB BLD-MCNC: 11.4 G/DL (ref 13–17.5)
HGB BLD-MCNC: 11.5 G/DL (ref 13–17.5)
IGG SERPL-MCNC: 621 MG/DL (ref 700–1600)
IGG/ALB CLEAR SER+CSF-RTO: 0.7 (ref 0–0.7)
LYMPHOCYTES # BLD: 0.7 X10^3/UL (ref 1–4.8)
LYMPHOCYTES # BLD: 1 X10^3/UL (ref 1–4.8)
LYMPHOCYTES NFR BLD AUTO: 10 % (ref 24–48)
LYMPHOCYTES NFR BLD AUTO: 15 % (ref 24–48)
MAGNESIUM SERPL-MCNC: 1.9 MG/DL (ref 1.8–2.4)
MCH RBC QN AUTO: 29 PG (ref 25–35)
MCH RBC QN AUTO: 30 PG (ref 25–35)
MCHC RBC AUTO-ENTMCNC: 35 G/DL (ref 31–37)
MCHC RBC AUTO-ENTMCNC: 35 G/DL (ref 31–37)
MCV RBC AUTO: 83 FL (ref 79–100)
MCV RBC AUTO: 83 FL (ref 79–100)
MONO #: 0.3 X10^3/UL (ref 0–1.1)
MONO #: 0.3 X10^3/UL (ref 0–1.1)
MONOCYTES NFR BLD: 4 % (ref 0–9)
MONOCYTES NFR BLD: 5 % (ref 0–9)
NEUT #: 4.9 X10^3/UL (ref 1.8–7.7)
NEUT #: 6 X10^3/UL (ref 1.8–7.7)
NEUTROPHILS NFR BLD AUTO: 76 % (ref 31–73)
NEUTROPHILS NFR BLD AUTO: 84 % (ref 31–73)
PHOSPHATE SERPL-MCNC: 6.6 MG/DL (ref 2.6–4.7)
PLATELET # BLD AUTO: 166 X10^3/UL (ref 140–400)
PLATELET # BLD AUTO: 178 X10^3/UL (ref 140–400)
POTASSIUM SERPL-SCNC: 3.5 MMOL/L (ref 3.5–5.1)
POTASSIUM SERPL-SCNC: 3.7 MMOL/L (ref 3.5–5.1)
PROT SERPL-MCNC: 5 G/DL (ref 6.4–8.2)
PROTHROMBIN TIME: 14.6 SEC (ref 11.7–14)
RBC # BLD AUTO: 3.91 X10^6/UL (ref 4.3–5.7)
RBC # BLD AUTO: 3.92 X10^6/UL (ref 4.3–5.7)
SODIUM SERPL-SCNC: 128 MMOL/L (ref 136–145)
SODIUM SERPL-SCNC: 129 MMOL/L (ref 136–145)
WBC # BLD AUTO: 6.5 X10^3/UL (ref 4–11)
WBC # BLD AUTO: 7.1 X10^3/UL (ref 4–11)

## 2019-07-12 RX ADMIN — MORPHINE SULFATE PRN MG: 4 INJECTION, SOLUTION INTRAMUSCULAR; INTRAVENOUS at 03:20

## 2019-07-12 RX ADMIN — DEXMEDETOMIDINE HYDROCHLORIDE PRN MLS/HR: 100 INJECTION, SOLUTION, CONCENTRATE INTRAVENOUS at 19:25

## 2019-07-12 RX ADMIN — DEXMEDETOMIDINE HYDROCHLORIDE PRN MLS/HR: 100 INJECTION, SOLUTION, CONCENTRATE INTRAVENOUS at 01:35

## 2019-07-12 RX ADMIN — PANTOPRAZOLE SODIUM SCH MG: 40 INJECTION, POWDER, FOR SOLUTION INTRAVENOUS at 08:02

## 2019-07-12 RX ADMIN — MORPHINE SULFATE PRN MG: 4 INJECTION, SOLUTION INTRAMUSCULAR; INTRAVENOUS at 13:57

## 2019-07-12 RX ADMIN — POTASSIUM CHLORIDE SCH MLS/HR: 2 INJECTION, SOLUTION, CONCENTRATE INTRAVENOUS at 21:05

## 2019-07-12 RX ADMIN — DEXMEDETOMIDINE HYDROCHLORIDE PRN MLS/HR: 100 INJECTION, SOLUTION, CONCENTRATE INTRAVENOUS at 14:57

## 2019-07-12 RX ADMIN — POLYETHYLENE GLYCOL 3350 SCH GM: 17 POWDER, FOR SOLUTION ORAL at 09:00

## 2019-07-12 RX ADMIN — POTASSIUM CHLORIDE SCH MLS/HR: 2 INJECTION, SOLUTION, CONCENTRATE INTRAVENOUS at 21:07

## 2019-07-12 RX ADMIN — HALOPERIDOL LACTATE PRN MG: 5 INJECTION, SOLUTION INTRAMUSCULAR at 07:56

## 2019-07-12 RX ADMIN — DEXMEDETOMIDINE HYDROCHLORIDE PRN MLS/HR: 100 INJECTION, SOLUTION, CONCENTRATE INTRAVENOUS at 07:56

## 2019-07-12 RX ADMIN — MORPHINE SULFATE PRN MG: 4 INJECTION, SOLUTION INTRAMUSCULAR; INTRAVENOUS at 23:29

## 2019-07-12 RX ADMIN — POTASSIUM CHLORIDE SCH MLS/HR: 2 INJECTION, SOLUTION, CONCENTRATE INTRAVENOUS at 16:31

## 2019-07-12 RX ADMIN — DEXMEDETOMIDINE HYDROCHLORIDE PRN MLS/HR: 100 INJECTION, SOLUTION, CONCENTRATE INTRAVENOUS at 22:05

## 2019-07-12 RX ADMIN — HEPARIN SODIUM AND DEXTROSE PRN MLS/HR: 5000; 5 INJECTION INTRAVENOUS at 20:20

## 2019-07-12 RX ADMIN — POTASSIUM CHLORIDE SCH MLS/HR: 2 INJECTION, SOLUTION, CONCENTRATE INTRAVENOUS at 21:06

## 2019-07-12 RX ADMIN — DEXMEDETOMIDINE HYDROCHLORIDE PRN MLS/HR: 100 INJECTION, SOLUTION, CONCENTRATE INTRAVENOUS at 11:28

## 2019-07-12 RX ADMIN — DEXMEDETOMIDINE HYDROCHLORIDE PRN MLS/HR: 100 INJECTION, SOLUTION, CONCENTRATE INTRAVENOUS at 04:35

## 2019-07-12 RX ADMIN — CEFTRIAXONE SODIUM SCH GM: 2 INJECTION, POWDER, FOR SOLUTION INTRAMUSCULAR; INTRAVENOUS at 13:49

## 2019-07-12 RX ADMIN — LINEZOLID SCH MG: 600 TABLET, FILM COATED ORAL at 11:24

## 2019-07-12 RX ADMIN — LINEZOLID SCH MG: 600 TABLET, FILM COATED ORAL at 21:00

## 2019-07-12 NOTE — PDOC
Objective:


Objective:


Reviewed w/ nurse - plans for CRRT.  Drank some water and ate ice chips, still 

pretty drowsy/in and out.


Vital Signs:





                                   Vital Signs








  Date Time  Temp Pulse Resp B/P (MAP) Pulse Ox O2 Delivery O2 Flow Rate FiO2


 


7/12/19 10:00  90 13 119/65 (83) 96 Room Air  


 


7/12/19 08:00 97.5       





 97.5       


 


7/11/19 19:50       98.0 








Labs:





Laboratory Tests








Test


 7/12/19


06:30


 


White Blood Count 6.5 x10^3/uL 


 


Red Blood Count 3.91 x10^6/uL 


 


Hemoglobin 11.4 g/dL 


 


Hematocrit 32.6 % 


 


Mean Corpuscular Volume 83 fL 


 


Mean Corpuscular Hemoglobin 29 pg 


 


Mean Corpuscular Hemoglobin


Concent 35 g/dL 





 


Red Cell Distribution Width 12.9 % 


 


Platelet Count 166 x10^3/uL 


 


Neutrophils (%) (Auto) 76 % 


 


Lymphocytes (%) (Auto) 15 % 


 


Monocytes (%) (Auto) 5 % 


 


Eosinophils (%) (Auto) 3 % 


 


Basophils (%) (Auto) 1 % 


 


Neutrophils # (Auto) 4.9 x10^3/uL 


 


Lymphocytes # (Auto) 1.0 x10^3/uL 


 


Monocytes # (Auto) 0.3 x10^3/uL 


 


Eosinophils # (Auto) 0.2 x10^3/uL 


 


Basophils # (Auto) 0.0 x10^3/uL 


 


Sodium Level 129 mmol/L 


 


Potassium Level 3.5 mmol/L 


 


Chloride Level 91 mmol/L 


 


Carbon Dioxide Level 27 mmol/L 


 


Anion Gap 11 


 


Blood Urea Nitrogen 57 mg/dL 


 


Creatinine 8.4 mg/dL 


 


Estimated GFR


(Cockcroft-Gault) 7.5 





 


BUN/Creatinine Ratio 7 


 


Glucose Level 80 mg/dL 


 


Calcium Level 6.7 mg/dL 


 


Total Bilirubin 0.5 mg/dL 


 


Aspartate Amino Transf


(AST/SGOT) 653 U/L 





 


Alanine Aminotransferase


(ALT/SGPT) 307 U/L 





 


Alkaline Phosphatase 57 U/L 


 


Creatine Kinase 02596 U/L 


 


Total Protein 5.0 g/dL 


 


Albumin 1.8 g/dL 


 


Albumin/Globulin Ratio 0.6 


 


HIV (1&2) Antibody Screen Nonreactive 





  ANAEROBIC-AEROBIC CULTURE  


                                PENDING





  ANAEROBIC RES 1  


                                PENDING





  AEROBIC CULT  


                                PENDING





  AEROBIC RES 1  


                                PENDING





  GRAM STAIN  Final  


        Final report





  GRAM STAIN RES 1  Final  


        Comment





        No white blood cells seen.





  GRAM STAIN RES 2  Final  


        No organisms seen











  URINE CULTURE  Final  


        Final report





  URINE CULTURE RES 1  Final  


        No growth











  BLOOD CULTURE  Preliminary  


        NO GROWTH AFTER 2 DAYS





PE:





GEN: NAD


LUNGS: room air


HEART: RRR


ABD: soft


NEURO/PSYCH: sleeping, not awakened





A/P:


Transaminitis - still improving


Abnormal Hep C Ab - PCR pending





--


Continue same.











LIZ GARCIA         Jul 12, 2019 11:02
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
## 2019-07-12 NOTE — PDOC
SUBJECTIVE


ROS


No acute events overnight





OBJECTIVE


Vital Signs





Vital Signs








  Date Time  Temp Pulse Resp B/P (MAP) Pulse Ox O2 Delivery O2 Flow Rate FiO2


 


7/12/19 16:00      Room Air  


 


7/12/19 16:00 98.0 90 13 127/84 (98) 94   





 98.0       


 


7/11/19 19:50       98.0 








I & 0











Intake and Output 


 


 7/12/19





 07:00


 


Intake Total 1580 ml


 


Output Total 20 ml


 


Balance 1560 ml


 


 


 


Intake Oral 1020 ml


 


IV Total 560 ml


 


Output Urine Total 20 ml











PHYSICAL EXAM


Physical Exam


GEN:    NAD 


HEENT:  Sclerae nonicteric, OM moist  


NECK:  Supple.


LUNGS:  Clear, No accessory muscle use 


CARDIOVASCULAR:  Regular rate.


ABDOMEN:  Soft, NT 


EXTREMITIES:   edema in the right hand, LE edema trace  


SKIN-He has multiple punctate skin,lesions.  He has some ulceration in one of 

the right hand finger.  


- Culp +


NEURO- per Neurologist exam , AXOX3





DIAGNOSIS/ASSESSMENT


Assessment & Plan


EL - ATN2/2 Rhabdo , Meth use  


Urinary retention at [facility],Anuric now  


No response to IVF 


Requiring  HD - 1 st Tx  7/10  


CRRT today as ordered, [facility] Rn 





Rhabdo-  CPK>100,000


Follow CPK, No significant diuresis with IVF  


CRRT 





Hyperkalemia- Normal K 





Hyponatremia- stable, not normalized 





Severe Metabolic acidosis- 2/2 all above 


Resolved  





Transaminitis - Improving LFT's 





Hypocalcemia- severe 2/2  Rhabdo


Replace cautiously and Monitor closely for Hypercalcemia during recovery phase 





Discussed with  RN





COMMENT/RELEVANT DATA


Meds





Current Medications








 Medications


  (Trade)  Dose


 Ordered  Sig/Travis  Start Time


 Stop Time Status Last Admin


Dose Admin


 


 Albuterol Sulfate


  (Ventolin Neb


 Soln)  10 mg  1X  ONCE  7/9/19 23:00


 7/9/19 23:01 DC 7/10/19 00:30


10 MG


 


 Atropine Sulfate


  (ATROPINE 0.5mg


 SYRINGE)  0.5 mg  PRN Q5MIN  PRN  7/10/19 14:15


     





 


 Calcium Gluconate


  (Calcium


 Gluconate)  2,000 mg  1X  ONCE  7/10/19 12:00


 7/10/19 12:01 DC 7/10/19 13:38


2,000 MG


 


 Calcium Gluconate


 2000 mg/Dextrose  120 ml @ 


 220 mls/hr  1X  ONCE  7/11/19 10:00


 7/11/19 10:32 DC 7/11/19 10:05


220 MLS/HR


 


 Ceftriaxone Sodium


  (Rocephin)  2 gm  Q24H  7/11/19 13:00


    7/12/19 13:49


2 GM


 


 Dexmedetomidine


 HCl 200 mcg/


 Sodium Chloride  50 ml @ 0


 mls/hr  CONT  PRN  7/10/19 14:15


    7/12/19 14:57


12 MLS/HR


 


 Dextrose


  (Dextrose


 50%-Water Syringe)  25 gm  1X  ONCE  7/9/19 23:00


 7/9/19 23:01 DC 7/9/19 22:42


25 GM


 


 Diphenhydramine


 HCl


  (Benadryl)  25 mg  1X PRN  PRN  7/11/19 11:15


 7/12/19 11:14 DC  





 


 Haloperidol


 Lactate


  (Haldol Inj)  1 mg  PRN Q8HRS  PRN  7/10/19 14:00


    7/12/19 07:56


1 MG


 


 Heparin Sodium


  (Porcine)


  (Heparin Sodium)  2,500 unit  PRN Q6HRS  PRN  7/12/19 12:00


     





 


 Heparin Sodium/


 Dextrose  500 ml @ 0


 mls/hr  CONT  PRN  7/12/19 12:00


     





 


 Info


  (PHARMACY


 MONITORING -- do


 not chart)  1 each  PRN DAILY  PRN  7/11/19 11:15


     





 


 Insulin Human


 Regular


  (HumuLIN R VIAL)  10 unit  1X  ONCE  7/9/19 23:00


 7/9/19 23:01 DC 7/9/19 23:51


10 UNIT


 


 Lidocaine/Sodium


 Bicarbonate


  (Buffered


 Lidocaine 1%)  6 ml  1X  ONCE  7/10/19 14:15


 7/10/19 14:16 DC  





 


 Linezolid


  (Zyvox)  600 mg  BID  7/11/19 09:00


    7/12/19 11:24


600 MG


 


 Lorazepam


  (Ativan Inj)  2 mg  PRN Q2HRS  PRN  7/10/19 14:00


    7/12/19 07:57


2 MG


 


 Morphine Sulfate


  (Morphine


 Sulfate)  4 mg  PRN Q4HRS  PRN  7/10/19 09:00


    7/12/19 13:57


4 MG


 


 Ondansetron HCl


  (Zofran)  4 mg  PRN Q8HRS  PRN  7/10/19 01:15


 7/11/19 01:14 DC  





 


 Pantoprazole


 Sodium


  (PROTONIX VIAL


 for IV PUSH)  40 mg  DAILYAC  7/11/19 07:30


    7/12/19 08:02


40 MG


 


 Pantoprazole


 Sodium


  (Protonix)  40 mg  DAILYAC  7/10/19 13:30


 7/10/19 19:22 DC  





 


 Polyethylene


 Glycol


  (miraLAX PACKET)  17 gm  DAILY  7/10/19 13:30


     





 


 Potassium


 Chloride 10 meq/


 Bicarbonate


 Dialysis Soln w/


 out KCl  5,005 ml @ 


 1,500 mls/hr  Q3H21M  7/12/19 13:00


 7/12/19 13:00 DC  





 


 Potassium


 Chloride 10 meq/


 Calcium Chloride


 12.5 meq/


 Bicarbonate


 Dialysis Soln w/


 out KCl  5,013.9286


 ml @ 1,000


 mls/hr  Q5H1M  7/12/19 13:00


 7/12/19 13:00 DC  





 


 Potassium


 Chloride 20 meq/


 Bicarbonate


 Dialysis Soln w/


 out KCl  5,010 ml @ 


 1,500 mls/hr  Q3H21M  7/12/19 18:01


     





 


 Potassium


 Chloride 20 meq/


 Calcium Chloride


 12.5 meq/


 Bicarbonate


 Dialysis Soln w/


 out KCl  5,018.9286


 ml @ 1,500


 mls/hr  Q3H21M  7/12/19 13:00


 7/12/19 13:00 DC  





 


 Potassium


 Chloride 30 meq/


 Bicarbonate


 Dialysis Soln w/


 out KCl  5,015 ml @ 


 1,500 mls/hr  Q3H21M  7/12/19 18:00


     





 


 Potassium


 Chloride 40 meq/


 Bicarbonate


 Dialysis Soln w/


 out KCl  5,020 ml @ 


 1,500 mls/hr  Q3H21M  7/12/19 13:00


 7/12/19 13:10 DC  





 


 Potassium


 Chloride 60 meq/


 Bicarbonate


 Dialysis Soln w/


 out KCl  5,030 ml @ 


 1,500 mls/hr  Q3H22M  7/12/19 13:00


 7/12/19 13:07 DC  





 


 Silver


 Sulfadiazine


  (Silvadene)  1 mack  BID  7/10/19 13:00


 7/11/19 15:04 DC 7/11/19 10:06


1 MACK


 


 Sodium Bicarbonate


  (Sodium Bicarb


 Adult 8.4% Syr)  100 meq  1X  ONCE  7/10/19 10:00


 7/10/19 10:01 DC 7/10/19 10:02


100 MEQ


 


 Sodium Chloride  1,000 ml @ 


 400 mls/hr  Q2H30M PRN  7/11/19 11:06


 7/11/19 23:05 DC  





 


 Sodium Chloride


  (Normal Saline


 Flush)  3 ml  QSHIFT  PRN  7/10/19 01:15


     











Lab





Laboratory Tests








Test


 7/12/19


06:30 7/12/19


12:20


 


White Blood Count


 6.5 x10^3/uL


(4.0-11.0) 7.1 x10^3/uL


(4.0-11.0)


 


Red Blood Count


 3.91 x10^6/uL


(4.30-5.70) 3.92 x10^6/uL


(4.30-5.70)


 


Hemoglobin


 11.4 g/dL


(13.0-17.5) 11.5 g/dL


(13.0-17.5)


 


Hematocrit


 32.6 %


(39.0-53.0) 32.6 %


(39.0-53.0)


 


Mean Corpuscular Volume 83 fL ()  83 fL () 


 


Mean Corpuscular Hemoglobin 29 pg (25-35)  30 pg (25-35) 


 


Mean Corpuscular Hemoglobin


Concent 35 g/dL


(31-37) 35 g/dL


(31-37)


 


Red Cell Distribution Width


 12.9 %


(11.5-14.5) 13.2 %


(11.5-14.5)


 


Platelet Count


 166 x10^3/uL


(140-400) 178 x10^3/uL


(140-400)


 


Neutrophils (%) (Auto) 76 % (31-73)  84 % (31-73) 


 


Lymphocytes (%) (Auto) 15 % (24-48)  10 % (24-48) 


 


Monocytes (%) (Auto) 5 % (0-9)  4 % (0-9) 


 


Eosinophils (%) (Auto) 3 % (0-3)  2 % (0-3) 


 


Basophils (%) (Auto) 1 % (0-3)  0 % (0-3) 


 


Neutrophils # (Auto)


 4.9 x10^3/uL


(1.8-7.7) 6.0 x10^3/uL


(1.8-7.7)


 


Lymphocytes # (Auto)


 1.0 x10^3/uL


(1.0-4.8) 0.7 x10^3/uL


(1.0-4.8)


 


Monocytes # (Auto)


 0.3 x10^3/uL


(0.0-1.1) 0.3 x10^3/uL


(0.0-1.1)


 


Eosinophils # (Auto)


 0.2 x10^3/uL


(0.0-0.7) 0.2 x10^3/uL


(0.0-0.7)


 


Basophils # (Auto)


 0.0 x10^3/uL


(0.0-0.2) 0.0 x10^3/uL


(0.0-0.2)


 


Sodium Level


 129 mmol/L


(136-145) 128 mmol/L


(136-145)


 


Potassium Level


 3.5 mmol/L


(3.5-5.1) 3.7 mmol/L


(3.5-5.1)


 


Chloride Level


 91 mmol/L


() 88 mmol/L


()


 


Carbon Dioxide Level


 27 mmol/L


(21-32) 24 mmol/L


(21-32)


 


Anion Gap 11 (6-14)  16 (6-14) 


 


Blood Urea Nitrogen


 57 mg/dL


(8-26) 59 mg/dL


(8-26)


 


Creatinine


 8.4 mg/dL


(0.7-1.3) 8.7 mg/dL


(0.7-1.3)


 


Estimated GFR


(Cockcroft-Gault) 7.5 


 7.2 





 


BUN/Creatinine Ratio 7 (6-20)  


 


Glucose Level


 80 mg/dL


(70-99) 79 mg/dL


(70-99)


 


Calcium Level


 6.7 mg/dL


(8.5-10.1) 6.6 mg/dL


(8.5-10.1)


 


Total Bilirubin


 0.5 mg/dL


(0.2-1.0) 





 


Aspartate Amino Transf


(AST/SGOT) 653 U/L


(15-37) 





 


Alanine Aminotransferase


(ALT/SGPT) 307 U/L


(16-63) 





 


Alkaline Phosphatase


 57 U/L


() 





 


Creatine Kinase


 26413 U/L


() 





 


Total Protein


 5.0 g/dL


(6.4-8.2) 





 


Albumin


 1.8 g/dL


(3.4-5.0) 





 


Albumin/Globulin Ratio 0.6 (1.0-1.7)  


 


HIV (1&2) Antibody Screen


 Nonreactive


(Nonreactive) 





 


Prothrombin Time


 


 14.6 SEC


(11.7-14.0)


 


Prothromb Time International


Ratio 


 1.2 (0.8-1.1) 





 


Activated Partial


Thromboplast Time 


 35 SEC (24-38) 





 


Phosphorus Level


 


 6.6 mg/dL


(2.6-4.7)


 


Magnesium Level


 


 1.9 mg/dL


(1.8-2.4)








Results


All relevant outside records, renal labs, imaging studies, telemetry/EKG's were 

reviewed.











BENIGNO JOHNSON MD                Jul 12, 2019 16:58

## 2019-07-12 NOTE — PDOC
PROGRESS NOTES


Assessment


Problems


Medical Problems:


(1) Elevated troponin


Status: Acute  





(2) Hepatorenal syndrome


Status: Acute  





(3) Hyperkalemia


Status: Acute  





(4) Hyponatremia


Status: Acute  





(5) Methamphetamine abuse


Status: Acute  





(6) Neurological complaint


Status: Acute  





(7) Rhabdomyolysis


Status: Acute  





(8) Urinary retention


Status: Acute  





(9) Urinary tract infection


Status: Acute  





Bilateral leg weakness and numbness, factitious disorder, was able to walk to 

chair earlier


But he has rhabdomyolysis and some edema of the psoas muscle on the MRI,


Methamphetamine abuse.


Plan


Hold on repeat MRI studies


Supportive ICU care for rhabdomyolysis, renal insufficiency, and other medical 

problems. He is getting dialysis


Wean off Precedex


Physical and occupational therapy.


Subjective


none


Objective





Vital Signs








  Date Time  Temp Pulse Resp B/P (MAP) Pulse Ox O2 Delivery O2 Flow Rate FiO2


 


7/12/19 06:00  80 18 139/79 (99) 95 Room Air  


 


7/12/19 04:20 97.8       





 97.8       


 


7/11/19 19:50       98.0 














Intake and Output 


 


 7/12/19





 06:59


 


Intake Total 1580 ml


 


Output Total 20 ml


 


Balance 1560 ml


 


 


 


Intake Oral 1020 ml


 


IV Total 560 ml


 


Output Urine Total 20 ml








PHYSICAL EXAM


Sleepy, on Precedex


PERRL.


EOMI.


CN: no focal findings.


Muscle tone: normal.


Muscle strength: Not cooperative


DTR: 2+


Plantar reflex: Flexor


Gait: not examined in bed.


Sensory exam: Not cooperative


Cerebellar: not cooperative


Review of Relevant


I have reviewed the following items estrella (where applicable) has been applied.


Labs





Laboratory Tests








Test


 7/10/19


09:39 7/10/19


11:19 7/10/19


15:15 7/10/19


18:00


 


Erythrocyte Sedimentation Rate 35 (0-15)    


 


Sodium Level


 117 mmol/L


(136-145) 


 


 127 mmol/L


(136-145)


 


Potassium Level


 6.6 mmol/L


(3.5-5.1) 


 


 3.5 mmol/L


(3.5-5.1)


 


Chloride Level


 81 mmol/L


() 


 


 88 mmol/L


()


 


Carbon Dioxide Level


 9 mmol/L


(21-32) 


 


 21 mmol/L


(21-32)


 


Anion Gap 27 (6-14)    18 (6-14) 


 


Blood Urea Nitrogen


 115 mg/dL


(8-26) 


 


 69 mg/dL


(8-26)


 


Creatinine


 13.4 mg/dL


(0.7-1.3) 


 


 8.6 mg/dL


(0.7-1.3)


 


Estimated GFR


(Cockcroft-Gault) 4.4 


 


 


 7.3 





 


Glucose Level


 81 mg/dL


(70-99) 


 


 70 mg/dL


(70-99)


 


Calcium Level


 < 5.0 mg/dL


(8.5-10.1) 


 


 5.6 mg/dL


(8.5-10.1)


 


Vitamin B12 Level


 430 pg/mL


(247-911) 


 


 





 


Thyroid Stimulating Hormone


(TSH) 2.175 uIU/mL


(0.358-3.74) 


 


 





 


CSF Tube Number  4   


 


CSF Volume  3.0   


 


CSF Color  Colorless   


 


CSF Clarity  Clear   


 


CSF WBC


 


 3 /cmm (Not


Established) 


 





 


CSF RBC


 


 2 /cmm (Not


Established) 


 





 


CSF Glucose


 


 52 mg/dL


(37-70) 


 





 


CSF Total Protein


 


 53.2 mg/dL


(15.0-45.0) 


 





 


Hepatitis A IgM Antibody


 


 


 Nonreactive


(Nonreactive) 





 


Hepatitis B Surface Antigen


 


 


 Nonreactive


(Nonreactive) 





 


Hepatitis B Surface Antibody,


Quant 


 


 <3.1 mIU/mL


(Immunity>9.9) 





 


Hepatitis B Core Total


Antibody 


 


 Nonreactive


(Nonreactive) 





 


Hepatitis B Core IgM Antibody


 


 


 Nonreactive


(Nonreactive) 





 


Hepatitis C IgG Antibody


 


 


 Equivocal


(Nonreactive) 





 


Test


 7/10/19


21:20 7/11/19


06:15 7/12/19


06:30 





 


Ionized Calcium


 0.70 mmol/L


(1.13-1.32) 


 


 





 


White Blood Count


 


 7.6 x10^3/uL


(4.0-11.0) 6.5 x10^3/uL


(4.0-11.0) 





 


Red Blood Count


 


 3.69 x10^6/uL


(4.30-5.70) 3.91 x10^6/uL


(4.30-5.70) 





 


Hemoglobin


 


 10.9 g/dL


(13.0-17.5) 11.4 g/dL


(13.0-17.5) 





 


Hematocrit


 


 30.6 %


(39.0-53.0) 32.6 %


(39.0-53.0) 





 


Mean Corpuscular Volume  83 fL ()  83 fL ()  


 


Mean Corpuscular Hemoglobin  30 pg (25-35)  29 pg (25-35)  


 


Mean Corpuscular Hemoglobin


Concent 


 36 g/dL


(31-37) 35 g/dL


(31-37) 





 


Red Cell Distribution Width


 


 13.1 %


(11.5-14.5) 12.9 %


(11.5-14.5) 





 


Platelet Count


 


 148 x10^3/uL


(140-400) 166 x10^3/uL


(140-400) 





 


Neutrophils (%) (Auto)  82 % (31-73)  76 % (31-73)  


 


Lymphocytes (%) (Auto)  11 % (24-48)  15 % (24-48)  


 


Monocytes (%) (Auto)  6 % (0-9)  5 % (0-9)  


 


Eosinophils (%) (Auto)  1 % (0-3)  3 % (0-3)  


 


Basophils (%) (Auto)  0 % (0-3)  1 % (0-3)  


 


Neutrophils # (Auto)


 


 6.2 x10^3/uL


(1.8-7.7) 4.9 x10^3/uL


(1.8-7.7) 





 


Lymphocytes # (Auto)


 


 0.8 x10^3/uL


(1.0-4.8) 1.0 x10^3/uL


(1.0-4.8) 





 


Monocytes # (Auto)


 


 0.4 x10^3/uL


(0.0-1.1) 0.3 x10^3/uL


(0.0-1.1) 





 


Eosinophils # (Auto)


 


 0.1 x10^3/uL


(0.0-0.7) 0.2 x10^3/uL


(0.0-0.7) 





 


Basophils # (Auto)


 


 0.0 x10^3/uL


(0.0-0.2) 0.0 x10^3/uL


(0.0-0.2) 





 


Sodium Level


 


 124 mmol/L


(136-145) 129 mmol/L


(136-145) 





 


Potassium Level


 


 4.3 mmol/L


(3.5-5.1) 3.5 mmol/L


(3.5-5.1) 





 


Chloride Level


 


 87 mmol/L


() 91 mmol/L


() 





 


Carbon Dioxide Level


 


 20 mmol/L


(21-32) 27 mmol/L


(21-32) 





 


Anion Gap  17 (6-14)  11 (6-14)  


 


Blood Urea Nitrogen


 


 84 mg/dL


(8-26) 57 mg/dL


(8-26) 





 


Creatinine


 


 10.4 mg/dL


(0.7-1.3) 8.4 mg/dL


(0.7-1.3) 





 


Estimated GFR


(Cockcroft-Gault) 


 5.9 


 7.5 


 





 


BUN/Creatinine Ratio  8 (6-20)  7 (6-20)  


 


Glucose Level


 


 91 mg/dL


(70-99) 80 mg/dL


(70-99) 





 


Calcium Level


 


 5.5 mg/dL


(8.5-10.1) 6.7 mg/dL


(8.5-10.1) 





 


Magnesium Level


 


 2.1 mg/dL


(1.8-2.4) 


 





 


Total Bilirubin


 


 0.5 mg/dL


(0.2-1.0) 0.5 mg/dL


(0.2-1.0) 





 


Aspartate Amino Transf


(AST/SGOT) 


 1017 U/L


(15-37) 653 U/L


(15-37) 





 


Alanine Aminotransferase


(ALT/SGPT) 


 412 U/L


(16-63) 307 U/L


(16-63) 





 


Alkaline Phosphatase


 


 65 U/L


() 57 U/L


() 





 


Creatine Kinase


 


 > 289043 U/L


() 


 





 


Total Protein


 


 5.2 g/dL


(6.4-8.2) 5.0 g/dL


(6.4-8.2) 





 


Albumin


 


 2.0 g/dL


(3.4-5.0) 1.8 g/dL


(3.4-5.0) 





 


Albumin/Globulin Ratio  0.6 (1.0-1.7)  0.6 (1.0-1.7)  








Laboratory Tests








Test


 7/12/19


06:30


 


White Blood Count


 6.5 x10^3/uL


(4.0-11.0)


 


Red Blood Count


 3.91 x10^6/uL


(4.30-5.70)


 


Hemoglobin


 11.4 g/dL


(13.0-17.5)


 


Hematocrit


 32.6 %


(39.0-53.0)


 


Mean Corpuscular Volume 83 fL () 


 


Mean Corpuscular Hemoglobin 29 pg (25-35) 


 


Mean Corpuscular Hemoglobin


Concent 35 g/dL


(31-37)


 


Red Cell Distribution Width


 12.9 %


(11.5-14.5)


 


Platelet Count


 166 x10^3/uL


(140-400)


 


Neutrophils (%) (Auto) 76 % (31-73) 


 


Lymphocytes (%) (Auto) 15 % (24-48) 


 


Monocytes (%) (Auto) 5 % (0-9) 


 


Eosinophils (%) (Auto) 3 % (0-3) 


 


Basophils (%) (Auto) 1 % (0-3) 


 


Neutrophils # (Auto)


 4.9 x10^3/uL


(1.8-7.7)


 


Lymphocytes # (Auto)


 1.0 x10^3/uL


(1.0-4.8)


 


Monocytes # (Auto)


 0.3 x10^3/uL


(0.0-1.1)


 


Eosinophils # (Auto)


 0.2 x10^3/uL


(0.0-0.7)


 


Basophils # (Auto)


 0.0 x10^3/uL


(0.0-0.2)


 


Sodium Level


 129 mmol/L


(136-145)


 


Potassium Level


 3.5 mmol/L


(3.5-5.1)


 


Chloride Level


 91 mmol/L


()


 


Carbon Dioxide Level


 27 mmol/L


(21-32)


 


Anion Gap 11 (6-14) 


 


Blood Urea Nitrogen


 57 mg/dL


(8-26)


 


Creatinine


 8.4 mg/dL


(0.7-1.3)


 


Estimated GFR


(Cockcroft-Gault) 7.5 





 


BUN/Creatinine Ratio 7 (6-20) 


 


Glucose Level


 80 mg/dL


(70-99)


 


Calcium Level


 6.7 mg/dL


(8.5-10.1)


 


Total Bilirubin


 0.5 mg/dL


(0.2-1.0)


 


Aspartate Amino Transf


(AST/SGOT) 653 U/L


(15-37)


 


Alanine Aminotransferase


(ALT/SGPT) 307 U/L


(16-63)


 


Alkaline Phosphatase


 57 U/L


()


 


Total Protein


 5.0 g/dL


(6.4-8.2)


 


Albumin


 1.8 g/dL


(3.4-5.0)


 


Albumin/Globulin Ratio 0.6 (1.0-1.7) 








Microbiology


7/10/19 Blood Culture - Preliminary, Resulted


          NO GROWTH AFTER 2 DAYS


7/10/19 CSF Gram Stain - Final, Complete


          


7/9/19 Urine Culture - Final, Complete


         


7/9/19 Urine Culture Result 1 (RUI) - Final, Complete


Medications





Current Medications


Morphine Sulfate (Morphine Sulfate) 4 mg 1X  ONCE IV  Last administered on 

7/9/19at 21:20;  Start 7/9/19 at 21:30;  Stop 7/9/19 at 21:31;  Status DC


Sodium Chloride 1,000 ml @  1,000 mls/hr 1X  ONCE IV  Last administered on 

7/9/19at 22:30;  Start 7/9/19 at 22:30;  Stop 7/9/19 at 23:29;  Status DC


Calcium Gluconate (Calcium Gluconate) 1,000 mg 1X  ONCE IVP  Last administered 

on 7/9/19at 22:43;  Start 7/9/19 at 23:00;  Stop 7/9/19 at 23:01;  Status DC


Insulin Human Regular (HumuLIN R VIAL) 10 unit 1X  ONCE IV  Last administered on

7/9/19at 23:51;  Start 7/9/19 at 23:00;  Stop 7/9/19 at 23:01;  Status DC


Dextrose (Dextrose 50%-Water Syringe) 25 gm 1X  ONCE IV  Last administered on 

7/9/19at 22:42;  Start 7/9/19 at 23:00;  Stop 7/9/19 at 23:01;  Status DC


Sodium Bicarbonate (Sodium Bicarb Adult 8.4% Syr) 50 meq 1X  ONCE IV  Last 

administered on 7/9/19at 23:49;  Start 7/9/19 at 23:00;  Stop 7/9/19 at 23:01;  

Status DC


Sodium Chloride 1,000 ml @  1,000 mls/hr 1X  ONCE IV  Last administered on 

7/9/19at 23:50;  Start 7/9/19 at 23:00;  Stop 7/9/19 at 23:59;  Status DC


Albuterol Sulfate (Ventolin Neb Soln) 10 mg 1X  ONCE CONT NEB  Last administered

on 7/10/19at 00:30;  Start 7/9/19 at 23:00;  Stop 7/9/19 at 23:01;  Status DC


Ceftriaxone Sodium (Rocephin) 1 gm 1X  ONCE IVP  Last administered on 7/9/19at 

22:43;  Start 7/9/19 at 23:00;  Stop 7/9/19 at 23:01;  Status DC


Sodium Chloride 1,000 ml @  1,000 mls/hr 1X  ONCE IV  Last administered on 

7/10/19at 01:00;  Start 7/10/19 at 01:00;  Stop 7/10/19 at 01:59;  Status DC


Lorazepam (Ativan Inj) 0.5 mg PRN Q6HRS  PRN IV ANXIETY / AGITATION Last 

administered on 7/10/19at 11:00;  Start 7/10/19 at 01:15;  Stop 7/11/19 at 

08:48;  Status DC


Ondansetron HCl (Zofran) 4 mg PRN Q6HRS  PRN IV NAUSEA/VOMITING 1ST CHOICE;  

Start 7/10/19 at 01:15;  Stop 7/10/19 at 01:22;  Status DC


Sodium Chloride (Normal Saline Flush) 3 ml QSHIFT  PRN IV AFTER MEDS AND BLOOD 

DRAWS;  Start 7/10/19 at 01:15


Sodium Chloride 1,000 ml @  175 mls/hr Q5H43M IV ;  Start 7/10/19 at 01:08;  

Stop 7/10/19 at 01:23;  Status DC


Ondansetron HCl (Zofran) 4 mg PRN Q8HRS  PRN IV NAUSEA/VOMITING  1ST CHOICE;  

Start 7/10/19 at 01:15;  Stop 7/11/19 at 01:14;  Status DC


Sodium Chloride 1,000 ml @  150 mls/hr Q6H40M IV ;  Start 7/10/19 at 01:30;  

Stop 7/10/19 at 18:49;  Status DC


Morphine Sulfate (Morphine Sulfate) 4 mg PRN Q4HRS  PRN IV SEVERE PAIN Last 

administered on 7/12/19at 03:20;  Start 7/10/19 at 09:00


Sodium Bicarbonate (Sodium Bicarb Adult 8.4% Syr) 100 meq 1X  ONCE IV  Last 

administered on 7/10/19at 10:02;  Start 7/10/19 at 10:00;  Stop 7/10/19 at 

10:01;  Status DC


Sodium Chloride 1,000 ml @  1,000 mls/hr 1X  ONCE IV  Last administered on 

7/10/19at 09:56;  Start 7/10/19 at 10:00;  Stop 7/10/19 at 10:59;  Status DC


Lidocaine/Sodium Bicarbonate (Buffered Lidocaine 1%) 3 ml STK-MED ONCE .ROUTE ; 

Start 7/10/19 at 11:07;  Stop 7/10/19 at 11:08;  Status DC


Calcium Gluconate (Calcium Gluconate) 2,000 mg 1X  ONCE IVP  Last administered 

on 7/10/19at 13:38;  Start 7/10/19 at 12:00;  Stop 7/10/19 at 12:01;  Status DC


Ceftriaxone Sodium (Rocephin) 1 gm Q24H IVP  Last administered on 7/10/19at 

12:49;  Start 7/10/19 at 13:00;  Stop 7/11/19 at 07:45;  Status DC


Silver Sulfadiazine (Silvadene) 1 mack BID TP  Last administered on 7/11/19at 

10:06;  Start 7/10/19 at 13:00;  Stop 7/11/19 at 15:04;  Status DC


Sodium Chloride 1,000 ml @  1,000 mls/hr 1X  ONCE IV  Last administered on 

7/10/19at 12:47;  Start 7/10/19 at 12:45;  Stop 7/10/19 at 13:44;  Status DC


Pantoprazole Sodium (Protonix) 40 mg DAILYAC PO ;  Start 7/10/19 at 13:30;  Stop

7/10/19 at 19:22;  Status DC


Polyethylene Glycol (miraLAX PACKET) 17 gm DAILY PO ;  Start 7/10/19 at 13:30


Lidocaine/Sodium Bicarbonate (Buffered Lidocaine 1%) 3 ml STK-MED ONCE .ROUTE ; 

Start 7/10/19 at 13:48;  Stop 7/10/19 at 13:49;  Status DC


Haloperidol Lactate (Haldol Inj) 1 mg PRN Q8HRS  PRN IVP AGITATION Last 

administered on 7/12/19at 07:56;  Start 7/10/19 at 14:00


Lorazepam (Ativan Inj) 2 mg PRN Q2HRS  PRN IV ANXIETY. Last administered on 

7/12/19at 07:57;  Start 7/10/19 at 14:00


Lidocaine/Sodium Bicarbonate (Buffered Lidocaine 1%) 6 ml 1X  ONCE INJ ;  Start 

7/10/19 at 14:15;  Stop 7/10/19 at 14:16;  Status DC


Dexmedetomidine HCl 200 mcg/ Sodium Chloride 50 ml @ 0 mls/hr CONT  PRN IV PER 

PROTOCOL Last administered on 7/12/19at 07:56;  Start 7/10/19 at 14:15


Sodium Chloride 500 ml @  500 mls/hr 1X PRN  PRN IV SEE COMMENTS;  Start 7/10/19

at 14:15


Atropine Sulfate (ATROPINE 0.5mg SYRINGE) 0.5 mg PRN Q5MIN  PRN IV SEE COMMENTS;

 Start 7/10/19 at 14:15


Sodium Chloride 1,000 ml @  1,000 mls/hr Q1H PRN IV hypotension;  Start 7/10/19 

at 15:08;  Stop 7/10/19 at 21:07;  Status DC


Diphenhydramine HCl (Benadryl) 25 mg 1X PRN  PRN IV ITCHING;  Start 7/10/19 at 

15:15;  Stop 7/11/19 at 11:19;  Status DC


Diphenhydramine HCl (Benadryl) 25 mg 1X PRN  PRN IV ITCHING;  Start 7/10/19 at 

15:15;  Stop 7/11/19 at 11:19;  Status DC


Sodium Chloride 1,000 ml @  400 mls/hr Q2H30M PRN IV PATENCY;  Start 7/10/19 at 

15:08;  Stop 7/11/19 at 03:07;  Status DC


Info (PHARMACY MONITORING -- do not chart) 1 each PRN DAILY  PRN MC SEE 

COMMENTS;  Start 7/10/19 at 15:15;  Stop 7/11/19 at 11:18;  Status DC


Pantoprazole Sodium (PROTONIX VIAL for IV PUSH) 40 mg DAILYAC IVP  Last a

dministered on 7/12/19at 08:02;  Start 7/11/19 at 07:30


Linezolid (Zyvox) 600 mg BID PO  Last administered on 7/11/19at 14:14;  Start 

7/11/19 at 09:00


Ceftriaxone Sodium (Rocephin) 2 gm Q24H IVP  Last administered on 7/11/19at 

14:39;  Start 7/11/19 at 13:00


Calcium Gluconate 2000 mg/Dextrose 120 ml @  220 mls/hr 1X  ONCE IV  Last 

administered on 7/11/19at 10:05;  Start 7/11/19 at 10:00;  Stop 7/11/19 at 

10:32;  Status DC


Sodium Chloride 1,000 ml @  1,000 mls/hr Q1H PRN IV hypotension;  Start 7/11/19 

at 11:06;  Stop 7/11/19 at 17:05;  Status DC


Diphenhydramine HCl (Benadryl) 25 mg 1X PRN  PRN IV ITCHING;  Start 7/11/19 at 

11:15;  Stop 7/12/19 at 11:14


Diphenhydramine HCl (Benadryl) 25 mg 1X PRN  PRN IV ITCHING;  Start 7/11/19 at 

11:15;  Stop 7/12/19 at 11:14


Sodium Chloride 1,000 ml @  400 mls/hr Q2H30M PRN IV PATENCY;  Start 7/11/19 at 

11:06;  Stop 7/11/19 at 23:05;  Status DC


Info (PHARMACY MONITORING -- do not chart) 1 each PRN DAILY  PRN MC SEE 

COMMENTS;  Start 7/11/19 at 11:15





Active Scripts


Active


Reported


Protonix (Pantoprazole Sodium) 20 Mg Tablet. 2 Tab PO DAILY


Zyprexa (Olanzapine) 20 Mg Tablet 2 Tab PO QHS


Buspirone Hcl 30 Mg Tablet 1 Tab PO BID


Vitals/I & O





Vital Sign - Last 24 Hours








 7/11/19 7/11/19 7/11/19 7/11/19





 09:00 10:00 11:00 12:00


 


Pulse 82 81 82 


 


Resp 17 23 14 


 


B/P (MAP) 111/53 (72) 110/50 (70) 100/73 (82) 


 


Pulse Ox 93 96 96 


 


O2 Delivery Room Air Room Air Room Air Room Air





 7/11/19 7/11/19 7/11/19 7/11/19





 12:00 13:00 13:35 14:00


 


Temp 98.1   





 98.1   


 


Pulse 82 88  104


 


Resp 15 14 17 28


 


B/P (MAP) 134/65 (88) 116/51 (72)  129/68 (88)


 


Pulse Ox 94 95 94 94


 


O2 Delivery Room Air Room Air Room Air Room Air


 


    





    





 7/11/19 7/11/19 7/11/19 7/11/19





 14:05 15:00 16:00 16:00


 


Temp    98.4





    98.4


 


Pulse  112  85


 


Resp  14  14


 


B/P (MAP)  135/67 (89)  126/66 (86)


 


Pulse Ox 95 92  93


 


O2 Delivery  Room Air Room Air Room Air


 


    





    





 7/11/19 7/11/19 7/11/19 7/11/19





 17:00 18:00 19:44 19:50


 


Pulse 85 85  


 


Resp 15 14 18 


 


B/P (MAP) 135/66 (89) 130/69 (89)  


 


Pulse Ox 94 93  


 


O2 Delivery Room Air Room Air Room Air Room Air


 


O2 Flow Rate    98.0





 7/11/19 7/11/19 7/11/19 7/11/19





 21:10 22:00 23:11 23:46


 


Pulse 87 87 87 


 


Resp 20  16 16


 


B/P (MAP) 125/62 (83) 120/60 (80) 119/63 (81) 


 


Pulse Ox 94 95 94 


 


O2 Delivery Room Air Room Air Room Air 





 7/12/19 7/12/19 7/12/19 7/12/19





 00:00 00:20 01:06 02:00


 


Temp  97.9  





  97.9  


 


Pulse  84 84 80


 


Resp  16 20 


 


B/P (MAP)  135/76 (95) 132/81 (98) 145/80 (101)


 


Pulse Ox  94 94 93


 


O2 Delivery Room Air Room Air Room Air Room Air


 


    





    





 7/12/19 7/12/19 7/12/19 7/12/19





 03:13 03:20 03:50 04:20


 


Temp    97.8





    97.8


 


Pulse 82   83


 


Resp 16 16 16 17


 


B/P (MAP) 149/81 (103)   122/72 (89)


 


Pulse Ox 94   94


 


O2 Delivery Room Air Room Air Room Air Room Air


 


    





    





 7/12/19 7/12/19 7/12/19 





 04:20 05:17 06:00 


 


Pulse  81 80 


 


Resp  16 18 


 


B/P (MAP)  123/69 (87) 139/79 (99) 


 


Pulse Ox  94 95 


 


O2 Delivery Room Air Room Air Room Air 














Intake and Output   


 


 7/11/19 7/11/19 7/12/19





 14:59 22:59 06:59


 


Intake Total 600 ml 830 ml 150 ml


 


Output Total 10 ml 0 ml 10 ml


 


Balance 590 ml 830 ml 140 ml

















BATSHEVA WARREN MD           Jul 12, 2019 08:49

## 2019-07-12 NOTE — PDOC
PULMONARY PROGRESS NOTES


Subjective


no soa, on precedex drip


Vitals





Vital Signs








  Date Time  Temp Pulse Resp B/P (MAP) Pulse Ox O2 Delivery O2 Flow Rate FiO2


 


7/12/19 10:00  90 13 119/65 (83) 96 Room Air  


 


7/12/19 08:00 97.5       





 97.5       


 


7/11/19 19:50       98.0 








General:  No acute distress


Lungs:  Clear


Cardiovascular:  S1


Abdomen:  Soft


Extremities:  Other (1+edema,)


Labs





Laboratory Tests








Test


 7/10/19


11:19 7/10/19


15:15 7/10/19


18:00 7/10/19


21:20


 


CSF Tube Number 4    


 


CSF Volume 3.0    


 


CSF Color Colorless    


 


CSF Clarity Clear    


 


CSF WBC


 3 /cmm (Not


Established) 


 


 





 


CSF RBC


 2 /cmm (Not


Established) 


 


 





 


CSF Glucose


 52 mg/dL


(37-70) 


 


 





 


CSF Total Protein


 53.2 mg/dL


(15.0-45.0) 


 


 





 


Hepatitis A IgM Antibody


 


 Nonreactive


(Nonreactive) 


 





 


Hepatitis B Surface Antigen


 


 Nonreactive


(Nonreactive) 


 





 


Hepatitis B Surface Antibody,


Quant 


 <3.1 mIU/mL


(Immunity>9.9) 


 





 


Hepatitis B Core Total


Antibody 


 Nonreactive


(Nonreactive) 


 





 


Hepatitis B Core IgM Antibody


 


 Nonreactive


(Nonreactive) 


 





 


Hepatitis C IgG Antibody


 


 Equivocal


(Nonreactive) 


 





 


Sodium Level


 


 


 127 mmol/L


(136-145) 





 


Potassium Level


 


 


 3.5 mmol/L


(3.5-5.1) 





 


Chloride Level


 


 


 88 mmol/L


() 





 


Carbon Dioxide Level


 


 


 21 mmol/L


(21-32) 





 


Anion Gap   18 (6-14)  


 


Blood Urea Nitrogen


 


 


 69 mg/dL


(8-26) 





 


Creatinine


 


 


 8.6 mg/dL


(0.7-1.3) 





 


Estimated GFR


(Cockcroft-Gault) 


 


 7.3 


 





 


Glucose Level


 


 


 70 mg/dL


(70-99) 





 


Calcium Level


 


 


 5.6 mg/dL


(8.5-10.1) 





 


Ionized Calcium


 


 


 


 0.70 mmol/L


(1.13-1.32)


 


Test


 7/11/19


06:15 7/12/19


06:30 


 





 


White Blood Count


 7.6 x10^3/uL


(4.0-11.0) 6.5 x10^3/uL


(4.0-11.0) 


 





 


Red Blood Count


 3.69 x10^6/uL


(4.30-5.70) 3.91 x10^6/uL


(4.30-5.70) 


 





 


Hemoglobin


 10.9 g/dL


(13.0-17.5) 11.4 g/dL


(13.0-17.5) 


 





 


Hematocrit


 30.6 %


(39.0-53.0) 32.6 %


(39.0-53.0) 


 





 


Mean Corpuscular Volume 83 fL ()  83 fL ()   


 


Mean Corpuscular Hemoglobin 30 pg (25-35)  29 pg (25-35)   


 


Mean Corpuscular Hemoglobin


Concent 36 g/dL


(31-37) 35 g/dL


(31-37) 


 





 


Red Cell Distribution Width


 13.1 %


(11.5-14.5) 12.9 %


(11.5-14.5) 


 





 


Platelet Count


 148 x10^3/uL


(140-400) 166 x10^3/uL


(140-400) 


 





 


Neutrophils (%) (Auto) 82 % (31-73)  76 % (31-73)   


 


Lymphocytes (%) (Auto) 11 % (24-48)  15 % (24-48)   


 


Monocytes (%) (Auto) 6 % (0-9)  5 % (0-9)   


 


Eosinophils (%) (Auto) 1 % (0-3)  3 % (0-3)   


 


Basophils (%) (Auto) 0 % (0-3)  1 % (0-3)   


 


Neutrophils # (Auto)


 6.2 x10^3/uL


(1.8-7.7) 4.9 x10^3/uL


(1.8-7.7) 


 





 


Lymphocytes # (Auto)


 0.8 x10^3/uL


(1.0-4.8) 1.0 x10^3/uL


(1.0-4.8) 


 





 


Monocytes # (Auto)


 0.4 x10^3/uL


(0.0-1.1) 0.3 x10^3/uL


(0.0-1.1) 


 





 


Eosinophils # (Auto)


 0.1 x10^3/uL


(0.0-0.7) 0.2 x10^3/uL


(0.0-0.7) 


 





 


Basophils # (Auto)


 0.0 x10^3/uL


(0.0-0.2) 0.0 x10^3/uL


(0.0-0.2) 


 





 


Sodium Level


 124 mmol/L


(136-145) 129 mmol/L


(136-145) 


 





 


Potassium Level


 4.3 mmol/L


(3.5-5.1) 3.5 mmol/L


(3.5-5.1) 


 





 


Chloride Level


 87 mmol/L


() 91 mmol/L


() 


 





 


Carbon Dioxide Level


 20 mmol/L


(21-32) 27 mmol/L


(21-32) 


 





 


Anion Gap 17 (6-14)  11 (6-14)   


 


Blood Urea Nitrogen


 84 mg/dL


(8-26) 57 mg/dL


(8-26) 


 





 


Creatinine


 10.4 mg/dL


(0.7-1.3) 8.4 mg/dL


(0.7-1.3) 


 





 


Estimated GFR


(Cockcroft-Gault) 5.9 


 7.5 


 


 





 


BUN/Creatinine Ratio 8 (6-20)  7 (6-20)   


 


Glucose Level


 91 mg/dL


(70-99) 80 mg/dL


(70-99) 


 





 


Calcium Level


 5.5 mg/dL


(8.5-10.1) 6.7 mg/dL


(8.5-10.1) 


 





 


Magnesium Level


 2.1 mg/dL


(1.8-2.4) 


 


 





 


Total Bilirubin


 0.5 mg/dL


(0.2-1.0) 0.5 mg/dL


(0.2-1.0) 


 





 


Aspartate Amino Transf


(AST/SGOT) 1017 U/L


(15-37) 653 U/L


(15-37) 


 





 


Alanine Aminotransferase


(ALT/SGPT) 412 U/L


(16-63) 307 U/L


(16-63) 


 





 


Alkaline Phosphatase


 65 U/L


() 57 U/L


() 


 





 


Creatine Kinase


 > 365470 U/L


() 16612 U/L


() 


 





 


Total Protein


 5.2 g/dL


(6.4-8.2) 5.0 g/dL


(6.4-8.2) 


 





 


Albumin


 2.0 g/dL


(3.4-5.0) 1.8 g/dL


(3.4-5.0) 


 





 


Albumin/Globulin Ratio 0.6 (1.0-1.7)  0.6 (1.0-1.7)   








Laboratory Tests








Test


 7/12/19


06:30


 


White Blood Count


 6.5 x10^3/uL


(4.0-11.0)


 


Red Blood Count


 3.91 x10^6/uL


(4.30-5.70)


 


Hemoglobin


 11.4 g/dL


(13.0-17.5)


 


Hematocrit


 32.6 %


(39.0-53.0)


 


Mean Corpuscular Volume 83 fL () 


 


Mean Corpuscular Hemoglobin 29 pg (25-35) 


 


Mean Corpuscular Hemoglobin


Concent 35 g/dL


(31-37)


 


Red Cell Distribution Width


 12.9 %


(11.5-14.5)


 


Platelet Count


 166 x10^3/uL


(140-400)


 


Neutrophils (%) (Auto) 76 % (31-73) 


 


Lymphocytes (%) (Auto) 15 % (24-48) 


 


Monocytes (%) (Auto) 5 % (0-9) 


 


Eosinophils (%) (Auto) 3 % (0-3) 


 


Basophils (%) (Auto) 1 % (0-3) 


 


Neutrophils # (Auto)


 4.9 x10^3/uL


(1.8-7.7)


 


Lymphocytes # (Auto)


 1.0 x10^3/uL


(1.0-4.8)


 


Monocytes # (Auto)


 0.3 x10^3/uL


(0.0-1.1)


 


Eosinophils # (Auto)


 0.2 x10^3/uL


(0.0-0.7)


 


Basophils # (Auto)


 0.0 x10^3/uL


(0.0-0.2)


 


Sodium Level


 129 mmol/L


(136-145)


 


Potassium Level


 3.5 mmol/L


(3.5-5.1)


 


Chloride Level


 91 mmol/L


()


 


Carbon Dioxide Level


 27 mmol/L


(21-32)


 


Anion Gap 11 (6-14) 


 


Blood Urea Nitrogen


 57 mg/dL


(8-26)


 


Creatinine


 8.4 mg/dL


(0.7-1.3)


 


Estimated GFR


(Cockcroft-Gault) 7.5 





 


BUN/Creatinine Ratio 7 (6-20) 


 


Glucose Level


 80 mg/dL


(70-99)


 


Calcium Level


 6.7 mg/dL


(8.5-10.1)


 


Total Bilirubin


 0.5 mg/dL


(0.2-1.0)


 


Aspartate Amino Transf


(AST/SGOT) 653 U/L


(15-37)


 


Alanine Aminotransferase


(ALT/SGPT) 307 U/L


(16-63)


 


Alkaline Phosphatase


 57 U/L


()


 


Creatine Kinase


 86537 U/L


()


 


Total Protein


 5.0 g/dL


(6.4-8.2)


 


Albumin


 1.8 g/dL


(3.4-5.0)


 


Albumin/Globulin Ratio 0.6 (1.0-1.7) 








Medications





Active Scripts








 Medications  Dose


 Route/Sig


 Max Daily Dose Days Date Category


 


 Protonix


  (Pantoprazole


 Sodium) 20 Mg


 Tablet.dr  2 Tab


 PO DAILY


   7/10/19 Reported


 


 Zyprexa


  (Olanzapine) 20


 Mg Tablet  2 Tab


 PO QHS


   7/10/19 Reported


 


 Buspirone Hcl 30


 Mg Tablet  1 Tab


 PO BID


   7/10/19 Reported











Impression


.


1.  Status post fall with acute rhabdomyolysis with markedly high CPKs and acute


renal failure.


2.  Acute kidney injury secondary to rhabdomyolysis.


3.  Severe metabolic acidosis secondary to acute kidney injury.  Clinically,


less likely sepsis.  Lactic acid is 1.1


4.  Hyponatremia.improving


5.  Moderate protein-calorie malnutrition.


6.  Leukocytosis, likely reactive.


7.  Status post fall with abnormal thoracic MRI.  Neurosurgery following.


8.  Abnormal liver function test secondary to hypoperfusion and rhabdomyolysis.


9.  Hypocalcemia.improving





Plan


.





1.  s/p fluid resuscitation , started on HD, now on CRRT


2.  Follow Renal's recommendation.


3.  prn bicarbonate.


4.  Monitor sodium level. improving


5.  Follow Neurology and Neurosurgery's recommendation.


6.  Continue empiric antibiotic, although clinical suspicion for infection is


low.


7.  Follow CPKs., trending down


8.  Correct calcium and follow the levels.improving


9.  clear chest x-ray.


10.  Discussed with RN and will follow along with you.











SIENA KHALIL MD                 Jul 12, 2019 10:15
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
## 2019-07-13 VITALS — SYSTOLIC BLOOD PRESSURE: 132 MMHG | DIASTOLIC BLOOD PRESSURE: 91 MMHG

## 2019-07-13 VITALS — SYSTOLIC BLOOD PRESSURE: 172 MMHG | DIASTOLIC BLOOD PRESSURE: 95 MMHG

## 2019-07-13 VITALS — SYSTOLIC BLOOD PRESSURE: 149 MMHG | DIASTOLIC BLOOD PRESSURE: 75 MMHG

## 2019-07-13 VITALS — SYSTOLIC BLOOD PRESSURE: 136 MMHG | DIASTOLIC BLOOD PRESSURE: 70 MMHG

## 2019-07-13 VITALS — DIASTOLIC BLOOD PRESSURE: 80 MMHG | SYSTOLIC BLOOD PRESSURE: 144 MMHG

## 2019-07-13 VITALS — DIASTOLIC BLOOD PRESSURE: 82 MMHG | SYSTOLIC BLOOD PRESSURE: 155 MMHG

## 2019-07-13 VITALS — DIASTOLIC BLOOD PRESSURE: 84 MMHG | SYSTOLIC BLOOD PRESSURE: 138 MMHG

## 2019-07-13 VITALS — DIASTOLIC BLOOD PRESSURE: 82 MMHG | SYSTOLIC BLOOD PRESSURE: 148 MMHG

## 2019-07-13 VITALS — SYSTOLIC BLOOD PRESSURE: 152 MMHG | DIASTOLIC BLOOD PRESSURE: 86 MMHG

## 2019-07-13 VITALS — SYSTOLIC BLOOD PRESSURE: 148 MMHG | DIASTOLIC BLOOD PRESSURE: 80 MMHG

## 2019-07-13 VITALS — DIASTOLIC BLOOD PRESSURE: 75 MMHG | SYSTOLIC BLOOD PRESSURE: 117 MMHG

## 2019-07-13 VITALS — SYSTOLIC BLOOD PRESSURE: 157 MMHG | DIASTOLIC BLOOD PRESSURE: 96 MMHG

## 2019-07-13 VITALS — SYSTOLIC BLOOD PRESSURE: 137 MMHG | DIASTOLIC BLOOD PRESSURE: 79 MMHG

## 2019-07-13 VITALS — DIASTOLIC BLOOD PRESSURE: 94 MMHG | SYSTOLIC BLOOD PRESSURE: 149 MMHG

## 2019-07-13 VITALS — DIASTOLIC BLOOD PRESSURE: 65 MMHG | SYSTOLIC BLOOD PRESSURE: 118 MMHG

## 2019-07-13 VITALS — SYSTOLIC BLOOD PRESSURE: 135 MMHG | DIASTOLIC BLOOD PRESSURE: 73 MMHG

## 2019-07-13 VITALS — SYSTOLIC BLOOD PRESSURE: 150 MMHG | DIASTOLIC BLOOD PRESSURE: 90 MMHG

## 2019-07-13 VITALS — DIASTOLIC BLOOD PRESSURE: 71 MMHG | SYSTOLIC BLOOD PRESSURE: 145 MMHG

## 2019-07-13 VITALS — DIASTOLIC BLOOD PRESSURE: 94 MMHG | SYSTOLIC BLOOD PRESSURE: 141 MMHG

## 2019-07-13 VITALS — SYSTOLIC BLOOD PRESSURE: 158 MMHG | DIASTOLIC BLOOD PRESSURE: 92 MMHG

## 2019-07-13 VITALS — DIASTOLIC BLOOD PRESSURE: 100 MMHG | SYSTOLIC BLOOD PRESSURE: 167 MMHG

## 2019-07-13 VITALS — SYSTOLIC BLOOD PRESSURE: 145 MMHG | DIASTOLIC BLOOD PRESSURE: 81 MMHG

## 2019-07-13 VITALS — DIASTOLIC BLOOD PRESSURE: 74 MMHG | SYSTOLIC BLOOD PRESSURE: 140 MMHG

## 2019-07-13 LAB
ANION GAP SERPL CALC-SCNC: 10 MMOL/L (ref 6–14)
ANION GAP SERPL CALC-SCNC: 11 MMOL/L (ref 6–14)
ANION GAP SERPL CALC-SCNC: 7 MMOL/L (ref 6–14)
ANION GAP SERPL CALC-SCNC: 9 MMOL/L (ref 6–14)
BASOPHILS # BLD AUTO: 0 X10^3/UL (ref 0–0.2)
BASOPHILS # BLD AUTO: 0.1 X10^3/UL (ref 0–0.2)
BASOPHILS NFR BLD: 0 % (ref 0–3)
BASOPHILS NFR BLD: 1 % (ref 0–3)
BUN SERPL-MCNC: 21 MG/DL (ref 8–26)
BUN SERPL-MCNC: 28 MG/DL (ref 8–26)
BUN SERPL-MCNC: 35 MG/DL (ref 8–26)
BUN SERPL-MCNC: 44 MG/DL (ref 8–26)
CALCIUM SERPL-MCNC: 7 MG/DL (ref 8.5–10.1)
CALCIUM SERPL-MCNC: 7.3 MG/DL (ref 8.5–10.1)
CALCIUM SERPL-MCNC: 7.4 MG/DL (ref 8.5–10.1)
CALCIUM SERPL-MCNC: 7.5 MG/DL (ref 8.5–10.1)
CHLORIDE SERPL-SCNC: 100 MMOL/L (ref 98–107)
CHLORIDE SERPL-SCNC: 96 MMOL/L (ref 98–107)
CHLORIDE SERPL-SCNC: 98 MMOL/L (ref 98–107)
CHLORIDE SERPL-SCNC: 98 MMOL/L (ref 98–107)
CO2 SERPL-SCNC: 23 MMOL/L (ref 21–32)
CO2 SERPL-SCNC: 24 MMOL/L (ref 21–32)
CO2 SERPL-SCNC: 25 MMOL/L (ref 21–32)
CO2 SERPL-SCNC: 26 MMOL/L (ref 21–32)
CREAT SERPL-MCNC: 3.4 MG/DL (ref 0.7–1.3)
CREAT SERPL-MCNC: 4.2 MG/DL (ref 0.7–1.3)
CREAT SERPL-MCNC: 5.2 MG/DL (ref 0.7–1.3)
CREAT SERPL-MCNC: 5.9 MG/DL (ref 0.7–1.3)
EOSINOPHIL NFR BLD: 0.1 X10^3/UL (ref 0–0.7)
EOSINOPHIL NFR BLD: 0.1 X10^3/UL (ref 0–0.7)
EOSINOPHIL NFR BLD: 0.2 X10^3/UL (ref 0–0.7)
EOSINOPHIL NFR BLD: 0.2 X10^3/UL (ref 0–0.7)
EOSINOPHIL NFR BLD: 2 % (ref 0–3)
EOSINOPHIL NFR BLD: 4 % (ref 0–3)
ERYTHROCYTE [DISTWIDTH] IN BLOOD BY AUTOMATED COUNT: 12.9 % (ref 11.5–14.5)
GFR SERPLBLD BASED ON 1.73 SQ M-ARVRAT: 11.3 ML/MIN
GFR SERPLBLD BASED ON 1.73 SQ M-ARVRAT: 13.1 ML/MIN
GFR SERPLBLD BASED ON 1.73 SQ M-ARVRAT: 16.7 ML/MIN
GFR SERPLBLD BASED ON 1.73 SQ M-ARVRAT: 21.4 ML/MIN
GLUCOSE SERPL-MCNC: 100 MG/DL (ref 70–99)
GLUCOSE SERPL-MCNC: 111 MG/DL (ref 70–99)
GLUCOSE SERPL-MCNC: 120 MG/DL (ref 70–99)
GLUCOSE SERPL-MCNC: 98 MG/DL (ref 70–99)
HCT VFR BLD CALC: 29.3 % (ref 39–53)
HCT VFR BLD CALC: 31.3 % (ref 39–53)
HCT VFR BLD CALC: 31.8 % (ref 39–53)
HCT VFR BLD CALC: 32.1 % (ref 39–53)
HGB BLD-MCNC: 10.3 G/DL (ref 13–17.5)
HGB BLD-MCNC: 11 G/DL (ref 13–17.5)
HGB BLD-MCNC: 11.3 G/DL (ref 13–17.5)
HGB BLD-MCNC: 11.4 G/DL (ref 13–17.5)
LYMPHOCYTES # BLD: 0.5 X10^3/UL (ref 1–4.8)
LYMPHOCYTES # BLD: 1 X10^3/UL (ref 1–4.8)
LYMPHOCYTES NFR BLD AUTO: 16 % (ref 24–48)
LYMPHOCYTES NFR BLD AUTO: 6 % (ref 24–48)
MAGNESIUM SERPL-MCNC: 2.1 MG/DL (ref 1.8–2.4)
MAGNESIUM SERPL-MCNC: 2.2 MG/DL (ref 1.8–2.4)
MAGNESIUM SERPL-MCNC: 2.3 MG/DL (ref 1.8–2.4)
MCH RBC QN AUTO: 29 PG (ref 25–35)
MCH RBC QN AUTO: 30 PG (ref 25–35)
MCHC RBC AUTO-ENTMCNC: 35 G/DL (ref 31–37)
MCHC RBC AUTO-ENTMCNC: 36 G/DL (ref 31–37)
MCV RBC AUTO: 82 FL (ref 79–100)
MCV RBC AUTO: 83 FL (ref 79–100)
MONO #: 0.2 X10^3/UL (ref 0–1.1)
MONO #: 0.2 X10^3/UL (ref 0–1.1)
MONO #: 0.4 X10^3/UL (ref 0–1.1)
MONO #: 0.5 X10^3/UL (ref 0–1.1)
MONOCYTES NFR BLD: 3 % (ref 0–9)
MONOCYTES NFR BLD: 3 % (ref 0–9)
MONOCYTES NFR BLD: 5 % (ref 0–9)
MONOCYTES NFR BLD: 6 % (ref 0–9)
NEUT #: 4.5 X10^3/UL (ref 1.8–7.7)
NEUT #: 6.9 X10^3/UL (ref 1.8–7.7)
NEUT #: 7 X10^3/UL (ref 1.8–7.7)
NEUT #: 7.1 X10^3/UL (ref 1.8–7.7)
NEUTROPHILS NFR BLD AUTO: 76 % (ref 31–73)
NEUTROPHILS NFR BLD AUTO: 86 % (ref 31–73)
NEUTROPHILS NFR BLD AUTO: 87 % (ref 31–73)
NEUTROPHILS NFR BLD AUTO: 88 % (ref 31–73)
PHOSPHATE SERPL-MCNC: 2.6 MG/DL (ref 2.6–4.7)
PHOSPHATE SERPL-MCNC: 3.1 MG/DL (ref 2.6–4.7)
PHOSPHATE SERPL-MCNC: 3.6 MG/DL (ref 2.6–4.7)
PHOSPHATE SERPL-MCNC: 4.1 MG/DL (ref 2.6–4.7)
PLATELET # BLD AUTO: 164 X10^3/UL (ref 140–400)
PLATELET # BLD AUTO: 172 X10^3/UL (ref 140–400)
PLATELET # BLD AUTO: 196 X10^3/UL (ref 140–400)
PLATELET # BLD AUTO: 212 X10^3/UL (ref 140–400)
POTASSIUM SERPL-SCNC: 3.9 MMOL/L (ref 3.5–5.1)
POTASSIUM SERPL-SCNC: 4.1 MMOL/L (ref 3.5–5.1)
POTASSIUM SERPL-SCNC: 4.3 MMOL/L (ref 3.5–5.1)
POTASSIUM SERPL-SCNC: 4.4 MMOL/L (ref 3.5–5.1)
RBC # BLD AUTO: 3.52 X10^6/UL (ref 4.3–5.7)
RBC # BLD AUTO: 3.77 X10^6/UL (ref 4.3–5.7)
RBC # BLD AUTO: 3.86 X10^6/UL (ref 4.3–5.7)
RBC # BLD AUTO: 3.86 X10^6/UL (ref 4.3–5.7)
SODIUM SERPL-SCNC: 130 MMOL/L (ref 136–145)
SODIUM SERPL-SCNC: 132 MMOL/L (ref 136–145)
SODIUM SERPL-SCNC: 132 MMOL/L (ref 136–145)
SODIUM SERPL-SCNC: 133 MMOL/L (ref 136–145)
WBC # BLD AUTO: 5.9 X10^3/UL (ref 4–11)
WBC # BLD AUTO: 7.9 X10^3/UL (ref 4–11)
WBC # BLD AUTO: 8 X10^3/UL (ref 4–11)
WBC # BLD AUTO: 8.2 X10^3/UL (ref 4–11)

## 2019-07-13 RX ADMIN — DEXMEDETOMIDINE HYDROCHLORIDE PRN MLS/HR: 100 INJECTION, SOLUTION, CONCENTRATE INTRAVENOUS at 06:24

## 2019-07-13 RX ADMIN — DEXMEDETOMIDINE HYDROCHLORIDE PRN MLS/HR: 100 INJECTION, SOLUTION, CONCENTRATE INTRAVENOUS at 00:55

## 2019-07-13 RX ADMIN — POTASSIUM CHLORIDE SCH MLS/HR: 2 INJECTION, SOLUTION, CONCENTRATE INTRAVENOUS at 07:55

## 2019-07-13 RX ADMIN — HEPARIN SODIUM AND DEXTROSE PRN MLS/HR: 5000; 5 INJECTION INTRAVENOUS at 15:05

## 2019-07-13 RX ADMIN — POTASSIUM CHLORIDE SCH MLS/HR: 2 INJECTION, SOLUTION, CONCENTRATE INTRAVENOUS at 00:06

## 2019-07-13 RX ADMIN — POTASSIUM CHLORIDE SCH MLS/HR: 2 INJECTION, SOLUTION, CONCENTRATE INTRAVENOUS at 03:49

## 2019-07-13 RX ADMIN — POLYETHYLENE GLYCOL 3350 SCH GM: 17 POWDER, FOR SOLUTION ORAL at 09:05

## 2019-07-13 RX ADMIN — POTASSIUM CHLORIDE SCH MLS/HR: 2 INJECTION, SOLUTION, CONCENTRATE INTRAVENOUS at 13:58

## 2019-07-13 RX ADMIN — POTASSIUM CHLORIDE SCH MLS/HR: 2 INJECTION, SOLUTION, CONCENTRATE INTRAVENOUS at 20:43

## 2019-07-13 RX ADMIN — POTASSIUM CHLORIDE SCH MLS/HR: 2 INJECTION, SOLUTION, CONCENTRATE INTRAVENOUS at 20:55

## 2019-07-13 RX ADMIN — LINEZOLID SCH MG: 600 TABLET, FILM COATED ORAL at 21:52

## 2019-07-13 RX ADMIN — DEXMEDETOMIDINE HYDROCHLORIDE PRN MLS/HR: 100 INJECTION, SOLUTION, CONCENTRATE INTRAVENOUS at 10:28

## 2019-07-13 RX ADMIN — DEXMEDETOMIDINE HYDROCHLORIDE PRN MLS/HR: 100 INJECTION, SOLUTION, CONCENTRATE INTRAVENOUS at 03:33

## 2019-07-13 RX ADMIN — POTASSIUM CHLORIDE SCH MLS/HR: 2 INJECTION, SOLUTION, CONCENTRATE INTRAVENOUS at 17:25

## 2019-07-13 RX ADMIN — MORPHINE SULFATE PRN MG: 4 INJECTION, SOLUTION INTRAMUSCULAR; INTRAVENOUS at 20:01

## 2019-07-13 RX ADMIN — POTASSIUM CHLORIDE SCH MLS/HR: 2 INJECTION, SOLUTION, CONCENTRATE INTRAVENOUS at 14:44

## 2019-07-13 RX ADMIN — POTASSIUM CHLORIDE SCH MLS/HR: 2 INJECTION, SOLUTION, CONCENTRATE INTRAVENOUS at 00:07

## 2019-07-13 RX ADMIN — LINEZOLID SCH MG: 600 TABLET, FILM COATED ORAL at 09:05

## 2019-07-13 RX ADMIN — PANTOPRAZOLE SODIUM SCH MG: 40 INJECTION, POWDER, FOR SOLUTION INTRAVENOUS at 09:05

## 2019-07-13 RX ADMIN — POTASSIUM CHLORIDE SCH MLS/HR: 2 INJECTION, SOLUTION, CONCENTRATE INTRAVENOUS at 20:42

## 2019-07-13 RX ADMIN — POTASSIUM CHLORIDE SCH MLS/HR: 2 INJECTION, SOLUTION, CONCENTRATE INTRAVENOUS at 17:07

## 2019-07-13 RX ADMIN — POTASSIUM CHLORIDE SCH MLS/HR: 2 INJECTION, SOLUTION, CONCENTRATE INTRAVENOUS at 03:50

## 2019-07-13 RX ADMIN — POTASSIUM CHLORIDE SCH MLS/HR: 2 INJECTION, SOLUTION, CONCENTRATE INTRAVENOUS at 20:52

## 2019-07-13 RX ADMIN — POTASSIUM CHLORIDE SCH MLS/HR: 2 INJECTION, SOLUTION, CONCENTRATE INTRAVENOUS at 07:56

## 2019-07-13 RX ADMIN — POTASSIUM CHLORIDE SCH MLS/HR: 2 INJECTION, SOLUTION, CONCENTRATE INTRAVENOUS at 15:30

## 2019-07-13 RX ADMIN — CEFTRIAXONE SODIUM SCH GM: 2 INJECTION, POWDER, FOR SOLUTION INTRAMUSCULAR; INTRAVENOUS at 13:12

## 2019-07-13 NOTE — RAD
CHEST AP ONLY

 

Clinical indications: CRRT. History of chest pain. 

 

COMPARISON: July 10, 2019.

 

Findings: Right IJ hemodialysis catheter is unchanged in position. There 

is a new finding of right perihilar lung infiltrate or pulmonary edema. No

pleural effusion or pneumothorax is seen. The heart size and mediastinum 

are stable.

 

Impression: New right perihilar lung infiltrate or pulmonary edema.

 

Electronically signed by: Eliecer Cervantes MD (7/13/2019 9:38 AM) [facility]

## 2019-07-13 NOTE — PDOC
G I PROGRESS NOTE


Subjective


Sleeping/not responsive to exam.


Objective


Staff report no GI issues.


Physical Exam


Lungs clear


RRR


Abdomen soft, not distended.


Review of Relevant


I have reviewed the following items estrella (where applicable) has been applied.


Labs





Laboratory Tests








Test


 7/12/19


06:30 7/12/19


12:20 7/13/19


03:00 7/13/19


09:00


 


White Blood Count


 6.5 x10^3/uL


(4.0-11.0) 7.1 x10^3/uL


(4.0-11.0) 5.9 x10^3/uL


(4.0-11.0) 7.9 x10^3/uL


(4.0-11.0)


 


Red Blood Count


 3.91 x10^6/uL


(4.30-5.70) 3.92 x10^6/uL


(4.30-5.70) 3.86 x10^6/uL


(4.30-5.70) 3.86 x10^6/uL


(4.30-5.70)


 


Hemoglobin


 11.4 g/dL


(13.0-17.5) 11.5 g/dL


(13.0-17.5) 11.4 g/dL


(13.0-17.5) 11.3 g/dL


(13.0-17.5)


 


Hematocrit


 32.6 %


(39.0-53.0) 32.6 %


(39.0-53.0) 31.8 %


(39.0-53.0) 32.1 %


(39.0-53.0)


 


Mean Corpuscular Volume 83 fL ()  83 fL ()  82 fL ()  83 fL 

() 


 


Mean Corpuscular Hemoglobin 29 pg (25-35)  30 pg (25-35)  30 pg (25-35)  29 pg 

(25-35) 


 


Mean Corpuscular Hemoglobin


Concent 35 g/dL


(31-37) 35 g/dL


(31-37) 36 g/dL


(31-37) 35 g/dL


(31-37)


 


Red Cell Distribution Width


 12.9 %


(11.5-14.5) 13.2 %


(11.5-14.5) 12.9 %


(11.5-14.5) 12.9 %


(11.5-14.5)


 


Platelet Count


 166 x10^3/uL


(140-400) 178 x10^3/uL


(140-400) 172 x10^3/uL


(140-400) 164 x10^3/uL


(140-400)


 


Neutrophils (%) (Auto) 76 % (31-73)  84 % (31-73)  76 % (31-73)  88 % (31-73) 


 


Lymphocytes (%) (Auto) 15 % (24-48)  10 % (24-48)  16 % (24-48)  6 % (24-48) 


 


Monocytes (%) (Auto) 5 % (0-9)  4 % (0-9)  3 % (0-9)  3 % (0-9) 


 


Eosinophils (%) (Auto) 3 % (0-3)  2 % (0-3)  4 % (0-3)  2 % (0-3) 


 


Basophils (%) (Auto) 1 % (0-3)  0 % (0-3)  1 % (0-3)  1 % (0-3) 


 


Neutrophils # (Auto)


 4.9 x10^3/uL


(1.8-7.7) 6.0 x10^3/uL


(1.8-7.7) 4.5 x10^3/uL


(1.8-7.7) 7.0 x10^3/uL


(1.8-7.7)


 


Lymphocytes # (Auto)


 1.0 x10^3/uL


(1.0-4.8) 0.7 x10^3/uL


(1.0-4.8) 1.0 x10^3/uL


(1.0-4.8) 0.5 x10^3/uL


(1.0-4.8)


 


Monocytes # (Auto)


 0.3 x10^3/uL


(0.0-1.1) 0.3 x10^3/uL


(0.0-1.1) 0.2 x10^3/uL


(0.0-1.1) 0.2 x10^3/uL


(0.0-1.1)


 


Eosinophils # (Auto)


 0.2 x10^3/uL


(0.0-0.7) 0.2 x10^3/uL


(0.0-0.7)


 


Basophils # (Auto)


 0.0 x10^3/uL


(0.0-0.2) 0.0 x10^3/uL


(0.0-0.2)


 


Sodium Level


 129 mmol/L


(136-145) 128 mmol/L


(136-145) 130 mmol/L


(136-145) 132 mmol/L


(136-145)


 


Potassium Level


 3.5 mmol/L


(3.5-5.1) 3.7 mmol/L


(3.5-5.1) 3.9 mmol/L


(3.5-5.1) 4.1 mmol/L


(3.5-5.1)


 


Chloride Level


 91 mmol/L


() 88 mmol/L


() 96 mmol/L


() 98 mmol/L


()


 


Carbon Dioxide Level


 27 mmol/L


(21-32) 24 mmol/L


(21-32) 25 mmol/L


(21-32) 24 mmol/L


(21-32)


 


Anion Gap 11 (6-14)  16 (6-14)  9 (6-14)  10 (6-14) 


 


Blood Urea Nitrogen


 57 mg/dL


(8-26) 59 mg/dL


(8-26) 44 mg/dL


(8-26) 35 mg/dL


(8-26)


 


Creatinine


 8.4 mg/dL


(0.7-1.3) 8.7 mg/dL


(0.7-1.3) 5.9 mg/dL


(0.7-1.3) 5.2 mg/dL


(0.7-1.3)


 


Estimated GFR


(Cockcroft-Gault) 7.5 


 7.2 


 11.3 


 13.1 





 


BUN/Creatinine Ratio 7 (6-20)    


 


Glucose Level


 80 mg/dL


(70-99) 79 mg/dL


(70-99) 100 mg/dL


(70-99) 98 mg/dL


(70-99)


 


Calcium Level


 6.7 mg/dL


(8.5-10.1) 6.6 mg/dL


(8.5-10.1) 7.0 mg/dL


(8.5-10.1) 7.3 mg/dL


(8.5-10.1)


 


Total Bilirubin


 0.5 mg/dL


(0.2-1.0) 


 


 





 


Aspartate Amino Transf


(AST/SGOT) 653 U/L


(15-37) 


 


 





 


Alanine Aminotransferase


(ALT/SGPT) 307 U/L


(16-63) 


 


 





 


Alkaline Phosphatase


 57 U/L


() 


 


 





 


Creatine Kinase


 84799 U/L


() 


 


 





 


Total Protein


 5.0 g/dL


(6.4-8.2) 


 


 





 


Albumin


 1.8 g/dL


(3.4-5.0) 


 


 





 


Albumin/Globulin Ratio 0.6 (1.0-1.7)    


 


HIV (1&2) Antibody Screen


 Nonreactive


(Nonreactive) 


 


 





 


Prothrombin Time


 


 14.6 SEC


(11.7-14.0) 


 





 


Prothromb Time International


Ratio 


 1.2 (0.8-1.1) 


 


 





 


Activated Partial


Thromboplast Time 


 35 SEC (24-38) 


 


 





 


Phosphorus Level


 


 6.6 mg/dL


(2.6-4.7) 4.1 mg/dL


(2.6-4.7) 3.6 mg/dL


(2.6-4.7)


 


Magnesium Level


 


 1.9 mg/dL


(1.8-2.4) 2.1 mg/dL


(1.8-2.4) 





 


Heparin Anti-Xa Act,


Unfractionated 


 


 0.29 IU/mL


(0.30-0.70) 0.34 IU/mL


(0.30-0.70)








Laboratory Tests








Test


 7/12/19


12:20 7/13/19


03:00 7/13/19


09:00


 


White Blood Count


 7.1 x10^3/uL


(4.0-11.0) 5.9 x10^3/uL


(4.0-11.0) 7.9 x10^3/uL


(4.0-11.0)


 


Red Blood Count


 3.92 x10^6/uL


(4.30-5.70) 3.86 x10^6/uL


(4.30-5.70) 3.86 x10^6/uL


(4.30-5.70)


 


Hemoglobin


 11.5 g/dL


(13.0-17.5) 11.4 g/dL


(13.0-17.5) 11.3 g/dL


(13.0-17.5)


 


Hematocrit


 32.6 %


(39.0-53.0) 31.8 %


(39.0-53.0) 32.1 %


(39.0-53.0)


 


Mean Corpuscular Volume 83 fL ()  82 fL ()  83 fL () 


 


Mean Corpuscular Hemoglobin 30 pg (25-35)  30 pg (25-35)  29 pg (25-35) 


 


Mean Corpuscular Hemoglobin


Concent 35 g/dL


(31-37) 36 g/dL


(31-37) 35 g/dL


(31-37)


 


Red Cell Distribution Width


 13.2 %


(11.5-14.5) 12.9 %


(11.5-14.5) 12.9 %


(11.5-14.5)


 


Platelet Count


 178 x10^3/uL


(140-400) 172 x10^3/uL


(140-400) 164 x10^3/uL


(140-400)


 


Neutrophils (%) (Auto) 84 % (31-73)  76 % (31-73)  88 % (31-73) 


 


Lymphocytes (%) (Auto) 10 % (24-48)  16 % (24-48)  6 % (24-48) 


 


Monocytes (%) (Auto) 4 % (0-9)  3 % (0-9)  3 % (0-9) 


 


Eosinophils (%) (Auto) 2 % (0-3)  4 % (0-3)  2 % (0-3) 


 


Basophils (%) (Auto) 0 % (0-3)  1 % (0-3)  1 % (0-3) 


 


Neutrophils # (Auto)


 6.0 x10^3/uL


(1.8-7.7) 4.5 x10^3/uL


(1.8-7.7) 7.0 x10^3/uL


(1.8-7.7)


 


Lymphocytes # (Auto)


 0.7 x10^3/uL


(1.0-4.8) 1.0 x10^3/uL


(1.0-4.8) 0.5 x10^3/uL


(1.0-4.8)


 


Monocytes # (Auto)


 0.3 x10^3/uL


(0.0-1.1) 0.2 x10^3/uL


(0.0-1.1) 0.2 x10^3/uL


(0.0-1.1)


 


Eosinophils # (Auto)


 0.2 x10^3/uL


(0.0-0.7) 0.2 x10^3/uL


(0.0-0.7) 0.2 x10^3/uL


(0.0-0.7)


 


Basophils # (Auto)


 0.0 x10^3/uL


(0.0-0.2) 0.0 x10^3/uL


(0.0-0.2) 0.0 x10^3/uL


(0.0-0.2)


 


Prothrombin Time


 14.6 SEC


(11.7-14.0) 


 





 


Prothromb Time International


Ratio 1.2 (0.8-1.1) 


 


 





 


Activated Partial


Thromboplast Time 35 SEC (24-38) 


 


 





 


Sodium Level


 128 mmol/L


(136-145) 130 mmol/L


(136-145) 132 mmol/L


(136-145)


 


Potassium Level


 3.7 mmol/L


(3.5-5.1) 3.9 mmol/L


(3.5-5.1) 4.1 mmol/L


(3.5-5.1)


 


Chloride Level


 88 mmol/L


() 96 mmol/L


() 98 mmol/L


()


 


Carbon Dioxide Level


 24 mmol/L


(21-32) 25 mmol/L


(21-32) 24 mmol/L


(21-32)


 


Anion Gap 16 (6-14)  9 (6-14)  10 (6-14) 


 


Blood Urea Nitrogen


 59 mg/dL


(8-26) 44 mg/dL


(8-26) 35 mg/dL


(8-26)


 


Creatinine


 8.7 mg/dL


(0.7-1.3) 5.9 mg/dL


(0.7-1.3) 5.2 mg/dL


(0.7-1.3)


 


Estimated GFR


(Cockcroft-Gault) 7.2 


 11.3 


 13.1 





 


Glucose Level


 79 mg/dL


(70-99) 100 mg/dL


(70-99) 98 mg/dL


(70-99)


 


Calcium Level


 6.6 mg/dL


(8.5-10.1) 7.0 mg/dL


(8.5-10.1) 7.3 mg/dL


(8.5-10.1)


 


Phosphorus Level


 6.6 mg/dL


(2.6-4.7) 4.1 mg/dL


(2.6-4.7) 3.6 mg/dL


(2.6-4.7)


 


Magnesium Level


 1.9 mg/dL


(1.8-2.4) 2.1 mg/dL


(1.8-2.4) 





 


Heparin Anti-Xa Act,


Unfractionated 


 0.29 IU/mL


(0.30-0.70) 0.34 IU/mL


(0.30-0.70)








Microbiology


7/10/19 Blood Culture - Preliminary, Resulted


          NO GROWTH AFTER 3 DAYS


7/10/19 CSF Gram Stain - Final, Complete


          


7/9/19 Urine Culture - Final, Complete


         


7/9/19 Urine Culture Result 1 (RUI) - Final, Complete


Vitals/I & O





Vital Sign - Last 24 Hours








 7/12/19 7/12/19 7/12/19 7/12/19





 11:00 12:00 12:00 13:00


 


Temp  97.8  





  97.8  


 


Pulse 85 85  89


 


Resp 14 14  18


 


B/P (MAP) 137/70 (92) 111/54 (73)  123/65 (84)


 


Pulse Ox 96 96  95


 


O2 Delivery Room Air Room Air Room Air Room Air


 


    





    





 7/12/19 7/12/19 7/12/19 7/12/19





 13:57 14:00 15:00 16:00


 


Temp    98.0





    98.0


 


Pulse  107 98 90


 


Resp 15 22 17 13


 


B/P (MAP)  127/80 (96) 134/68 (90) 127/84 (98)


 


Pulse Ox 96 97 95 94


 


O2 Delivery Room Air Room Air Room Air Room Air


 


    





    





 7/12/19 7/12/19 7/12/19 7/12/19





 16:00 17:00 18:00 19:00


 


Pulse  95 101 92


 


Resp  16 13 14


 


B/P (MAP)  140/69 (92) 136/72 (93) 145/76 (99)


 


Pulse Ox  95 97 96


 


O2 Delivery Room Air Room Air Room Air 





 7/12/19 7/12/19 7/12/19 7/12/19





 20:00 20:00 21:00 22:00


 


Pulse  100 92 100


 


Resp  16 13 22


 


B/P (MAP)  135/72 (93) 134/62 (86) 134/62 (86)


 


Pulse Ox  96 97 90


 


O2 Delivery Room Air  Room Air 





 7/12/19 7/12/19 7/13/19 7/13/19





 23:00 23:29 00:00 00:00


 


Pulse 85   


 


Resp 13   


 


B/P (MAP) 140/65 (90)   


 


Pulse Ox 95 94 94 


 


O2 Delivery Room Air Room Air Room Air Room Air


 


O2 Flow Rate   98.0 





 7/13/19 7/13/19 7/13/19 7/13/19





 00:00 01:00 02:00 03:00


 


Temp 98.0   





 98.0   


 


Pulse 78 79 78 76


 


Resp 16 13 13 15


 


B/P (MAP) 140/74 (96) 145/71 (95) 136/70 (92) 144/80 (101)


 


Pulse Ox 96 97 97 96


 


O2 Delivery Room Air Room Air Room Air Room Air


 


    





    





 7/13/19 7/13/19 7/13/19 7/13/19





 04:00 04:00 05:00 06:00


 


Temp  97.8  





  97.8  


 


Pulse  75 95 74


 


Resp  16 16 13


 


B/P (MAP)  138/84 (102) 155/82 (106) 117/75 (89)


 


Pulse Ox  96 97 98


 


O2 Delivery Room Air Room Air Room Air Room Air


 


    





    





 7/13/19 7/13/19 7/13/19 7/13/19





 07:00 08:00 08:00 09:00


 


Temp   96.8 





   96.8 


 


Pulse 91  77 91


 


Resp 13  15 18


 


B/P (MAP) 135/73 (93)  145/81 (102) 149/75 (99)


 


Pulse Ox 97  96 98


 


O2 Delivery Room Air Room Air Room Air Room Air


 


    





    





 7/13/19   





 10:00   


 


Pulse 86   


 


Resp 16   


 


B/P (MAP) 148/82 (104)   


 


Pulse Ox 97   


 


O2 Delivery Room Air   














Intake and Output   


 


 7/12/19 7/12/19 7/13/19





 15:00 23:00 07:00


 


Intake Total 1080 ml 667 ml 59817.6 ml


 


Output Total 0 ml 15 ml 8 ml


 


Balance 1080 ml 652 ml 40652.6 ml








Problem List


Problems


Medical Problems:


(1) Elevated troponin


Status: Acute  





(2) Hepatorenal syndrome


Status: Acute  





(3) Hyperkalemia


Status: Acute  





(4) Hyponatremia


Status: Acute  





(5) Methamphetamine abuse


Status: Acute  





(6) Neurological complaint


Status: Acute  





(7) Rhabdomyolysis


Status: Acute  





(8) Urinary retention


Status: Acute  





(9) Urinary tract infection


Status: Acute  





Assessment


Transaminitis, improving.  Suspect much from rhabdomyolysis.


Negative HCV PCR.  No viral hepatitis issues.


Plan of Care Note


Continue as now.


Tube feeding?











MERLIN ZAVALA MD          Jul 13, 2019 10:41

## 2019-07-13 NOTE — PDOC
PULMONARY PROGRESS NOTES


Subjective


on precedex drip, on crrt, hep gtt, doesnt follow my command, inappropriate off 

sedation per rn


Vitals





Vital Signs








  Date Time  Temp Pulse Resp B/P (MAP) Pulse Ox O2 Delivery O2 Flow Rate FiO2


 


7/13/19 06:00  74 13 117/75 (89) 98 Room Air  


 


7/13/19 04:00 97.8       





 97.8       


 


7/13/19 00:00       98.0 








Comments


ros as mentioned as above, discussed w rn, other sys otherwise neg


General:  No acute distress, Lethargic


HEENT:  Other (nc at perrl nose clear     neck no lad  no thyromegaly)


Lungs:  Crackles


Cardiovascular:  S1, S2


Abdomen:  Soft, Non-tender, Other (no mass)


Extremities:  Other (1+edema,)


Skin:  Warm


Labs





Laboratory Tests








Test


 7/12/19


06:30 7/12/19


12:20 7/13/19


03:00


 


White Blood Count


 6.5 x10^3/uL


(4.0-11.0) 7.1 x10^3/uL


(4.0-11.0) 5.9 x10^3/uL


(4.0-11.0)


 


Red Blood Count


 3.91 x10^6/uL


(4.30-5.70) 3.92 x10^6/uL


(4.30-5.70) 3.86 x10^6/uL


(4.30-5.70)


 


Hemoglobin


 11.4 g/dL


(13.0-17.5) 11.5 g/dL


(13.0-17.5) 11.4 g/dL


(13.0-17.5)


 


Hematocrit


 32.6 %


(39.0-53.0) 32.6 %


(39.0-53.0) 31.8 %


(39.0-53.0)


 


Mean Corpuscular Volume 83 fL ()  83 fL ()  82 fL () 


 


Mean Corpuscular Hemoglobin 29 pg (25-35)  30 pg (25-35)  30 pg (25-35) 


 


Mean Corpuscular Hemoglobin


Concent 35 g/dL


(31-37) 35 g/dL


(31-37) 36 g/dL


(31-37)


 


Red Cell Distribution Width


 12.9 %


(11.5-14.5) 13.2 %


(11.5-14.5) 12.9 %


(11.5-14.5)


 


Platelet Count


 166 x10^3/uL


(140-400) 178 x10^3/uL


(140-400) 172 x10^3/uL


(140-400)


 


Neutrophils (%) (Auto) 76 % (31-73)  84 % (31-73)  76 % (31-73) 


 


Lymphocytes (%) (Auto) 15 % (24-48)  10 % (24-48)  16 % (24-48) 


 


Monocytes (%) (Auto) 5 % (0-9)  4 % (0-9)  3 % (0-9) 


 


Eosinophils (%) (Auto) 3 % (0-3)  2 % (0-3)  4 % (0-3) 


 


Basophils (%) (Auto) 1 % (0-3)  0 % (0-3)  1 % (0-3) 


 


Neutrophils # (Auto)


 4.9 x10^3/uL


(1.8-7.7) 6.0 x10^3/uL


(1.8-7.7) 4.5 x10^3/uL


(1.8-7.7)


 


Lymphocytes # (Auto)


 1.0 x10^3/uL


(1.0-4.8) 0.7 x10^3/uL


(1.0-4.8) 1.0 x10^3/uL


(1.0-4.8)


 


Monocytes # (Auto)


 0.3 x10^3/uL


(0.0-1.1) 0.3 x10^3/uL


(0.0-1.1) 0.2 x10^3/uL


(0.0-1.1)


 


Eosinophils # (Auto)


 0.2 x10^3/uL


(0.0-0.7) 0.2 x10^3/uL


(0.0-0.7) 0.2 x10^3/uL


(0.0-0.7)


 


Basophils # (Auto)


 0.0 x10^3/uL


(0.0-0.2) 0.0 x10^3/uL


(0.0-0.2) 0.0 x10^3/uL


(0.0-0.2)


 


Sodium Level


 129 mmol/L


(136-145) 128 mmol/L


(136-145) 130 mmol/L


(136-145)


 


Potassium Level


 3.5 mmol/L


(3.5-5.1) 3.7 mmol/L


(3.5-5.1) 3.9 mmol/L


(3.5-5.1)


 


Chloride Level


 91 mmol/L


() 88 mmol/L


() 96 mmol/L


()


 


Carbon Dioxide Level


 27 mmol/L


(21-32) 24 mmol/L


(21-32) 25 mmol/L


(21-32)


 


Anion Gap 11 (6-14)  16 (6-14)  9 (6-14) 


 


Blood Urea Nitrogen


 57 mg/dL


(8-26) 59 mg/dL


(8-26) 44 mg/dL


(8-26)


 


Creatinine


 8.4 mg/dL


(0.7-1.3) 8.7 mg/dL


(0.7-1.3) 5.9 mg/dL


(0.7-1.3)


 


Estimated GFR


(Cockcroft-Gault) 7.5 


 7.2 


 11.3 





 


BUN/Creatinine Ratio 7 (6-20)   


 


Glucose Level


 80 mg/dL


(70-99) 79 mg/dL


(70-99) 100 mg/dL


(70-99)


 


Calcium Level


 6.7 mg/dL


(8.5-10.1) 6.6 mg/dL


(8.5-10.1) 7.0 mg/dL


(8.5-10.1)


 


Total Bilirubin


 0.5 mg/dL


(0.2-1.0) 


 





 


Aspartate Amino Transf


(AST/SGOT) 653 U/L


(15-37) 


 





 


Alanine Aminotransferase


(ALT/SGPT) 307 U/L


(16-63) 


 





 


Alkaline Phosphatase


 57 U/L


() 


 





 


Creatine Kinase


 17394 U/L


() 


 





 


Total Protein


 5.0 g/dL


(6.4-8.2) 


 





 


Albumin


 1.8 g/dL


(3.4-5.0) 


 





 


Albumin/Globulin Ratio 0.6 (1.0-1.7)   


 


HIV (1&2) Antibody Screen


 Nonreactive


(Nonreactive) 


 





 


Prothrombin Time


 


 14.6 SEC


(11.7-14.0) 





 


Prothromb Time International


Ratio 


 1.2 (0.8-1.1) 


 





 


Activated Partial


Thromboplast Time 


 35 SEC (24-38) 


 





 


Phosphorus Level


 


 6.6 mg/dL


(2.6-4.7) 4.1 mg/dL


(2.6-4.7)


 


Magnesium Level


 


 1.9 mg/dL


(1.8-2.4) 2.1 mg/dL


(1.8-2.4)


 


Heparin Anti-Xa Act,


Unfractionated 


 


 0.29 IU/mL


(0.30-0.70)








Laboratory Tests








Test


 7/12/19


12:20 7/13/19


03:00


 


White Blood Count


 7.1 x10^3/uL


(4.0-11.0) 5.9 x10^3/uL


(4.0-11.0)


 


Red Blood Count


 3.92 x10^6/uL


(4.30-5.70) 3.86 x10^6/uL


(4.30-5.70)


 


Hemoglobin


 11.5 g/dL


(13.0-17.5) 11.4 g/dL


(13.0-17.5)


 


Hematocrit


 32.6 %


(39.0-53.0) 31.8 %


(39.0-53.0)


 


Mean Corpuscular Volume 83 fL ()  82 fL () 


 


Mean Corpuscular Hemoglobin 30 pg (25-35)  30 pg (25-35) 


 


Mean Corpuscular Hemoglobin


Concent 35 g/dL


(31-37) 36 g/dL


(31-37)


 


Red Cell Distribution Width


 13.2 %


(11.5-14.5) 12.9 %


(11.5-14.5)


 


Platelet Count


 178 x10^3/uL


(140-400) 172 x10^3/uL


(140-400)


 


Neutrophils (%) (Auto) 84 % (31-73)  76 % (31-73) 


 


Lymphocytes (%) (Auto) 10 % (24-48)  16 % (24-48) 


 


Monocytes (%) (Auto) 4 % (0-9)  3 % (0-9) 


 


Eosinophils (%) (Auto) 2 % (0-3)  4 % (0-3) 


 


Basophils (%) (Auto) 0 % (0-3)  1 % (0-3) 


 


Neutrophils # (Auto)


 6.0 x10^3/uL


(1.8-7.7) 4.5 x10^3/uL


(1.8-7.7)


 


Lymphocytes # (Auto)


 0.7 x10^3/uL


(1.0-4.8) 1.0 x10^3/uL


(1.0-4.8)


 


Monocytes # (Auto)


 0.3 x10^3/uL


(0.0-1.1) 0.2 x10^3/uL


(0.0-1.1)


 


Eosinophils # (Auto)


 0.2 x10^3/uL


(0.0-0.7) 0.2 x10^3/uL


(0.0-0.7)


 


Basophils # (Auto)


 0.0 x10^3/uL


(0.0-0.2) 0.0 x10^3/uL


(0.0-0.2)


 


Prothrombin Time


 14.6 SEC


(11.7-14.0) 





 


Prothromb Time International


Ratio 1.2 (0.8-1.1) 


 





 


Activated Partial


Thromboplast Time 35 SEC (24-38) 


 





 


Sodium Level


 128 mmol/L


(136-145) 130 mmol/L


(136-145)


 


Potassium Level


 3.7 mmol/L


(3.5-5.1) 3.9 mmol/L


(3.5-5.1)


 


Chloride Level


 88 mmol/L


() 96 mmol/L


()


 


Carbon Dioxide Level


 24 mmol/L


(21-32) 25 mmol/L


(21-32)


 


Anion Gap 16 (6-14)  9 (6-14) 


 


Blood Urea Nitrogen


 59 mg/dL


(8-26) 44 mg/dL


(8-26)


 


Creatinine


 8.7 mg/dL


(0.7-1.3) 5.9 mg/dL


(0.7-1.3)


 


Estimated GFR


(Cockcroft-Gault) 7.2 


 11.3 





 


Glucose Level


 79 mg/dL


(70-99) 100 mg/dL


(70-99)


 


Calcium Level


 6.6 mg/dL


(8.5-10.1) 7.0 mg/dL


(8.5-10.1)


 


Phosphorus Level


 6.6 mg/dL


(2.6-4.7) 4.1 mg/dL


(2.6-4.7)


 


Magnesium Level


 1.9 mg/dL


(1.8-2.4) 2.1 mg/dL


(1.8-2.4)


 


Heparin Anti-Xa Act,


Unfractionated 


 0.29 IU/mL


(0.30-0.70)








Medications





Active Scripts








 Medications  Dose


 Route/Sig


 Max Daily Dose Days Date Category


 


 Protonix


  (Pantoprazole


 Sodium) 20 Mg


 Tablet.dr  2 Tab


 PO DAILY


   7/10/19 Reported


 


 Zyprexa


  (Olanzapine) 20


 Mg Tablet  2 Tab


 PO QHS


   7/10/19 Reported


 


 Buspirone Hcl 30


 Mg Tablet  1 Tab


 PO BID


   7/10/19 Reported








Comments


cxr reviewed  ?new perihilar infilt





Impression


.


1.  Status post fall with acute rhabdomyolysis with markedly high CPKs and acute


renal failure.


2.  Acute kidney injury secondary to rhabdomyolysis.


3.  Severe metabolic acidosis secondary to acute kidney injury.  Clinically,


less likely sepsis.  Lactic acid is 1.1


4.  Hyponatremia.improving


5.  Moderate protein-calorie malnutrition.


6.  Leukocytosis, likely reactive.


7.  Status post fall with abnormal thoracic MRI.  Neurosurgery following.


8.  Abnormal liver function test secondary to hypoperfusion and rhabdomyolysis.


9.  Hypocalcemia.improving





Plan


.





1.  s/p fluid resuscitation , started on HD, now on CRRT


2.  Follow Renal's recommendation.


3.  prn bicarbonate.


4.  Monitor sodium level. improving


5.  Follow Neurology and Neurosurgery's recommendation.


6.  Continue empiric antibiotic, per id


7.  Follow CPKs., trending down


8.  Correct calcium and follow the levels.improving


9.   chest x-ray, ? new perihilar infilt.


10.  avoid oversedation


Discussed with RN and will follow along with you.











KODY BRANTLEY MD               Jul 13, 2019 06:57
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
## 2019-07-13 NOTE — NUR
Pt's filter clotted off so pt disconnected from CRRT machine at 0815. Dialysis RN on unit, 
will change system.

## 2019-07-13 NOTE — PDOC
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 Bicarbonate Dialysis Soln w/ out KCl 5,010 ml @  

1,000 mls/hr Q5H1M IV  Last administered on 7/13/19at 07:55;  Start 7/12/19 at 

13:00


Potassium Chloride 20 meq/ Bicarbonate Dialysis Soln w/ out KCl 5,010 ml @  

1,500 mls/hr Q3H21M IV ;  Start 7/12/19 at 12:45;  Stop 7/12/19 at 12:45;  

Status DC


Potassium Chloride 40 meq/ Bicarbonate Dialysis Soln w/ out KCl 5,020 ml @  

1,500 mls/hr Q3H21M IV ;  Start 7/12/19 at 13:00;  Stop 7/12/19 at 13:10;  

Status DC


Potassium Chloride 60 meq/ Bicarbonate Dialysis Soln w/ out KCl 5,030 ml @  

1,500 mls/hr Q3H22M IV ;  Start 7/12/19 at 13:00;  Stop 7/12/19 at 13:07;  

Status DC


Potassium Chloride 30 meq/ Bicarbonate Dialysis Soln w/ out KCl 5,015 ml @  

1,500 mls/hr Q3H21M IV ;  Start 7/12/19 at 13:15;  Stop 7/12/19 at 18:00;  

Status DC


Potassium Chloride 20 meq/ Bicarbonate Dialysis Soln w/ out KCl 5,010 ml @  

1,500 mls/hr Q3H21M IV  Last administered on 7/12/19at 21:06;  Start 7/12/19 at 

13:10;  Stop 7/12/19 at 18:00;  Status DC


Potassium Chloride 30 meq/ Bicarbonate Dialysis Soln w/ out KCl 5,015 ml @  

1,500 mls/hr Q3H21M IV ;  Start 7/12/19 at 18:00;  Stop 7/12/19 at 19:10;  

Status DC


Potassium Chloride 20 meq/ Bicarbonate Dialysis Soln w/ out KCl 5,010 ml @  

1,500 mls/hr Q3H21M IV  Last administered on 7/13/19at 07:55;  Start 7/12/19 at 

18:01


Potassium Chloride 20 meq/ Bicarbonate Dialysis Soln w/ out KCl 5,010 ml @  

1,500 mls/hr Q3H21M IV  Last administered on 7/13/19at 07:56;  Start 7/12/19 at 

20:00





Active Scripts


Active


Reported


Protonix (Pantoprazole Sodium) 20 Mg Tablet.dr 2 Tab PO DAILY


Zyprexa (Olanzapine) 20 Mg Tablet 2 Tab PO QHS


Buspirone Hcl 30 Mg Tablet 1 Tab PO BID


Vitals/I & O





Vital Sign - Last 24 Hours








 7/12/19 7/12/19 7/12/19 7/12/19





 11:00 12:00 12:00 13:00


 


Temp  97.8  





  97.8  


 


Pulse 85 85  89


 


Resp 14 14  18


 


B/P (MAP) 137/70 (92) 111/54 (73)  123/65 (84)


 


Pulse Ox 96 96  95


 


O2 Delivery Room Air Room Air Room Air Room Air


 


    





    





 7/12/19 7/12/19 7/12/19 7/12/19





 13:57 14:00 15:00 16:00


 


Temp    98.0





    98.0


 


Pulse  107 98 90


 


Resp 15 22 17 13


 


B/P (MAP)  127/80 (96) 134/68 (90) 127/84 (98)


 


Pulse Ox 96 97 95 94


 


O2 Delivery Room Air Room Air Room Air Room Air


 


    





    





 7/12/19 7/12/19 7/12/19 7/12/19





 16:00 17:00 18:00 19:00


 


Pulse  95 101 92


 


Resp  16 13 14


 


B/P (MAP)  140/69 (92) 136/72 (93) 145/76 (99)


 


Pulse Ox  95 97 96


 


O2 Delivery Room Air Room Air Room Air 





 7/12/19 7/12/19 7/12/19 7/12/19





 20:00 20:00 21:00 22:00


 


Pulse  100 92 100


 


Resp  16 13 22


 


B/P (MAP)  135/72 (93) 134/62 (86) 134/62 (86)


 


Pulse Ox  96 97 90


 


O2 Delivery Room Air  Room Air 





 7/12/19 7/12/19 7/13/19 7/13/19





 23:00 23:29 00:00 00:00


 


Pulse 85   


 


Resp 13   


 


B/P (MAP) 140/65 (90)   


 


Pulse Ox 95 94 94 


 


O2 Delivery Room Air Room Air Room Air Room Air


 


O2 Flow Rate   98.0 





 7/13/19 7/13/19 7/13/19 7/13/19





 00:00 01:00 02:00 03:00


 


Temp 98.0   





 98.0   


 


Pulse 78 79 78 76


 


Resp 16 13 13 15


 


B/P (MAP) 140/74 (96) 145/71 (95) 136/70 (92) 144/80 (101)


 


Pulse Ox 96 97 97 96


 


O2 Delivery Room Air Room Air Room Air Room Air


 


    





    





 7/13/19 7/13/19 7/13/19 7/13/19





 04:00 04:00 05:00 06:00


 


Temp  97.8  





  97.8  


 


Pulse  75 95 74


 


Resp  16 16 13


 


B/P (MAP)  138/84 (102) 155/82 (106) 117/75 (89)


 


Pulse Ox  96 97 98


 


O2 Delivery Room Air Room Air Room Air Room Air


 


    





    





 7/13/19 7/13/19 7/13/19 7/13/19





 07:00 08:00 08:00 09:00


 


Temp   96.8 





   96.8 


 


Pulse 91  77 91


 


Resp 13  15 18


 


B/P (MAP) 135/73 (93)  145/81 (102) 149/75 (99)


 


Pulse Ox 97  96 98


 


O2 Delivery Room Air Room Air Room Air Room Air














Intake and Output   


 


 7/12/19 7/12/19 7/13/19





 15:00 23:00 07:00


 


Intake Total 1080 ml 667 ml 49304.6 ml


 


Output Total 0 ml 15 ml 8 ml


 


Balance 1080 ml 652 ml 49772.6 ml

















HIEN NUNEZ MD          Jul 13, 2019 10:03

## 2019-07-13 NOTE — PDOC
Dialysis Progress Note


Dialysis Note


Dialysis Note


Seen on CRRT  tolerating treatment   well








 


General Appearance:          


Awake:  Alert Oriented  x   3


Neck:    No JVD or JVP


Chest:    CTA Jay


Heart:    S1 S2


Abdomen -    Soft NTND


Extremities -    No Edema











EL/ ATN:  Rhabdo - will transition from CRRT to HD once CRRT Clots





Vitals


Vital Signs





Vital Signs








  Date Time  Temp Pulse Resp B/P (MAP) Pulse Ox O2 Delivery O2 Flow Rate FiO2


 


7/13/19 11:00  105 19 148/80 (102) 97 Room Air  


 


7/13/19 08:00 96.8       





 96.8       


 


7/13/19 00:00       98.0 











Labs


Last Labs





Laboratory Tests








Test


 7/12/19


06:30 7/12/19


12:20 7/13/19


03:00 7/13/19


09:00


 


White Blood Count


 6.5 x10^3/uL


(4.0-11.0) 7.1 x10^3/uL


(4.0-11.0) 5.9 x10^3/uL


(4.0-11.0) 7.9 x10^3/uL


(4.0-11.0)


 


Red Blood Count


 3.91 x10^6/uL


(4.30-5.70) 3.92 x10^6/uL


(4.30-5.70) 3.86 x10^6/uL


(4.30-5.70) 3.86 x10^6/uL


(4.30-5.70)


 


Hemoglobin


 11.4 g/dL


(13.0-17.5) 11.5 g/dL


(13.0-17.5) 11.4 g/dL


(13.0-17.5) 11.3 g/dL


(13.0-17.5)


 


Hematocrit


 32.6 %


(39.0-53.0) 32.6 %


(39.0-53.0) 31.8 %


(39.0-53.0) 32.1 %


(39.0-53.0)


 


Mean Corpuscular Volume 83 fL ()  83 fL ()  82 fL ()  83 fL 

() 


 


Mean Corpuscular Hemoglobin 29 pg (25-35)  30 pg (25-35)  30 pg (25-35)  29 pg 

(25-35) 


 


Mean Corpuscular Hemoglobin


Concent 35 g/dL


(31-37) 35 g/dL


(31-37) 36 g/dL


(31-37) 35 g/dL


(31-37)


 


Red Cell Distribution Width


 12.9 %


(11.5-14.5) 13.2 %


(11.5-14.5) 12.9 %


(11.5-14.5) 12.9 %


(11.5-14.5)


 


Platelet Count


 166 x10^3/uL


(140-400) 178 x10^3/uL


(140-400) 172 x10^3/uL


(140-400) 164 x10^3/uL


(140-400)


 


Neutrophils (%) (Auto) 76 % (31-73)  84 % (31-73)  76 % (31-73)  88 % (31-73) 


 


Lymphocytes (%) (Auto) 15 % (24-48)  10 % (24-48)  16 % (24-48)  6 % (24-48) 


 


Monocytes (%) (Auto) 5 % (0-9)  4 % (0-9)  3 % (0-9)  3 % (0-9) 


 


Eosinophils (%) (Auto) 3 % (0-3)  2 % (0-3)  4 % (0-3)  2 % (0-3) 


 


Basophils (%) (Auto) 1 % (0-3)  0 % (0-3)  1 % (0-3)  1 % (0-3) 


 


Neutrophils # (Auto)


 4.9 x10^3/uL


(1.8-7.7) 6.0 x10^3/uL


(1.8-7.7) 4.5 x10^3/uL


(1.8-7.7) 7.0 x10^3/uL


(1.8-7.7)


 


Lymphocytes # (Auto)


 1.0 x10^3/uL


(1.0-4.8) 0.7 x10^3/uL


(1.0-4.8) 1.0 x10^3/uL


(1.0-4.8) 0.5 x10^3/uL


(1.0-4.8)


 


Monocytes # (Auto)


 0.3 x10^3/uL


(0.0-1.1) 0.3 x10^3/uL


(0.0-1.1) 0.2 x10^3/uL


(0.0-1.1) 0.2 x10^3/uL


(0.0-1.1)


 


Eosinophils # (Auto)


 0.2 x10^3/uL


(0.0-0.7) 0.2 x10^3/uL


(0.0-0.7)


 


Basophils # (Auto)


 0.0 x10^3/uL


(0.0-0.2) 0.0 x10^3/uL


(0.0-0.2)


 


Sodium Level


 129 mmol/L


(136-145) 128 mmol/L


(136-145) 130 mmol/L


(136-145) 132 mmol/L


(136-145)


 


Potassium Level


 3.5 mmol/L


(3.5-5.1) 3.7 mmol/L


(3.5-5.1) 3.9 mmol/L


(3.5-5.1) 4.1 mmol/L


(3.5-5.1)


 


Chloride Level


 91 mmol/L


() 88 mmol/L


() 96 mmol/L


() 98 mmol/L


()


 


Carbon Dioxide Level


 27 mmol/L


(21-32) 24 mmol/L


(21-32) 25 mmol/L


(21-32) 24 mmol/L


(21-32)


 


Anion Gap 11 (6-14)  16 (6-14)  9 (6-14)  10 (6-14) 


 


Blood Urea Nitrogen


 57 mg/dL


(8-26) 59 mg/dL


(8-26) 44 mg/dL


(8-26) 35 mg/dL


(8-26)


 


Creatinine


 8.4 mg/dL


(0.7-1.3) 8.7 mg/dL


(0.7-1.3) 5.9 mg/dL


(0.7-1.3) 5.2 mg/dL


(0.7-1.3)


 


Estimated GFR


(Cockcroft-Gault) 7.5 


 7.2 


 11.3 


 13.1 





 


BUN/Creatinine Ratio 7 (6-20)    


 


Glucose Level


 80 mg/dL


(70-99) 79 mg/dL


(70-99) 100 mg/dL


(70-99) 98 mg/dL


(70-99)


 


Calcium Level


 6.7 mg/dL


(8.5-10.1) 6.6 mg/dL


(8.5-10.1) 7.0 mg/dL


(8.5-10.1) 7.3 mg/dL


(8.5-10.1)


 


Total Bilirubin


 0.5 mg/dL


(0.2-1.0) 


 


 





 


Aspartate Amino Transf


(AST/SGOT) 653 U/L


(15-37) 


 


 





 


Alanine Aminotransferase


(ALT/SGPT) 307 U/L


(16-63) 


 


 





 


Alkaline Phosphatase


 57 U/L


() 


 


 





 


Creatine Kinase


 07940 U/L


() 


 


 





 


Total Protein


 5.0 g/dL


(6.4-8.2) 


 


 





 


Albumin


 1.8 g/dL


(3.4-5.0) 


 


 





 


Albumin/Globulin Ratio 0.6 (1.0-1.7)    


 


HIV (1&2) Antibody Screen


 Nonreactive


(Nonreactive) 


 


 





 


Prothrombin Time


 


 14.6 SEC


(11.7-14.0) 


 





 


Prothromb Time International


Ratio 


 1.2 (0.8-1.1) 


 


 





 


Activated Partial


Thromboplast Time 


 35 SEC (24-38) 


 


 





 


Phosphorus Level


 


 6.6 mg/dL


(2.6-4.7) 4.1 mg/dL


(2.6-4.7) 3.6 mg/dL


(2.6-4.7)


 


Magnesium Level


 


 1.9 mg/dL


(1.8-2.4) 2.1 mg/dL


(1.8-2.4) 





 


Heparin Anti-Xa Act,


Unfractionated 


 


 0.29 IU/mL


(0.30-0.70) 0.34 IU/mL


(0.30-0.70)








Laboratory Tests








Test


 7/12/19


12:20 7/13/19


03:00 7/13/19


09:00


 


White Blood Count


 7.1 x10^3/uL


(4.0-11.0) 5.9 x10^3/uL


(4.0-11.0) 7.9 x10^3/uL


(4.0-11.0)


 


Red Blood Count


 3.92 x10^6/uL


(4.30-5.70) 3.86 x10^6/uL


(4.30-5.70) 3.86 x10^6/uL


(4.30-5.70)


 


Hemoglobin


 11.5 g/dL


(13.0-17.5) 11.4 g/dL


(13.0-17.5) 11.3 g/dL


(13.0-17.5)


 


Hematocrit


 32.6 %


(39.0-53.0) 31.8 %


(39.0-53.0) 32.1 %


(39.0-53.0)


 


Mean Corpuscular Volume 83 fL ()  82 fL ()  83 fL () 


 


Mean Corpuscular Hemoglobin 30 pg (25-35)  30 pg (25-35)  29 pg (25-35) 


 


Mean Corpuscular Hemoglobin


Concent 35 g/dL


(31-37) 36 g/dL


(31-37) 35 g/dL


(31-37)


 


Red Cell Distribution Width


 13.2 %


(11.5-14.5) 12.9 %


(11.5-14.5) 12.9 %


(11.5-14.5)


 


Platelet Count


 178 x10^3/uL


(140-400) 172 x10^3/uL


(140-400) 164 x10^3/uL


(140-400)


 


Neutrophils (%) (Auto) 84 % (31-73)  76 % (31-73)  88 % (31-73) 


 


Lymphocytes (%) (Auto) 10 % (24-48)  16 % (24-48)  6 % (24-48) 


 


Monocytes (%) (Auto) 4 % (0-9)  3 % (0-9)  3 % (0-9) 


 


Eosinophils (%) (Auto) 2 % (0-3)  4 % (0-3)  2 % (0-3) 


 


Basophils (%) (Auto) 0 % (0-3)  1 % (0-3)  1 % (0-3) 


 


Neutrophils # (Auto)


 6.0 x10^3/uL


(1.8-7.7) 4.5 x10^3/uL


(1.8-7.7) 7.0 x10^3/uL


(1.8-7.7)


 


Lymphocytes # (Auto)


 0.7 x10^3/uL


(1.0-4.8) 1.0 x10^3/uL


(1.0-4.8) 0.5 x10^3/uL


(1.0-4.8)


 


Monocytes # (Auto)


 0.3 x10^3/uL


(0.0-1.1) 0.2 x10^3/uL


(0.0-1.1) 0.2 x10^3/uL


(0.0-1.1)


 


Eosinophils # (Auto)


 0.2 x10^3/uL


(0.0-0.7) 0.2 x10^3/uL


(0.0-0.7) 0.2 x10^3/uL


(0.0-0.7)


 


Basophils # (Auto)


 0.0 x10^3/uL


(0.0-0.2) 0.0 x10^3/uL


(0.0-0.2) 0.0 x10^3/uL


(0.0-0.2)


 


Prothrombin Time


 14.6 SEC


(11.7-14.0) 


 





 


Prothromb Time International


Ratio 1.2 (0.8-1.1) 


 


 





 


Activated Partial


Thromboplast Time 35 SEC (24-38) 


 


 





 


Sodium Level


 128 mmol/L


(136-145) 130 mmol/L


(136-145) 132 mmol/L


(136-145)


 


Potassium Level


 3.7 mmol/L


(3.5-5.1) 3.9 mmol/L


(3.5-5.1) 4.1 mmol/L


(3.5-5.1)


 


Chloride Level


 88 mmol/L


() 96 mmol/L


() 98 mmol/L


()


 


Carbon Dioxide Level


 24 mmol/L


(21-32) 25 mmol/L


(21-32) 24 mmol/L


(21-32)


 


Anion Gap 16 (6-14)  9 (6-14)  10 (6-14) 


 


Blood Urea Nitrogen


 59 mg/dL


(8-26) 44 mg/dL


(8-26) 35 mg/dL


(8-26)


 


Creatinine


 8.7 mg/dL


(0.7-1.3) 5.9 mg/dL


(0.7-1.3) 5.2 mg/dL


(0.7-1.3)


 


Estimated GFR


(Cockcroft-Gault) 7.2 


 11.3 


 13.1 





 


Glucose Level


 79 mg/dL


(70-99) 100 mg/dL


(70-99) 98 mg/dL


(70-99)


 


Calcium Level


 6.6 mg/dL


(8.5-10.1) 7.0 mg/dL


(8.5-10.1) 7.3 mg/dL


(8.5-10.1)


 


Phosphorus Level


 6.6 mg/dL


(2.6-4.7) 4.1 mg/dL


(2.6-4.7) 3.6 mg/dL


(2.6-4.7)


 


Magnesium Level


 1.9 mg/dL


(1.8-2.4) 2.1 mg/dL


(1.8-2.4) 





 


Heparin Anti-Xa Act,


Unfractionated 


 0.29 IU/mL


(0.30-0.70) 0.34 IU/mL


(0.30-0.70)











Assessment


Assessment


Problems


Medical Problems:


(1) Elevated troponin


Status: Acute  





(2) Hepatorenal syndrome


Status: Acute  





(3) Hyperkalemia


Status: Acute  





(4) Hyponatremia


Status: Acute  





(5) Methamphetamine abuse


Status: Acute  





(6) Neurological complaint


Status: Acute  





(7) Rhabdomyolysis


Status: Acute  





(8) Urinary retention


Status: Acute  





(9) Urinary tract infection


Status: Acute  











Plan


Plan of Care


Problems


Medical Problems:


(1) Elevated troponin


Status: Acute  





(2) Hepatorenal syndrome


Status: Acute  





(3) Hyperkalemia


Status: Acute  





(4) Hyponatremia


Status: Acute  





(5) Methamphetamine abuse


Status: Acute  





(6) Neurological complaint


Status: Acute  





(7) Rhabdomyolysis


Status: Acute  





(8) Urinary retention


Status: Acute  





(9) Urinary tract infection


Status: Acute  

















LORENZO ALMODOVAR MD               Jul 13, 2019 11:34

## 2019-07-14 VITALS — SYSTOLIC BLOOD PRESSURE: 164 MMHG | DIASTOLIC BLOOD PRESSURE: 91 MMHG

## 2019-07-14 VITALS — SYSTOLIC BLOOD PRESSURE: 135 MMHG | DIASTOLIC BLOOD PRESSURE: 97 MMHG

## 2019-07-14 VITALS — DIASTOLIC BLOOD PRESSURE: 91 MMHG | SYSTOLIC BLOOD PRESSURE: 144 MMHG

## 2019-07-14 VITALS — SYSTOLIC BLOOD PRESSURE: 161 MMHG | DIASTOLIC BLOOD PRESSURE: 91 MMHG

## 2019-07-14 VITALS — DIASTOLIC BLOOD PRESSURE: 88 MMHG | SYSTOLIC BLOOD PRESSURE: 158 MMHG

## 2019-07-14 VITALS — DIASTOLIC BLOOD PRESSURE: 78 MMHG | SYSTOLIC BLOOD PRESSURE: 138 MMHG

## 2019-07-14 VITALS — DIASTOLIC BLOOD PRESSURE: 87 MMHG | SYSTOLIC BLOOD PRESSURE: 164 MMHG

## 2019-07-14 VITALS — SYSTOLIC BLOOD PRESSURE: 159 MMHG | DIASTOLIC BLOOD PRESSURE: 79 MMHG

## 2019-07-14 VITALS — DIASTOLIC BLOOD PRESSURE: 111 MMHG | SYSTOLIC BLOOD PRESSURE: 174 MMHG

## 2019-07-14 VITALS — DIASTOLIC BLOOD PRESSURE: 120 MMHG | SYSTOLIC BLOOD PRESSURE: 178 MMHG

## 2019-07-14 VITALS — DIASTOLIC BLOOD PRESSURE: 98 MMHG | SYSTOLIC BLOOD PRESSURE: 167 MMHG

## 2019-07-14 VITALS — DIASTOLIC BLOOD PRESSURE: 80 MMHG | SYSTOLIC BLOOD PRESSURE: 157 MMHG

## 2019-07-14 VITALS — SYSTOLIC BLOOD PRESSURE: 176 MMHG | DIASTOLIC BLOOD PRESSURE: 87 MMHG

## 2019-07-14 VITALS — SYSTOLIC BLOOD PRESSURE: 155 MMHG | DIASTOLIC BLOOD PRESSURE: 82 MMHG

## 2019-07-14 VITALS — SYSTOLIC BLOOD PRESSURE: 149 MMHG | DIASTOLIC BLOOD PRESSURE: 87 MMHG

## 2019-07-14 VITALS — SYSTOLIC BLOOD PRESSURE: 140 MMHG | DIASTOLIC BLOOD PRESSURE: 77 MMHG

## 2019-07-14 LAB
ANION GAP SERPL CALC-SCNC: 11 MMOL/L (ref 6–14)
BASOPHILS # BLD AUTO: 0 X10^3/UL (ref 0–0.2)
BASOPHILS NFR BLD: 1 % (ref 0–3)
BUN SERPL-MCNC: 20 MG/DL (ref 8–26)
CALCIUM SERPL-MCNC: 7.9 MG/DL (ref 8.5–10.1)
CHLORIDE SERPL-SCNC: 99 MMOL/L (ref 98–107)
CO2 SERPL-SCNC: 23 MMOL/L (ref 21–32)
CREAT SERPL-MCNC: 3.7 MG/DL (ref 0.7–1.3)
EOSINOPHIL NFR BLD: 0.2 X10^3/UL (ref 0–0.7)
EOSINOPHIL NFR BLD: 3 % (ref 0–3)
ERYTHROCYTE [DISTWIDTH] IN BLOOD BY AUTOMATED COUNT: 13 % (ref 11.5–14.5)
GFR SERPLBLD BASED ON 1.73 SQ M-ARVRAT: 19.4 ML/MIN
GLUCOSE SERPL-MCNC: 101 MG/DL (ref 70–99)
HCT VFR BLD CALC: 26.2 % (ref 39–53)
HGB BLD-MCNC: 9.2 G/DL (ref 13–17.5)
LYMPHOCYTES # BLD: 0.8 X10^3/UL (ref 1–4.8)
LYMPHOCYTES NFR BLD AUTO: 12 % (ref 24–48)
MCH RBC QN AUTO: 30 PG (ref 25–35)
MCHC RBC AUTO-ENTMCNC: 35 G/DL (ref 31–37)
MCV RBC AUTO: 84 FL (ref 79–100)
MONO #: 0.6 X10^3/UL (ref 0–1.1)
MONOCYTES NFR BLD: 8 % (ref 0–9)
NEUT #: 5.3 X10^3/UL (ref 1.8–7.7)
NEUTROPHILS NFR BLD AUTO: 77 % (ref 31–73)
PHOSPHATE SERPL-MCNC: 2.8 MG/DL (ref 2.6–4.7)
PLATELET # BLD AUTO: 182 X10^3/UL (ref 140–400)
POTASSIUM SERPL-SCNC: 3.9 MMOL/L (ref 3.5–5.1)
RBC # BLD AUTO: 3.12 X10^6/UL (ref 4.3–5.7)
SODIUM SERPL-SCNC: 133 MMOL/L (ref 136–145)
WBC # BLD AUTO: 6.8 X10^3/UL (ref 4–11)

## 2019-07-14 RX ADMIN — GABAPENTIN SCH MG: 300 CAPSULE ORAL at 21:21

## 2019-07-14 RX ADMIN — Medication SCH CAP: at 21:21

## 2019-07-14 RX ADMIN — POTASSIUM CHLORIDE SCH MLS/HR: 2 INJECTION, SOLUTION, CONCENTRATE INTRAVENOUS at 05:13

## 2019-07-14 RX ADMIN — POTASSIUM CHLORIDE SCH MLS/HR: 2 INJECTION, SOLUTION, CONCENTRATE INTRAVENOUS at 02:30

## 2019-07-14 RX ADMIN — POTASSIUM CHLORIDE SCH MLS/HR: 2 INJECTION, SOLUTION, CONCENTRATE INTRAVENOUS at 00:18

## 2019-07-14 RX ADMIN — LINEZOLID SCH MG: 600 TABLET, FILM COATED ORAL at 08:24

## 2019-07-14 RX ADMIN — PANTOPRAZOLE SODIUM SCH MG: 40 INJECTION, POWDER, FOR SOLUTION INTRAVENOUS at 08:24

## 2019-07-14 RX ADMIN — POTASSIUM CHLORIDE SCH MLS/HR: 2 INJECTION, SOLUTION, CONCENTRATE INTRAVENOUS at 00:15

## 2019-07-14 RX ADMIN — MORPHINE SULFATE PRN MG: 4 INJECTION, SOLUTION INTRAMUSCULAR; INTRAVENOUS at 00:11

## 2019-07-14 RX ADMIN — MORPHINE SULFATE PRN MG: 4 INJECTION, SOLUTION INTRAMUSCULAR; INTRAVENOUS at 04:02

## 2019-07-14 RX ADMIN — POTASSIUM CHLORIDE SCH MLS/HR: 2 INJECTION, SOLUTION, CONCENTRATE INTRAVENOUS at 02:09

## 2019-07-14 RX ADMIN — POTASSIUM CHLORIDE SCH MLS/HR: 2 INJECTION, SOLUTION, CONCENTRATE INTRAVENOUS at 02:52

## 2019-07-14 RX ADMIN — POLYETHYLENE GLYCOL 3350 SCH GM: 17 POWDER, FOR SOLUTION ORAL at 08:24

## 2019-07-14 RX ADMIN — MORPHINE SULFATE PRN MG: 4 INJECTION, SOLUTION INTRAMUSCULAR; INTRAVENOUS at 08:24

## 2019-07-14 RX ADMIN — CEFTRIAXONE SODIUM SCH GM: 2 INJECTION, POWDER, FOR SOLUTION INTRAMUSCULAR; INTRAVENOUS at 12:14

## 2019-07-14 RX ADMIN — LINEZOLID SCH MG: 600 TABLET, FILM COATED ORAL at 21:21

## 2019-07-14 NOTE — NUR
Patients blood pressure 174/111 at approx 1900. Patient stating he is in pain and seemed to 
be having some anxiety (flopping in bed.) Pain and anxiety medication given. Rechecked blood 
pressure at 178/120. Dr. Bruner  notified. Orders for PRN blood pressure medication given if 
SBP greater than 180. Verbalized order back and clarified that he did not want me to treat 
the patient with his Blood pressure 178/120, and he verbalized back that I did not need to 
treat his blood pressure at this time.

## 2019-07-14 NOTE — NUR
Paged Dr. Cochran for stronger pain medication for severe back pain. Orders received for 
Oxycodone IR 5mg PO PRN Q4HRS. Per Tien Schwab, okay for pt to move to [facility] bed if okay 
with Primary. Dr. Cochran notified and orders for transfer placed. Will do HD Monday.

## 2019-07-14 NOTE — PDOC
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


Linezolid (Zyvox) 600 mg BID PO  Last administered on 7/14/19at 08:24;  Start 

7/11/19 at 09:00


Ceftriaxone Sodium (Rocephin) 2 gm Q24H IVP  Last administered on 7/13/19at 

13:12;  Start 7/11/19 at 13:00


Calcium Gluconate 2000 mg/Dextrose 120 ml @  220 mls/hr 1X  ONCE IV  Last 

administered on 7/11/19at 10:05;  Start 7/11/19 at 10:00;  Stop 7/11/19 at 

10:32;  Status DC


Sodium Chloride 1,000 ml @  1,000 mls/hr Q1H PRN IV hypotension;  Start 7/11/19 

at 11:06;  Stop 7/11/19 at 17:05;  Status DC


Diphenhydramine HCl (Benadryl) 25 mg 1X PRN  PRN IV ITCHING;  Start 7/11/19 at 

11:15;  Stop 7/12/19 at 11:14;  Status DC


Diphenhydramine HCl (Benadryl) 25 mg 1X PRN  PRN IV ITCHING;  Start 7/11/19 at 

11:15;  Stop 7/12/19 at 11:14;  Status DC


Sodium Chloride 1,000 ml @  400 mls/hr Q2H30M PRN IV PATENCY;  Start 7/11/19 at 

11:06;  Stop 7/11/19 at 23:05;  Status DC


Info (PHARMACY MONITORING -- do not chart) 1 each PRN DAILY  PRN MC SEE 

COMMENTS;  Start 7/11/19 at 11:15


Potassium Chloride 10 meq/ Calcium Chloride 12.5 meq/ Bicarbonate Dialysis Soln 

w/ out KCl 5,013.9286 ml @ 1,000 mls/hr Q5H1M IV ;  Start 7/12/19 at 13:00;  

Status Cancel


Potassium Chloride 20 meq/ Calcium Chloride 12.5 meq/ Bicarbonate Dialysis Soln 

w/ out KCl 5,018.9286 ml @ 1,500 mls/hr Q3H21M IV ;  Start 7/12/19 at 13:00;  

Stop 7/12/19 at 13:00;  Status DC


Heparin Sodium (Porcine) (Heparin Sodium) 4,000 unit 1X  ONCE IV  Last 

administered on 7/12/19at 19:54;  Start 7/12/19 at 12:00;  Stop 7/12/19 at 

12:11;  Status DC


Heparin Sodium/ Dextrose 500 ml @ 0 mls/hr CONT  PRN IV SEE I/O RECORD Last 

administered on 7/13/19at 15:05;  Start 7/12/19 at 12:00;  Stop 7/14/19 at 08:03

;  Status DC


Heparin Sodium (Porcine) (Heparin Sodium) 2,500 unit PRN Q6HRS  PRN IV FOR UFH 

LEVEL LESS THAN 0.2;  Start 7/12/19 at 12:00


Potassium Chloride 10 meq/ Calcium Chloride 12.5 meq/ Bicarbonate Dialysis Soln 

w/ out KCl 5,013.9286 ml @ 1,000 mls/hr Q5H1M IV ;  Start 7/12/19 at 13:00;  

Stop 7/12/19 at 13:00;  Status DC


Potassium Chloride 10 meq/ Bicarbonate Dialysis Soln w/ out KCl 5,005 ml @  

1,000 mls/hr Q5H1M IV ;  Start 7/12/19 at 13:00;  Stop 7/12/19 at 13:00;  Status

DC


Potassium Chloride 20 meq/ Bicarbonate Dialysis Soln w/ out KCl 5,010 ml @  

1,500 mls/hr Q3H21M IV ;  Start 7/12/19 at 13:00;  Stop 7/12/19 at 13:00;  S

tatus DC


Potassium Chloride 10 meq/ Bicarbonate Dialysis Soln w/ out KCl 5,005 ml @  

1,500 mls/hr Q3H21M IV ;  Start 7/12/19 at 13:00;  Stop 7/12/19 at 13:00;  

Status DC


Potassium Chloride 20 meq/ Bicarbonate Dialysis Soln w/ out KCl 5,010 ml @  

1,000 mls/hr Q5H1M IV  Last administered on 7/13/19at 20:43;  Start 7/12/19 at 

13:00;  Stop 7/14/19 at 08:03;  Status DC


Potassium Chloride 20 meq/ Bicarbonate Dialysis Soln w/ out KCl 5,010 ml @  

1,500 mls/hr Q3H21M IV ;  Start 7/12/19 at 12:45;  Stop 7/12/19 at 12:45;  

Status DC


Potassium Chloride 40 meq/ Bicarbonate Dialysis Soln w/ out KCl 5,020 ml @  

1,500 mls/hr Q3H21M IV ;  Start 7/12/19 at 13:00;  Stop 7/12/19 at 13:10;  

Status DC


Potassium Chloride 60 meq/ Bicarbonate Dialysis Soln w/ out KCl 5,030 ml @  1,5

00 mls/hr Q3H22M IV ;  Start 7/12/19 at 13:00;  Stop 7/12/19 at 13:07;  Status 

DC


Potassium Chloride 30 meq/ Bicarbonate Dialysis Soln w/ out KCl 5,015 ml @  

1,500 mls/hr Q3H21M IV ;  Start 7/12/19 at 13:15;  Stop 7/12/19 at 18:00;  

Status DC


Potassium Chloride 20 meq/ Bicarbonate Dialysis Soln w/ out KCl 5,010 ml @  

1,500 mls/hr Q3H21M IV  Last administered on 7/12/19at 21:06;  Start 7/12/19 at 

13:10;  Stop 7/12/19 at 18:00;  Status DC


Potassium Chloride 30 meq/ Bicarbonate Dialysis Soln w/ out KCl 5,015 ml @  

1,500 mls/hr Q3H21M IV ;  Start 7/12/19 at 18:00;  Stop 7/12/19 at 19:10;  

Status DC


Potassium Chloride 20 meq/ Bicarbonate Dialysis Soln w/ out KCl 5,010 ml @  

1,500 mls/hr Q3H21M IV  Last administered on 7/14/19at 00:15;  Start 7/12/19 at 

18:01;  Stop 7/14/19 at 08:03;  Status DC


Potassium Chloride 20 meq/ Bicarbonate Dialysis Soln w/ out KCl 5,010 ml @  

1,500 mls/hr Q3H21M IV  Last administered on 7/14/19at 00:18;  Start 7/12/19 at 

20:00;  Stop 7/14/19 at 08:03;  Status DC





Active Scripts


Active


Reported


Protonix (Pantoprazole Sodium) 20 Mg Tablet.dr 2 Tab PO DAILY


Zyprexa (Olanzapine) 20 Mg Tablet 2 Tab PO QHS


Buspirone Hcl 30 Mg Tablet 1 Tab PO BID


Vitals/I & O





Vital Sign - Last 24 Hours








 7/13/19 7/13/19 7/13/19 7/13/19





 11:00 12:00 12:00 13:00


 


Temp    97.0





    97.0


 


Pulse 105  112 86


 


Resp 19  20 14


 


B/P (MAP) 148/80 (102)  137/79 (98) 118/65 (82)


 


Pulse Ox 97  99 99


 


O2 Delivery Room Air Room Air Room Air Room Air


 


    





    





 7/13/19 7/13/19 7/13/19 7/13/19





 14:00 15:00 16:00 16:00


 


Temp   97.0 





   97.0 


 


Pulse 82 86 98 


 


Resp 13 16 20 


 


B/P (MAP) 132/91 (105) 149/94 (112) 152/86 (108) 


 


Pulse Ox 98 98 97 


 


O2 Delivery Room Air Room Air Room Air Room Air


 


    





    





 7/13/19 7/13/19 7/13/19 7/13/19





 17:00 18:00 19:00 20:00


 


Temp    98.8





    98.8


 


Pulse 94 113 121 105


 


Resp 14 22 21 18


 


B/P (MAP) 141/94 (110) 158/92 (114) 172/95 (120) 157/96 (116)


 


Pulse Ox 98 98 98 97


 


O2 Delivery Room Air Room Air Room Air Room Air


 


    





    





 7/13/19 7/13/19 7/13/19 7/13/19





 20:00 20:01 21:00 22:00


 


Pulse   107 114


 


Resp  22 19 19


 


B/P (MAP)   150/90 (110) 150/90 (110)


 


Pulse Ox  97 97 98


 


O2 Delivery Room Air Room Air Room Air Room Air





 7/13/19 7/14/19 7/14/19 7/14/19





 23:00 00:00 00:00 00:11


 


Temp   99.1 





   99.1 


 


Pulse 114  112 


 


Resp 16 19 19


 


B/P (MAP) 167/100 (122)  138/78 (98) 


 


Pulse Ox 99  98 99


 


O2 Delivery Room Air Room Air Room Air Room Air


 


    





    





 7/14/19 7/14/19 7/14/19 7/14/19





 00:51 01:00 02:00 03:00


 


Pulse  97 103 106


 


Resp  16 18 20


 


B/P (MAP)  164/87 (112) 144/91 (108) 155/82 (106)


 


Pulse Ox 98 95  


 


O2 Delivery  Room Air Room Air Room Air





 7/14/19 7/14/19 7/14/19 7/14/19





 04:00 04:00 04:02 04:32


 


Temp 99.5   





 99.5   


 


Pulse 112   


 


Resp 22 22 20


 


B/P (MAP) 167/98 (121)   


 


Pulse Ox 97   


 


O2 Delivery Room Air Room Air Room Air 


 


    





    





 7/14/19 7/14/19 7/14/19 7/14/19





 05:00 06:00 07:00 08:00


 


Temp    99.0





    99.0


 


Pulse 120 109 120 110


 


Resp 18 19 19 16


 


B/P (MAP) 159/79 (105) 140/77 (98) 157/80 (105) 149/87 (107)


 


Pulse Ox   98 94


 


O2 Delivery Room Air Room Air Room Air Room Air


 


    





    





 7/14/19 7/14/19 7/14/19 7/14/19





 08:00 08:24 08:54 09:00


 


Pulse    114


 


Resp    17


 


B/P (MAP)    161/91 (114)


 


Pulse Ox  96  96


 


O2 Delivery Room Air Room Air Room Air Room Air





 7/14/19   





 10:00   


 


Pulse 117   


 


Resp 23   


 


B/P (MAP) 158/88 (111)   


 


Pulse Ox 97   


 


O2 Delivery Room Air   














Intake and Output  0 


 


 7/13/19 7/13/19 7/14/19





 14:59 22:59 06:59


 


Intake Total 424 ml 80843 ml 4581.1 ml


 


Output Total 22 ml 7 ml 7 ml


 


Balance 402 ml 68221 ml 4574.1 ml

















HIEN NUNEZ MD          Jul 14, 2019 10:28

## 2019-07-14 NOTE — PDOC
PULMONARY PROGRESS NOTES


Subjective


alert, no sob, cough, has low back pain, off precedex drip, crrt stopped 3 hrs 

ago, clotted line, hep gtt,


Vitals





Vital Signs








  Date Time  Temp Pulse Resp B/P (MAP) Pulse Ox O2 Delivery O2 Flow Rate FiO2


 


7/14/19 06:00  109 19 140/77 (98)  Room Air  


 


7/14/19 04:00 99.5    97   





 99.5       








Comments


ros as mentioned as above, discussed w rn, other sys otherwise neg


General:  No acute distress, Lethargic


HEENT:  Other (nc at perrl nose clear     neck no lad  no thyromegaly)


Lungs:  Crackles


Cardiovascular:  S1, S2


Abdomen:  Soft, Non-tender, Other (no mass)


Extremities:  Other (1+edema,)


Skin:  Warm


Labs





Laboratory Tests








Test


 7/12/19


12:20 7/13/19


03:00 7/13/19


09:00 7/13/19


14:50


 


White Blood Count


 7.1 x10^3/uL


(4.0-11.0) 5.9 x10^3/uL


(4.0-11.0) 7.9 x10^3/uL


(4.0-11.0) 8.0 x10^3/uL


(4.0-11.0)


 


Red Blood Count


 3.92 x10^6/uL


(4.30-5.70) 3.86 x10^6/uL


(4.30-5.70) 3.86 x10^6/uL


(4.30-5.70) 3.77 x10^6/uL


(4.30-5.70)


 


Hemoglobin


 11.5 g/dL


(13.0-17.5) 11.4 g/dL


(13.0-17.5) 11.3 g/dL


(13.0-17.5) 11.0 g/dL


(13.0-17.5)


 


Hematocrit


 32.6 %


(39.0-53.0) 31.8 %


(39.0-53.0) 32.1 %


(39.0-53.0) 31.3 %


(39.0-53.0)


 


Mean Corpuscular Volume 83 fL ()  82 fL ()  83 fL ()  83 fL 

() 


 


Mean Corpuscular Hemoglobin 30 pg (25-35)  30 pg (25-35)  29 pg (25-35)  29 pg 

(25-35) 


 


Mean Corpuscular Hemoglobin


Concent 35 g/dL


(31-37) 36 g/dL


(31-37) 35 g/dL


(31-37) 35 g/dL


(31-37)


 


Red Cell Distribution Width


 13.2 %


(11.5-14.5) 12.9 %


(11.5-14.5)


 


Platelet Count


 178 x10^3/uL


(140-400) 172 x10^3/uL


(140-400) 164 x10^3/uL


(140-400) 196 x10^3/uL


(140-400)


 


Neutrophils (%) (Auto) 84 % (31-73)  76 % (31-73)  88 % (31-73)  86 % (31-73) 


 


Lymphocytes (%) (Auto) 10 % (24-48)  16 % (24-48)  6 % (24-48)  6 % (24-48) 


 


Monocytes (%) (Auto) 4 % (0-9)  3 % (0-9)  3 % (0-9)  5 % (0-9) 


 


Eosinophils (%) (Auto) 2 % (0-3)  4 % (0-3)  2 % (0-3)  2 % (0-3) 


 


Basophils (%) (Auto) 0 % (0-3)  1 % (0-3)  1 % (0-3)  1 % (0-3) 


 


Neutrophils # (Auto)


 6.0 x10^3/uL


(1.8-7.7) 4.5 x10^3/uL


(1.8-7.7) 7.0 x10^3/uL


(1.8-7.7) 6.9 x10^3/uL


(1.8-7.7)


 


Lymphocytes # (Auto)


 0.7 x10^3/uL


(1.0-4.8) 1.0 x10^3/uL


(1.0-4.8) 0.5 x10^3/uL


(1.0-4.8) 0.5 x10^3/uL


(1.0-4.8)


 


Monocytes # (Auto)


 0.3 x10^3/uL


(0.0-1.1) 0.2 x10^3/uL


(0.0-1.1) 0.2 x10^3/uL


(0.0-1.1) 0.4 x10^3/uL


(0.0-1.1)


 


Eosinophils # (Auto)


 0.2 x10^3/uL


(0.0-0.7) 0.2 x10^3/uL


(0.0-0.7) 0.2 x10^3/uL


(0.0-0.7) 0.1 x10^3/uL


(0.0-0.7)


 


Basophils # (Auto)


 0.0 x10^3/uL


(0.0-0.2) 0.0 x10^3/uL


(0.0-0.2) 0.0 x10^3/uL


(0.0-0.2) 0.1 x10^3/uL


(0.0-0.2)


 


Prothrombin Time


 14.6 SEC


(11.7-14.0) 


 


 





 


Prothromb Time International


Ratio 1.2 (0.8-1.1) 


 


 


 





 


Activated Partial


Thromboplast Time 35 SEC (24-38) 


 


 


 





 


Sodium Level


 128 mmol/L


(136-145) 130 mmol/L


(136-145) 132 mmol/L


(136-145) 133 mmol/L


(136-145)


 


Potassium Level


 3.7 mmol/L


(3.5-5.1) 3.9 mmol/L


(3.5-5.1) 4.1 mmol/L


(3.5-5.1) 4.4 mmol/L


(3.5-5.1)


 


Chloride Level


 88 mmol/L


() 96 mmol/L


() 98 mmol/L


() 100 mmol/L


()


 


Carbon Dioxide Level


 24 mmol/L


(21-32) 25 mmol/L


(21-32) 24 mmol/L


(21-32) 26 mmol/L


(21-32)


 


Anion Gap 16 (6-14)  9 (6-14)  10 (6-14)  7 (6-14) 


 


Blood Urea Nitrogen


 59 mg/dL


(8-26) 44 mg/dL


(8-26) 35 mg/dL


(8-26) 28 mg/dL


(8-26)


 


Creatinine


 8.7 mg/dL


(0.7-1.3) 5.9 mg/dL


(0.7-1.3) 5.2 mg/dL


(0.7-1.3) 4.2 mg/dL


(0.7-1.3)


 


Estimated GFR


(Cockcroft-Gault) 7.2 


 11.3 


 13.1 


 16.7 





 


Glucose Level


 79 mg/dL


(70-99) 100 mg/dL


(70-99) 98 mg/dL


(70-99) 111 mg/dL


(70-99)


 


Calcium Level


 6.6 mg/dL


(8.5-10.1) 7.0 mg/dL


(8.5-10.1) 7.3 mg/dL


(8.5-10.1) 7.4 mg/dL


(8.5-10.1)


 


Phosphorus Level


 6.6 mg/dL


(2.6-4.7) 4.1 mg/dL


(2.6-4.7) 3.6 mg/dL


(2.6-4.7) 3.1 mg/dL


(2.6-4.7)


 


Magnesium Level


 1.9 mg/dL


(1.8-2.4) 2.1 mg/dL


(1.8-2.4) 


 2.2 mg/dL


(1.8-2.4)


 


Heparin Anti-Xa Act,


Unfractionated 


 0.29 IU/mL


(0.30-0.70) 0.34 IU/mL


(0.30-0.70) 0.33 IU/mL


(0.30-0.70)


 


Test


 7/13/19


21:45 7/14/19


06:00 


 





 


White Blood Count


 8.2 x10^3/uL


(4.0-11.0) 


 


 





 


Red Blood Count


 3.52 x10^6/uL


(4.30-5.70) 


 


 





 


Hemoglobin


 10.3 g/dL


(13.0-17.5) 


 


 





 


Hematocrit


 29.3 %


(39.0-53.0) 


 


 





 


Mean Corpuscular Volume 83 fL ()    


 


Mean Corpuscular Hemoglobin 29 pg (25-35)    


 


Mean Corpuscular Hemoglobin


Concent 35 g/dL


(31-37) 


 


 





 


Red Cell Distribution Width


 12.9 %


(11.5-14.5) 


 


 





 


Platelet Count


 212 x10^3/uL


(140-400) 


 


 





 


Neutrophils (%) (Auto) 87 % (31-73)    


 


Lymphocytes (%) (Auto) 6 % (24-48)    


 


Monocytes (%) (Auto) 6 % (0-9)    


 


Eosinophils (%) (Auto) 2 % (0-3)    


 


Basophils (%) (Auto) 0 % (0-3)    


 


Neutrophils # (Auto)


 7.1 x10^3/uL


(1.8-7.7) 


 


 





 


Lymphocytes # (Auto)


 0.5 x10^3/uL


(1.0-4.8) 


 


 





 


Monocytes # (Auto)


 0.5 x10^3/uL


(0.0-1.1) 


 


 





 


Eosinophils # (Auto)


 0.1 x10^3/uL


(0.0-0.7) 


 


 





 


Basophils # (Auto)


 0.0 x10^3/uL


(0.0-0.2) 


 


 





 


Sodium Level


 132 mmol/L


(136-145) 133 mmol/L


(136-145) 


 





 


Potassium Level


 4.3 mmol/L


(3.5-5.1) 3.9 mmol/L


(3.5-5.1) 


 





 


Chloride Level


 98 mmol/L


() 99 mmol/L


() 


 





 


Carbon Dioxide Level


 23 mmol/L


(21-32) 23 mmol/L


(21-32) 


 





 


Anion Gap 11 (6-14)  11 (6-14)   


 


Blood Urea Nitrogen


 21 mg/dL


(8-26) 20 mg/dL


(8-26) 


 





 


Creatinine


 3.4 mg/dL


(0.7-1.3) 3.7 mg/dL


(0.7-1.3) 


 





 


Estimated GFR


(Cockcroft-Gault) 21.4 


 19.4 


 


 





 


Glucose Level


 120 mg/dL


(70-99) 101 mg/dL


(70-99) 


 





 


Calcium Level


 7.5 mg/dL


(8.5-10.1) 7.9 mg/dL


(8.5-10.1) 


 





 


Phosphorus Level


 2.6 mg/dL


(2.6-4.7) 2.8 mg/dL


(2.6-4.7) 


 





 


Magnesium Level


 2.3 mg/dL


(1.8-2.4) 2.3 mg/dL


(1.8-2.4) 


 











Laboratory Tests








Test


 7/13/19


09:00 7/13/19


14:50 7/13/19


21:45 7/14/19


06:00


 


White Blood Count


 7.9 x10^3/uL


(4.0-11.0) 8.0 x10^3/uL


(4.0-11.0) 8.2 x10^3/uL


(4.0-11.0) 





 


Red Blood Count


 3.86 x10^6/uL


(4.30-5.70) 3.77 x10^6/uL


(4.30-5.70) 3.52 x10^6/uL


(4.30-5.70) 





 


Hemoglobin


 11.3 g/dL


(13.0-17.5) 11.0 g/dL


(13.0-17.5) 10.3 g/dL


(13.0-17.5) 





 


Hematocrit


 32.1 %


(39.0-53.0) 31.3 %


(39.0-53.0) 29.3 %


(39.0-53.0) 





 


Mean Corpuscular Volume 83 fL ()  83 fL ()  83 fL ()  


 


Mean Corpuscular Hemoglobin 29 pg (25-35)  29 pg (25-35)  29 pg (25-35)  


 


Mean Corpuscular Hemoglobin


Concent 35 g/dL


(31-37) 35 g/dL


(31-37) 35 g/dL


(31-37) 





 


Red Cell Distribution Width


 12.9 %


(11.5-14.5) 12.9 %


(11.5-14.5) 12.9 %


(11.5-14.5) 





 


Platelet Count


 164 x10^3/uL


(140-400) 196 x10^3/uL


(140-400) 212 x10^3/uL


(140-400) 





 


Neutrophils (%) (Auto) 88 % (31-73)  86 % (31-73)  87 % (31-73)  


 


Lymphocytes (%) (Auto) 6 % (24-48)  6 % (24-48)  6 % (24-48)  


 


Monocytes (%) (Auto) 3 % (0-9)  5 % (0-9)  6 % (0-9)  


 


Eosinophils (%) (Auto) 2 % (0-3)  2 % (0-3)  2 % (0-3)  


 


Basophils (%) (Auto) 1 % (0-3)  1 % (0-3)  0 % (0-3)  


 


Neutrophils # (Auto)


 7.0 x10^3/uL


(1.8-7.7) 6.9 x10^3/uL


(1.8-7.7) 7.1 x10^3/uL


(1.8-7.7) 





 


Lymphocytes # (Auto)


 0.5 x10^3/uL


(1.0-4.8) 0.5 x10^3/uL


(1.0-4.8) 0.5 x10^3/uL


(1.0-4.8) 





 


Monocytes # (Auto)


 0.2 x10^3/uL


(0.0-1.1) 0.4 x10^3/uL


(0.0-1.1) 0.5 x10^3/uL


(0.0-1.1) 





 


Eosinophils # (Auto)


 0.2 x10^3/uL


(0.0-0.7) 0.1 x10^3/uL


(0.0-0.7) 0.1 x10^3/uL


(0.0-0.7) 





 


Basophils # (Auto)


 0.0 x10^3/uL


(0.0-0.2) 0.1 x10^3/uL


(0.0-0.2) 0.0 x10^3/uL


(0.0-0.2) 





 


Heparin Anti-Xa Act,


Unfractionated 0.34 IU/mL


(0.30-0.70) 0.33 IU/mL


(0.30-0.70) 


 





 


Sodium Level


 132 mmol/L


(136-145) 133 mmol/L


(136-145) 132 mmol/L


(136-145) 133 mmol/L


(136-145)


 


Potassium Level


 4.1 mmol/L


(3.5-5.1) 4.4 mmol/L


(3.5-5.1) 4.3 mmol/L


(3.5-5.1) 3.9 mmol/L


(3.5-5.1)


 


Chloride Level


 98 mmol/L


() 100 mmol/L


() 98 mmol/L


() 99 mmol/L


()


 


Carbon Dioxide Level


 24 mmol/L


(21-32) 26 mmol/L


(21-32) 23 mmol/L


(21-32) 23 mmol/L


(21-32)


 


Anion Gap 10 (6-14)  7 (6-14)  11 (6-14)  11 (6-14) 


 


Blood Urea Nitrogen


 35 mg/dL


(8-26) 28 mg/dL


(8-26) 21 mg/dL


(8-26) 20 mg/dL


(8-26)


 


Creatinine


 5.2 mg/dL


(0.7-1.3) 4.2 mg/dL


(0.7-1.3) 3.4 mg/dL


(0.7-1.3) 3.7 mg/dL


(0.7-1.3)


 


Estimated GFR


(Cockcroft-Gault) 13.1 


 16.7 


 21.4 


 19.4 





 


Glucose Level


 98 mg/dL


(70-99) 111 mg/dL


(70-99) 120 mg/dL


(70-99) 101 mg/dL


(70-99)


 


Calcium Level


 7.3 mg/dL


(8.5-10.1) 7.4 mg/dL


(8.5-10.1) 7.5 mg/dL


(8.5-10.1) 7.9 mg/dL


(8.5-10.1)


 


Phosphorus Level


 3.6 mg/dL


(2.6-4.7) 3.1 mg/dL


(2.6-4.7) 2.6 mg/dL


(2.6-4.7) 2.8 mg/dL


(2.6-4.7)


 


Magnesium Level


 


 2.2 mg/dL


(1.8-2.4) 2.3 mg/dL


(1.8-2.4) 2.3 mg/dL


(1.8-2.4)








Medications





Active Scripts








 Medications  Dose


 Route/Sig


 Max Daily Dose Days Date Category


 


 Protonix


  (Pantoprazole


 Sodium) 20 Mg


 Tablet.dr  2 Tab


 PO DAILY


   7/10/19 Reported


 


 Zyprexa


  (Olanzapine) 20


 Mg Tablet  2 Tab


 PO QHS


   7/10/19 Reported


 


 Buspirone Hcl 30


 Mg Tablet  1 Tab


 PO BID


   7/10/19 Reported








Comments


7/14, cxr reviewed  increased vm





Impression


.


1.  Status post fall with acute rhabdomyolysis with markedly high CPKs and acute


renal failure.


2.  Acute kidney injury secondary to rhabdomyolysis.


3.  Severe metabolic acidosis secondary to acute kidney injury.  Clinically,


less likely sepsis.  Lactic acid is 1.1


4.  Hyponatremia.improving


5.  Moderate protein-calorie malnutrition.


6.  Leukocytosis, likely reactive.


7.  Status post fall with abnormal thoracic MRI.  Neurosurgery following.


8.  Abnormal liver function test secondary to hypoperfusion and rhabdomyolysis.


9.  Hypocalcemia.improving





Plan


.





1.  s/p fluid resuscitation, was on CRRT


2.  Follow Renal's recommendation.


3.  prn bicarbonate.


4.  Monitor sodium level. improving


5.  Follow Neurology and Neurosurgery's recommendation.


6.  Continue empiric antibiotic, per id


7.  Follow CPKs., trending down


8.  Correct calcium and follow the levels.improving


9.   chest x-ray, increased vm


10.  avoid oversedation


Discussed with RN and will follow along with you.











KODY BRANTLEY MD               Jul 14, 2019 06:53

## 2019-07-14 NOTE — NUR
pt's filter clotted off again. attempts to flush line and decrease blood rate unsuccessful 
so pt removed from CRRT; plan is for pt to resume hemodialysis on Monday. will pass on in 
report, will continue to closely monitor.

## 2019-07-14 NOTE — PDOC
Infectious Disease Note


Subjective


Subjective


c/o lower back pain, otherwise doing alright


Eating some [place] toast and scrambled eggs this morning


Denies F/C/N/V/D/SOA





Off CRRT 


Dressings recently changed





ROS


ROS


per HPI





Vital Sign


Vital Signs





Vital Signs








  Date Time  Temp Pulse Resp B/P (MAP) Pulse Ox O2 Delivery O2 Flow Rate FiO2


 


7/14/19 06:00  109 19 140/77 (98)  Room Air  


 


7/14/19 04:00 99.5    97   





 99.5       











Physical Exam


PHYSICAL EXAM


GENERAL:  Sitting in the chair, eating, NAD 


EENT:  Pupils equal, oral cavity clear


NECK:  Supple.


LUNGS:  Clear bilaterally.  No wheezing.


HEART:  S1, S2, regular, tachy 120s


ABDOMEN:  Soft, nontender, BS present


: Culp


EXTREMITIES:  Generalized trace edema, no cyanosis. Right index and thumb 

bandaged. (pic reviewed) 


CENTRAL NERVOUS SYSTEM:  Alert, answering appropriately


RIJ/HDC without signs of complications


PIV





Labs


Lab





Laboratory Tests








Test


 7/13/19


09:00 7/13/19


14:50 7/13/19


21:45 7/14/19


06:00


 


White Blood Count


 7.9 x10^3/uL


(4.0-11.0) 8.0 x10^3/uL


(4.0-11.0) 8.2 x10^3/uL


(4.0-11.0) 6.8 x10^3/uL


(4.0-11.0)


 


Red Blood Count


 3.86 x10^6/uL


(4.30-5.70) 3.77 x10^6/uL


(4.30-5.70) 3.52 x10^6/uL


(4.30-5.70) 3.12 x10^6/uL


(4.30-5.70)


 


Hemoglobin


 11.3 g/dL


(13.0-17.5) 11.0 g/dL


(13.0-17.5) 10.3 g/dL


(13.0-17.5) 9.2 g/dL


(13.0-17.5)


 


Hematocrit


 32.1 %


(39.0-53.0) 31.3 %


(39.0-53.0) 29.3 %


(39.0-53.0) 26.2 %


(39.0-53.0)


 


Mean Corpuscular Volume 83 fL ()  83 fL ()  83 fL ()  84 fL 

() 


 


Mean Corpuscular Hemoglobin 29 pg (25-35)  29 pg (25-35)  29 pg (25-35)  30 pg 

(25-35) 


 


Mean Corpuscular Hemoglobin


Concent 35 g/dL


(31-37) 35 g/dL


(31-37)


 


Red Cell Distribution Width


 12.9 %


(11.5-14.5) 12.9 %


(11.5-14.5) 12.9 %


(11.5-14.5) 13.0 %


(11.5-14.5)


 


Platelet Count


 164 x10^3/uL


(140-400) 196 x10^3/uL


(140-400) 212 x10^3/uL


(140-400) 182 x10^3/uL


(140-400)


 


Neutrophils (%) (Auto) 88 % (31-73)  86 % (31-73)  87 % (31-73)  77 % (31-73) 


 


Lymphocytes (%) (Auto) 6 % (24-48)  6 % (24-48)  6 % (24-48)  12 % (24-48) 


 


Monocytes (%) (Auto) 3 % (0-9)  5 % (0-9)  6 % (0-9)  8 % (0-9) 


 


Eosinophils (%) (Auto) 2 % (0-3)  2 % (0-3)  2 % (0-3)  3 % (0-3) 


 


Basophils (%) (Auto) 1 % (0-3)  1 % (0-3)  0 % (0-3)  1 % (0-3) 


 


Neutrophils # (Auto)


 7.0 x10^3/uL


(1.8-7.7) 6.9 x10^3/uL


(1.8-7.7) 7.1 x10^3/uL


(1.8-7.7) 5.3 x10^3/uL


(1.8-7.7)


 


Lymphocytes # (Auto)


 0.5 x10^3/uL


(1.0-4.8) 0.5 x10^3/uL


(1.0-4.8) 0.5 x10^3/uL


(1.0-4.8) 0.8 x10^3/uL


(1.0-4.8)


 


Monocytes # (Auto)


 0.2 x10^3/uL


(0.0-1.1) 0.4 x10^3/uL


(0.0-1.1) 0.5 x10^3/uL


(0.0-1.1) 0.6 x10^3/uL


(0.0-1.1)


 


Eosinophils # (Auto)


 0.2 x10^3/uL


(0.0-0.7) 0.1 x10^3/uL


(0.0-0.7) 0.1 x10^3/uL


(0.0-0.7) 0.2 x10^3/uL


(0.0-0.7)


 


Basophils # (Auto)


 0.0 x10^3/uL


(0.0-0.2) 0.1 x10^3/uL


(0.0-0.2) 0.0 x10^3/uL


(0.0-0.2) 0.0 x10^3/uL


(0.0-0.2)


 


Heparin Anti-Xa Act,


Unfractionated 0.34 IU/mL


(0.30-0.70) 0.33 IU/mL


(0.30-0.70) 


 0.29 IU/mL


(0.30-0.70)


 


Sodium Level


 132 mmol/L


(136-145) 133 mmol/L


(136-145) 132 mmol/L


(136-145) 133 mmol/L


(136-145)


 


Potassium Level


 4.1 mmol/L


(3.5-5.1) 4.4 mmol/L


(3.5-5.1) 4.3 mmol/L


(3.5-5.1) 3.9 mmol/L


(3.5-5.1)


 


Chloride Level


 98 mmol/L


() 100 mmol/L


() 98 mmol/L


() 99 mmol/L


()


 


Carbon Dioxide Level


 24 mmol/L


(21-32) 26 mmol/L


(21-32) 23 mmol/L


(21-32) 23 mmol/L


(21-32)


 


Anion Gap 10 (6-14)  7 (6-14)  11 (6-14)  11 (6-14) 


 


Blood Urea Nitrogen


 35 mg/dL


(8-26) 28 mg/dL


(8-26) 21 mg/dL


(8-26) 20 mg/dL


(8-26)


 


Creatinine


 5.2 mg/dL


(0.7-1.3) 4.2 mg/dL


(0.7-1.3) 3.4 mg/dL


(0.7-1.3) 3.7 mg/dL


(0.7-1.3)


 


Estimated GFR


(Cockcroft-Gault) 13.1 


 16.7 


 21.4 


 19.4 





 


Glucose Level


 98 mg/dL


(70-99) 111 mg/dL


(70-99) 120 mg/dL


(70-99) 101 mg/dL


(70-99)


 


Calcium Level


 7.3 mg/dL


(8.5-10.1) 7.4 mg/dL


(8.5-10.1) 7.5 mg/dL


(8.5-10.1) 7.9 mg/dL


(8.5-10.1)


 


Phosphorus Level


 3.6 mg/dL


(2.6-4.7) 3.1 mg/dL


(2.6-4.7) 2.6 mg/dL


(2.6-4.7) 2.8 mg/dL


(2.6-4.7)


 


Magnesium Level


 


 2.2 mg/dL


(1.8-2.4) 2.3 mg/dL


(1.8-2.4) 2.3 mg/dL


(1.8-2.4)


 


Creatine Kinase


 


 


 


 45322 U/L


()








Micro





7/10/19 Blood Culture - Preliminary, Resulted


          NO GROWTH AFTER 3 DAYS








7/10. GRAM STAIN,CSF  Final  


        WBCS                        OCCASIONAL


        ORGANISMS                   NONE SEEN








7/9. URINE CULTURE RES 1  Final  


        No growth





Objective


Assessment


Right hand burn to index finger with cellulitis, superficial.  No evidence of 

sinus tract. X ray neg 


History of intravenous drug use


Leukocytosis, likely reactive.resolved


History of fall with rhabdomyolysis. CK >100,000 - trending down 


Hyponatremia, hyperkalemia, severe metabolic acidosis, acute kidney injury off 

CRRT


Transaminitis. Gallbladder sludge on ultrasound.


Methicillin-resistant Staphylococcus aureus screen positive.


Bilateral lower extremity weakness, numbness and history of fall prior to 

admission.


Urinary retention - Culp in place.


Paresthesia.


Abnormal T2 signal within the right greater than left paraspinal


    musculature and psoas, which can be seen with edema in these regions.  

Neurology and Neurosurgery consulted.


   -s/p LP 7/10: CSF WBC 3, glu 52, TP 52.3. gram stain showed no organisms. Cx 

pending 


Hematuria with occasional wbc's on urinalysis.





Plan


Plan of Care


Continue Zyvox.(7/11) and Rocephin (7/11)


local wound care. Pictures reviewed with nursing 


Cultures neg to date





Clinically better. In chair and eating lunch





D/w nursing





Attending Co-Sign


Attending Co-Sign


The patient was seen and interviewed as well as examined at the bedside. The 

chart was reviewed. The case was discussed. Agree with the plan of care.











EDUAR PANTOJA        Jul 14, 2019 08:36


BRETT RAY MD              Jul 14, 2019 12:30

## 2019-07-14 NOTE — RAD
EXAM: CHEST 1 VIEW 

 

History: Continuous dialysis

 

COMPARISON: 7/13/2019

 

TECHNIQUE: Single portable radiograph of the chest

 

Findings/

impression:

 

Low lung volumes and technique accentuates heart size and pulmonary 

vascularity. Right-sided dialysis catheter is unchanged. Mild prominent 

bilateral interstitial lung markings likely mild congestive changes 

similar to prior exam.

 

Electronically signed by: Rey Kurtz MD (7/14/2019 7:55 AM) [facility]

## 2019-07-15 VITALS — DIASTOLIC BLOOD PRESSURE: 103 MMHG | SYSTOLIC BLOOD PRESSURE: 156 MMHG

## 2019-07-15 VITALS — SYSTOLIC BLOOD PRESSURE: 180 MMHG | DIASTOLIC BLOOD PRESSURE: 100 MMHG

## 2019-07-15 VITALS — SYSTOLIC BLOOD PRESSURE: 184 MMHG | DIASTOLIC BLOOD PRESSURE: 107 MMHG

## 2019-07-15 VITALS — SYSTOLIC BLOOD PRESSURE: 136 MMHG | DIASTOLIC BLOOD PRESSURE: 82 MMHG

## 2019-07-15 VITALS — SYSTOLIC BLOOD PRESSURE: 161 MMHG | DIASTOLIC BLOOD PRESSURE: 102 MMHG

## 2019-07-15 VITALS — DIASTOLIC BLOOD PRESSURE: 96 MMHG | SYSTOLIC BLOOD PRESSURE: 158 MMHG

## 2019-07-15 LAB
ALBUMIN SERPL-MCNC: 1.9 G/DL (ref 3.4–5)
ALBUMIN/GLOB SERPL: 0.6 {RATIO} (ref 1–1.7)
ALP SERPL-CCNC: 129 U/L (ref 46–116)
ALT SERPL-CCNC: 220 U/L (ref 16–63)
ANION GAP SERPL CALC-SCNC: 9 MMOL/L (ref 6–14)
AST SERPL-CCNC: 296 U/L (ref 15–37)
BASOPHILS # BLD AUTO: 0.1 X10^3/UL (ref 0–0.2)
BASOPHILS NFR BLD: 1 % (ref 0–3)
BILIRUB SERPL-MCNC: 0.5 MG/DL (ref 0.2–1)
BUN SERPL-MCNC: 27 MG/DL (ref 8–26)
BUN/CREAT SERPL: 5 (ref 6–20)
CALCIUM SERPL-MCNC: 8.8 MG/DL (ref 8.5–10.1)
CHLORIDE SERPL-SCNC: 97 MMOL/L (ref 98–107)
CO2 SERPL-SCNC: 26 MMOL/L (ref 21–32)
CREAT SERPL-MCNC: 5.6 MG/DL (ref 0.7–1.3)
EOSINOPHIL NFR BLD: 0.3 X10^3/UL (ref 0–0.7)
EOSINOPHIL NFR BLD: 3 % (ref 0–3)
ERYTHROCYTE [DISTWIDTH] IN BLOOD BY AUTOMATED COUNT: 13.1 % (ref 11.5–14.5)
GFR SERPLBLD BASED ON 1.73 SQ M-ARVRAT: 12 ML/MIN
GLOBULIN SER-MCNC: 3.3 G/DL (ref 2.2–3.8)
GLUCOSE SERPL-MCNC: 95 MG/DL (ref 70–99)
HCT VFR BLD CALC: 27.8 % (ref 39–53)
HGB BLD-MCNC: 9.6 G/DL (ref 13–17.5)
LYMPHOCYTES # BLD: 1.3 X10^3/UL (ref 1–4.8)
LYMPHOCYTES NFR BLD AUTO: 15 % (ref 24–48)
MCH RBC QN AUTO: 29 PG (ref 25–35)
MCHC RBC AUTO-ENTMCNC: 35 G/DL (ref 31–37)
MCV RBC AUTO: 84 FL (ref 79–100)
MONO #: 0.7 X10^3/UL (ref 0–1.1)
MONOCYTES NFR BLD: 8 % (ref 0–9)
NEUT #: 6.3 X10^3/UL (ref 1.8–7.7)
NEUTROPHILS NFR BLD AUTO: 73 % (ref 31–73)
PLATELET # BLD AUTO: 210 X10^3/UL (ref 140–400)
POTASSIUM SERPL-SCNC: 3.9 MMOL/L (ref 3.5–5.1)
PROT SERPL-MCNC: 5.2 G/DL (ref 6.4–8.2)
RBC # BLD AUTO: 3.3 X10^6/UL (ref 4.3–5.7)
SODIUM SERPL-SCNC: 132 MMOL/L (ref 136–145)
WBC # BLD AUTO: 8.6 X10^3/UL (ref 4–11)

## 2019-07-15 PROCEDURE — 5A1D70Z PERFORMANCE OF URINARY FILTRATION, INTERMITTENT, LESS THAN 6 HOURS PER DAY: ICD-10-PCS | Performed by: FAMILY MEDICINE

## 2019-07-15 RX ADMIN — Medication SCH CAP: at 21:06

## 2019-07-15 RX ADMIN — Medication SCH EA: at 21:00

## 2019-07-15 RX ADMIN — LIDOCAINE SCH PATCH: 50 PATCH CUTANEOUS at 10:00

## 2019-07-15 RX ADMIN — GABAPENTIN SCH MG: 300 CAPSULE ORAL at 21:06

## 2019-07-15 RX ADMIN — LINEZOLID SCH MG: 600 TABLET, FILM COATED ORAL at 10:04

## 2019-07-15 RX ADMIN — MORPHINE SULFATE PRN MG: 4 INJECTION, SOLUTION INTRAMUSCULAR; INTRAVENOUS at 20:04

## 2019-07-15 RX ADMIN — PANTOPRAZOLE SODIUM SCH MG: 40 INJECTION, POWDER, FOR SOLUTION INTRAVENOUS at 10:04

## 2019-07-15 RX ADMIN — Medication SCH CAP: at 10:04

## 2019-07-15 RX ADMIN — LINEZOLID SCH MG: 600 TABLET, FILM COATED ORAL at 21:06

## 2019-07-15 RX ADMIN — POLYETHYLENE GLYCOL 3350 SCH GM: 17 POWDER, FOR SOLUTION ORAL at 10:05

## 2019-07-15 NOTE — PDOC
PULMONARY PROGRESS NOTES


Subjective


alert, no sob, cough, has low back pain, off precedex drip, crrt stopped 3 hrs 

ago, clotted line, hep gtt,


Vitals





Vital Signs








  Date Time  Temp Pulse Resp B/P (MAP) Pulse Ox O2 Delivery O2 Flow Rate FiO2


 


7/15/19 08:51   18   Room Air  


 


7/15/19 07:57 97.7 90  180/100 (126) 97   





 97.7       


 


7/14/19 22:22       98.0 








Comments


ros as mentioned as above, discussed w rn, other sys otherwise neg


General:  No acute distress


HEENT:  Other (nc at perrl nose clear     neck no lad  no thyromegaly)


Lungs:  Clear


Cardiovascular:  S1, S2


Abdomen:  Soft, Non-tender, Other (no mass)


Extremities:  Other (1+edema,)


Skin:  Warm


Labs





Laboratory Tests








Test


 7/13/19


14:50 7/13/19


21:45 7/14/19


06:00 7/15/19


03:15


 


White Blood Count


 8.0 x10^3/uL


(4.0-11.0) 8.2 x10^3/uL


(4.0-11.0) 6.8 x10^3/uL


(4.0-11.0) 8.6 x10^3/uL


(4.0-11.0)


 


Red Blood Count


 3.77 x10^6/uL


(4.30-5.70) 3.52 x10^6/uL


(4.30-5.70) 3.12 x10^6/uL


(4.30-5.70) 3.30 x10^6/uL


(4.30-5.70)


 


Hemoglobin


 11.0 g/dL


(13.0-17.5) 10.3 g/dL


(13.0-17.5) 9.2 g/dL


(13.0-17.5) 9.6 g/dL


(13.0-17.5)


 


Hematocrit


 31.3 %


(39.0-53.0) 29.3 %


(39.0-53.0) 26.2 %


(39.0-53.0) 27.8 %


(39.0-53.0)


 


Mean Corpuscular Volume 83 fL ()  83 fL ()  84 fL ()  84 fL 

() 


 


Mean Corpuscular Hemoglobin 29 pg (25-35)  29 pg (25-35)  30 pg (25-35)  29 pg 

(25-35) 


 


Mean Corpuscular Hemoglobin


Concent 35 g/dL


(31-37) 35 g/dL


(31-37)


 


Red Cell Distribution Width


 12.9 %


(11.5-14.5) 12.9 %


(11.5-14.5) 13.0 %


(11.5-14.5) 13.1 %


(11.5-14.5)


 


Platelet Count


 196 x10^3/uL


(140-400) 212 x10^3/uL


(140-400) 182 x10^3/uL


(140-400) 210 x10^3/uL


(140-400)


 


Neutrophils (%) (Auto) 86 % (31-73)  87 % (31-73)  77 % (31-73)  73 % (31-73) 


 


Lymphocytes (%) (Auto) 6 % (24-48)  6 % (24-48)  12 % (24-48)  15 % (24-48) 


 


Monocytes (%) (Auto) 5 % (0-9)  6 % (0-9)  8 % (0-9)  8 % (0-9) 


 


Eosinophils (%) (Auto) 2 % (0-3)  2 % (0-3)  3 % (0-3)  3 % (0-3) 


 


Basophils (%) (Auto) 1 % (0-3)  0 % (0-3)  1 % (0-3)  1 % (0-3) 


 


Neutrophils # (Auto)


 6.9 x10^3/uL


(1.8-7.7) 7.1 x10^3/uL


(1.8-7.7) 5.3 x10^3/uL


(1.8-7.7) 6.3 x10^3/uL


(1.8-7.7)


 


Lymphocytes # (Auto)


 0.5 x10^3/uL


(1.0-4.8) 0.5 x10^3/uL


(1.0-4.8) 0.8 x10^3/uL


(1.0-4.8) 1.3 x10^3/uL


(1.0-4.8)


 


Monocytes # (Auto)


 0.4 x10^3/uL


(0.0-1.1) 0.5 x10^3/uL


(0.0-1.1) 0.6 x10^3/uL


(0.0-1.1) 0.7 x10^3/uL


(0.0-1.1)


 


Eosinophils # (Auto)


 0.1 x10^3/uL


(0.0-0.7) 0.1 x10^3/uL


(0.0-0.7) 0.2 x10^3/uL


(0.0-0.7) 0.3 x10^3/uL


(0.0-0.7)


 


Basophils # (Auto)


 0.1 x10^3/uL


(0.0-0.2) 0.0 x10^3/uL


(0.0-0.2) 0.0 x10^3/uL


(0.0-0.2) 0.1 x10^3/uL


(0.0-0.2)


 


Heparin Anti-Xa Act,


Unfractionated 0.33 IU/mL


(0.30-0.70) 


 0.29 IU/mL


(0.30-0.70) 





 


Sodium Level


 133 mmol/L


(136-145) 132 mmol/L


(136-145) 133 mmol/L


(136-145) 132 mmol/L


(136-145)


 


Potassium Level


 4.4 mmol/L


(3.5-5.1) 4.3 mmol/L


(3.5-5.1) 3.9 mmol/L


(3.5-5.1) 3.9 mmol/L


(3.5-5.1)


 


Chloride Level


 100 mmol/L


() 98 mmol/L


() 99 mmol/L


() 97 mmol/L


()


 


Carbon Dioxide Level


 26 mmol/L


(21-32) 23 mmol/L


(21-32) 23 mmol/L


(21-32) 26 mmol/L


(21-32)


 


Anion Gap 7 (6-14)  11 (6-14)  11 (6-14)  9 (6-14) 


 


Blood Urea Nitrogen


 28 mg/dL


(8-26) 21 mg/dL


(8-26) 20 mg/dL


(8-26) 27 mg/dL


(8-26)


 


Creatinine


 4.2 mg/dL


(0.7-1.3) 3.4 mg/dL


(0.7-1.3) 3.7 mg/dL


(0.7-1.3) 5.6 mg/dL


(0.7-1.3)


 


Estimated GFR


(Cockcroft-Gault) 16.7 


 21.4 


 19.4 


 12.0 





 


Glucose Level


 111 mg/dL


(70-99) 120 mg/dL


(70-99) 101 mg/dL


(70-99) 95 mg/dL


(70-99)


 


Calcium Level


 7.4 mg/dL


(8.5-10.1) 7.5 mg/dL


(8.5-10.1) 7.9 mg/dL


(8.5-10.1) 8.8 mg/dL


(8.5-10.1)


 


Phosphorus Level


 3.1 mg/dL


(2.6-4.7) 2.6 mg/dL


(2.6-4.7) 2.8 mg/dL


(2.6-4.7) 





 


Magnesium Level


 2.2 mg/dL


(1.8-2.4) 2.3 mg/dL


(1.8-2.4) 2.3 mg/dL


(1.8-2.4) 





 


Creatine Kinase


 


 


 08612 U/L


() 90364 U/L


()


 


BUN/Creatinine Ratio    5 (6-20) 


 


Total Bilirubin


 


 


 


 0.5 mg/dL


(0.2-1.0)


 


Aspartate Amino Transf


(AST/SGOT) 


 


 


 296 U/L


(15-37)


 


Alanine Aminotransferase


(ALT/SGPT) 


 


 


 220 U/L


(16-63)


 


Alkaline Phosphatase


 


 


 


 129 U/L


()


 


Total Protein


 


 


 


 5.2 g/dL


(6.4-8.2)


 


Albumin


 


 


 


 1.9 g/dL


(3.4-5.0)


 


Albumin/Globulin Ratio    0.6 (1.0-1.7) 








Laboratory Tests








Test


 7/15/19


03:15


 


White Blood Count


 8.6 x10^3/uL


(4.0-11.0)


 


Red Blood Count


 3.30 x10^6/uL


(4.30-5.70)


 


Hemoglobin


 9.6 g/dL


(13.0-17.5)


 


Hematocrit


 27.8 %


(39.0-53.0)


 


Mean Corpuscular Volume 84 fL () 


 


Mean Corpuscular Hemoglobin 29 pg (25-35) 


 


Mean Corpuscular Hemoglobin


Concent 35 g/dL


(31-37)


 


Red Cell Distribution Width


 13.1 %


(11.5-14.5)


 


Platelet Count


 210 x10^3/uL


(140-400)


 


Neutrophils (%) (Auto) 73 % (31-73) 


 


Lymphocytes (%) (Auto) 15 % (24-48) 


 


Monocytes (%) (Auto) 8 % (0-9) 


 


Eosinophils (%) (Auto) 3 % (0-3) 


 


Basophils (%) (Auto) 1 % (0-3) 


 


Neutrophils # (Auto)


 6.3 x10^3/uL


(1.8-7.7)


 


Lymphocytes # (Auto)


 1.3 x10^3/uL


(1.0-4.8)


 


Monocytes # (Auto)


 0.7 x10^3/uL


(0.0-1.1)


 


Eosinophils # (Auto)


 0.3 x10^3/uL


(0.0-0.7)


 


Basophils # (Auto)


 0.1 x10^3/uL


(0.0-0.2)


 


Sodium Level


 132 mmol/L


(136-145)


 


Potassium Level


 3.9 mmol/L


(3.5-5.1)


 


Chloride Level


 97 mmol/L


()


 


Carbon Dioxide Level


 26 mmol/L


(21-32)


 


Anion Gap 9 (6-14) 


 


Blood Urea Nitrogen


 27 mg/dL


(8-26)


 


Creatinine


 5.6 mg/dL


(0.7-1.3)


 


Estimated GFR


(Cockcroft-Gault) 12.0 





 


BUN/Creatinine Ratio 5 (6-20) 


 


Glucose Level


 95 mg/dL


(70-99)


 


Calcium Level


 8.8 mg/dL


(8.5-10.1)


 


Total Bilirubin


 0.5 mg/dL


(0.2-1.0)


 


Aspartate Amino Transf


(AST/SGOT) 296 U/L


(15-37)


 


Alanine Aminotransferase


(ALT/SGPT) 220 U/L


(16-63)


 


Alkaline Phosphatase


 129 U/L


()


 


Creatine Kinase


 77361 U/L


()


 


Total Protein


 5.2 g/dL


(6.4-8.2)


 


Albumin


 1.9 g/dL


(3.4-5.0)


 


Albumin/Globulin Ratio 0.6 (1.0-1.7) 








Medications





Active Scripts








 Medications  Dose


 Route/Sig


 Max Daily Dose Days Date Category


 


 Protonix


  (Pantoprazole


 Sodium) 20 Mg


 Tablet.dr  2 Tab


 PO DAILY


   7/10/19 Reported


 


 Zyprexa


  (Olanzapine) 20


 Mg Tablet  2 Tab


 PO QHS


   7/10/19 Reported


 


 Buspirone Hcl 30


 Mg Tablet  1 Tab


 PO BID


   7/10/19 Reported








Comments


7/14, cxr reviewed  increased vm





Impression


.


1.  Status post fall with acute rhabdomyolysis with markedly high CPKs and acute


renal failure.


2.  Acute kidney injury secondary to rhabdomyolysis.


3.  Severe metabolic acidosis secondary to acute kidney injury.  Clinically,


less likely sepsis.  Lactic acid is 1.1, improved MA


4.  Hyponatremia.improving


5.  Moderate protein-calorie malnutrition.


6.  Leukocytosis, likely reactive.


7.  Status post fall with abnormal thoracic MRI.  Neurosurgery following.


8.  Abnormal liver function test secondary to hypoperfusion and rhabdomyolysis.


9.  Hypocalcemia.improving





Plan


.





1.  s/p fluid resuscitation, was on CRRT


2.  Follow Renal's recommendation.


3.  prn bicarbonate.


4.  Monitor sodium level. improving


5.  Follow Neurology and Neurosurgery's recommendation.


6.  Continue empiric antibiotic, per id


7.  Follow CPKs., trending down


8.  Correct calcium and follow the levels.improving


9.   chest x-ray, reviewed 7/14, not much to add


10.  avoid oversedation


Discussed with RN 


will sign off











SIENA KHALIL MD                 Jul 15, 2019 11:11

## 2019-07-15 NOTE — PDOC
PROGRESS NOTES


Chief Complaint


Chief Complaint


? ESRD-initiation of CRRT


Hypotension


Hepatorenal syndrome


Elevated troponin in the background of sepsis


UTI


Methamphetamine abuse


Metabolic encephalopathy


etoh drinker


Sepsis with hypotension


MOD to sever PCM


Oliguric renal failure


Accelerated hypertension





History of Present Illness


History of Present Illness


Off-and-on confusion, knows where he is today


Maintain Culp catheter per renal, still oliguric


Has a right temporary dialysis catheter


Underwent CRRT few sessions in this admission


Oliguric as per RN


Blood pressure on the high side


Back pain today


On Zyvox and Rocephin per ID





working with PT.OT - some weakness





PLAN:





Please give the labetalol when necessary


Continue Zyvox Rocephin per ID


Dialysis per renal


Unsure if this will be a permanent HD  or outpatient setting set up,-patient is 

self-pay


Lidoderm patch for the back





dw R at bedside





Vitals


Vitals





Vital Signs








  Date Time  Temp Pulse Resp B/P (MAP) Pulse Ox O2 Delivery O2 Flow Rate FiO2


 


7/15/19 11:34 97.7 99 18 158/96 (116) 98 Room Air  





 97.7       


 


7/14/19 22:22       98.0 











Physical Exam


Physical Exam


GENERAL:  In bed, NAD, coop 


EENT:  Pupils equal, oral cavity clear


NECK:  Supple.


LUNGS:  Clear bilaterally.  No wheezing.


HEART:  S1, S2, regular, tachy 108s


ABDOMEN:  Soft, nontender, BS present


: Culp


EXTREMITIES:  Generalized trace edema, no cyanosis. Right index with min 

swelling.  Lesions are stable - mild maceration


CENTRAL NERVOUS SYSTEM:  Alert, answering appropriately


RIJ/HDC without signs of complications


PIV


General:  Oriented X3, Cooperative, mild distress


Heart:  Regular rate, Normal S1, No murmurs


Lungs:  Clear


Abdomen:  Normal bowel sounds, Soft, No hepatosplenomegaly, Other (obese, no 

fluid wave)


Extremities:  No clubbing, No cyanosis, No edema, Other (burn to right index 

finger APPEARS OLD  associated  cellulitis)





Labs


LABS





Laboratory Tests








Test


 7/15/19


03:15


 


White Blood Count


 8.6 x10^3/uL


(4.0-11.0)


 


Red Blood Count


 3.30 x10^6/uL


(4.30-5.70)


 


Hemoglobin


 9.6 g/dL


(13.0-17.5)


 


Hematocrit


 27.8 %


(39.0-53.0)


 


Mean Corpuscular Volume 84 fL () 


 


Mean Corpuscular Hemoglobin 29 pg (25-35) 


 


Mean Corpuscular Hemoglobin


Concent 35 g/dL


(31-37)


 


Red Cell Distribution Width


 13.1 %


(11.5-14.5)


 


Platelet Count


 210 x10^3/uL


(140-400)


 


Neutrophils (%) (Auto) 73 % (31-73) 


 


Lymphocytes (%) (Auto) 15 % (24-48) 


 


Monocytes (%) (Auto) 8 % (0-9) 


 


Eosinophils (%) (Auto) 3 % (0-3) 


 


Basophils (%) (Auto) 1 % (0-3) 


 


Neutrophils # (Auto)


 6.3 x10^3/uL


(1.8-7.7)


 


Lymphocytes # (Auto)


 1.3 x10^3/uL


(1.0-4.8)


 


Monocytes # (Auto)


 0.7 x10^3/uL


(0.0-1.1)


 


Eosinophils # (Auto)


 0.3 x10^3/uL


(0.0-0.7)


 


Basophils # (Auto)


 0.1 x10^3/uL


(0.0-0.2)


 


Sodium Level


 132 mmol/L


(136-145)


 


Potassium Level


 3.9 mmol/L


(3.5-5.1)


 


Chloride Level


 97 mmol/L


()


 


Carbon Dioxide Level


 26 mmol/L


(21-32)


 


Anion Gap 9 (6-14) 


 


Blood Urea Nitrogen


 27 mg/dL


(8-26)


 


Creatinine


 5.6 mg/dL


(0.7-1.3)


 


Estimated GFR


(Cockcroft-Gault) 12.0 





 


BUN/Creatinine Ratio 5 (6-20) 


 


Glucose Level


 95 mg/dL


(70-99)


 


Calcium Level


 8.8 mg/dL


(8.5-10.1)


 


Total Bilirubin


 0.5 mg/dL


(0.2-1.0)


 


Aspartate Amino Transf


(AST/SGOT) 296 U/L


(15-37)


 


Alanine Aminotransferase


(ALT/SGPT) 220 U/L


(16-63)


 


Alkaline Phosphatase


 129 U/L


()


 


Creatine Kinase


 35303 U/L


()


 


Total Protein


 5.2 g/dL


(6.4-8.2)


 


Albumin


 1.9 g/dL


(3.4-5.0)


 


Albumin/Globulin Ratio 0.6 (1.0-1.7) 











Review of Systems


Review of Systems


Weak, oliguria, the rest of ROS 14 point negative





Assessment and Plan


Assessmemt and Plan


Problems


Medical Problems:


(1) Elevated troponin


Status: Acute  





(2) Hepatorenal syndrome


Status: Acute  





(3) Hyperkalemia


Status: Acute  





(4) Hyponatremia


Status: Acute  





(5) Methamphetamine abuse


Status: Acute  





(6) Neurological complaint


Status: Acute  





(7) Rhabdomyolysis


Status: Acute  





(8) Urinary retention


Status: Acute  





(9) Urinary tract infection


Status: Acute  











Comment


Review of Relevant


I have reviewed the following items estrella (where applicable) has been applied.


Labs





Laboratory Tests








Test


 7/13/19


14:50 7/13/19


21:45 7/14/19


06:00 7/15/19


03:15


 


White Blood Count


 8.0 x10^3/uL


(4.0-11.0) 8.2 x10^3/uL


(4.0-11.0) 6.8 x10^3/uL


(4.0-11.0) 8.6 x10^3/uL


(4.0-11.0)


 


Red Blood Count


 3.77 x10^6/uL


(4.30-5.70) 3.52 x10^6/uL


(4.30-5.70) 3.12 x10^6/uL


(4.30-5.70) 3.30 x10^6/uL


(4.30-5.70)


 


Hemoglobin


 11.0 g/dL


(13.0-17.5) 10.3 g/dL


(13.0-17.5) 9.2 g/dL


(13.0-17.5) 9.6 g/dL


(13.0-17.5)


 


Hematocrit


 31.3 %


(39.0-53.0) 29.3 %


(39.0-53.0) 26.2 %


(39.0-53.0) 27.8 %


(39.0-53.0)


 


Mean Corpuscular Volume 83 fL ()  83 fL ()  84 fL ()  84 fL 

() 


 


Mean Corpuscular Hemoglobin 29 pg (25-35)  29 pg (25-35)  30 pg (25-35)  29 pg 

(25-35) 


 


Mean Corpuscular Hemoglobin


Concent 35 g/dL


(31-37) 35 g/dL


(31-37)


 


Red Cell Distribution Width


 12.9 %


(11.5-14.5) 12.9 %


(11.5-14.5) 13.0 %


(11.5-14.5) 13.1 %


(11.5-14.5)


 


Platelet Count


 196 x10^3/uL


(140-400) 212 x10^3/uL


(140-400) 182 x10^3/uL


(140-400) 210 x10^3/uL


(140-400)


 


Neutrophils (%) (Auto) 86 % (31-73)  87 % (31-73)  77 % (31-73)  73 % (31-73) 


 


Lymphocytes (%) (Auto) 6 % (24-48)  6 % (24-48)  12 % (24-48)  15 % (24-48) 


 


Monocytes (%) (Auto) 5 % (0-9)  6 % (0-9)  8 % (0-9)  8 % (0-9) 


 


Eosinophils (%) (Auto) 2 % (0-3)  2 % (0-3)  3 % (0-3)  3 % (0-3) 


 


Basophils (%) (Auto) 1 % (0-3)  0 % (0-3)  1 % (0-3)  1 % (0-3) 


 


Neutrophils # (Auto)


 6.9 x10^3/uL


(1.8-7.7) 7.1 x10^3/uL


(1.8-7.7) 5.3 x10^3/uL


(1.8-7.7) 6.3 x10^3/uL


(1.8-7.7)


 


Lymphocytes # (Auto)


 0.5 x10^3/uL


(1.0-4.8) 0.5 x10^3/uL


(1.0-4.8) 0.8 x10^3/uL


(1.0-4.8) 1.3 x10^3/uL


(1.0-4.8)


 


Monocytes # (Auto)


 0.4 x10^3/uL


(0.0-1.1) 0.5 x10^3/uL


(0.0-1.1) 0.6 x10^3/uL


(0.0-1.1) 0.7 x10^3/uL


(0.0-1.1)


 


Eosinophils # (Auto)


 0.1 x10^3/uL


(0.0-0.7) 0.1 x10^3/uL


(0.0-0.7) 0.2 x10^3/uL


(0.0-0.7) 0.3 x10^3/uL


(0.0-0.7)


 


Basophils # (Auto)


 0.1 x10^3/uL


(0.0-0.2) 0.0 x10^3/uL


(0.0-0.2) 0.0 x10^3/uL


(0.0-0.2) 0.1 x10^3/uL


(0.0-0.2)


 


Heparin Anti-Xa Act,


Unfractionated 0.33 IU/mL


(0.30-0.70) 


 0.29 IU/mL


(0.30-0.70) 





 


Sodium Level


 133 mmol/L


(136-145) 132 mmol/L


(136-145) 133 mmol/L


(136-145) 132 mmol/L


(136-145)


 


Potassium Level


 4.4 mmol/L


(3.5-5.1) 4.3 mmol/L


(3.5-5.1) 3.9 mmol/L


(3.5-5.1) 3.9 mmol/L


(3.5-5.1)


 


Chloride Level


 100 mmol/L


() 98 mmol/L


() 99 mmol/L


() 97 mmol/L


()


 


Carbon Dioxide Level


 26 mmol/L


(21-32) 23 mmol/L


(21-32) 23 mmol/L


(21-32) 26 mmol/L


(21-32)


 


Anion Gap 7 (6-14)  11 (6-14)  11 (6-14)  9 (6-14) 


 


Blood Urea Nitrogen


 28 mg/dL


(8-26) 21 mg/dL


(8-26) 20 mg/dL


(8-26) 27 mg/dL


(8-26)


 


Creatinine


 4.2 mg/dL


(0.7-1.3) 3.4 mg/dL


(0.7-1.3) 3.7 mg/dL


(0.7-1.3) 5.6 mg/dL


(0.7-1.3)


 


Estimated GFR


(Cockcroft-Gault) 16.7 


 21.4 


 19.4 


 12.0 





 


Glucose Level


 111 mg/dL


(70-99) 120 mg/dL


(70-99) 101 mg/dL


(70-99) 95 mg/dL


(70-99)


 


Calcium Level


 7.4 mg/dL


(8.5-10.1) 7.5 mg/dL


(8.5-10.1) 7.9 mg/dL


(8.5-10.1) 8.8 mg/dL


(8.5-10.1)


 


Phosphorus Level


 3.1 mg/dL


(2.6-4.7) 2.6 mg/dL


(2.6-4.7) 2.8 mg/dL


(2.6-4.7) 





 


Magnesium Level


 2.2 mg/dL


(1.8-2.4) 2.3 mg/dL


(1.8-2.4) 2.3 mg/dL


(1.8-2.4) 





 


Creatine Kinase


 


 


 93462 U/L


() 31198 U/L


()


 


BUN/Creatinine Ratio    5 (6-20) 


 


Total Bilirubin


 


 


 


 0.5 mg/dL


(0.2-1.0)


 


Aspartate Amino Transf


(AST/SGOT) 


 


 


 296 U/L


(15-37)


 


Alanine Aminotransferase


(ALT/SGPT) 


 


 


 220 U/L


(16-63)


 


Alkaline Phosphatase


 


 


 


 129 U/L


()


 


Total Protein


 


 


 


 5.2 g/dL


(6.4-8.2)


 


Albumin


 


 


 


 1.9 g/dL


(3.4-5.0)


 


Albumin/Globulin Ratio    0.6 (1.0-1.7) 








Laboratory Tests








Test


 7/15/19


03:15


 


White Blood Count


 8.6 x10^3/uL


(4.0-11.0)


 


Red Blood Count


 3.30 x10^6/uL


(4.30-5.70)


 


Hemoglobin


 9.6 g/dL


(13.0-17.5)


 


Hematocrit


 27.8 %


(39.0-53.0)


 


Mean Corpuscular Volume 84 fL () 


 


Mean Corpuscular Hemoglobin 29 pg (25-35) 


 


Mean Corpuscular Hemoglobin


Concent 35 g/dL


(31-37)


 


Red Cell Distribution Width


 13.1 %


(11.5-14.5)


 


Platelet Count


 210 x10^3/uL


(140-400)


 


Neutrophils (%) (Auto) 73 % (31-73) 


 


Lymphocytes (%) (Auto) 15 % (24-48) 


 


Monocytes (%) (Auto) 8 % (0-9) 


 


Eosinophils (%) (Auto) 3 % (0-3) 


 


Basophils (%) (Auto) 1 % (0-3) 


 


Neutrophils # (Auto)


 6.3 x10^3/uL


(1.8-7.7)


 


Lymphocytes # (Auto)


 1.3 x10^3/uL


(1.0-4.8)


 


Monocytes # (Auto)


 0.7 x10^3/uL


(0.0-1.1)


 


Eosinophils # (Auto)


 0.3 x10^3/uL


(0.0-0.7)


 


Basophils # (Auto)


 0.1 x10^3/uL


(0.0-0.2)


 


Sodium Level


 132 mmol/L


(136-145)


 


Potassium Level


 3.9 mmol/L


(3.5-5.1)


 


Chloride Level


 97 mmol/L


()


 


Carbon Dioxide Level


 26 mmol/L


(21-32)


 


Anion Gap 9 (6-14) 


 


Blood Urea Nitrogen


 27 mg/dL


(8-26)


 


Creatinine


 5.6 mg/dL


(0.7-1.3)


 


Estimated GFR


(Cockcroft-Gault) 12.0 





 


BUN/Creatinine Ratio 5 (6-20) 


 


Glucose Level


 95 mg/dL


(70-99)


 


Calcium Level


 8.8 mg/dL


(8.5-10.1)


 


Total Bilirubin


 0.5 mg/dL


(0.2-1.0)


 


Aspartate Amino Transf


(AST/SGOT) 296 U/L


(15-37)


 


Alanine Aminotransferase


(ALT/SGPT) 220 U/L


(16-63)


 


Alkaline Phosphatase


 129 U/L


()


 


Creatine Kinase


 43714 U/L


()


 


Total Protein


 5.2 g/dL


(6.4-8.2)


 


Albumin


 1.9 g/dL


(3.4-5.0)


 


Albumin/Globulin Ratio 0.6 (1.0-1.7) 








Microbiology


7/10/19 Blood Culture - Final, Complete


          NO GROWTH AFTER 5 DAYS


7/10/19 CSF Gram Stain - Final, Complete


          


7/9/19 Urine Culture - Final, Complete


         


7/9/19 Urine Culture Result 1 (RUI) - Final, Complete


Medications





Current Medications


Morphine Sulfate (Morphine Sulfate) 4 mg 1X  ONCE IV  Last administered on 7/9/1

9at 21:20;  Start 7/9/19 at 21:30;  Stop 7/9/19 at 21:31;  Status DC


Sodium Chloride 1,000 ml @  1,000 mls/hr 1X  ONCE IV  Last administered on 

7/9/19at 22:30;  Start 7/9/19 at 22:30;  Stop 7/9/19 at 23:29;  Status DC


Calcium Gluconate (Calcium Gluconate) 1,000 mg 1X  ONCE IVP  Last administered 

on 7/9/19at 22:43;  Start 7/9/19 at 23:00;  Stop 7/9/19 at 23:01;  Status DC


Insulin Human Regular (HumuLIN R VIAL) 10 unit 1X  ONCE IV  Last administered on

7/9/19at 23:51;  Start 7/9/19 at 23:00;  Stop 7/9/19 at 23:01;  Status DC


Dextrose (Dextrose 50%-Water Syringe) 25 gm 1X  ONCE IV  Last administered on 

7/9/19at 22:42;  Start 7/9/19 at 23:00;  Stop 7/9/19 at 23:01;  Status DC


Sodium Bicarbonate (Sodium Bicarb Adult 8.4% Syr) 50 meq 1X  ONCE IV  Last 

administered on 7/9/19at 23:49;  Start 7/9/19 at 23:00;  Stop 7/9/19 at 23:01;  

Status DC


Sodium Chloride 1,000 ml @  1,000 mls/hr 1X  ONCE IV  Last administered on 

7/9/19at 23:50;  Start 7/9/19 at 23:00;  Stop 7/9/19 at 23:59;  Status DC


Albuterol Sulfate (Ventolin Neb Soln) 10 mg 1X  ONCE CONT NEB  Last administered

on 7/10/19at 00:30;  Start 7/9/19 at 23:00;  Stop 7/9/19 at 23:01;  Status DC


Ceftriaxone Sodium (Rocephin) 1 gm 1X  ONCE IVP  Last administered on 7/9/19at 

22:43;  Start 7/9/19 at 23:00;  Stop 7/9/19 at 23:01;  Status DC


Sodium Chloride 1,000 ml @  1,000 mls/hr 1X  ONCE IV  Last administered on 

7/10/19at 01:00;  Start 7/10/19 at 01:00;  Stop 7/10/19 at 01:59;  Status DC


Lorazepam (Ativan Inj) 0.5 mg PRN Q6HRS  PRN IV ANXIETY / AGITATION Last 

administered on 7/10/19at 11:00;  Start 7/10/19 at 01:15;  Stop 7/11/19 at 

08:48;  Status DC


Ondansetron HCl (Zofran) 4 mg PRN Q6HRS  PRN IV NAUSEA/VOMITING 1ST CHOICE;  

Start 7/10/19 at 01:15;  Stop 7/10/19 at 01:22;  Status DC


Sodium Chloride (Normal Saline Flush) 3 ml QSHIFT  PRN IV AFTER MEDS AND BLOOD 

DRAWS;  Start 7/10/19 at 01:15


Sodium Chloride 1,000 ml @  175 mls/hr Q5H43M IV ;  Start 7/10/19 at 01:08;  

Stop 7/10/19 at 01:23;  Status DC


Ondansetron HCl (Zofran) 4 mg PRN Q8HRS  PRN IV NAUSEA/VOMITING  1ST CHOICE;  

Start 7/10/19 at 01:15;  Stop 7/11/19 at 01:14;  Status DC


Sodium Chloride 1,000 ml @  150 mls/hr Q6H40M IV ;  Start 7/10/19 at 01:30;  

Stop 7/10/19 at 18:49;  Status DC


Morphine Sulfate (Morphine Sulfate) 4 mg PRN Q4HRS  PRN IV SEVERE PAIN Last 

administered on 7/14/19at 08:24;  Start 7/10/19 at 09:00


Sodium Bicarbonate (Sodium Bicarb Adult 8.4% Syr) 100 meq 1X  ONCE IV  Last ad

ministered on 7/10/19at 10:02;  Start 7/10/19 at 10:00;  Stop 7/10/19 at 10:01; 

Status DC


Sodium Chloride 1,000 ml @  1,000 mls/hr 1X  ONCE IV  Last administered on 

7/10/19at 09:56;  Start 7/10/19 at 10:00;  Stop 7/10/19 at 10:59;  Status DC


Lidocaine/Sodium Bicarbonate (Buffered Lidocaine 1%) 3 ml STK-MED ONCE .ROUTE ; 

Start 7/10/19 at 11:07;  Stop 7/10/19 at 11:08;  Status DC


Calcium Gluconate (Calcium Gluconate) 2,000 mg 1X  ONCE IVP  Last administered 

on 7/10/19at 13:38;  Start 7/10/19 at 12:00;  Stop 7/10/19 at 12:01;  Status DC


Ceftriaxone Sodium (Rocephin) 1 gm Q24H IVP  Last administered on 7/10/19at 

12:49;  Start 7/10/19 at 13:00;  Stop 7/11/19 at 07:45;  Status DC


Silver Sulfadiazine (Silvadene) 1 mack BID TP  Last administered on 7/11/19at 

10:06;  Start 7/10/19 at 13:00;  Stop 7/11/19 at 15:04;  Status DC


Sodium Chloride 1,000 ml @  1,000 mls/hr 1X  ONCE IV  Last administered on 

7/10/19at 12:47;  Start 7/10/19 at 12:45;  Stop 7/10/19 at 13:44;  Status DC


Pantoprazole Sodium (Protonix) 40 mg DAILYAC PO ;  Start 7/10/19 at 13:30;  Stop

7/10/19 at 19:22;  Status DC


Polyethylene Glycol (miraLAX PACKET) 17 gm DAILY PO  Last administered on 

7/15/19at 10:05;  Start 7/10/19 at 13:30


Lidocaine/Sodium Bicarbonate (Buffered Lidocaine 1%) 3 ml STK-MED ONCE .ROUTE ; 

Start 7/10/19 at 13:48;  Stop 7/10/19 at 13:49;  Status DC


Haloperidol Lactate (Haldol Inj) 1 mg PRN Q8HRS  PRN IVP AGITATION Last 

administered on 7/12/19at 07:56;  Start 7/10/19 at 14:00


Lorazepam (Ativan Inj) 2 mg PRN Q2HRS  PRN IV ANXIETY. Last administered on 

7/14/19at 21:22;  Start 7/10/19 at 14:00;  Stop 7/15/19 at 10:17;  Status DC


Lidocaine/Sodium Bicarbonate (Buffered Lidocaine 1%) 6 ml 1X  ONCE INJ ;  Start 

7/10/19 at 14:15;  Stop 7/10/19 at 14:16;  Status DC


Dexmedetomidine HCl 200 mcg/ Sodium Chloride 50 ml @ 0 mls/hr CONT  PRN IV PER 

PROTOCOL Last administered on 7/13/19at 10:28;  Start 7/10/19 at 14:15;  Stop 7/

15/19 at 10:17;  Status DC


Sodium Chloride 500 ml @  500 mls/hr 1X PRN  PRN IV SEE COMMENTS;  Start 7/10/19

at 14:15


Atropine Sulfate (ATROPINE 0.5mg SYRINGE) 0.5 mg PRN Q5MIN  PRN IV SEE COMMENTS;

 Start 7/10/19 at 14:15


Sodium Chloride 1,000 ml @  1,000 mls/hr Q1H PRN IV hypotension;  Start 7/10/19 

at 15:08;  Stop 7/10/19 at 21:07;  Status DC


Diphenhydramine HCl (Benadryl) 25 mg 1X PRN  PRN IV ITCHING;  Start 7/10/19 at 

15:15;  Stop 7/11/19 at 11:19;  Status DC


Diphenhydramine HCl (Benadryl) 25 mg 1X PRN  PRN IV ITCHING;  Start 7/10/19 at 

15:15;  Stop 7/11/19 at 11:19;  Status DC


Sodium Chloride 1,000 ml @  400 mls/hr Q2H30M PRN IV PATENCY;  Start 7/10/19 at 

15:08;  Stop 7/11/19 at 03:07;  Status DC


Info (PHARMACY MONITORING -- do not chart) 1 each PRN DAILY  PRN MC SEE 

COMMENTS;  Start 7/10/19 at 15:15;  Stop 7/11/19 at 11:18;  Status DC


Pantoprazole Sodium (PROTONIX VIAL for IV PUSH) 40 mg DAILYAC IVP  Last 

administered on 7/15/19at 10:04;  Start 7/11/19 at 07:30;  Stop 7/15/19 at 

11:54;  Status DC


Linezolid (Zyvox) 600 mg BID PO  Last administered on 7/15/19at 10:04;  Start 

7/11/19 at 09:00


Ceftriaxone Sodium (Rocephin) 2 gm Q24H IVP  Last administered on 7/14/19at 

12:14;  Start 7/11/19 at 13:00;  Stop 7/15/19 at 10:06;  Status DC


Calcium Gluconate 2000 mg/Dextrose 120 ml @  220 mls/hr 1X  ONCE IV  Last 

administered on 7/11/19at 10:05;  Start 7/11/19 at 10:00;  Stop 7/11/19 at 10:

32;  Status DC


Sodium Chloride 1,000 ml @  1,000 mls/hr Q1H PRN IV hypotension;  Start 7/11/19 

at 11:06;  Stop 7/11/19 at 17:05;  Status DC


Diphenhydramine HCl (Benadryl) 25 mg 1X PRN  PRN IV ITCHING;  Start 7/11/19 at 

11:15;  Stop 7/12/19 at 11:14;  Status DC


Diphenhydramine HCl (Benadryl) 25 mg 1X PRN  PRN IV ITCHING;  Start 7/11/19 at 

11:15;  Stop 7/12/19 at 11:14;  Status DC


Sodium Chloride 1,000 ml @  400 mls/hr Q2H30M PRN IV PATENCY;  Start 7/11/19 at 

11:06;  Stop 7/11/19 at 23:05;  Status DC


Info (PHARMACY MONITORING -- do not chart) 1 each PRN DAILY  PRN MC SEE 

COMMENTS;  Start 7/11/19 at 11:15;  Status Cancel


Potassium Chloride 10 meq/ Calcium Chloride 12.5 meq/ Bicarbonate Dialysis Soln 

w/ out KCl 5,013.9286 ml @ 1,000 mls/hr Q5H1M IV ;  Start 7/12/19 at 13:00;  

Status Cancel


Potassium Chloride 20 meq/ Calcium Chloride 12.5 meq/ Bicarbonate Dialysis Soln 

w/ out KCl 5,018.9286 ml @ 1,500 mls/hr Q3H21M IV ;  Start 7/12/19 at 13:00;  

Stop 7/12/19 at 13:00;  Status DC


Heparin Sodium (Porcine) (Heparin Sodium) 4,000 unit 1X  ONCE IV  Last 

administered on 7/12/19at 19:54;  Start 7/12/19 at 12:00;  Stop 7/12/19 at 

12:11;  Status DC


Heparin Sodium/ Dextrose 500 ml @ 0 mls/hr CONT  PRN IV SEE I/O RECORD Last 

administered on 7/13/19at 15:05;  Start 7/12/19 at 12:00;  Stop 7/14/19 at 

08:03;  Status DC


Heparin Sodium (Porcine) (Heparin Sodium) 2,500 unit PRN Q6HRS  PRN IV FOR [facility]FH 

LEVEL LESS THAN 0.2;  Start 7/12/19 at 12:00


Potassium Chloride 10 meq/ Calcium Chloride 12.5 meq/ Bicarbonate Dialysis Soln 

w/ out KCl 5,013.9286 ml @ 1,000 mls/hr Q5H1M IV ;  Start 7/12/19 at 13:00;  

Stop 7/12/19 at 13:00;  Status DC


Potassium Chloride 10 meq/ Bicarbonate Dialysis Soln w/ out KCl 5,005 ml @  

1,000 mls/hr Q5H1M IV ;  Start 7/12/19 at 13:00;  Stop 7/12/19 at 13:00;  Status

DC


Potassium Chloride 20 meq/ Bicarbonate Dialysis Soln w/ out KCl 5,010 ml @  

1,500 mls/hr Q3H21M IV ;  Start 7/12/19 at 13:00;  Stop 7/12/19 at 13:00;  

Status DC


Potassium Chloride 10 meq/ Bicarbonate Dialysis Soln w/ out KCl 5,005 ml @  

1,500 mls/hr Q3H21M IV ;  Start 7/12/19 at 13:00;  Stop 7/12/19 at 13:00;  

Status DC


Potassium Chloride 20 meq/ Bicarbonate Dialysis Soln w/ out KCl 5,010 ml @  

1,000 mls/hr Q5H1M IV  Last administered on 7/13/19at 20:43;  Start 7/12/19 at 

13:00;  Stop 7/14/19 at 08:03;  Status DC


Potassium Chloride 20 meq/ Bicarbonate Dialysis Soln w/ out KCl 5,010 ml @  

1,500 mls/hr Q3H21M IV ;  Start 7/12/19 at 12:45;  Stop 7/12/19 at 12:45;  

Status DC


Potassium Chloride 40 meq/ Bicarbonate Dialysis Soln w/ out KCl 5,020 ml @  

1,500 mls/hr Q3H21M IV ;  Start 7/12/19 at 13:00;  Stop 7/12/19 at 13:10;  

Status DC


Potassium Chloride 60 meq/ Bicarbonate Dialysis Soln w/ out KCl 5,030 ml @  

1,500 mls/hr Q3H22M IV ;  Start 7/12/19 at 13:00;  Stop 7/12/19 at 13:07;  

Status DC


Potassium Chloride 30 meq/ Bicarbonate Dialysis Soln w/ out KCl 5,015 ml @  

1,500 mls/hr Q3H21M IV ;  Start 7/12/19 at 13:15;  Stop 7/12/19 at 18:00;  

Status DC


Potassium Chloride 20 meq/ Bicarbonate Dialysis Soln w/ out KCl 5,010 ml @  

1,500 mls/hr Q3H21M IV  Last administered on 7/12/19at 21:06;  Start 7/12/19 at 

13:10;  Stop 7/12/19 at 18:00;  Status DC


Potassium Chloride 30 meq/ Bicarbonate Dialysis Soln w/ out KCl 5,015 ml @  

1,500 mls/hr Q3H21M IV ;  Start 7/12/19 at 18:00;  Stop 7/12/19 at 19:10;  

Status DC


Potassium Chloride 20 meq/ Bicarbonate Dialysis Soln w/ out KCl 5,010 ml @  

1,500 mls/hr Q3H21M IV  Last administered on 7/14/19at 00:15;  Start 7/12/19 at 

18:01;  Stop 7/14/19 at 08:03;  Status DC


Potassium Chloride 20 meq/ Bicarbonate Dialysis Soln w/ out KCl 5,010 ml @  

1,500 mls/hr Q3H21M IV  Last administered on 7/14/19at 00:18;  Start 7/12/19 at 

20:00;  Stop 7/14/19 at 08:03;  Status DC


Lactobacillus Rhamnosus (Culturelle) 1 cap BID PO  Last administered on 

7/15/19at 10:04;  Start 7/14/19 at 21:00


Oxycodone HCl (Roxicodone) 5 mg PRN Q4HRS  PRN PO PAIN Last administered on 

7/15/19at 07:51;  Start 7/14/19 at 11:45


Lorazepam (Ativan Inj) 1 mg PRN Q8HRS  PRN IV ANXIETY.;  Start 7/14/19 at 16:00


Gabapentin (Neurontin) 300 mg QHS PO  Last administered on 7/14/19at 21:21;  

Start 7/14/19 at 21:00


Labetalol HCl (Normodyne Iv Push) 10 mg PRN Q4HRS  PRN IVP HYPERTENSION Last 

administered on 7/15/19at 03:33;  Start 7/14/19 at 23:30;  Stop 7/15/19 at 

09:26;  Status DC


Labetalol HCl (Normodyne Iv Push) 20 mg PRN Q4HRS  PRN IVP HYPERTENSION;  Start 

7/15/19 at 09:30


Ondansetron HCl (Zofran) 4 mg PRN Q6HRS  PRN IV NAUSEA/VOMITING;  Start 7/15/19 

at 09:30


Acetaminophen (Tylenol) 500 mg PRN Q6HRS  PRN PO MILD PAIN / TEMP;  Start 

7/15/19 at 09:30


Lidocaine (Lidoderm) 1 patch DAILY TD ;  Start 7/15/19 at 10:00


Miscellaneous (Lidoderm Patch Removal) 1 ea QHS MC ;  Start 7/15/19 at 21:00


Sodium Chloride 1,000 ml @  1,000 mls/hr Q1H PRN IV hypotension;  Start 7/15/19 

at 11:26;  Stop 7/15/19 at 17:25


Albumin Human 200 ml @  200 mls/hr 1X PRN  PRN IV Hypotension;  Start 7/15/19 at

11:30;  Stop 7/15/19 at 17:29


Sodium Chloride (Normal Saline Flush) 10 ml 1X PRN  PRN IV AP catheter pack;  

Start 7/15/19 at 11:30;  Stop 7/16/19 at 11:29


Sodium Chloride (Normal Saline Flush) 10 ml 1X PRN  PRN IV  catheter pack;  

Start 7/15/19 at 11:30;  Stop 7/16/19 at 11:29


Sodium Chloride 1,000 ml @  400 mls/hr Q2H30M PRN IV PATENCY;  Start 7/15/19 at 

11:26;  Stop 7/15/19 at 23:25


Info (PHARMACY MONITORING -- do not chart) 1 each PRN DAILY  PRN MC SEE COMMENT

S;  Start 7/15/19 at 11:30;  Status UNV


Info (PHARMACY MONITORING -- do not chart) 1 each PRN DAILY  PRN MC SEE 

COMMENTS;  Start 7/15/19 at 11:30


Pantoprazole Sodium (Protonix) 40 mg DAILYAC PO ;  Start 7/16/19 at 07:30





Active Scripts


Active


Reported


Protonix (Pantoprazole Sodium) 20 Mg Tablet.dr 2 Tab PO DAILY


Zyprexa (Olanzapine) 20 Mg Tablet 2 Tab PO QHS


Buspirone Hcl 30 Mg Tablet 1 Tab PO BID


Vitals/I & O





Vital Sign - Last 24 Hours








 7/14/19 7/14/19 7/14/19 7/14/19





 13:00 16:21 19:40 20:00


 


Pulse 18  101 


 


Resp 16 18 18 


 


B/P (MAP) 164/91 (115)  174/111 (132) 


 


Pulse Ox 95  96 


 


O2 Delivery Room Air Room Air Room Air Room Air





 7/14/19 7/14/19 7/14/19 7/15/19





 21:22 22:22 23:39 03:33


 


Temp   98.4 





   98.4 


 


Pulse   103 98


 


Resp 16  18 


 


B/P (MAP)   178/120 (139) 182/107


 


Pulse Ox   96 


 


O2 Delivery Room Air  Room Air 


 


O2 Flow Rate 98.0 98.0  


 


    





    





 7/15/19 7/15/19 7/15/19 7/15/19





 03:37 03:40 04:37 07:51


 


Temp  98.3  





  98.3  


 


Pulse  113  


 


Resp 22 18  18


 


B/P (MAP)  184/107 (132)  


 


Pulse Ox  96 96 


 


O2 Delivery Room Air Room Air  Room Air


 


    





    





 7/15/19 7/15/19 7/15/19 





 07:57 08:51 11:34 


 


Temp 97.7  97.7 





 97.7  97.7 


 


Pulse 90  99 


 


Resp 18 18 18 


 


B/P (MAP) 180/100 (126)  158/96 (116) 


 


Pulse Ox 97  98 


 


O2 Delivery Room Air Room Air Room Air 














Intake and Output   


 


 7/14/19 7/14/19 7/15/19





 14:59 22:59 06:59


 


Intake Total 634 ml  


 


Output Total 44 ml  150 ml


 


Balance 590 ml  -150 ml

















GIUSEPPE MONREAL MD           Jul 15, 2019 12:19

## 2019-07-15 NOTE — NUR
ADELAIDA following pt. ADELAIDA phoned [facility] team for assessment and evaluation for meth use. Will 
continue to follow.

-------------------------------------------------------------------------------

Addendum: 07/15/19 at 1300 by LEVI SON

-------------------------------------------------------------------------------

Pt seen by Tamara from [facility] team and is provided with OP resources. Pt agreeable to follow up 
with [facility] walk in clinic to help with mental health dxs but declined to schedule 
an appointment with [facility]. Pt is provided with community resources.

## 2019-07-15 NOTE — PDOC
SUBJECTIVE


ROS


No acute events overnight





OBJECTIVE


Vital Signs





Vital Signs








  Date Time  Temp Pulse Resp B/P (MAP) Pulse Ox O2 Delivery O2 Flow Rate FiO2


 


7/15/19 08:51   18   Room Air  


 


7/15/19 07:57 97.7 90  180/100 (126) 97   





 97.7       


 


7/14/19 22:22       98.0 








I & 0











Intake and Output 


 


 7/15/19





 07:00


 


Intake Total 634 ml


 


Output Total 194 ml


 


Balance 440 ml


 


 


 


Intake Oral 568 ml


 


IV Total 66 ml


 


Output Urine Total 194 ml











PHYSICAL EXAM


Physical Exam


GENERAL:  In chair, NAD, 


EENT:   OM moist  


NECK:  Supple.


LUNGS:  Clear bilaterally.  No wheezing.


HEART:  S1, S2, regular, 


ABDOMEN:  Soft, nontender, BS present


: Culp


EXTREMITIES:  Generalized trace edema,  


 NEURO:  Alert, answering appropriately


RIJ/HDC





DIAGNOSIS/ASSESSMENT


Assessment & Plan


EL - ATN2/2 Rhabdo , Meth use  


Urinary retention at Presentation,Anuric now  


Requiring  HD - 1 st Tx  7/10  ,CRRT x 1  


Seen on HD, tolerating well, continue as ordered, Keith Garces  





Rhabdo-  CPK>100,000 at presentation  





Hyperkalemia- Normal K 





Hyponatremia- stable, mildly low  





Severe Metabolic acidosis- 2/2 all above 


Resolved  





Transaminitis - Improving LFT's 





Hypocalcemia- severe 2/2  Rhabdo


Replace cautiously and Monitor closely for Hypercalcemia during recovery phase 





Discussed with  RN





COMMENT/RELEVANT DATA


Meds





Current Medications








 Medications


  (Trade)  Dose


 Ordered  Sig/Travis  Start Time


 Stop Time Status Last Admin


Dose Admin


 


 Acetaminophen


  (Tylenol)  500 mg  PRN Q6HRS  PRN  7/15/19 09:30


     





 


 Albumin Human  200 ml @ 


 200 mls/hr  1X PRN  PRN  7/15/19 11:30


 7/15/19 17:29   





 


 Albuterol Sulfate


  (Ventolin Neb


 Soln)  10 mg  1X  ONCE  7/9/19 23:00


 7/9/19 23:01 DC 7/10/19 00:30


10 MG


 


 Atropine Sulfate


  (ATROPINE 0.5mg


 SYRINGE)  0.5 mg  PRN Q5MIN  PRN  7/10/19 14:15


     





 


 Calcium Gluconate


  (Calcium


 Gluconate)  2,000 mg  1X  ONCE  7/10/19 12:00


 7/10/19 12:01 DC 7/10/19 13:38


2,000 MG


 


 Calcium Gluconate


 2000 mg/Dextrose  120 ml @ 


 220 mls/hr  1X  ONCE  7/11/19 10:00


 7/11/19 10:32 DC 7/11/19 10:05


220 MLS/HR


 


 Ceftriaxone Sodium


  (Rocephin)  2 gm  Q24H  7/11/19 13:00


 7/15/19 10:06 DC 7/14/19 12:14


2 GM


 


 Dexmedetomidine


 HCl 200 mcg/


 Sodium Chloride  50 ml @ 0


 mls/hr  CONT  PRN  7/10/19 14:15


 7/15/19 10:17 DC 7/13/19 10:28


12.6 MLS/HR


 


 Dextrose


  (Dextrose


 50%-Water Syringe)  25 gm  1X  ONCE  7/9/19 23:00


 7/9/19 23:01 DC 7/9/19 22:42


25 GM


 


 Diphenhydramine


 HCl


  (Benadryl)  25 mg  1X PRN  PRN  7/11/19 11:15


 7/12/19 11:14 DC  





 


 Gabapentin


  (Neurontin)  300 mg  QHS  7/14/19 21:00


    7/14/19 21:21


300 MG


 


 Haloperidol


 Lactate


  (Haldol Inj)  1 mg  PRN Q8HRS  PRN  7/10/19 14:00


    7/12/19 07:56


1 MG


 


 Heparin Sodium


  (Porcine)


  (Heparin Sodium)  2,500 unit  PRN Q6HRS  PRN  7/12/19 12:00


     





 


 Heparin Sodium/


 Dextrose  500 ml @ 0


 mls/hr  CONT  PRN  7/12/19 12:00


 7/14/19 08:03 DC 7/13/19 15:05


28.3 MLS/HR


 


 Info


  (PHARMACY


 MONITORING -- do


 not chart)  1 each  PRN DAILY  PRN  7/15/19 11:30


     





 


 Insulin Human


 Regular


  (HumuLIN R VIAL)  10 unit  1X  ONCE  7/9/19 23:00


 7/9/19 23:01 DC 7/9/19 23:51


10 UNIT


 


 Labetalol HCl


  (Normodyne Iv


 Push)  20 mg  PRN Q4HRS  PRN  7/15/19 09:30


     





 


 Lactobacillus


 Rhamnosus


  (Culturelle)  1 cap  BID  7/14/19 21:00


    7/15/19 10:04


1 CAP


 


 Lidocaine


  (Lidoderm)  1 patch  DAILY  7/15/19 10:00


     





 


 Lidocaine/Sodium


 Bicarbonate


  (Buffered


 Lidocaine 1%)  6 ml  1X  ONCE  7/10/19 14:15


 7/10/19 14:16 DC  





 


 Linezolid


  (Zyvox)  600 mg  BID  7/11/19 09:00


    7/15/19 10:04


600 MG


 


 Lorazepam


  (Ativan Inj)  1 mg  PRN Q8HRS  PRN  7/14/19 16:00


     





 


 Miscellaneous


  (Lidoderm Patch


 Removal)  1 ea  QHS  7/15/19 21:00


     





 


 Morphine Sulfate


  (Morphine


 Sulfate)  4 mg  PRN Q4HRS  PRN  7/10/19 09:00


    7/14/19 08:24


4 MG


 


 Ondansetron HCl


  (Zofran)  4 mg  PRN Q6HRS  PRN  7/15/19 09:30


     





 


 Oxycodone HCl


  (Roxicodone)  5 mg  PRN Q4HRS  PRN  7/14/19 11:45


    7/15/19 07:51


5 MG


 


 Pantoprazole


 Sodium


  (PROTONIX VIAL


 for IV PUSH)  40 mg  DAILYAC  7/11/19 07:30


    7/15/19 10:04


40 MG


 


 Pantoprazole


 Sodium


  (Protonix)  40 mg  DAILYAC  7/10/19 13:30


 7/10/19 19:22 DC  





 


 Polyethylene


 Glycol


  (miraLAX PACKET)  17 gm  DAILY  7/10/19 13:30


    7/15/19 10:05


17 GM


 


 Potassium


 Chloride 10 meq/


 Bicarbonate


 Dialysis Soln w/


 out KCl  5,005 ml @ 


 1,500 mls/hr  Q3H21M  7/12/19 13:00


 7/12/19 13:00 DC  





 


 Potassium


 Chloride 10 meq/


 Calcium Chloride


 12.5 meq/


 Bicarbonate


 Dialysis Soln w/


 out KCl  5,013.9286


 ml @ 1,000


 mls/hr  Q5H1M  7/12/19 13:00


 7/12/19 13:00 DC  





 


 Potassium


 Chloride 20 meq/


 Bicarbonate


 Dialysis Soln w/


 out KCl  5,010 ml @ 


 1,500 mls/hr  Q3H21M  7/12/19 20:00


 7/14/19 08:03 DC 7/14/19 00:18


1,500 MLS/HR


 


 Potassium


 Chloride 20 meq/


 Calcium Chloride


 12.5 meq/


 Bicarbonate


 Dialysis Soln w/


 out KCl  5,018.9286


 ml @ 1,500


 mls/hr  Q3H21M  7/12/19 13:00


 7/12/19 13:00 DC  





 


 Potassium


 Chloride 30 meq/


 Bicarbonate


 Dialysis Soln w/


 out KCl  5,015 ml @ 


 1,500 mls/hr  Q3H21M  7/12/19 18:00


 7/12/19 19:10 DC  





 


 Potassium


 Chloride 40 meq/


 Bicarbonate


 Dialysis Soln w/


 out KCl  5,020 ml @ 


 1,500 mls/hr  Q3H21M  7/12/19 13:00


 7/12/19 13:10 DC  





 


 Potassium


 Chloride 60 meq/


 Bicarbonate


 Dialysis Soln w/


 out KCl  5,030 ml @ 


 1,500 mls/hr  Q3H22M  7/12/19 13:00


 7/12/19 13:07 DC  





 


 Silver


 Sulfadiazine


  (Silvadene)  1 mack  BID  7/10/19 13:00


 7/11/19 15:04 DC 7/11/19 10:06


1 MACK


 


 Sodium Bicarbonate


  (Sodium Bicarb


 Adult 8.4% Syr)  100 meq  1X  ONCE  7/10/19 10:00


 7/10/19 10:01 DC 7/10/19 10:02


100 MEQ


 


 Sodium Chloride  1,000 ml @ 


 400 mls/hr  Q2H30M PRN  7/15/19 11:26


 7/15/19 23:25   





 


 Sodium Chloride


  (Normal Saline


 Flush)  10 ml  1X PRN  PRN  7/15/19 11:30


 7/16/19 11:29   











Lab





Laboratory Tests








Test


 7/15/19


03:15


 


White Blood Count


 8.6 x10^3/uL


(4.0-11.0)


 


Red Blood Count


 3.30 x10^6/uL


(4.30-5.70)


 


Hemoglobin


 9.6 g/dL


(13.0-17.5)


 


Hematocrit


 27.8 %


(39.0-53.0)


 


Mean Corpuscular Volume 84 fL () 


 


Mean Corpuscular Hemoglobin 29 pg (25-35) 


 


Mean Corpuscular Hemoglobin


Concent 35 g/dL


(31-37)


 


Red Cell Distribution Width


 13.1 %


(11.5-14.5)


 


Platelet Count


 210 x10^3/uL


(140-400)


 


Neutrophils (%) (Auto) 73 % (31-73) 


 


Lymphocytes (%) (Auto) 15 % (24-48) 


 


Monocytes (%) (Auto) 8 % (0-9) 


 


Eosinophils (%) (Auto) 3 % (0-3) 


 


Basophils (%) (Auto) 1 % (0-3) 


 


Neutrophils # (Auto)


 6.3 x10^3/uL


(1.8-7.7)


 


Lymphocytes # (Auto)


 1.3 x10^3/uL


(1.0-4.8)


 


Monocytes # (Auto)


 0.7 x10^3/uL


(0.0-1.1)


 


Eosinophils # (Auto)


 0.3 x10^3/uL


(0.0-0.7)


 


Basophils # (Auto)


 0.1 x10^3/uL


(0.0-0.2)


 


Sodium Level


 132 mmol/L


(136-145)


 


Potassium Level


 3.9 mmol/L


(3.5-5.1)


 


Chloride Level


 97 mmol/L


()


 


Carbon Dioxide Level


 26 mmol/L


(21-32)


 


Anion Gap 9 (6-14) 


 


Blood Urea Nitrogen


 27 mg/dL


(8-26)


 


Creatinine


 5.6 mg/dL


(0.7-1.3)


 


Estimated GFR


(Cockcroft-Gault) 12.0 





 


BUN/Creatinine Ratio 5 (6-20) 


 


Glucose Level


 95 mg/dL


(70-99)


 


Calcium Level


 8.8 mg/dL


(8.5-10.1)


 


Total Bilirubin


 0.5 mg/dL


(0.2-1.0)


 


Aspartate Amino Transf


(AST/SGOT) 296 U/L


(15-37)


 


Alanine Aminotransferase


(ALT/SGPT) 220 U/L


(16-63)


 


Alkaline Phosphatase


 129 U/L


()


 


Creatine Kinase


 96383 U/L


()


 


Total Protein


 5.2 g/dL


(6.4-8.2)


 


Albumin


 1.9 g/dL


(3.4-5.0)


 


Albumin/Globulin Ratio 0.6 (1.0-1.7) 








Results


All relevant outside records, renal labs, imaging studies, telemetry/EKG's were 

reviewed.











BENIGNO JOHNSON MD                Jul 15, 2019 11:47

## 2019-07-15 NOTE — NUR
Wound care:



Patient in dialysis at this time. Spoke with RN, RN will change dressings today as patient 
will be in dialysis during wound care rounds. Dressing change left with RN. Wound care will 
see patient tomorrow.

## 2019-07-15 NOTE — PDOC
Infectious Disease Note


Subjective


Subjective


Doing ok


A little finger pain


Denies F/C/N/V/D/SOA





ROS


ROS


o/w neg





Vital Sign


Vital Signs





Vital Signs








  Date Time  Temp Pulse Resp B/P (MAP) Pulse Ox O2 Delivery O2 Flow Rate FiO2


 


7/15/19 07:57 97.7 90 18 180/100 (126) 97 Room Air  





 97.7       


 


7/14/19 22:22       98.0 











Physical Exam


PHYSICAL EXAM


GENERAL:  In bed, NAD, coop 


EENT:  Pupils equal, oral cavity clear


NECK:  Supple.


LUNGS:  Clear bilaterally.  No wheezing.


HEART:  S1, S2, regular, tachy 108s


ABDOMEN:  Soft, nontender, BS present


: Culp


EXTREMITIES:  Generalized trace edema, no cyanosis. Right index with min 

swelling.  Lesions are stable - mild maceration


CENTRAL NERVOUS SYSTEM:  Alert, answering appropriately


RIJ/HDC without signs of complications


PIV





Labs


Lab





Laboratory Tests








Test


 7/15/19


03:15


 


White Blood Count


 8.6 x10^3/uL


(4.0-11.0)


 


Red Blood Count


 3.30 x10^6/uL


(4.30-5.70)


 


Hemoglobin


 9.6 g/dL


(13.0-17.5)


 


Hematocrit


 27.8 %


(39.0-53.0)


 


Mean Corpuscular Volume 84 fL () 


 


Mean Corpuscular Hemoglobin 29 pg (25-35) 


 


Mean Corpuscular Hemoglobin


Concent 35 g/dL


(31-37)


 


Red Cell Distribution Width


 13.1 %


(11.5-14.5)


 


Platelet Count


 210 x10^3/uL


(140-400)


 


Neutrophils (%) (Auto) 73 % (31-73) 


 


Lymphocytes (%) (Auto) 15 % (24-48) 


 


Monocytes (%) (Auto) 8 % (0-9) 


 


Eosinophils (%) (Auto) 3 % (0-3) 


 


Basophils (%) (Auto) 1 % (0-3) 


 


Neutrophils # (Auto)


 6.3 x10^3/uL


(1.8-7.7)


 


Lymphocytes # (Auto)


 1.3 x10^3/uL


(1.0-4.8)


 


Monocytes # (Auto)


 0.7 x10^3/uL


(0.0-1.1)


 


Eosinophils # (Auto)


 0.3 x10^3/uL


(0.0-0.7)


 


Basophils # (Auto)


 0.1 x10^3/uL


(0.0-0.2)


 


Sodium Level


 132 mmol/L


(136-145)


 


Potassium Level


 3.9 mmol/L


(3.5-5.1)


 


Chloride Level


 97 mmol/L


()


 


Carbon Dioxide Level


 26 mmol/L


(21-32)


 


Anion Gap 9 (6-14) 


 


Blood Urea Nitrogen


 27 mg/dL


(8-26)


 


Creatinine


 5.6 mg/dL


(0.7-1.3)


 


Estimated GFR


(Cockcroft-Gault) 12.0 





 


BUN/Creatinine Ratio 5 (6-20) 


 


Glucose Level


 95 mg/dL


(70-99)


 


Calcium Level


 8.8 mg/dL


(8.5-10.1)


 


Total Bilirubin


 0.5 mg/dL


(0.2-1.0)


 


Aspartate Amino Transf


(AST/SGOT) 296 U/L


(15-37)


 


Alanine Aminotransferase


(ALT/SGPT) 220 U/L


(16-63)


 


Alkaline Phosphatase


 129 U/L


()


 


Creatine Kinase


 79073 U/L


()


 


Total Protein


 5.2 g/dL


(6.4-8.2)


 


Albumin


 1.9 g/dL


(3.4-5.0)


 


Albumin/Globulin Ratio 0.6 (1.0-1.7) 








Micro





Microbiology


7/10/19 Blood Culture - Final, Complete


          NO GROWTH AFTER 5 DAYS


7/10/19 CSF Gram Stain - Final, Complete


          


7/9/19 Urine Culture - Final, Complete


         


7/9/19 Urine Culture Result 1 (RUI) - Final, Complete





Objective


Assessment





Right hand burn to index finger with cellulitis, superficial.  No evidence of 

sinus tract. X ray neg - no I and D at this point per Ortho


History of intravenous drug use


Leukocytosis, likely reactive.resolved


History of fall with rhabdomyolysis. CK >100,000


Hyponatremia, hyperkalemia, severe metabolic acidosis, acute kidney injury on 

CRRT


Transaminitis. Gallbladder sludge on ultrasound.


Encephalopathy - S/p LP 7/10


Methicillin-resistant Staphylococcus aureus screen positive.


Bilateral lower extremity weakness, numbness and history of fall prior to 

admission.


Urinary retention.


Paresthesia.


Abnormal T2 signal within the right greater than left paraspinal


    musculature and psoas, which can be seen with edema in these regions.  

Neurology and Neurosurgery consulted.


Hematuria with occasional wbc's on urinalysis.





Plan


Plan of Care


Continue Zyvox.(7/11) and d/c Rocephin (7/11)


local wound care. Pictures reviewed with nursing 


Cultures neg to date





Clinically better.





D/w nursing











BRETT RAY MD              Jul 15, 2019 09:19

## 2019-07-15 NOTE — PDOC
PROGRESS NOTES


Assessment


Assessment


IMPRESSION:


Generalized weakness.


Rhabdomyolysis.


Methamphetamine positive.


Electrolytes imbalances.


Hyponatremia.


Hyperkalemia.


Hypocalcemia.


AKD.


Other psychiatric problems.





RECOMMENDATIONS/PLAN:


Treat medical diseases.


CSF OCB zero.


OT/PT.





Past Medical History


GI:  GERD


Psych:  Anxiety, Addictions, Depression





Past Surgical History


No pertinent history





Family History


No pertinent hx





Social History


Unemployed, uses methamphetamine, smokes occasionally, rare alcohol. Was said 

out of incarceration on 7/5/19.





ALLERGY:


NKDA





MEDICATIONS:


Refer to MAR





REVIEW OF SYSTEMS:


Refer to PMH and PSH.





PHYSICAL EXAMINATION:   


General appearance in no acute distress.  


HEENT:  Normocephalic and nontraumatic.  Eyes, nose, ears, and throat are 

unremarkable. 


Neck is supple. No lymphadenopathy.  No Crepitus.


Cardiovascular:  S1, S2, regular rate and rhythm.  


Pulmonary:  Clear to auscultation bilaterally. 


Abdomen:  Bowel sounds are positive.    


Extremities:  No rash, lesions, or edema.  


No restriction of range of motion





NEUROLOGICAL  EXAMINATION:


Awake.


Sitting in chair playing cellphone.


Oriented to time, place and person.


PERRL.


EOMI.


CN: no focal findings.


Muscle tone: within normal.


Muscle strength: 5


DTR: 2


Plantar reflex: Flexor response bilaterally 


Gait: not examined in chair.


Sensory exam: no acute abnormal findings.


No cerebellar signs elicited.


F-T-N test fine.





Objective


Objective





Vital Signs








  Date Time  Temp Pulse Resp B/P (MAP) Pulse Ox O2 Delivery O2 Flow Rate FiO2


 


7/15/19 11:34 97.7 99 18 158/96 (116) 98 Room Air  





 97.7       


 


7/14/19 22:22       98.0 














Intake and Output 


 


 7/15/19





 07:00


 


Intake Total 634 ml


 


Output Total 194 ml


 


Balance 440 ml


 


 


 


Intake Oral 568 ml


 


IV Total 66 ml


 


Output Urine Total 194 ml











Vitals Signs


Vitals





                          VS - Last 72 Hours, by Label








  Date Time  Temp Pulse Resp B/P (MAP) Pulse Ox O2 Delivery O2 Flow Rate FiO2


 


7/15/19 11:34 97.7 99 18 158/96 (116) 98 Room Air  





 97.7       


 


7/15/19 08:51   18   Room Air  


 


7/15/19 07:57 97.7 90 18 180/100 (126) 97 Room Air  





 97.7       


 


7/15/19 07:51   18   Room Air  


 


7/15/19 04:37     96   


 


7/15/19 03:40 98.3 113 18 184/107 (132) 96 Room Air  





 98.3       


 


7/15/19 03:37   22   Room Air  


 


7/15/19 03:33  98  182/107    


 


7/14/19 23:39 98.4 103 18 178/120 (139) 96 Room Air  





 98.4       


 


7/14/19 22:22       98.0 


 


7/14/19 21:22   16   Room Air 98.0 


 


7/14/19 20:00      Room Air  


 


7/14/19 19:40  101 18 174/111 (132) 96 Room Air  


 


7/14/19 16:21   18   Room Air  


 


7/14/19 13:00  18 16 164/91 (115) 95 Room Air  


 


7/14/19 12:14   23  96 Room Air  


 


7/14/19 12:00 99.0 114 20 176/87 (116) 97 Room Air  





 99.0       


 


7/14/19 12:00      Room Air  


 


7/14/19 11:00  121 20 135/97 (110) 96 Room Air  


 


7/14/19 10:00  117 23 158/88 (111) 97 Room Air  


 


7/14/19 09:00  114 17 161/91 (114) 96 Room Air  


 


7/14/19 08:54      Room Air  


 


7/14/19 08:24     96 Room Air  


 


7/14/19 08:00      Room Air  


 


7/14/19 08:00 99.0 110 16 149/87 (107) 94 Room Air  





 99.0       


 


7/14/19 07:00  120 19 157/80 (105) 98 Room Air  











Laboratory


Laboratory





Laboratory Tests








Test


 7/15/19


03:15


 


White Blood Count


 8.6 x10^3/uL


(4.0-11.0)


 


Red Blood Count


 3.30 x10^6/uL


(4.30-5.70)


 


Hemoglobin


 9.6 g/dL


(13.0-17.5)


 


Hematocrit


 27.8 %


(39.0-53.0)


 


Mean Corpuscular Volume 84 fL () 


 


Mean Corpuscular Hemoglobin 29 pg (25-35) 


 


Mean Corpuscular Hemoglobin


Concent 35 g/dL


(31-37)


 


Red Cell Distribution Width


 13.1 %


(11.5-14.5)


 


Platelet Count


 210 x10^3/uL


(140-400)


 


Neutrophils (%) (Auto) 73 % (31-73) 


 


Lymphocytes (%) (Auto) 15 % (24-48) 


 


Monocytes (%) (Auto) 8 % (0-9) 


 


Eosinophils (%) (Auto) 3 % (0-3) 


 


Basophils (%) (Auto) 1 % (0-3) 


 


Neutrophils # (Auto)


 6.3 x10^3/uL


(1.8-7.7)


 


Lymphocytes # (Auto)


 1.3 x10^3/uL


(1.0-4.8)


 


Monocytes # (Auto)


 0.7 x10^3/uL


(0.0-1.1)


 


Eosinophils # (Auto)


 0.3 x10^3/uL


(0.0-0.7)


 


Basophils # (Auto)


 0.1 x10^3/uL


(0.0-0.2)


 


Sodium Level


 132 mmol/L


(136-145)


 


Potassium Level


 3.9 mmol/L


(3.5-5.1)


 


Chloride Level


 97 mmol/L


()


 


Carbon Dioxide Level


 26 mmol/L


(21-32)


 


Anion Gap 9 (6-14) 


 


Blood Urea Nitrogen


 27 mg/dL


(8-26)


 


Creatinine


 5.6 mg/dL


(0.7-1.3)


 


Estimated GFR


(Cockcroft-Gault) 12.0 





 


BUN/Creatinine Ratio 5 (6-20) 


 


Glucose Level


 95 mg/dL


(70-99)


 


Calcium Level


 8.8 mg/dL


(8.5-10.1)


 


Total Bilirubin


 0.5 mg/dL


(0.2-1.0)


 


Aspartate Amino Transf


(AST/SGOT) 296 U/L


(15-37)


 


Alanine Aminotransferase


(ALT/SGPT) 220 U/L


(16-63)


 


Alkaline Phosphatase


 129 U/L


()


 


Creatine Kinase


 85552 U/L


()


 


Total Protein


 5.2 g/dL


(6.4-8.2)


 


Albumin


 1.9 g/dL


(3.4-5.0)


 


Albumin/Globulin Ratio 0.6 (1.0-1.7) 








Microbiology


7/10/19 Blood Culture - Final, Complete


          NO GROWTH AFTER 5 DAYS


7/10/19 CSF Gram Stain - Final, Complete


          


7/9/19 Urine Culture - Final, Complete


         


7/9/19 Urine Culture Result 1 (RUI) - Final, Complete





Medication


Medications





Current Medications


Acetaminophen (Tylenol) 500 mg PRN Q6HRS  PRN PO MILD PAIN / TEMP;  Start 

7/15/19 at 09:30


Albumin Human 200 ml @  200 mls/hr 1X PRN  PRN IV Hypotension;  Start 7/15/19 at

11:30;  Stop 7/15/19 at 17:29


Gabapentin (Neurontin) 300 mg QHS PO  Last administered on 7/14/19at 21:21;  

Start 7/14/19 at 21:00


Info (PHARMACY MONITORING -- do not chart) 1 each PRN DAILY  PRN MC SEE 

COMMENTS;  Start 7/15/19 at 11:30


Info (PHARMACY MONITORING -- do not chart) 1 each PRN DAILY  PRN MC SEE 

COMMENTS;  Start 7/15/19 at 11:30;  Status UNV


Labetalol HCl (Normodyne Iv Push) 10 mg PRN Q4HRS  PRN IVP HYPERTENSION Last 

administered on 7/15/19at 03:33;  Start 7/14/19 at 23:30;  Stop 7/15/19 at 

09:26;  Status DC


Labetalol HCl (Normodyne Iv Push) 20 mg PRN Q4HRS  PRN IVP HYPERTENSION;  Start 

7/15/19 at 09:30


Lactobacillus Rhamnosus (Culturelle) 1 cap BID PO  Last administered on 

7/15/19at 10:04;  Start 7/14/19 at 21:00


Lidocaine (Lidoderm) 1 patch DAILY TD ;  Start 7/15/19 at 10:00


Lorazepam (Ativan Inj) 1 mg PRN Q8HRS  PRN IV ANXIETY.;  Start 7/14/19 at 16:00


Miscellaneous (Lidoderm Patch Removal) 1 ea QHS MC ;  Start 7/15/19 at 21:00


Ondansetron HCl (Zofran) 4 mg PRN Q6HRS  PRN IV NAUSEA/VOMITING;  Start 7/15/19 

at 09:30


Pantoprazole Sodium (Protonix) 40 mg DAILYAC PO ;  Start 7/16/19 at 07:30


Sodium Chloride 1,000 ml @  400 mls/hr Q2H30M PRN IV PATENCY;  Start 7/15/19 at 

11:26;  Stop 7/15/19 at 23:25


Sodium Chloride 1,000 ml @  1,000 mls/hr Q1H PRN IV hypotension;  Start 7/15/19 

at 11:26;  Stop 7/15/19 at 17:25


Sodium Chloride (Normal Saline Flush) 10 ml 1X PRN  PRN IV AP catheter pack;  

Start 7/15/19 at 11:30;  Stop 7/16/19 at 11:29


Sodium Chloride (Normal Saline Flush) 10 ml 1X PRN  PRN IV  catheter pack;  

Start 7/15/19 at 11:30;  Stop 7/16/19 at 11:29





Comment


Review of Relevant


I have reviewed the following items estrella (where applicable) has been applied.











BAY GROVES MD                 Jul 15, 2019 15:26

## 2019-07-15 NOTE — PDOC
Subjective:


Subjective:


"I'm okay."





Objective:


Vital Signs:





                                   Vital Signs








  Date Time  Temp Pulse Resp B/P (MAP) Pulse Ox O2 Delivery O2 Flow Rate FiO2


 


7/15/19 11:34 97.7 99 18 158/96 (116) 98 Room Air  





 97.7       


 


7/14/19 22:22       98.0 








Labs:





Laboratory Tests








Test


 7/15/19


03:15


 


White Blood Count 8.6 x10^3/uL 


 


Red Blood Count 3.30 x10^6/uL 


 


Hemoglobin 9.6 g/dL 


 


Hematocrit 27.8 % 


 


Mean Corpuscular Volume 84 fL 


 


Mean Corpuscular Hemoglobin 29 pg 


 


Mean Corpuscular Hemoglobin


Concent 35 g/dL 





 


Red Cell Distribution Width 13.1 % 


 


Platelet Count 210 x10^3/uL 


 


Neutrophils (%) (Auto) 73 % 


 


Lymphocytes (%) (Auto) 15 % 


 


Monocytes (%) (Auto) 8 % 


 


Eosinophils (%) (Auto) 3 % 


 


Basophils (%) (Auto) 1 % 


 


Neutrophils # (Auto) 6.3 x10^3/uL 


 


Lymphocytes # (Auto) 1.3 x10^3/uL 


 


Monocytes # (Auto) 0.7 x10^3/uL 


 


Eosinophils # (Auto) 0.3 x10^3/uL 


 


Basophils # (Auto) 0.1 x10^3/uL 


 


Sodium Level 132 mmol/L 


 


Potassium Level 3.9 mmol/L 


 


Chloride Level 97 mmol/L 


 


Carbon Dioxide Level 26 mmol/L 


 


Anion Gap 9 


 


Blood Urea Nitrogen 27 mg/dL 


 


Creatinine 5.6 mg/dL 


 


Estimated GFR


(Cockcroft-Gault) 12.0 





 


BUN/Creatinine Ratio 5 


 


Glucose Level 95 mg/dL 


 


Calcium Level 8.8 mg/dL 


 


Total Bilirubin 0.5 mg/dL 


 


Aspartate Amino Transf


(AST/SGOT) 296 U/L 





 


Alanine Aminotransferase


(ALT/SGPT) 220 U/L 





 


Alkaline Phosphatase 129 U/L 


 


Creatine Kinase 95510 U/L 


 


Total Protein 5.2 g/dL 


 


Albumin 1.9 g/dL 


 


Albumin/Globulin Ratio 0.6 





  ANAEROBIC-AEROBIC CULTURE  


                                PENDING





  ANAEROBIC RES 1  


                                PENDING





  AEROBIC CULT  Preliminary  


        Preliminary report





  AEROBIC RES 1  Preliminary  


        Comment





        No growth in 36 - 48 hours.





  GRAM STAIN  Final  


        Final report





  GRAM STAIN RES 1  Final  


        Comment





        No white blood cells seen.





  GRAM STAIN RES 2  Final  


        No organisms seen





  BLOOD CULTURE  Final  


        NO GROWTH AFTER 5 DAYS


Imaging:


Head CT 7/15


IMPRESSION:


No acute intracranial findings.





PE:





GEN: NAD


LUNGS: CTAB


HEART: RRR


ABD: non-tender


NEURO/PSYCH: drowsy





A/P:


Transaminitis - AST and ALT better, AP mildly elevated


Rhabdo, EL, +meth


HTN, right hand burn





--


PO PPI if eating.











LIZ GARCIA         Jul 15, 2019 11:53

## 2019-07-15 NOTE — RAD
CT HEAD

 

INDICATION: Weakness

 

COMPARISON: None Available.

 

Exposure: One or more of the following individualized dose reduction 

techniques were utilized for this examination:  1. Automated exposure 

control  2. Adjustment of the mA and/or kV according to patient size  3. 

Use of iterative reconstruction technique

 

TECHNIQUE: 5 mm contiguous axial images were obtained from the skull base 

to the vertex in both bone and soft tissue algorithm.  

 

FINDINGS: 

No abnormal attenuation within the brain parenchyma.  

 

No evidence of acute intracranial hemorrhage.   No extra-axial fluid 

collections.

 

No mass effect or midline shift. Ventricular size is appropriate.  Basal 

cisterns are patent.

 

No fractures identified.Gray-white differentiation is preserved.Globes and

orbits are within normal limits.   Paranasal sinuses and mastoid air cells

are clear.   

 

IMPRESSION:

 

No acute intracranial findings. 

 

Electronically signed by: Rey Kurtz MD (7/15/2019 9:23 AM) [id number]

## 2019-07-15 NOTE — NUR
pt reported to me that he had money in his drawer. I confirmed that there was $150.00 
cash.7-$20.00 bills and 2-$5.00 bills. I spoke to the pt about locking it up with security 
or sending it with family. Security came and talked to pt. Pt declined locking it up and 
said he will take his chances. He wants to keep it in his shorts in the green bag that is in 
his bedside table.

## 2019-07-16 VITALS — SYSTOLIC BLOOD PRESSURE: 136 MMHG | DIASTOLIC BLOOD PRESSURE: 88 MMHG

## 2019-07-16 VITALS — DIASTOLIC BLOOD PRESSURE: 97 MMHG | SYSTOLIC BLOOD PRESSURE: 141 MMHG

## 2019-07-16 VITALS — DIASTOLIC BLOOD PRESSURE: 99 MMHG | SYSTOLIC BLOOD PRESSURE: 165 MMHG

## 2019-07-16 VITALS — DIASTOLIC BLOOD PRESSURE: 93 MMHG | SYSTOLIC BLOOD PRESSURE: 151 MMHG

## 2019-07-16 VITALS — SYSTOLIC BLOOD PRESSURE: 142 MMHG | DIASTOLIC BLOOD PRESSURE: 83 MMHG

## 2019-07-16 VITALS — SYSTOLIC BLOOD PRESSURE: 153 MMHG | DIASTOLIC BLOOD PRESSURE: 101 MMHG

## 2019-07-16 RX ADMIN — POLYETHYLENE GLYCOL 3350 SCH GM: 17 POWDER, FOR SOLUTION ORAL at 08:44

## 2019-07-16 RX ADMIN — GABAPENTIN SCH MG: 300 CAPSULE ORAL at 19:40

## 2019-07-16 RX ADMIN — MORPHINE SULFATE PRN MG: 4 INJECTION, SOLUTION INTRAMUSCULAR; INTRAVENOUS at 05:34

## 2019-07-16 RX ADMIN — LINEZOLID SCH MG: 600 TABLET, FILM COATED ORAL at 19:40

## 2019-07-16 RX ADMIN — LABETALOL HCL SCH MG: 100 TABLET, FILM COATED ORAL at 09:49

## 2019-07-16 RX ADMIN — PANTOPRAZOLE SODIUM SCH MG: 40 TABLET, DELAYED RELEASE ORAL at 08:40

## 2019-07-16 RX ADMIN — MORPHINE SULFATE PRN MG: 4 INJECTION, SOLUTION INTRAMUSCULAR; INTRAVENOUS at 19:40

## 2019-07-16 RX ADMIN — Medication SCH EA: at 21:00

## 2019-07-16 RX ADMIN — MORPHINE SULFATE PRN MG: 4 INJECTION, SOLUTION INTRAMUSCULAR; INTRAVENOUS at 00:50

## 2019-07-16 RX ADMIN — LABETALOL HCL SCH MG: 100 TABLET, FILM COATED ORAL at 19:40

## 2019-07-16 RX ADMIN — Medication SCH CAP: at 19:40

## 2019-07-16 RX ADMIN — LINEZOLID SCH MG: 600 TABLET, FILM COATED ORAL at 08:43

## 2019-07-16 RX ADMIN — MORPHINE SULFATE PRN MG: 4 INJECTION, SOLUTION INTRAMUSCULAR; INTRAVENOUS at 14:27

## 2019-07-16 RX ADMIN — Medication SCH CAP: at 08:40

## 2019-07-16 RX ADMIN — LIDOCAINE SCH PATCH: 50 PATCH CUTANEOUS at 08:44

## 2019-07-16 RX ADMIN — ONDANSETRON PRN MG: 2 INJECTION INTRAMUSCULAR; INTRAVENOUS at 19:39

## 2019-07-16 NOTE — PDOC
PROGRESS NOTES


Chief Complaint


Chief Complaint


? ESRD-initiation of CRRT


Hypotension


Hepatorenal syndrome


Elevated troponin in the background of sepsis


UTI


Methamphetamine abuse


Metabolic encephalopathy


etoh drinker


Sepsis with hypotension


MOD to sever PCM


Oliguric renal failure


Accelerated hypertension





History of Present Illness


History of Present Illness


Not confused anymore for 48 hours ,at least under my watch


He has no complaints


Unsure if there plans of dialysis today


Still oliguric, helms in


Creatinine 5 from teens on admission


He is self-pay


CK down to 4800 from 16,000 from 100,000 on admission


Blood pressure still high - on labetalol when necessary and has gotten a couple 

of doses





Plan: start labetalol  twice a day


Initiate Xanax and Haldol by mouth instead of IV too


CRRT/HD per renal-so far no clear plans if needs HD permanently but then again 

patient is self-pay


dc or maintain Helms?-We'll defer to renal





Vitals


Vitals





Vital Signs








  Date Time  Temp Pulse Resp B/P (MAP) Pulse Ox O2 Delivery O2 Flow Rate FiO2


 


7/16/19 09:49  83  165/99    


 


7/16/19 07:45 97.8  18  98 Room Air  





 97.8       


 


7/15/19 16:00       98.0 











Physical Exam


Physical Exam


GENERAL:  Standing with PT then sat down, NAD, coop 


EENT:  Pupils equal, oral cavity clear


NECK:  Supple.


LUNGS:  Clear bilaterally.  No wheezing.


HEART:  S1, S2, regular, tachy 108s


ABDOMEN:  Soft, nontender, BS present


: Helms


EXTREMITIES:  Generalized trace edema, no cyanosis. Right index with min 

swelling.  Lesions are stable - mild maceration


CENTRAL NERVOUS SYSTEM:  Alert, answering appropriately


RIJ/HDC without signs of complications


PIV


General:  Oriented X3, Cooperative, mild distress


Heart:  Regular rate, Normal S1, No murmurs


Lungs:  Clear


Abdomen:  Normal bowel sounds, Soft, No hepatosplenomegaly, Other (obese, no 

fluid wave)


Extremities:  No clubbing, No cyanosis, No edema, Other (burn to right index 

finger APPEARS OLD  associated  cellulitis)





Labs


LABS





Laboratory Tests








Test


 7/16/19


05:15


 


Creatine Kinase


 4893 U/L


()











Review of Systems


Review of Systems


A 14 point ROS was completed with the following noted as positive:





Other systems reviewed and negative.


\CONSTITUTIONAL:        No fever or chills


EYES:                          No recent changes


SKIN:               No rash or itching


CARDIOVASCULAR:     No chest pain, syncope, palpitations, or edema


RESPIRATORY:            No SOB or cough


GASTROINTESTINAL:    No nausea, vomiting or abdominal pain


NEUROLOGICAL:          No headaches or weakness


ENDOCRINE:               No cold or heat intolerance


GENITOURINARY:         No urgency or frequency of urination


MUSCULOSKELETAL:   No back pain or joint pain


LYMPHATICS:               No enlarged lymph nodes


PSYCHIATRIC:              No anxiety or depression





Assessment and Plan


Assessmemt and Plan


Problems


Medical Problems:


(1) Elevated troponin


Status: Acute  





(2) Hepatorenal syndrome


Status: Acute  





(3) Hyperkalemia


Status: Acute  





(4) Hyponatremia


Status: Acute  





(5) Methamphetamine abuse


Status: Acute  





(6) Neurological complaint


Status: Acute  





(7) Rhabdomyolysis


Status: Acute  





(8) Urinary retention


Status: Acute  





(9) Urinary tract infection


Status: Acute  











Comment


Review of Relevant


I have reviewed the following items estrella (where applicable) has been applied.


Labs





Laboratory Tests








Test


 7/15/19


03:15 7/16/19


05:15


 


White Blood Count


 8.6 x10^3/uL


(4.0-11.0) 





 


Red Blood Count


 3.30 x10^6/uL


(4.30-5.70) 





 


Hemoglobin


 9.6 g/dL


(13.0-17.5) 





 


Hematocrit


 27.8 %


(39.0-53.0) 





 


Mean Corpuscular Volume 84 fL ()  


 


Mean Corpuscular Hemoglobin 29 pg (25-35)  


 


Mean Corpuscular Hemoglobin


Concent 35 g/dL


(31-37) 





 


Red Cell Distribution Width


 13.1 %


(11.5-14.5) 





 


Platelet Count


 210 x10^3/uL


(140-400) 





 


Neutrophils (%) (Auto) 73 % (31-73)  


 


Lymphocytes (%) (Auto) 15 % (24-48)  


 


Monocytes (%) (Auto) 8 % (0-9)  


 


Eosinophils (%) (Auto) 3 % (0-3)  


 


Basophils (%) (Auto) 1 % (0-3)  


 


Neutrophils # (Auto)


 6.3 x10^3/uL


(1.8-7.7) 





 


Lymphocytes # (Auto)


 1.3 x10^3/uL


(1.0-4.8) 





 


Monocytes # (Auto)


 0.7 x10^3/uL


(0.0-1.1) 





 


Eosinophils # (Auto)


 0.3 x10^3/uL


(0.0-0.7) 





 


Basophils # (Auto)


 0.1 x10^3/uL


(0.0-0.2) 





 


Sodium Level


 132 mmol/L


(136-145) 





 


Potassium Level


 3.9 mmol/L


(3.5-5.1) 





 


Chloride Level


 97 mmol/L


() 





 


Carbon Dioxide Level


 26 mmol/L


(21-32) 





 


Anion Gap 9 (6-14)  


 


Blood Urea Nitrogen


 27 mg/dL


(8-26) 





 


Creatinine


 5.6 mg/dL


(0.7-1.3) 





 


Estimated GFR


(Cockcroft-Gault) 12.0 


 





 


BUN/Creatinine Ratio 5 (6-20)  


 


Glucose Level


 95 mg/dL


(70-99) 





 


Calcium Level


 8.8 mg/dL


(8.5-10.1) 





 


Total Bilirubin


 0.5 mg/dL


(0.2-1.0) 





 


Aspartate Amino Transf


(AST/SGOT) 296 U/L


(15-37) 





 


Alanine Aminotransferase


(ALT/SGPT) 220 U/L


(16-63) 





 


Alkaline Phosphatase


 129 U/L


() 





 


Creatine Kinase


 53957 U/L


() 4893 U/L


()


 


Total Protein


 5.2 g/dL


(6.4-8.2) 





 


Albumin


 1.9 g/dL


(3.4-5.0) 





 


Albumin/Globulin Ratio 0.6 (1.0-1.7)  








Laboratory Tests








Test


 7/16/19


05:15


 


Creatine Kinase


 4893 U/L


()








Microbiology


7/10/19 Blood Culture - Final, Complete


          NO GROWTH AFTER 5 DAYS


7/10/19 CSF Gram Stain - Final, Complete


          


7/9/19 Urine Culture - Final, Complete


         


7/9/19 Urine Culture Result 1 (RUI) - Final, Complete


Medications





Current Medications


Morphine Sulfate (Morphine Sulfate) 4 mg 1X  ONCE IV  Last administered on 

7/9/19at 21:20;  Start 7/9/19 at 21:30;  Stop 7/9/19 at 21:31;  Status DC


Sodium Chloride 1,000 ml @  1,000 mls/hr 1X  ONCE IV  Last administered on 

7/9/19at 22:30;  Start 7/9/19 at 22:30;  Stop 7/9/19 at 23:29;  Status DC


Calcium Gluconate (Calcium Gluconate) 1,000 mg 1X  ONCE IVP  Last administered 

on 7/9/19at 22:43;  Start 7/9/19 at 23:00;  Stop 7/9/19 at 23:01;  Status DC


Insulin Human Regular (HumuLIN R VIAL) 10 unit 1X  ONCE IV  Last administered on

7/9/19at 23:51;  Start 7/9/19 at 23:00;  Stop 7/9/19 at 23:01;  Status DC


Dextrose (Dextrose 50%-Water Syringe) 25 gm 1X  ONCE IV  Last administered on 

7/9/19at 22:42;  Start 7/9/19 at 23:00;  Stop 7/9/19 at 23:01;  Status DC


Sodium Bicarbonate (Sodium Bicarb Adult 8.4% Syr) 50 meq 1X  ONCE IV  Last 

administered on 7/9/19at 23:49;  Start 7/9/19 at 23:00;  Stop 7/9/19 at 23:01;  

Status DC


Sodium Chloride 1,000 ml @  1,000 mls/hr 1X  ONCE IV  Last administered on 

7/9/19at 23:50;  Start 7/9/19 at 23:00;  Stop 7/9/19 at 23:59;  Status DC


Albuterol Sulfate (Ventolin Neb Soln) 10 mg 1X  ONCE CONT NEB  Last administered

on 7/10/19at 00:30;  Start 7/9/19 at 23:00;  Stop 7/9/19 at 23:01;  Status DC


Ceftriaxone Sodium (Rocephin) 1 gm 1X  ONCE IVP  Last administered on 7/9/19at 

22:43;  Start 7/9/19 at 23:00;  Stop 7/9/19 at 23:01;  Status DC


Sodium Chloride 1,000 ml @  1,000 mls/hr 1X  ONCE IV  Last administered on 

7/10/19at 01:00;  Start 7/10/19 at 01:00;  Stop 7/10/19 at 01:59;  Status DC


Lorazepam (Ativan Inj) 0.5 mg PRN Q6HRS  PRN IV ANXIETY / AGITATION Last 

administered on 7/10/19at 11:00;  Start 7/10/19 at 01:15;  Stop 7/11/19 at 

08:48;  Status DC


Ondansetron HCl (Zofran) 4 mg PRN Q6HRS  PRN IV NAUSEA/VOMITING 1ST CHOICE;  

Start 7/10/19 at 01:15;  Stop 7/10/19 at 01:22;  Status DC


Sodium Chloride (Normal Saline Flush) 3 ml QSHIFT  PRN IV AFTER MEDS AND BLOOD 

DRAWS;  Start 7/10/19 at 01:15


Sodium Chloride 1,000 ml @  175 mls/hr Q5H43M IV ;  Start 7/10/19 at 01:08;  

Stop 7/10/19 at 01:23;  Status DC


Ondansetron HCl (Zofran) 4 mg PRN Q8HRS  PRN IV NAUSEA/VOMITING  1ST CHOICE;  

Start 7/10/19 at 01:15;  Stop 7/11/19 at 01:14;  Status DC


Sodium Chloride 1,000 ml @  150 mls/hr Q6H40M IV ;  Start 7/10/19 at 01:30;  

Stop 7/10/19 at 18:49;  Status DC


Morphine Sulfate (Morphine Sulfate) 4 mg PRN Q4HRS  PRN IV SEVERE PAIN Last 

administered on 7/16/19at 05:34;  Start 7/10/19 at 09:00


Sodium Bicarbonate (Sodium Bicarb Adult 8.4% Syr) 100 meq 1X  ONCE IV  Last 

administered on 7/10/19at 10:02;  Start 7/10/19 at 10:00;  Stop 7/10/19 at 

10:01;  Status DC


Sodium Chloride 1,000 ml @  1,000 mls/hr 1X  ONCE IV  Last administered on 

7/10/19at 09:56;  Start 7/10/19 at 10:00;  Stop 7/10/19 at 10:59;  Status DC


Lidocaine/Sodium Bicarbonate (Buffered Lidocaine 1%) 3 ml STK-MED ONCE .ROUTE ; 

Start 7/10/19 at 11:07;  Stop 7/10/19 at 11:08;  Status DC


Calcium Gluconate (Calcium Gluconate) 2,000 mg 1X  ONCE IVP  Last administered 

on 7/10/19at 13:38;  Start 7/10/19 at 12:00;  Stop 7/10/19 at 12:01;  Status DC


Ceftriaxone Sodium (Rocephin) 1 gm Q24H IVP  Last administered on 7/10/19at 

12:49;  Start 7/10/19 at 13:00;  Stop 7/11/19 at 07:45;  Status DC


Silver Sulfadiazine (Silvadene) 1 mack BID TP  Last administered on 7/11/19at 

10:06;  Start 7/10/19 at 13:00;  Stop 7/11/19 at 15:04;  Status DC


Sodium Chloride 1,000 ml @  1,000 mls/hr 1X  ONCE IV  Last administered on 

7/10/19at 12:47;  Start 7/10/19 at 12:45;  Stop 7/10/19 at 13:44;  Status DC


Pantoprazole Sodium (Protonix) 40 mg DAILYAC PO ;  Start 7/10/19 at 13:30;  Stop

7/10/19 at 19:22;  Status DC


Polyethylene Glycol (miraLAX PACKET) 17 gm DAILY PO  Last administered on 

7/16/19at 08:44;  Start 7/10/19 at 13:30


Lidocaine/Sodium Bicarbonate (Buffered Lidocaine 1%) 3 ml STK-MED ONCE .ROUTE ; 

Start 7/10/19 at 13:48;  Stop 7/10/19 at 13:49;  Status DC


Haloperidol Lactate (Haldol Inj) 1 mg PRN Q8HRS  PRN IVP AGITATION Last 

administered on 7/12/19at 07:56;  Start 7/10/19 at 14:00


Lorazepam (Ativan Inj) 2 mg PRN Q2HRS  PRN IV ANXIETY. Last administered on 

7/14/19at 21:22;  Start 7/10/19 at 14:00;  Stop 7/15/19 at 10:17;  Status DC


Lidocaine/Sodium Bicarbonate (Buffered Lidocaine 1%) 6 ml 1X  ONCE INJ ;  Start 

7/10/19 at 14:15;  Stop 7/10/19 at 14:16;  Status DC


Dexmedetomidine HCl 200 mcg/ Sodium Chloride 50 ml @ 0 mls/hr CONT  PRN IV PER 

PROTOCOL Last administered on 7/13/19at 10:28;  Start 7/10/19 at 14:15;  Stop 

7/15/19 at 10:17;  Status DC


Sodium Chloride 500 ml @  500 mls/hr 1X PRN  PRN IV SEE COMMENTS;  Start 7/10/19

at 14:15


Atropine Sulfate (ATROPINE 0.5mg SYRINGE) 0.5 mg PRN Q5MIN  PRN IV SEE COMMENTS;

 Start 7/10/19 at 14:15


Sodium Chloride 1,000 ml @  1,000 mls/hr Q1H PRN IV hypotension;  Start 7/10/19 

at 15:08;  Stop 7/10/19 at 21:07;  Status DC


Diphenhydramine HCl (Benadryl) 25 mg 1X PRN  PRN IV ITCHING;  Start 7/10/19 at 

15:15;  Stop 7/11/19 at 11:19;  Status DC


Diphenhydramine HCl (Benadryl) 25 mg 1X PRN  PRN IV ITCHING;  Start 7/10/19 at 

15:15;  Stop 7/11/19 at 11:19;  Status DC


Sodium Chloride 1,000 ml @  400 mls/hr Q2H30M PRN IV PATENCY;  Start 7/10/19 at 

15:08;  Stop 7/11/19 at 03:07;  Status DC


Info (PHARMACY MONITORING -- do not chart) 1 each PRN DAILY  PRN MC SEE 

COMMENTS;  Start 7/10/19 at 15:15;  Stop 7/11/19 at 11:18;  Status DC


Pantoprazole Sodium (PROTONIX VIAL for IV PUSH) 40 mg DAILYAC IVP  Last 

administered on 7/15/19at 10:04;  Start 7/11/19 at 07:30;  Stop 7/15/19 at 

11:54;  Status DC


Linezolid (Zyvox) 600 mg BID PO  Last administered on 7/16/19at 08:43;  Start 

7/11/19 at 09:00;  Stop 7/17/19 at 22:00


Ceftriaxone Sodium (Rocephin) 2 gm Q24H IVP  Last administered on 7/14/19at 

12:14;  Start 7/11/19 at 13:00;  Stop 7/15/19 at 10:06;  Status DC


Calcium Gluconate 2000 mg/Dextrose 120 ml @  220 mls/hr 1X  ONCE IV  Last 

administered on 7/11/19at 10:05;  Start 7/11/19 at 10:00;  Stop 7/11/19 at 

10:32;  Status DC


Sodium Chloride 1,000 ml @  1,000 mls/hr Q1H PRN IV hypotension;  Start 7/11/19 

at 11:06;  Stop 7/11/19 at 17:05;  Status DC


Diphenhydramine HCl (Benadryl) 25 mg 1X PRN  PRN IV ITCHING;  Start 7/11/19 at 

11:15;  Stop 7/12/19 at 11:14;  Status DC


Diphenhydramine HCl (Benadryl) 25 mg 1X PRN  PRN IV ITCHING;  Start 7/11/19 at 

11:15;  Stop 7/12/19 at 11:14;  Status DC


Sodium Chloride 1,000 ml @  400 mls/hr Q2H30M PRN IV PATENCY;  Start 7/11/19 at 

11:06;  Stop 7/11/19 at 23:05;  Status DC


Info (PHARMACY MONITORING -- do not chart) 1 each PRN DAILY  PRN MC SEE 

COMMENTS;  Start 7/11/19 at 11:15;  Status Cancel


Potassium Chloride 10 meq/ Calcium Chloride 12.5 meq/ Bicarbonate Dialysis Soln 

w/ out KCl 5,013.9286 ml @ 1,000 mls/hr Q5H1M IV ;  Start 7/12/19 at 13:00;  

Status Cancel


Potassium Chloride 20 meq/ Calcium Chloride 12.5 meq/ Bicarbonate Dialysis Soln 

w/ out KCl 5,018.9286 ml @ 1,500 mls/hr Q3H21M IV ;  Start 7/12/19 at 13:00;  

Stop 7/12/19 at 13:00;  Status DC


Heparin Sodium (Porcine) (Heparin Sodium) 4,000 unit 1X  ONCE IV  Last 

administered on 7/12/19at 19:54;  Start 7/12/19 at 12:00;  Stop 7/12/19 at 

12:11;  Status DC


Heparin Sodium/ Dextrose 500 ml @ 0 mls/hr CONT  PRN IV SEE I/O RECORD Last 

administered on 7/13/19at 15:05;  Start 7/12/19 at 12:00;  Stop 7/14/19 at 

08:03;  Status DC


Heparin Sodium (Porcine) (Heparin Sodium) 2,500 unit PRN Q6HRS  PRN IV FOR UFH 

LEVEL LESS THAN 0.2;  Start 7/12/19 at 12:00;  Stop 7/15/19 at 13:16;  Status DC


Potassium Chloride 10 meq/ Calcium Chloride 12.5 meq/ Bicarbonate Dialysis Soln 

w/ out KCl 5,013.9286 ml @ 1,000 mls/hr Q5H1M IV ;  Start 7/12/19 at 13:00;  

Stop 7/12/19 at 13:00;  Status DC


Potassium Chloride 10 meq/ Bicarbonate Dialysis Soln w/ out KCl 5,005 ml @  

1,000 mls/hr Q5H1M IV ;  Start 7/12/19 at 13:00;  Stop 7/12/19 at 13:00;  Status

DC


Potassium Chloride 20 meq/ Bicarbonate Dialysis Soln w/ out KCl 5,010 ml @  

1,500 mls/hr Q3H21M IV ;  Start 7/12/19 at 13:00;  Stop 7/12/19 at 13:00;  

Status DC


Potassium Chloride 10 meq/ Bicarbonate Dialysis Soln w/ out KCl 5,005 ml @  

1,500 mls/hr Q3H21M IV ;  Start 7/12/19 at 13:00;  Stop 7/12/19 at 13:00;  

Status DC


Potassium Chloride 20 meq/ Bicarbonate Dialysis Soln w/ out KCl 5,010 ml @  

1,000 mls/hr Q5H1M IV  Last administered on 7/13/19at 20:43;  Start 7/12/19 at 

13:00;  Stop 7/14/19 at 08:03;  Status DC


Potassium Chloride 20 meq/ Bicarbonate Dialysis Soln w/ out KCl 5,010 ml @  

1,500 mls/hr Q3H21M IV ;  Start 7/12/19 at 12:45;  Stop 7/12/19 at 12:45;  

Status DC


Potassium Chloride 40 meq/ Bicarbonate Dialysis Soln w/ out KCl 5,020 ml @  

1,500 mls/hr Q3H21M IV ;  Start 7/12/19 at 13:00;  Stop 7/12/19 at 13:10;  

Status DC


Potassium Chloride 60 meq/ Bicarbonate Dialysis Soln w/ out KCl 5,030 ml @  

1,500 mls/hr Q3H22M IV ;  Start 7/12/19 at 13:00;  Stop 7/12/19 at 13:07;  

Status DC


Potassium Chloride 30 meq/ Bicarbonate Dialysis Soln w/ out KCl 5,015 ml @  

1,500 mls/hr Q3H21M IV ;  Start 7/12/19 at 13:15;  Stop 7/12/19 at 18:00;  

Status DC


Potassium Chloride 20 meq/ Bicarbonate Dialysis Soln w/ out KCl 5,010 ml @  

1,500 mls/hr Q3H21M IV  Last administered on 7/12/19at 21:06;  Start 7/12/19 at 

13:10;  Stop 7/12/19 at 18:00;  Status DC


Potassium Chloride 30 meq/ Bicarbonate Dialysis Soln w/ out KCl 5,015 ml @  

1,500 mls/hr Q3H21M IV ;  Start 7/12/19 at 18:00;  Stop 7/12/19 at 19:10;  

Status DC


Potassium Chloride 20 meq/ Bicarbonate Dialysis Soln w/ out KCl 5,010 ml @  

1,500 mls/hr Q3H21M IV  Last administered on 7/14/19at 00:15;  Start 7/12/19 at 

18:01;  Stop 7/14/19 at 08:03;  Status DC


Potassium Chloride 20 meq/ Bicarbonate Dialysis Soln w/ out KCl 5,010 ml @  

1,500 mls/hr Q3H21M IV  Last administered on 7/14/19at 00:18;  Start 7/12/19 at 

20:00;  Stop 7/14/19 at 08:03;  Status DC


Lactobacillus Rhamnosus (Culturelle) 1 cap BID PO  Last administered on 

7/16/19at 08:40;  Start 7/14/19 at 21:00


Oxycodone HCl (Roxicodone) 5 mg PRN Q4HRS  PRN PO PAIN Last administered on 

7/15/19at 22:12;  Start 7/14/19 at 11:45


Lorazepam (Ativan Inj) 1 mg PRN Q8HRS  PRN IV ANXIETY.;  Start 7/14/19 at 16:00


Gabapentin (Neurontin) 300 mg QHS PO  Last administered on 7/15/19at 21:06;  

Start 7/14/19 at 21:00


Labetalol HCl (Normodyne Iv Push) 10 mg PRN Q4HRS  PRN IVP HYPERTENSION Last 

administered on 7/15/19at 03:33;  Start 7/14/19 at 23:30;  Stop 7/15/19 at 

09:26;  Status DC


Labetalol HCl (Normodyne Iv Push) 20 mg PRN Q4HRS  PRN IVP HYPERTENSION;  Start 

7/15/19 at 09:30


Ondansetron HCl (Zofran) 4 mg PRN Q6HRS  PRN IV NAUSEA/VOMITING;  Start 7/15/19 

at 09:30


Acetaminophen (Tylenol) 500 mg PRN Q6HRS  PRN PO MILD PAIN / TEMP;  Start 

7/15/19 at 09:30


Lidocaine (Lidoderm) 1 patch DAILY TD  Last administered on 7/16/19at 08:44;  

Start 7/15/19 at 10:00


Miscellaneous (Lidoderm Patch Removal) 1 ea QHS MC  Last administered on 

7/15/19at 21:00;  Start 7/15/19 at 21:00


Sodium Chloride 1,000 ml @  1,000 mls/hr Q1H PRN IV hypotension;  Start 7/15/19 

at 11:26;  Stop 7/15/19 at 17:25;  Status DC


Albumin Human 200 ml @  200 mls/hr 1X PRN  PRN IV Hypotension;  Start 7/15/19 at

11:30;  Stop 7/15/19 at 17:29;  Status DC


Sodium Chloride (Normal Saline Flush) 10 ml 1X PRN  PRN IV AP catheter pack;  

Start 7/15/19 at 11:30;  Stop 7/16/19 at 11:29;  Status DC


Sodium Chloride (Normal Saline Flush) 10 ml 1X PRN  PRN IV  catheter pack;  

Start 7/15/19 at 11:30;  Stop 7/16/19 at 11:29;  Status DC


Sodium Chloride 1,000 ml @  400 mls/hr Q2H30M PRN IV PATENCY;  Start 7/15/19 at 

11:26;  Stop 7/15/19 at 23:25;  Status DC


Info (PHARMACY MONITORING -- do not chart) 1 each PRN DAILY  PRN MC SEE 

COMMENTS;  Start 7/15/19 at 11:30;  Status UNV


Info (PHARMACY MONITORING -- do not chart) 1 each PRN DAILY  PRN MC SEE 

COMMENTS;  Start 7/15/19 at 11:30


Pantoprazole Sodium (Protonix) 40 mg DAILYAC PO  Last administered on 7/16/19at 

08:40;  Start 7/16/19 at 07:30


Labetalol HCl (Trandate) 100 mg BID PO  Last administered on 7/16/19at 09:49;  

Start 7/16/19 at 09:00


Lorazepam (Ativan) 0.5 mg PRN Q8HRS  PRN PO ANXIETY / AGITATION;  Start 7/16/19 

at 09:00


Haloperidol (Haldol) 0.5 mg PRN QID  PRN PO AGITATION, 2ND CHOICE;  Start 

7/16/19 at 09:00;  Stop 7/16/19 at 09:03;  Status DC


Haloperidol Lactate (HALDOL 2mg ORAL CONC) 0.5 mg PRN QID  PRN PO AGITATION, 2ND

CHOICE;  Start 7/16/19 at 09:03





Active Scripts


Active


Reported


Protonix (Pantoprazole Sodium) 20 Mg Tablet.dr 2 Tab PO DAILY


Zyprexa (Olanzapine) 20 Mg Tablet 2 Tab PO QHS


Buspirone Hcl 30 Mg Tablet 1 Tab PO BID


Vitals/I & O





Vital Sign - Last 24 Hours








 7/15/19 7/15/19 7/15/19 7/15/19





 16:00 17:59 18:08 19:08


 


Temp  98.4  





  98.4  


 


Pulse  109  


 


Resp  18 18 


 


B/P (MAP)  161/102 (121)  


 


Pulse Ox  99  


 


O2 Delivery Room Air Room Air Room Air Room Air


 


O2 Flow Rate 98.0   


 


    





    





 7/15/19 7/15/19 7/15/19 7/15/19





 19:20 19:45 20:04 20:34


 


Temp 98.2   





 98.2   


 


Pulse 105   


 


Resp 18   


 


B/P (MAP) 156/103 (120)   


 


Pulse Ox 97   


 


O2 Delivery Room Air Room Air Room Air Room Air


 


    





    





 7/15/19 7/15/19 7/16/19 7/16/19





 22:12 23:25 00:50 03:25


 


Temp  98.3  98.3





  98.3  98.3


 


Pulse  98  99


 


Resp  16  16


 


B/P (MAP)  136/82 (100)  151/93 (112)


 


Pulse Ox  94  98


 


O2 Delivery Room Air Room Air Room Air Room Air


 


    





    





 7/16/19 7/16/19  





 07:45 09:49  


 


Temp 97.8   





 97.8   


 


Pulse 83 83  


 


Resp 18   


 


B/P (MAP) 165/99 (121) 165/99  


 


Pulse Ox 98   


 


O2 Delivery Room Air   














Intake and Output   


 


 7/15/19 7/15/19 7/16/19





 15:00 23:00 07:00


 


Intake Total  0 ml 400 ml


 


Balance  0 ml 400 ml

















GIUSEPPE MONREAL MD           Jul 16, 2019 12:04

## 2019-07-16 NOTE — NUR
SW following pt. Awaiting on clear direction from nephrology regarding OP HD. Pt is also 
self pay. Will continue to follow.
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
## 2019-07-16 NOTE — PDOC
Subjective:


Subjective:


Has back pain.


Eating and stooling without issue.


Says he ate a lot for breakfast and was really full.





Objective:


Vital Signs:





                                   Vital Signs








  Date Time  Temp Pulse Resp B/P (MAP) Pulse Ox O2 Delivery O2 Flow Rate FiO2


 


7/16/19 09:49  83  165/99    


 


7/16/19 07:45 97.8  18  98 Room Air  





 97.8       


 


7/15/19 16:00       98.0 








Labs:





Laboratory Tests








Test


 7/16/19


05:15


 


Creatine Kinase 4893 U/L 











PE:





GEN: NAD - therapy present


LUNGS: CTAB


HEART: RRR


ABD: NABS, S/ND/NT


NEURO/PSYCH: A & O 3





A/P:


Transaminitis - better


Rhabdo, EL





--


Improving GI-wise.











LIZ GARCIA         Jul 16, 2019 10:59
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
## 2019-07-16 NOTE — PDOC
SUBJECTIVE


ROS


No acute events overnight





OBJECTIVE


Vital Signs





Vital Signs








  Date Time  Temp Pulse Resp B/P (MAP) Pulse Ox O2 Delivery O2 Flow Rate FiO2


 


7/16/19 09:49  83  165/99    


 


7/16/19 07:45 97.8  18  98 Room Air  





 97.8       


 


7/15/19 16:00       98.0 








I & 0











Intake and Output 


 


 7/16/19





 07:00


 


Intake Total 400 ml


 


Balance 400 ml


 


 


 


Intake Oral 400 ml











PHYSICAL EXAM


Physical Exam


GENERAL:  In chair, NAD, 


EENT:   OM moist  


NECK:  Supple.


LUNGS:  Clear bilaterally.  No wheezing.


HEART:  S1, S2, regular, 


ABDOMEN:  Soft, nontender, BS present


: Culp


EXTREMITIES:  Generalized trace edema,  


 NEURO:  Alert, answering appropriately


RIJ/HDC





DIAGNOSIS/ASSESSMENT


Assessment & Plan


EL - ATN2/2 Rhabdo , Meth use  


Urinary retention at Presentation,Anuric


Has Culp in place-Flush to make sure not clogged  


 Requiring  HD - 1 st Tx  7/10  ,CRRT x 1  


No indication for HD today , If no improvement will need Perma cath and OP chair

time   





Rhabdo-  CPK>100,000 at presentation  





Hyperkalemia- Normal K 





Hyponatremia- stable, mildly low  





Severe Metabolic acidosis- 2/2 all above 


Resolved  





Transaminitis - Improving LFT's 





Hypocalcemia- severe 2/2  Rhabdo


Replace cautiously and Monitor closely for Hypercalcemia during recovery phase 





Discussed with  RN





COMMENT/RELEVANT DATA


Meds





Current Medications








 Medications


  (Trade)  Dose


 Ordered  Sig/Travis  Start Time


 Stop Time Status Last Admin


Dose Admin


 


 Acetaminophen


  (Tylenol)  500 mg  PRN Q6HRS  PRN  7/15/19 09:30


     





 


 Albumin Human  200 ml @ 


 200 mls/hr  1X PRN  PRN  7/15/19 11:30


 7/15/19 17:29 DC  





 


 Albuterol Sulfate


  (Ventolin Neb


 Soln)  10 mg  1X  ONCE  7/9/19 23:00


 7/9/19 23:01 DC 7/10/19 00:30


10 MG


 


 Atropine Sulfate


  (ATROPINE 0.5mg


 SYRINGE)  0.5 mg  PRN Q5MIN  PRN  7/10/19 14:15


     





 


 Calcium Gluconate


  (Calcium


 Gluconate)  2,000 mg  1X  ONCE  7/10/19 12:00


 7/10/19 12:01 DC 7/10/19 13:38


2,000 MG


 


 Calcium Gluconate


 2000 mg/Dextrose  120 ml @ 


 220 mls/hr  1X  ONCE  7/11/19 10:00


 7/11/19 10:32 DC 7/11/19 10:05


220 MLS/HR


 


 Ceftriaxone Sodium


  (Rocephin)  2 gm  Q24H  7/11/19 13:00


 7/15/19 10:06 DC 7/14/19 12:14


2 GM


 


 Dexmedetomidine


 HCl 200 mcg/


 Sodium Chloride  50 ml @ 0


 mls/hr  CONT  PRN  7/10/19 14:15


 7/15/19 10:17 DC 7/13/19 10:28


12.6 MLS/HR


 


 Dextrose


  (Dextrose


 50%-Water Syringe)  25 gm  1X  ONCE  7/9/19 23:00


 7/9/19 23:01 DC 7/9/19 22:42


25 GM


 


 Diphenhydramine


 HCl


  (Benadryl)  25 mg  1X PRN  PRN  7/11/19 11:15


 7/12/19 11:14 DC  





 


 Gabapentin


  (Neurontin)  300 mg  QHS  7/14/19 21:00


    7/15/19 21:06


300 MG


 


 Haloperidol


  (Haldol)  0.5 mg  PRN QID  PRN  7/16/19 09:00


 7/16/19 09:03 DC  





 


 Haloperidol


 Lactate


  (HALDOL 2mg ORAL


 CONC)  0.5 mg  PRN QID  PRN  7/16/19 09:03


     





 


 Haloperidol


 Lactate


  (Haldol Inj)  1 mg  PRN Q8HRS  PRN  7/10/19 14:00


    7/12/19 07:56


1 MG


 


 Heparin Sodium


  (Porcine)


  (Heparin Sodium)  2,500 unit  PRN Q6HRS  PRN  7/12/19 12:00


 7/15/19 13:16 DC  





 


 Heparin Sodium/


 Dextrose  500 ml @ 0


 mls/hr  CONT  PRN  7/12/19 12:00


 7/14/19 08:03 DC 7/13/19 15:05


28.3 MLS/HR


 


 Info


  (PHARMACY


 MONITORING -- do


 not chart)  1 each  PRN DAILY  PRN  7/15/19 11:30


     





 


 Insulin Human


 Regular


  (HumuLIN R VIAL)  10 unit  1X  ONCE  7/9/19 23:00


 7/9/19 23:01 DC 7/9/19 23:51


10 UNIT


 


 Labetalol HCl


  (Normodyne Iv


 Push)  20 mg  PRN Q4HRS  PRN  7/15/19 09:30


     





 


 Labetalol HCl


  (Trandate)  100 mg  BID  7/16/19 09:00


    7/16/19 09:49


100 MG


 


 Lactobacillus


 Rhamnosus


  (Culturelle)  1 cap  BID  7/14/19 21:00


    7/16/19 08:40


1 CAP


 


 Lidocaine


  (Lidoderm)  1 patch  DAILY  7/15/19 10:00


    7/16/19 08:44


1 PATCH


 


 Lidocaine/Sodium


 Bicarbonate


  (Buffered


 Lidocaine 1%)  6 ml  1X  ONCE  7/10/19 14:15


 7/10/19 14:16 DC  





 


 Linezolid


  (Zyvox)  600 mg  BID  7/11/19 09:00


 7/17/19 22:00  7/16/19 08:43


600 MG


 


 Lorazepam


  (Ativan Inj)  1 mg  PRN Q8HRS  PRN  7/14/19 16:00


     





 


 Lorazepam


  (Ativan)  0.5 mg  PRN Q8HRS  PRN  7/16/19 09:00


     





 


 Miscellaneous


  (Lidoderm Patch


 Removal)  1 ea  QHS  7/15/19 21:00


    7/15/19 21:00


1 EA


 


 Morphine Sulfate


  (Morphine


 Sulfate)  4 mg  PRN Q4HRS  PRN  7/10/19 09:00


    7/16/19 05:34


4 MG


 


 Ondansetron HCl


  (Zofran)  4 mg  PRN Q6HRS  PRN  7/15/19 09:30


     





 


 Oxycodone HCl


  (Roxicodone)  5 mg  PRN Q4HRS  PRN  7/14/19 11:45


    7/15/19 22:12


5 MG


 


 Pantoprazole


 Sodium


  (PROTONIX VIAL


 for IV PUSH)  40 mg  DAILYAC  7/11/19 07:30


 7/15/19 11:54 DC 7/15/19 10:04


40 MG


 


 Pantoprazole


 Sodium


  (Protonix)  40 mg  DAILYAC  7/16/19 07:30


    7/16/19 08:40


40 MG


 


 Polyethylene


 Glycol


  (miraLAX PACKET)  17 gm  DAILY  7/10/19 13:30


    7/16/19 08:44


17 GM


 


 Potassium


 Chloride 10 meq/


 Bicarbonate


 Dialysis Soln w/


 out KCl  5,005 ml @ 


 1,500 mls/hr  Q3H21M  7/12/19 13:00


 7/12/19 13:00 DC  





 


 Potassium


 Chloride 10 meq/


 Calcium Chloride


 12.5 meq/


 Bicarbonate


 Dialysis Soln w/


 out KCl  5,013.9286


 ml @ 1,000


 mls/hr  Q5H1M  7/12/19 13:00


 7/12/19 13:00 DC  





 


 Potassium


 Chloride 20 meq/


 Bicarbonate


 Dialysis Soln w/


 out KCl  5,010 ml @ 


 1,500 mls/hr  Q3H21M  7/12/19 20:00


 7/14/19 08:03 DC 7/14/19 00:18


1,500 MLS/HR


 


 Potassium


 Chloride 20 meq/


 Calcium Chloride


 12.5 meq/


 Bicarbonate


 Dialysis Soln w/


 out KCl  5,018.9286


 ml @ 1,500


 mls/hr  Q3H21M  7/12/19 13:00


 7/12/19 13:00 DC  





 


 Potassium


 Chloride 30 meq/


 Bicarbonate


 Dialysis Soln w/


 out KCl  5,015 ml @ 


 1,500 mls/hr  Q3H21M  7/12/19 18:00


 7/12/19 19:10 DC  





 


 Potassium


 Chloride 40 meq/


 Bicarbonate


 Dialysis Soln w/


 out KCl  5,020 ml @ 


 1,500 mls/hr  Q3H21M  7/12/19 13:00


 7/12/19 13:10 DC  





 


 Potassium


 Chloride 60 meq/


 Bicarbonate


 Dialysis Soln w/


 out KCl  5,030 ml @ 


 1,500 mls/hr  Q3H22M  7/12/19 13:00


 7/12/19 13:07 DC  





 


 Silver


 Sulfadiazine


  (Silvadene)  1 mack  BID  7/10/19 13:00


 7/11/19 15:04 DC 7/11/19 10:06


1 MACK


 


 Sodium Bicarbonate


  (Sodium Bicarb


 Adult 8.4% Syr)  100 meq  1X  ONCE  7/10/19 10:00


 7/10/19 10:01 DC 7/10/19 10:02


100 MEQ


 


 Sodium Chloride  1,000 ml @ 


 400 mls/hr  Q2H30M PRN  7/15/19 11:26


 7/15/19 23:25 DC  





 


 Sodium Chloride


  (Normal Saline


 Flush)  10 ml  1X PRN  PRN  7/15/19 11:30


 7/16/19 11:29   











Lab





Laboratory Tests








Test


 7/16/19


05:15


 


Creatine Kinase


 4893 U/L


()








Results


All relevant outside records, renal labs, imaging studies, telemetry/EKG's were 

reviewed.











BENIGNO JOHNSON MD                Jul 16, 2019 11:13

## 2019-07-16 NOTE — PDOC
PROGRESS NOTES


Assessment


Assessment


Generalized weakness.


Rhabdomyolysis.


Methamphetamine positive.


Electrolytes imbalances.


Hyponatremia.


Hyperkalemia.


Hypocalcemia.


Hypoalbuminemia.


AKD.


Elevated hepatic enzymes.


Other psychiatric problems.


Abnormal T-spine MRI.


LE edema.


No evidence of MS this time.





RECOMMENDATIONS/PLAN:


Treat medical diseases.


Repeat T-spine MRI ( no contrast due to renal failure).


OT/PT.





CSF OCB zero.





Past Medical History


GI:  GERD


Psych:  Anxiety, Addictions, Depression





Past Surgical History


No pertinent history





Family History


No pertinent hx





Social History


Unemployed, uses methamphetamine, smokes occasionally, rare alcohol. Was said 

out of incarceration on 7/5/19.





ALLERGY:


NKDA





MEDICATIONS:


Refer to MAR





REVIEW OF SYSTEMS:


Refer to PMH and PSH.





PHYSICAL EXAMINATION:   


General appearance in no acute distress.  


HEENT:  Normocephalic and nontraumatic.  Eyes, nose, ears, and throat are 

unremarkable. 


Neck is supple. No lymphadenopathy.  No Crepitus.


Cardiovascular:  S1, S2, regular rate and rhythm.  


Pulmonary:  Clear to auscultation bilaterally. 


Abdomen:  Bowel sounds are positive.    


Extremities:  No rash, lesions, or edema.  


No restriction of range of motion





NEUROLOGICAL  EXAMINATION:


Awake.


Sitting in chair playing cellphone.


Oriented to time, place and person.


PERRL.


EOMI.


CN: no focal findings.


Muscle tone: within normal.


Muscle strength: 4-5


DTR: 2


Plantar reflex: Flexor response bilaterally 


Gait: not examined in chair.


Sensory exam: no acute abnormal findings.


No cerebellar signs elicited.


F-T-N test fine.


Edema noted in LE, right > Left.





Objective


Objective





Vital Signs








  Date Time  Temp Pulse Resp B/P (MAP) Pulse Ox O2 Delivery O2 Flow Rate FiO2


 


7/16/19 19:40  88  151/103    


 


7/16/19 19:40     100 Room Air 98.0 


 


7/16/19 15:15 97.9  18     





 97.9       














Intake and Output 


 


 7/16/19





 07:00


 


Intake Total 400 ml


 


Balance 400 ml


 


 


 


Intake Oral 400 ml











Vitals Signs


Vitals





                          VS - Last 72 Hours, by Label








  Date Time  Temp Pulse Resp B/P (MAP) Pulse Ox O2 Delivery O2 Flow Rate FiO2


 


7/16/19 19:40  88  151/103    


 


7/16/19 19:40     100 Room Air 98.0 


 


7/16/19 16:00      Room Air 98.0 


 


7/16/19 15:15 97.9 88 18 142/83 (102) 100 Room Air  





 97.9       


 


7/16/19 14:57     93 Room Air 98.0 


 


7/16/19 14:27     93 Room Air 98.0 


 


7/16/19 11:10 97.8 101 18 136/88 (104) 93 Room Air  





 97.8       


 


7/16/19 09:49  83  165/99    


 


7/16/19 07:45 97.8 83 18 165/99 (121) 98 Room Air  





 97.8       


 


7/16/19 03:25 98.3 99 16 151/93 (112) 98 Room Air  





 98.3       


 


7/16/19 00:50      Room Air  


 


7/15/19 23:25 98.3 98 16 136/82 (100) 94 Room Air  





 98.3       


 


7/15/19 22:12      Room Air  


 


7/15/19 20:04      Room Air  


 


7/15/19 19:45      Room Air  


 


7/15/19 19:20 98.2 105 18 156/103 (120) 97 Room Air  





 98.2       


 


7/15/19 19:08      Room Air  


 


7/15/19 18:08   18   Room Air  


 


7/15/19 17:59 98.4 109 18 161/102 (121) 99 Room Air  





 98.4       


 


7/15/19 16:00      Room Air 98.0 


 


7/15/19 11:34 97.7 99 18 158/96 (116) 98 Room Air  





 97.7       


 


7/15/19 08:51   18     


 


7/15/19 07:57 97.7 90 18 180/100 (126) 97 Room Air  





 97.7       


 


7/15/19 07:51   18   Room Air  











Laboratory


Laboratory





Laboratory Tests








Test


 7/16/19


05:15


 


Creatine Kinase


 4893 U/L


()








Microbiology


7/10/19 Blood Culture - Final, Complete


          NO GROWTH AFTER 5 DAYS


7/10/19 CSF Gram Stain - Final, Complete


          


7/9/19 Urine Culture - Final, Complete


         


7/9/19 Urine Culture Result 1 (RUI) - Final, Complete





Medication


Medications





Current Medications


Haloperidol (Haldol) 0.5 mg PRN QID  PRN PO AGITATION, 2ND CHOICE;  Start 

7/16/19 at 09:00;  Stop 7/16/19 at 09:03;  Status DC


Haloperidol Lactate (HALDOL 2mg ORAL CONC) 0.5 mg PRN QID  PRN PO AGITATION, 2ND

CHOICE;  Start 7/16/19 at 09:03


Labetalol HCl (Trandate) 100 mg BID PO  Last administered on 7/16/19at 19:40;  

Start 7/16/19 at 09:00


Lorazepam (Ativan) 0.5 mg PRN Q8HRS  PRN PO ANXIETY / AGITATION;  Start 7/16/19 

at 09:00


Metoprolol Tartrate (Lopressor Vial) 10 mg PRN Q6HRS  PRN IVP HYPERTENSION;  

Start 7/16/19 at 19:15


Miscellaneous (Lidoderm Patch Removal) 1 ea QHS MC  Last administered on 

7/15/19at 21:00;  Start 7/15/19 at 21:00


Pantoprazole Sodium (Protonix) 40 mg DAILYAC PO  Last administered on 7/16/19at 

08:40;  Start 7/16/19 at 07:30





Comment


Review of Relevant


I have reviewed the following items estrella (where applicable) has been applied.











BAY GROVES MD                 Jul 16, 2019 20:21

## 2019-07-17 VITALS — SYSTOLIC BLOOD PRESSURE: 138 MMHG | DIASTOLIC BLOOD PRESSURE: 78 MMHG

## 2019-07-17 VITALS — DIASTOLIC BLOOD PRESSURE: 77 MMHG | SYSTOLIC BLOOD PRESSURE: 132 MMHG

## 2019-07-17 VITALS — DIASTOLIC BLOOD PRESSURE: 91 MMHG | SYSTOLIC BLOOD PRESSURE: 143 MMHG

## 2019-07-17 VITALS — DIASTOLIC BLOOD PRESSURE: 91 MMHG | SYSTOLIC BLOOD PRESSURE: 150 MMHG

## 2019-07-17 VITALS — SYSTOLIC BLOOD PRESSURE: 156 MMHG | DIASTOLIC BLOOD PRESSURE: 103 MMHG

## 2019-07-17 VITALS — DIASTOLIC BLOOD PRESSURE: 88 MMHG | SYSTOLIC BLOOD PRESSURE: 152 MMHG

## 2019-07-17 LAB
ALBUMIN SERPL-MCNC: 1.9 G/DL (ref 3.4–5)
ANION GAP SERPL CALC-SCNC: 8 MMOL/L (ref 6–14)
BUN SERPL-MCNC: 34 MG/DL (ref 8–26)
CALCIUM SERPL-MCNC: 9 MG/DL (ref 8.5–10.1)
CHLORIDE SERPL-SCNC: 96 MMOL/L (ref 98–107)
CK SERPL-CCNC: 2551 U/L (ref 39–308)
CO2 SERPL-SCNC: 28 MMOL/L (ref 21–32)
CREAT SERPL-MCNC: 6.8 MG/DL (ref 0.7–1.3)
GFR SERPLBLD BASED ON 1.73 SQ M-ARVRAT: 9.6 ML/MIN
GLUCOSE SERPL-MCNC: 95 MG/DL (ref 70–99)
PHOSPHATE SERPL-MCNC: 5.3 MG/DL (ref 2.6–4.7)
POTASSIUM SERPL-SCNC: 4 MMOL/L (ref 3.5–5.1)
SODIUM SERPL-SCNC: 132 MMOL/L (ref 136–145)

## 2019-07-17 PROCEDURE — 5A1D70Z PERFORMANCE OF URINARY FILTRATION, INTERMITTENT, LESS THAN 6 HOURS PER DAY: ICD-10-PCS | Performed by: FAMILY MEDICINE

## 2019-07-17 RX ADMIN — MORPHINE SULFATE PRN MG: 4 INJECTION, SOLUTION INTRAMUSCULAR; INTRAVENOUS at 18:52

## 2019-07-17 RX ADMIN — MORPHINE SULFATE PRN MG: 4 INJECTION, SOLUTION INTRAMUSCULAR; INTRAVENOUS at 06:54

## 2019-07-17 RX ADMIN — MORPHINE SULFATE PRN MG: 4 INJECTION, SOLUTION INTRAMUSCULAR; INTRAVENOUS at 14:37

## 2019-07-17 RX ADMIN — Medication SCH CAP: at 11:23

## 2019-07-17 RX ADMIN — GABAPENTIN SCH MG: 300 CAPSULE ORAL at 20:55

## 2019-07-17 RX ADMIN — LABETALOL HCL SCH MG: 100 TABLET, FILM COATED ORAL at 20:55

## 2019-07-17 RX ADMIN — LINEZOLID SCH MG: 600 TABLET, FILM COATED ORAL at 20:55

## 2019-07-17 RX ADMIN — Medication SCH CAP: at 20:55

## 2019-07-17 RX ADMIN — PANTOPRAZOLE SODIUM SCH MG: 40 TABLET, DELAYED RELEASE ORAL at 11:23

## 2019-07-17 RX ADMIN — MORPHINE SULFATE PRN MG: 4 INJECTION, SOLUTION INTRAMUSCULAR; INTRAVENOUS at 10:39

## 2019-07-17 RX ADMIN — LINEZOLID SCH MG: 600 TABLET, FILM COATED ORAL at 11:23

## 2019-07-17 RX ADMIN — LABETALOL HCL SCH MG: 100 TABLET, FILM COATED ORAL at 16:39

## 2019-07-17 RX ADMIN — MORPHINE SULFATE PRN MG: 4 INJECTION, SOLUTION INTRAMUSCULAR; INTRAVENOUS at 23:13

## 2019-07-17 RX ADMIN — Medication SCH EA: at 21:00

## 2019-07-17 RX ADMIN — LIDOCAINE SCH PATCH: 50 PATCH CUTANEOUS at 09:00

## 2019-07-17 RX ADMIN — POLYETHYLENE GLYCOL 3350 SCH GM: 17 POWDER, FOR SOLUTION ORAL at 09:00

## 2019-07-17 NOTE — PDOC
PROGRESS NOTES


Assessment


Assessment


Generalized weakness.


Rhabdomyolysis.


Methamphetamine positive.


Electrolytes imbalances.


Hyponatremia.


Hyperkalemia.


Hypocalcemia.


Hypoalbuminemia.


AKD.


Elevated hepatic enzymes.


Other psychiatric problems.


Abnormal T-spine MRI.


LE edema.


No evidence of MS this time.





RECOMMENDATIONS/PLAN:


Treat medical diseases.


Repeat T-spine MRI ( no contrast due to renal failure).


OT/PT.





CSF OCB zero.





Past Medical History


GI:  GERD


Psych:  Anxiety, Addictions, Depression





Past Surgical History


No pertinent history





Family History


No pertinent hx





Social History


Unemployed, uses methamphetamine, smokes occasionally, rare alcohol. Was said 

out of incarceration on 7/5/19.





ALLERGY:


NKDA





MEDICATIONS:


Refer to MAR





REVIEW OF SYSTEMS:


Refer to PMH and PSH.





PHYSICAL EXAMINATION:   


General appearance in no acute distress.  


HEENT:  Normocephalic and nontraumatic.  Eyes, nose, ears, and throat are 

unremarkable. 


Neck is supple. No lymphadenopathy.  No Crepitus.


Cardiovascular:  S1, S2, regular rate and rhythm.  


Pulmonary:  Clear to auscultation bilaterally. 


Abdomen:  Bowel sounds are positive.    


Extremities:  No rash, lesions, or edema.  


No restriction of range of motion





NEUROLOGICAL  EXAMINATION:


Awake.


Oriented to time, place and person.


PERRL.


EOMI.


CN: no focal findings.


Muscle tone: within normal.


Muscle strength: 4-5


DTR: 2


Plantar reflex: Flexor response bilaterally 


Gait: Able to walk with a walker.


Sensory exam: no acute abnormal findings.


No cerebellar signs elicited.


F-T-N test fine.


Edema in LE, right > Left.





Objective


Objective





Vital Signs








  Date Time  Temp Pulse Resp B/P (MAP) Pulse Ox O2 Delivery O2 Flow Rate FiO2


 


7/17/19 14:37   18   Room Air  


 


7/17/19 11:00 98.2 91  143/91 (108) 94   





 98.2       


 


7/16/19 19:40       98.0 











Vitals Signs


Vitals





                          VS - Last 72 Hours, by Label








  Date Time  Temp Pulse Resp B/P (MAP) Pulse Ox O2 Delivery O2 Flow Rate FiO2


 


7/17/19 14:37   18   Room Air  


 


7/17/19 11:09   18   Room Air  


 


7/17/19 11:00 98.2 91 16 143/91 (108) 94 Room Air  





 98.2       


 


7/17/19 10:39   18   Room Air  


 


7/17/19 07:00 97.7 91 18 150/91 (110) 93 Room Air  





 97.7       


 


7/17/19 06:54   18   Room Air  


 


7/17/19 03:00 98.0 100 18 156/103 (120) 95 Room Air  





 98.0       


 


7/16/19 23:10   18   Room Air  


 


7/16/19 22:10 97.8 90 18 141/97 (112) 93 Room Air  





 97.8       


 


7/16/19 22:10   18   Room Air  


 


7/16/19 22:00      Room Air  


 


7/16/19 19:40  88  151/103    


 


7/16/19 19:40     100 Room Air 98.0 


 


7/16/19 19:20 98.2 93 20 153/101 (118) 96 Room Air  





 98.2       


 


7/16/19 16:00      Room Air 98.0 


 


7/16/19 15:15 97.9 88 18 142/83 (102) 100 Room Air  





 97.9       


 


7/16/19 14:57     93  98.0 


 


7/16/19 14:27     93 Room Air 98.0 


 


7/16/19 11:10 97.8 101 18 136/88 (104) 93 Room Air  





 97.8       


 


7/16/19 09:49  83  165/99    


 


7/16/19 07:45 97.8 83 18 165/99 (121) 98 Room Air  





 97.8       











Laboratory


Laboratory





Laboratory Tests








Test


 7/17/19


06:20 7/17/19


07:30 7/17/19


11:43


 


Sodium Level


 132 mmol/L


(136-145) 


 





 


Potassium Level


 4.0 mmol/L


(3.5-5.1) 


 





 


Chloride Level


 96 mmol/L


() 


 





 


Carbon Dioxide Level


 28 mmol/L


(21-32) 


 





 


Anion Gap 8 (6-14)   


 


Blood Urea Nitrogen


 34 mg/dL


(8-26) 


 





 


Creatinine


 6.8 mg/dL


(0.7-1.3) 


 





 


Estimated GFR


(Cockcroft-Gault) 9.6 


 


 





 


Glucose Level


 95 mg/dL


(70-99) 


 





 


Calcium Level


 9.0 mg/dL


(8.5-10.1) 


 





 


Phosphorus Level


 5.3 mg/dL


(2.6-4.7) 


 





 


Creatine Kinase


 2551 U/L


() 


 





 


Albumin


 1.9 g/dL


(3.4-5.0) 


 





 


Glucose (Fingerstick)


 


 168 mg/dL


(70-99) 87 mg/dL


(70-99)








Microbiology


7/10/19 Blood Culture - Final, Complete


          NO GROWTH AFTER 5 DAYS


7/10/19 CSF Gram Stain - Final, Complete


          


7/9/19 Urine Culture - Final, Complete


         


7/9/19 Urine Culture Result 1 (RUI) - Final, Complete





Medication


Medications





Current Medications


Diphenhydramine HCl (Benadryl) 25 mg 1X PRN  PRN IV ITCHING;  Start 7/17/19 at 

10:30;  Stop 7/18/19 at 10:29


Diphenhydramine HCl (Benadryl) 25 mg 1X PRN  PRN IV ITCHING;  Start 7/17/19 at 

10:30;  Stop 7/18/19 at 10:29


Info (PHARMACY MONITORING -- do not chart) 1 each PRN DAILY  PRN MC SEE 

COMMENTS;  Start 7/17/19 at 10:30;  Status UNV


Labetalol HCl (Trandate) 200 mg BID PO ;  Start 7/17/19 at 10:00


Metoprolol Tartrate (Lopressor Vial) 10 mg PRN Q6HRS  PRN IVP HYPERTENSION;  

Start 7/16/19 at 19:15


Sodium Chloride 1,000 ml @  400 mls/hr Q2H30M PRN IV PATENCY;  Start 7/17/19 at 

10:18;  Stop 7/17/19 at 22:17


Sodium Chloride 1,000 ml @  1,000 mls/hr Q1H PRN IV hypotension;  Start 7/17/19 

at 10:18;  Stop 7/17/19 at 16:17





Comment


Review of Relevant


I have reviewed the following items estrella (where applicable) has been applied.











BAY GROVES MD                 Jul 17, 2019 15:20

## 2019-07-17 NOTE — PDOC
PROGRESS NOTES


Chief Complaint


Chief Complaint


NEw ESRD-initiation of HD/ ccrt at some point


Hypotension, resolved


Anasarca


Hepatorenal syndrome


Elevated troponin in the background of sepsis


UTI


Methamphetamine abuse


Metabolic encephalopathy, improved


etoh drinker


Sepsis with hypotension, resolved


MOD to sever PCM


Oliguric renal failure


Accelerated hypertension, better





History of Present Illness


History of Present Illness


NOT confused anymore


Complaints he is swollen and he is


He does have anasarca


Still oliguric and Culp catheter in-discussed with renal okay to DC Culp


Discussed with renal  re anasarca an oliguria,  they will pull fluid today 

during dialysis


Creatinine 6.8, from 5.6 not improving, still oliguric


Patient is a self-pay


Discussed with renal, plans for tunneled catheter


Discussed with renal, prefer patient to be here over the weekend to see if he 

improves


Otherwise self-pay and he will have to come to the ER for dialysis





CK improving, 400s from [id number] on admit





BP still high, i started labetolol 100 BID tuesday





Plan


DC Culp


Pull fluid later during dialysis


Plans of tunneled catheter HD insertion


Inc labetolol to 200 BID


Discussed with renal regarding the above, would prefer to keep the patient 

throughout the weekend-we'll discuss with case management on LOS





Vitals


Vitals





Vital Signs








  Date Time  Temp Pulse Resp B/P (MAP) Pulse Ox O2 Delivery O2 Flow Rate FiO2


 


7/17/19 07:24   18   Room Air  


 


7/17/19 07:00 97.7 91  150/91 (110) 93   





 97.7       


 


7/16/19 19:40       98.0 











Physical Exam


Physical Exam


GENERAL:  Standing with PT then sat down, NAD, coop 


EENT:  Pupils equal, oral cavity clear


NECK:  Supple.


LUNGS:  Clear bilaterally.  No wheezing.


HEART:  S1, S2, regular, tachy 108s


ABDOMEN:  Soft, nontender, BS present


: Culp


EXTREMITIES:  Generalized trace edema, no cyanosis. Right index with min 

swelling.  Lesions are stable - mild maceration


CENTRAL NERVOUS SYSTEM:  Alert, answering appropriately


RIJ/HDC without signs of complications


PIV


General:  Oriented X3, Cooperative, mild distress


Heart:  Regular rate, Normal S1, No murmurs


Lungs:  Clear


Abdomen:  Normal bowel sounds, Soft, No hepatosplenomegaly, Other (obese, no 

fluid wave)


Extremities:  No clubbing, No cyanosis, No edema, Other (burn to right index 

finger APPEARS OLD  associated  cellulitis)





Labs


LABS





Laboratory Tests








Test


 7/17/19


06:20 7/17/19


07:30


 


Sodium Level


 132 mmol/L


(136-145) 





 


Potassium Level


 4.0 mmol/L


(3.5-5.1) 





 


Chloride Level


 96 mmol/L


() 





 


Carbon Dioxide Level


 28 mmol/L


(21-32) 





 


Anion Gap 8 (6-14)  


 


Blood Urea Nitrogen


 34 mg/dL


(8-26) 





 


Creatinine


 6.8 mg/dL


(0.7-1.3) 





 


Estimated GFR


(Cockcroft-Gault) 9.6 


 





 


Glucose Level


 95 mg/dL


(70-99) 





 


Calcium Level


 9.0 mg/dL


(8.5-10.1) 





 


Phosphorus Level


 5.3 mg/dL


(2.6-4.7) 





 


Creatine Kinase


 2551 U/L


() 





 


Albumin


 1.9 g/dL


(3.4-5.0) 





 


Glucose (Fingerstick)


 


 168 mg/dL


(70-99)











Review of Systems


Review of Systems


Anasarca, otherwise the rest of ROS 14 point negative





Assessment and Plan


Assessmemt and Plan


Problems


Medical Problems:


(1) Elevated troponin


Status: Acute  





(2) Hepatorenal syndrome


Status: Acute  





(3) Hyperkalemia


Status: Acute  





(4) Hyponatremia


Status: Acute  





(5) Methamphetamine abuse


Status: Acute  





(6) Neurological complaint


Status: Acute  





(7) Rhabdomyolysis


Status: Acute  





(8) Urinary retention


Status: Acute  





(9) Urinary tract infection


Status: Acute  











Comment


Review of Relevant


I have reviewed the following items estrella (where applicable) has been applied.


Labs





Laboratory Tests








Test


 7/16/19


05:15 7/17/19


06:20 7/17/19


07:30


 


Creatine Kinase


 4893 U/L


() 2551 U/L


() 





 


Sodium Level


 


 132 mmol/L


(136-145) 





 


Potassium Level


 


 4.0 mmol/L


(3.5-5.1) 





 


Chloride Level


 


 96 mmol/L


() 





 


Carbon Dioxide Level


 


 28 mmol/L


(21-32) 





 


Anion Gap  8 (6-14)  


 


Blood Urea Nitrogen


 


 34 mg/dL


(8-26) 





 


Creatinine


 


 6.8 mg/dL


(0.7-1.3) 





 


Estimated GFR


(Cockcroft-Gault) 


 9.6 


 





 


Glucose Level


 


 95 mg/dL


(70-99) 





 


Calcium Level


 


 9.0 mg/dL


(8.5-10.1) 





 


Phosphorus Level


 


 5.3 mg/dL


(2.6-4.7) 





 


Albumin


 


 1.9 g/dL


(3.4-5.0) 





 


Glucose (Fingerstick)


 


 


 168 mg/dL


(70-99)








Laboratory Tests








Test


 7/17/19


06:20 7/17/19


07:30


 


Sodium Level


 132 mmol/L


(136-145) 





 


Potassium Level


 4.0 mmol/L


(3.5-5.1) 





 


Chloride Level


 96 mmol/L


() 





 


Carbon Dioxide Level


 28 mmol/L


(21-32) 





 


Anion Gap 8 (6-14)  


 


Blood Urea Nitrogen


 34 mg/dL


(8-26) 





 


Creatinine


 6.8 mg/dL


(0.7-1.3) 





 


Estimated GFR


(Cockcroft-Gault) 9.6 


 





 


Glucose Level


 95 mg/dL


(70-99) 





 


Calcium Level


 9.0 mg/dL


(8.5-10.1) 





 


Phosphorus Level


 5.3 mg/dL


(2.6-4.7) 





 


Creatine Kinase


 2551 U/L


() 





 


Albumin


 1.9 g/dL


(3.4-5.0) 





 


Glucose (Fingerstick)


 


 168 mg/dL


(70-99)








Microbiology


7/10/19 Blood Culture - Final, Complete


          NO GROWTH AFTER 5 DAYS


7/10/19 CSF Gram Stain - Final, Complete


          


7/9/19 Urine Culture - Final, Complete


         


7/9/19 Urine Culture Result 1 (RUI) - Final, Complete


Medications





Current Medications


Morphine Sulfate (Morphine Sulfate) 4 mg 1X  ONCE IV  Last administered on 

7/9/19at 21:20;  Start 7/9/19 at 21:30;  Stop 7/9/19 at 21:31;  Status DC


Sodium Chloride 1,000 ml @  1,000 mls/hr 1X  ONCE IV  Last administered on 

7/9/19at 22:30;  Start 7/9/19 at 22:30;  Stop 7/9/19 at 23:29;  Status DC


Calcium Gluconate (Calcium Gluconate) 1,000 mg 1X  ONCE IVP  Last administered 

on 7/9/19at 22:43;  Start 7/9/19 at 23:00;  Stop 7/9/19 at 23:01;  Status DC


Insulin Human Regular (HumuLIN R VIAL) 10 unit 1X  ONCE IV  Last administered on

7/9/19at 23:51;  Start 7/9/19 at 23:00;  Stop 7/9/19 at 23:01;  Status DC


Dextrose (Dextrose 50%-Water Syringe) 25 gm 1X  ONCE IV  Last administered on 

7/9/19at 22:42;  Start 7/9/19 at 23:00;  Stop 7/9/19 at 23:01;  Status DC


Sodium Bicarbonate (Sodium Bicarb Adult 8.4% Syr) 50 meq 1X  ONCE IV  Last 

administered on 7/9/19at 23:49;  Start 7/9/19 at 23:00;  Stop 7/9/19 at 23:01;  

Status DC


Sodium Chloride 1,000 ml @  1,000 mls/hr 1X  ONCE IV  Last administered on 

7/9/19at 23:50;  Start 7/9/19 at 23:00;  Stop 7/9/19 at 23:59;  Status DC


Albuterol Sulfate (Ventolin Neb Soln) 10 mg 1X  ONCE CONT NEB  Last administered

on 7/10/19at 00:30;  Start 7/9/19 at 23:00;  Stop 7/9/19 at 23:01;  Status DC


Ceftriaxone Sodium (Rocephin) 1 gm 1X  ONCE IVP  Last administered on 7/9/19at 

22:43;  Start 7/9/19 at 23:00;  Stop 7/9/19 at 23:01;  Status DC


Sodium Chloride 1,000 ml @  1,000 mls/hr 1X  ONCE IV  Last administered on 

7/10/19at 01:00;  Start 7/10/19 at 01:00;  Stop 7/10/19 at 01:59;  Status DC


Lorazepam (Ativan Inj) 0.5 mg PRN Q6HRS  PRN IV ANXIETY / AGITATION Last 

administered on 7/10/19at 11:00;  Start 7/10/19 at 01:15;  Stop 7/11/19 at 

08:48;  Status DC


Ondansetron HCl (Zofran) 4 mg PRN Q6HRS  PRN IV NAUSEA/VOMITING 1ST CHOICE;  

Start 7/10/19 at 01:15;  Stop 7/10/19 at 01:22;  Status DC


Sodium Chloride (Normal Saline Flush) 3 ml QSHIFT  PRN IV AFTER MEDS AND BLOOD 

DRAWS;  Start 7/10/19 at 01:15


Sodium Chloride 1,000 ml @  175 mls/hr Q5H43M IV ;  Start 7/10/19 at 01:08;  

Stop 7/10/19 at 01:23;  Status DC


Ondansetron HCl (Zofran) 4 mg PRN Q8HRS  PRN IV NAUSEA/VOMITING  1ST CHOICE;  

Start 7/10/19 at 01:15;  Stop 7/11/19 at 01:14;  Status DC


Sodium Chloride 1,000 ml @  150 mls/hr Q6H40M IV ;  Start 7/10/19 at 01:30;  

Stop 7/10/19 at 18:49;  Status DC


Morphine Sulfate (Morphine Sulfate) 4 mg PRN Q4HRS  PRN IV SEVERE PAIN Last 

administered on 7/17/19at 06:54;  Start 7/10/19 at 09:00


Sodium Bicarbonate (Sodium Bicarb Adult 8.4% Syr) 100 meq 1X  ONCE IV  Last 

administered on 7/10/19at 10:02;  Start 7/10/19 at 10:00;  Stop 7/10/19 at 

10:01;  Status DC


Sodium Chloride 1,000 ml @  1,000 mls/hr 1X  ONCE IV  Last administered on 

7/10/19at 09:56;  Start 7/10/19 at 10:00;  Stop 7/10/19 at 10:59;  Status DC


Lidocaine/Sodium Bicarbonate (Buffered Lidocaine 1%) 3 ml STK-MED ONCE .ROUTE ; 

Start 7/10/19 at 11:07;  Stop 7/10/19 at 11:08;  Status DC


Calcium Gluconate (Calcium Gluconate) 2,000 mg 1X  ONCE IVP  Last administered 

on 7/10/19at 13:38;  Start 7/10/19 at 12:00;  Stop 7/10/19 at 12:01;  Status DC


Ceftriaxone Sodium (Rocephin) 1 gm Q24H IVP  Last administered on 7/10/19at 

12:49;  Start 7/10/19 at 13:00;  Stop 7/11/19 at 07:45;  Status DC


Silver Sulfadiazine (Silvadene) 1 mack BID TP  Last administered on 7/11/19at 

10:06;  Start 7/10/19 at 13:00;  Stop 7/11/19 at 15:04;  Status DC


Sodium Chloride 1,000 ml @  1,000 mls/hr 1X  ONCE IV  Last administered on 

7/10/19at 12:47;  Start 7/10/19 at 12:45;  Stop 7/10/19 at 13:44;  Status DC


Pantoprazole Sodium (Protonix) 40 mg DAILYAC PO ;  Start 7/10/19 at 13:30;  Stop

7/10/19 at 19:22;  Status DC


Polyethylene Glycol (miraLAX PACKET) 17 gm DAILY PO  Last administered on 7/1 6/19at 08:44;  Start 7/10/19 at 13:30


Lidocaine/Sodium Bicarbonate (Buffered Lidocaine 1%) 3 ml STK-MED ONCE .ROUTE ; 

Start 7/10/19 at 13:48;  Stop 7/10/19 at 13:49;  Status DC


Haloperidol Lactate (Haldol Inj) 1 mg PRN Q8HRS  PRN IVP AGITATION Last admini

stered on 7/12/19at 07:56;  Start 7/10/19 at 14:00


Lorazepam (Ativan Inj) 2 mg PRN Q2HRS  PRN IV ANXIETY. Last administered on 7 /14/19at 21:22;  Start 7/10/19 at 14:00;  Stop 7/15/19 at 10:17;  Status DC


Lidocaine/Sodium Bicarbonate (Buffered Lidocaine 1%) 6 ml 1X  ONCE INJ ;  Start 

7/10/19 at 14:15;  Stop 7/10/19 at 14:16;  Status DC


Dexmedetomidine HCl 200 mcg/ Sodium Chloride 50 ml @ 0 mls/hr CONT  PRN IV PER 

PROTOCOL Last administered on 7/13/19at 10:28;  Start 7/10/19 at 14:15;  Stop 

7/15/19 at 10:17;  Status DC


Sodium Chloride 500 ml @  500 mls/hr 1X PRN  PRN IV SEE COMMENTS;  Start 7/10/19

at 14:15


Atropine Sulfate (ATROPINE 0.5mg SYRINGE) 0.5 mg PRN Q5MIN  PRN IV SEE COMMENTS;

 Start 7/10/19 at 14:15


Sodium Chloride 1,000 ml @  1,000 mls/hr Q1H PRN IV hypotension;  Start 7/10/19 

at 15:08;  Stop 7/10/19 at 21:07;  Status DC


Diphenhydramine HCl (Benadryl) 25 mg 1X PRN  PRN IV ITCHING;  Start 7/10/19 at 

15:15;  Stop 7/11/19 at 11:19;  Status DC


Diphenhydramine HCl (Benadryl) 25 mg 1X PRN  PRN IV ITCHING;  Start 7/10/19 at 

15:15;  Stop 7/11/19 at 11:19;  Status DC


Sodium Chloride 1,000 ml @  400 mls/hr Q2H30M PRN IV PATENCY;  Start 7/10/19 at 

15:08;  Stop 7/11/19 at 03:07;  Status DC


Info (PHARMACY MONITORING -- do not chart) 1 each PRN DAILY  PRN MC SEE C

OMMENTS;  Start 7/10/19 at 15:15;  Stop 7/11/19 at 11:18;  Status DC


Pantoprazole Sodium (PROTONIX VIAL for IV PUSH) 40 mg DAILYAC IVP  Last 

administered on 7/15/19at 10:04;  Start 7/11/19 at 07:30;  Stop 7/15/19 at 

11:54;  Status DC


Linezolid (Zyvox) 600 mg BID PO  Last administered on 7/16/19at 19:40;  Start 

7/11/19 at 09:00;  Stop 7/17/19 at 22:00


Ceftriaxone Sodium (Rocephin) 2 gm Q24H IVP  Last administered on 7/14/19at 

12:14;  Start 7/11/19 at 13:00;  Stop 7/15/19 at 10:06;  Status DC


Calcium Gluconate 2000 mg/Dextrose 120 ml @  220 mls/hr 1X  ONCE IV  Last 

administered on 7/11/19at 10:05;  Start 7/11/19 at 10:00;  Stop 7/11/19 at 

10:32;  Status DC


Sodium Chloride 1,000 ml @  1,000 mls/hr Q1H PRN IV hypotension;  Start 7/11/19 

at 11:06;  Stop 7/11/19 at 17:05;  Status DC


Diphenhydramine HCl (Benadryl) 25 mg 1X PRN  PRN IV ITCHING;  Start 7/11/19 at 

11:15;  Stop 7/12/19 at 11:14;  Status DC


Diphenhydramine HCl (Benadryl) 25 mg 1X PRN  PRN IV ITCHING;  Start 7/11/19 at 

11:15;  Stop 7/12/19 at 11:14;  Status DC


Sodium Chloride 1,000 ml @  400 mls/hr Q2H30M PRN IV PATENCY;  Start 7/11/19 at 

11:06;  Stop 7/11/19 at 23:05;  Status DC


Info (PHARMACY MONITORING -- do not chart) 1 each PRN DAILY  PRN MC SEE 

COMMENTS;  Start 7/11/19 at 11:15;  Status Cancel


Potassium Chloride 10 meq/ Calcium Chloride 12.5 meq/ Bicarbonate Dialysis Soln 

w/ out KCl 5,013.9286 ml @ 1,000 mls/hr Q5H1M IV ;  Start 7/12/19 at 13:00;  

Status Cancel


Potassium Chloride 20 meq/ Calcium Chloride 12.5 meq/ Bicarbonate Dialysis Soln 

w/ out KCl 5,018.9286 ml @ 1,500 mls/hr Q3H21M IV ;  Start 7/12/19 at 13:00;  

Stop 7/12/19 at 13:00;  Status DC


Heparin Sodium (Porcine) (Heparin Sodium) 4,000 unit 1X  ONCE IV  Last 

administered on 7/12/19at 19:54;  Start 7/12/19 at 12:00;  Stop 7/12/19 at 

12:11;  Status DC


Heparin Sodium/ Dextrose 500 ml @ 0 mls/hr CONT  PRN IV SEE I/O RECORD Last 

administered on 7/13/19at 15:05;  Start 7/12/19 at 12:00;  Stop 7/14/19 at 

08:03;  Status DC


Heparin Sodium (Porcine) (Heparin Sodium) 2,500 unit PRN Q6HRS  PRN IV FOR UFH 

LEVEL LESS THAN 0.2;  Start 7/12/19 at 12:00;  Stop 7/15/19 at 13:16;  Status DC


Potassium Chloride 10 meq/ Calcium Chloride 12.5 meq/ Bicarbonate Dialysis Soln 

w/ out KCl 5,013.9286 ml @ 1,000 mls/hr Q5H1M IV ;  Start 7/12/19 at 13:00;  

Stop 7/12/19 at 13:00;  Status DC


Potassium Chloride 10 meq/ Bicarbonate Dialysis Soln w/ out KCl 5,005 ml @  

1,000 mls/hr Q5H1M IV ;  Start 7/12/19 at 13:00;  Stop 7/12/19 at 13:00;  Status

DC


Potassium Chloride 20 meq/ Bicarbonate Dialysis Soln w/ out KCl 5,010 ml @  1,5

00 mls/hr Q3H21M IV ;  Start 7/12/19 at 13:00;  Stop 7/12/19 at 13:00;  Status 

DC


Potassium Chloride 10 meq/ Bicarbonate Dialysis Soln w/ out KCl 5,005 ml @  

1,500 mls/hr Q3H21M IV ;  Start 7/12/19 at 13:00;  Stop 7/12/19 at 13:00;  

Status DC


Potassium Chloride 20 meq/ Bicarbonate Dialysis Soln w/ out KCl 5,010 ml @  

1,000 mls/hr Q5H1M IV  Last administered on 7/13/19at 20:43;  Start 7/12/19 at 

13:00;  Stop 7/14/19 at 08:03;  Status DC


Potassium Chloride 20 meq/ Bicarbonate Dialysis Soln w/ out KCl 5,010 ml @  

1,500 mls/hr Q3H21M IV ;  Start 7/12/19 at 12:45;  Stop 7/12/19 at 12:45;  

Status DC


Potassium Chloride 40 meq/ Bicarbonate Dialysis Soln w/ out KCl 5,020 ml @  

1,500 mls/hr Q3H21M IV ;  Start 7/12/19 at 13:00;  Stop 7/12/19 at 13:10;  

Status DC


Potassium Chloride 60 meq/ Bicarbonate Dialysis Soln w/ out KCl 5,030 ml @  

1,500 mls/hr Q3H22M IV ;  Start 7/12/19 at 13:00;  Stop 7/12/19 at 13:07;  

Status DC


Potassium Chloride 30 meq/ Bicarbonate Dialysis Soln w/ out KCl 5,015 ml @  

1,500 mls/hr Q3H21M IV ;  Start 7/12/19 at 13:15;  Stop 7/12/19 at 18:00;  

Status DC


Potassium Chloride 20 meq/ Bicarbonate Dialysis Soln w/ out KCl 5,010 ml @  

1,500 mls/hr Q3H21M IV  Last administered on 7/12/19at 21:06;  Start 7/12/19 at 

13:10;  Stop 7/12/19 at 18:00;  Status DC


Potassium Chloride 30 meq/ Bicarbonate Dialysis Soln w/ out KCl 5,015 ml @  

1,500 mls/hr Q3H21M IV ;  Start 7/12/19 at 18:00;  Stop 7/12/19 at 19:10;  

Status DC


Potassium Chloride 20 meq/ Bicarbonate Dialysis Soln w/ out KCl 5,010 ml @  

1,500 mls/hr Q3H21M IV  Last administered on 7/14/19at 00:15;  Start 7/12/19 at 

18:01;  Stop 7/14/19 at 08:03;  Status DC


Potassium Chloride 20 meq/ Bicarbonate Dialysis Soln w/ out KCl 5,010 ml @  

1,500 mls/hr Q3H21M IV  Last administered on 7/14/19at 00:18;  Start 7/12/19 at 

20:00;  Stop 7/14/19 at 08:03;  Status DC


Lactobacillus Rhamnosus (Culturelle) 1 cap BID PO  Last administered on 

7/16/19at 19:40;  Start 7/14/19 at 21:00


Oxycodone HCl (Roxicodone) 5 mg PRN Q4HRS  PRN PO PAIN Last administered on 

7/16/19at 22:10;  Start 7/14/19 at 11:45


Lorazepam (Ativan Inj) 1 mg PRN Q8HRS  PRN IV ANXIETY.;  Start 7/14/19 at 16:00


Gabapentin (Neurontin) 300 mg QHS PO  Last administered on 7/16/19at 19:40;  

Start 7/14/19 at 21:00


Labetalol HCl (Normodyne Iv Push) 10 mg PRN Q4HRS  PRN IVP HYPERTENSION Last 

administered on 7/15/19at 03:33;  Start 7/14/19 at 23:30;  Stop 7/15/19 at 

09:26;  Status DC


Labetalol HCl (Normodyne Iv Push) 20 mg PRN Q4HRS  PRN IVP HYPERTENSION;  Start 

7/15/19 at 09:30;  Stop 7/16/19 at 19:10;  Status DC


Ondansetron HCl (Zofran) 4 mg PRN Q6HRS  PRN IV NAUSEA/VOMITING Last 

administered on 7/16/19at 19:39;  Start 7/15/19 at 09:30


Acetaminophen (Tylenol) 500 mg PRN Q6HRS  PRN PO MILD PAIN / TEMP;  Start 

7/15/19 at 09:30


Lidocaine (Lidoderm) 1 patch DAILY TD  Last administered on 7/16/19at 08:44;  

Start 7/15/19 at 10:00


Miscellaneous (Lidoderm Patch Removal) 1 ea QHS MC  Last administered on 7

/15/19at 21:00;  Start 7/15/19 at 21:00


Sodium Chloride 1,000 ml @  1,000 mls/hr Q1H PRN IV hypotension;  Start 7/15/19 

at 11:26;  Stop 7/15/19 at 17:25;  Status DC


Albumin Human 200 ml @  200 mls/hr 1X PRN  PRN IV Hypotension;  Start 7/15/19 at

11:30;  Stop 7/15/19 at 17:29;  Status DC


Sodium Chloride (Normal Saline Flush) 10 ml 1X PRN  PRN IV AP catheter pack;  

Start 7/15/19 at 11:30;  Stop 7/16/19 at 11:29;  Status DC


Sodium Chloride (Normal Saline Flush) 10 ml 1X PRN  PRN IV  catheter pack;  

Start 7/15/19 at 11:30;  Stop 7/16/19 at 11:29;  Status DC


Sodium Chloride 1,000 ml @  400 mls/hr Q2H30M PRN IV PATENCY;  Start 7/15/19 at 

11:26;  Stop 7/15/19 at 23:25;  Status DC


Info (PHARMACY MONITORING -- do not chart) 1 each PRN DAILY  PRN MC SEE 

COMMENTS;  Start 7/15/19 at 11:30;  Status UNV


Info (PHARMACY MONITORING -- do not chart) 1 each PRN DAILY  PRN MC SEE 

COMMENTS;  Start 7/15/19 at 11:30


Pantoprazole Sodium (Protonix) 40 mg DAILYAC PO  Last administered on 7/16/19at 

08:40;  Start 7/16/19 at 07:30


Labetalol HCl (Trandate) 100 mg BID PO  Last administered on 7/16/19at 19:40;  

Start 7/16/19 at 09:00;  Stop 7/17/19 at 09:13;  Status DC


Lorazepam (Ativan) 0.5 mg PRN Q8HRS  PRN PO ANXIETY / AGITATION;  Start 7/16/19 

at 09:00


Haloperidol (Haldol) 0.5 mg PRN QID  PRN PO AGITATION, 2ND CHOICE;  Start 

7/16/19 at 09:00;  Stop 7/16/19 at 09:03;  Status DC


Haloperidol Lactate (HALDOL 2mg ORAL CONC) 0.5 mg PRN QID  PRN PO AGITATION, 2ND

CHOICE;  Start 7/16/19 at 09:03


Metoprolol Tartrate (Lopressor Vial) 10 mg PRN Q6HRS  PRN IVP HYPERTENSION;  

Start 7/16/19 at 19:15


Labetalol HCl (Trandate) 200 mg BID PO ;  Start 7/17/19 at 10:00





Active Scripts


Active


Reported


Protonix (Pantoprazole Sodium) 20 Mg Tablet. 2 Tab PO DAILY


Zyprexa (Olanzapine) 20 Mg Tablet 2 Tab PO QHS


Buspirone Hcl 30 Mg Tablet 1 Tab PO BID


Vitals/I & O





Vital Sign - Last 24 Hours








 7/16/19 7/16/19 7/16/19 7/16/19





 11:10 14:27 14:57 15:15


 


Temp 97.8   97.9





 97.8   97.9


 


Pulse 101   88


 


Resp 18   18


 


B/P (MAP) 136/88 (104)   142/83 (102)


 


Pulse Ox 93 93 93 100


 


O2 Delivery Room Air Room Air  Room Air


 


O2 Flow Rate  98.0 98.0 


 


    





    





 7/16/19 7/16/19 7/16/19 7/16/19





 16:00 19:20 19:40 19:40


 


Temp  98.2  





  98.2  


 


Pulse  93  88


 


Resp  20  


 


B/P (MAP)  153/101 (118)  151/103


 


Pulse Ox  96 100 


 


O2 Delivery Room Air Room Air Room Air 


 


O2 Flow Rate 98.0  98.0 


 


    





    





 7/16/19 7/16/19 7/16/19 7/16/19





 22:00 22:10 22:10 23:10


 


Temp   97.8 





   97.8 


 


Pulse   90 


 


Resp  18 18 18


 


B/P (MAP)   141/97 (112) 


 


Pulse Ox   93 


 


O2 Delivery Room Air Room Air Room Air Room Air


 


    





    





 7/17/19 7/17/19 7/17/19 7/17/19





 03:00 06:54 07:00 07:24


 


Temp 98.0  97.7 





 98.0  97.7 


 


Pulse 100  91 


 


Resp 18 18 18 18


 


B/P (MAP) 156/103 (120)  150/91 (110) 


 


Pulse Ox 95  93 


 


O2 Delivery Room Air Room Air Room Air Room Air

















GIUSEPPE MONREAL MD           Jul 17, 2019 10:11

## 2019-07-17 NOTE — PDOC
SUBJECTIVE


ROS


No acute events overnight





OBJECTIVE


Vital Signs





Vital Signs








  Date Time  Temp Pulse Resp B/P (MAP) Pulse Ox O2 Delivery O2 Flow Rate FiO2


 


7/17/19 11:09   18   Room Air  


 


7/17/19 11:00 98.2 91  143/91 (108) 94   





 98.2       


 


7/16/19 19:40       98.0 











PHYSICAL EXAM


Physical Exam


GENERAL:  In chair, NAD, 


EENT:   OM moist  


NECK:  Supple.


LUNGS:  Clear bilaterally.  No wheezing.


HEART:  S1, S2, regular, 


ABDOMEN:  Soft, nontender, BS present


: Culp


EXTREMITIES:  Generalized trace edema,  


 NEURO:  Alert, answering appropriately


RIJ/HDC





DIAGNOSIS/ASSESSMENT


Assessment & Plan


EL - ATN2/2 Rhabdo , Meth use  


Urinary retention at Presentation,Anuric


Culp removed, Monitor for UOP with intermittent Bladder scan  


 Requiring  HD - 1 st Tx  7/10  ,CRRT x 1  


 Seen on HD, tolerating well, continue as ordered  


Need Tunnelled HDC , Per Dr. Bui pt is uninsured and will have to come to ER

for HD Eval  





Rhabdo-  CPK>100,000 at presentation  





Hyperkalemia- Normal K 





Hyponatremia- stable, mildly low  





Severe Metabolic acidosis- 2/2 all above 


Resolved  





Transaminitis - Improving LFT's 





Hypocalcemia- severe 2/2  Rhabdo


Normal Ca now  





Discussed with  Dr. Bui





COMMENT/RELEVANT DATA


Meds





Current Medications








 Medications


  (Trade)  Dose


 Ordered  Sig/Travis  Start Time


 Stop Time Status Last Admin


Dose Admin


 


 Acetaminophen


  (Tylenol)  500 mg  PRN Q6HRS  PRN  7/15/19 09:30


     





 


 Albumin Human  200 ml @ 


 200 mls/hr  1X PRN  PRN  7/15/19 11:30


 7/15/19 17:29 DC  





 


 Albuterol Sulfate


  (Ventolin Neb


 Soln)  10 mg  1X  ONCE  7/9/19 23:00


 7/9/19 23:01 DC 7/10/19 00:30


10 MG


 


 Atropine Sulfate


  (ATROPINE 0.5mg


 SYRINGE)  0.5 mg  PRN Q5MIN  PRN  7/10/19 14:15


     





 


 Calcium Gluconate


  (Calcium


 Gluconate)  2,000 mg  1X  ONCE  7/10/19 12:00


 7/10/19 12:01 DC 7/10/19 13:38


2,000 MG


 


 Calcium Gluconate


 2000 mg/Dextrose  120 ml @ 


 220 mls/hr  1X  ONCE  7/11/19 10:00


 7/11/19 10:32 DC 7/11/19 10:05


220 MLS/HR


 


 Ceftriaxone Sodium


  (Rocephin)  2 gm  Q24H  7/11/19 13:00


 7/15/19 10:06 DC 7/14/19 12:14


2 GM


 


 Dexmedetomidine


 HCl 200 mcg/


 Sodium Chloride  50 ml @ 0


 mls/hr  CONT  PRN  7/10/19 14:15


 7/15/19 10:17 DC 7/13/19 10:28


12.6 MLS/HR


 


 Dextrose


  (Dextrose


 50%-Water Syringe)  25 gm  1X  ONCE  7/9/19 23:00


 7/9/19 23:01 DC 7/9/19 22:42


25 GM


 


 Diphenhydramine


 HCl


  (Benadryl)  25 mg  1X PRN  PRN  7/17/19 10:30


 7/18/19 10:29   





 


 Gabapentin


  (Neurontin)  300 mg  QHS  7/14/19 21:00


    7/16/19 19:40


300 MG


 


 Haloperidol


  (Haldol)  0.5 mg  PRN QID  PRN  7/16/19 09:00


 7/16/19 09:03 DC  





 


 Haloperidol


 Lactate


  (HALDOL 2mg ORAL


 CONC)  0.5 mg  PRN QID  PRN  7/16/19 09:03


     





 


 Haloperidol


 Lactate


  (Haldol Inj)  1 mg  PRN Q8HRS  PRN  7/10/19 14:00


    7/12/19 07:56


1 MG


 


 Heparin Sodium


  (Porcine)


  (Heparin Sodium)  2,500 unit  PRN Q6HRS  PRN  7/12/19 12:00


 7/15/19 13:16 DC  





 


 Heparin Sodium/


 Dextrose  500 ml @ 0


 mls/hr  CONT  PRN  7/12/19 12:00


 7/14/19 08:03 DC 7/13/19 15:05


28.3 MLS/HR


 


 Info


  (PHARMACY


 MONITORING -- do


 not chart)  1 each  PRN DAILY  PRN  7/17/19 10:30


   UNV  





 


 Insulin Human


 Regular


  (HumuLIN R VIAL)  10 unit  1X  ONCE  7/9/19 23:00


 7/9/19 23:01 DC 7/9/19 23:51


10 UNIT


 


 Labetalol HCl


  (Normodyne Iv


 Push)  20 mg  PRN Q4HRS  PRN  7/15/19 09:30


 7/16/19 19:10 DC  





 


 Labetalol HCl


  (Trandate)  200 mg  BID  7/17/19 10:00


     





 


 Lactobacillus


 Rhamnosus


  (Culturelle)  1 cap  BID  7/14/19 21:00


    7/17/19 11:23


1 CAP


 


 Lidocaine


  (Lidoderm)  1 patch  DAILY  7/15/19 10:00


    7/16/19 08:44


1 PATCH


 


 Lidocaine/Sodium


 Bicarbonate


  (Buffered


 Lidocaine 1%)  6 ml  1X  ONCE  7/10/19 14:15


 7/10/19 14:16 DC  





 


 Linezolid


  (Zyvox)  600 mg  BID  7/11/19 09:00


 7/17/19 22:00  7/17/19 11:23


600 MG


 


 Lorazepam


  (Ativan Inj)  1 mg  PRN Q8HRS  PRN  7/14/19 16:00


     





 


 Lorazepam


  (Ativan)  0.5 mg  PRN Q8HRS  PRN  7/16/19 09:00


     





 


 Metoprolol


 Tartrate


  (Lopressor Vial)  10 mg  PRN Q6HRS  PRN  7/16/19 19:15


     





 


 Miscellaneous


  (Lidoderm Patch


 Removal)  1 ea  QHS  7/15/19 21:00


    7/15/19 21:00


1 EA


 


 Morphine Sulfate


  (Morphine


 Sulfate)  4 mg  PRN Q4HRS  PRN  7/10/19 09:00


    7/17/19 10:39


4 MG


 


 Ondansetron HCl


  (Zofran)  4 mg  PRN Q6HRS  PRN  7/15/19 09:30


    7/16/19 19:39


4 MG


 


 Oxycodone HCl


  (Roxicodone)  5 mg  PRN Q4HRS  PRN  7/14/19 11:45


    7/16/19 22:10


5 MG


 


 Pantoprazole


 Sodium


  (PROTONIX VIAL


 for IV PUSH)  40 mg  DAILYAC  7/11/19 07:30


 7/15/19 11:54 DC 7/15/19 10:04


40 MG


 


 Pantoprazole


 Sodium


  (Protonix)  40 mg  DAILYAC  7/16/19 07:30


    7/17/19 11:23


40 MG


 


 Polyethylene


 Glycol


  (miraLAX PACKET)  17 gm  DAILY  7/10/19 13:30


    7/16/19 08:44


17 GM


 


 Potassium


 Chloride 10 meq/


 Bicarbonate


 Dialysis Soln w/


 out KCl  5,005 ml @ 


 1,500 mls/hr  Q3H21M  7/12/19 13:00


 7/12/19 13:00 DC  





 


 Potassium


 Chloride 10 meq/


 Calcium Chloride


 12.5 meq/


 Bicarbonate


 Dialysis Soln w/


 out KCl  5,013.9286


 ml @ 1,000


 mls/hr  Q5H1M  7/12/19 13:00


 7/12/19 13:00 DC  





 


 Potassium


 Chloride 20 meq/


 Bicarbonate


 Dialysis Soln w/


 out KCl  5,010 ml @ 


 1,500 mls/hr  Q3H21M  7/12/19 20:00


 7/14/19 08:03 DC 7/14/19 00:18


1,500 MLS/HR


 


 Potassium


 Chloride 20 meq/


 Calcium Chloride


 12.5 meq/


 Bicarbonate


 Dialysis Soln w/


 out KCl  5,018.9286


 ml @ 1,500


 mls/hr  Q3H21M  7/12/19 13:00


 7/12/19 13:00 DC  





 


 Potassium


 Chloride 30 meq/


 Bicarbonate


 Dialysis Soln w/


 out KCl  5,015 ml @ 


 1,500 mls/hr  Q3H21M  7/12/19 18:00


 7/12/19 19:10 DC  





 


 Potassium


 Chloride 40 meq/


 Bicarbonate


 Dialysis Soln w/


 out KCl  5,020 ml @ 


 1,500 mls/hr  Q3H21M  7/12/19 13:00


 7/12/19 13:10 DC  





 


 Potassium


 Chloride 60 meq/


 Bicarbonate


 Dialysis Soln w/


 out KCl  5,030 ml @ 


 1,500 mls/hr  Q3H22M  7/12/19 13:00


 7/12/19 13:07 DC  





 


 Silver


 Sulfadiazine


  (Silvadene)  1 mack  BID  7/10/19 13:00


 7/11/19 15:04 DC 7/11/19 10:06


1 MACK


 


 Sodium Bicarbonate


  (Sodium Bicarb


 Adult 8.4% Syr)  100 meq  1X  ONCE  7/10/19 10:00


 7/10/19 10:01 DC 7/10/19 10:02


100 MEQ


 


 Sodium Chloride  1,000 ml @ 


 400 mls/hr  Q2H30M PRN  7/17/19 10:18


 7/17/19 22:17   





 


 Sodium Chloride


  (Normal Saline


 Flush)  10 ml  1X PRN  PRN  7/15/19 11:30


 7/16/19 11:29 DC  











Lab





Laboratory Tests








Test


 7/17/19


06:20 7/17/19


07:30 7/17/19


11:43


 


Sodium Level


 132 mmol/L


(136-145) 


 





 


Potassium Level


 4.0 mmol/L


(3.5-5.1) 


 





 


Chloride Level


 96 mmol/L


() 


 





 


Carbon Dioxide Level


 28 mmol/L


(21-32) 


 





 


Anion Gap 8 (6-14)   


 


Blood Urea Nitrogen


 34 mg/dL


(8-26) 


 





 


Creatinine


 6.8 mg/dL


(0.7-1.3) 


 





 


Estimated GFR


(Cockcroft-Gault) 9.6 


 


 





 


Glucose Level


 95 mg/dL


(70-99) 


 





 


Calcium Level


 9.0 mg/dL


(8.5-10.1) 


 





 


Phosphorus Level


 5.3 mg/dL


(2.6-4.7) 


 





 


Creatine Kinase


 2551 U/L


() 


 





 


Albumin


 1.9 g/dL


(3.4-5.0) 


 





 


Glucose (Fingerstick)


 


 168 mg/dL


(70-99) 87 mg/dL


(70-99)








Results


All relevant outside records, renal labs, imaging studies, telemetry/EKG's were 

reviewed.











BENIGNO JOHNSON MD                Jul 17, 2019 13:03

## 2019-07-18 VITALS — SYSTOLIC BLOOD PRESSURE: 133 MMHG | DIASTOLIC BLOOD PRESSURE: 72 MMHG

## 2019-07-18 VITALS — SYSTOLIC BLOOD PRESSURE: 159 MMHG | DIASTOLIC BLOOD PRESSURE: 77 MMHG

## 2019-07-18 VITALS — DIASTOLIC BLOOD PRESSURE: 94 MMHG | SYSTOLIC BLOOD PRESSURE: 142 MMHG

## 2019-07-18 VITALS — DIASTOLIC BLOOD PRESSURE: 91 MMHG | SYSTOLIC BLOOD PRESSURE: 164 MMHG

## 2019-07-18 VITALS — DIASTOLIC BLOOD PRESSURE: 78 MMHG | SYSTOLIC BLOOD PRESSURE: 142 MMHG

## 2019-07-18 VITALS — SYSTOLIC BLOOD PRESSURE: 155 MMHG | DIASTOLIC BLOOD PRESSURE: 100 MMHG

## 2019-07-18 VITALS — SYSTOLIC BLOOD PRESSURE: 124 MMHG | DIASTOLIC BLOOD PRESSURE: 77 MMHG

## 2019-07-18 LAB
ALBUMIN SERPL-MCNC: 2 G/DL (ref 3.4–5)
ANION GAP SERPL CALC-SCNC: 7 MMOL/L (ref 6–14)
BUN SERPL-MCNC: 26 MG/DL (ref 8–26)
CALCIUM SERPL-MCNC: 8.7 MG/DL (ref 8.5–10.1)
CHLORIDE SERPL-SCNC: 97 MMOL/L (ref 98–107)
CO2 SERPL-SCNC: 29 MMOL/L (ref 21–32)
CREAT SERPL-MCNC: 5.8 MG/DL (ref 0.7–1.3)
GFR SERPLBLD BASED ON 1.73 SQ M-ARVRAT: 11.5 ML/MIN
GLUCOSE SERPL-MCNC: 101 MG/DL (ref 70–99)
PHOSPHATE SERPL-MCNC: 4.6 MG/DL (ref 2.6–4.7)
POTASSIUM SERPL-SCNC: 4 MMOL/L (ref 3.5–5.1)
SODIUM SERPL-SCNC: 133 MMOL/L (ref 136–145)

## 2019-07-18 PROCEDURE — 0JH63XZ INSERTION OF TUNNELED VASCULAR ACCESS DEVICE INTO CHEST SUBCUTANEOUS TISSUE AND FASCIA, PERCUTANEOUS APPROACH: ICD-10-PCS | Performed by: RADIOLOGY

## 2019-07-18 PROCEDURE — 02PYX3Z REMOVAL OF INFUSION DEVICE FROM GREAT VESSEL, EXTERNAL APPROACH: ICD-10-PCS | Performed by: RADIOLOGY

## 2019-07-18 PROCEDURE — B2141ZZ FLUOROSCOPY OF RIGHT HEART USING LOW OSMOLAR CONTRAST: ICD-10-PCS | Performed by: RADIOLOGY

## 2019-07-18 PROCEDURE — 02H633Z INSERTION OF INFUSION DEVICE INTO RIGHT ATRIUM, PERCUTANEOUS APPROACH: ICD-10-PCS | Performed by: RADIOLOGY

## 2019-07-18 RX ADMIN — Medication SCH CAP: at 21:13

## 2019-07-18 RX ADMIN — PANTOPRAZOLE SODIUM SCH MG: 40 TABLET, DELAYED RELEASE ORAL at 09:29

## 2019-07-18 RX ADMIN — MORPHINE SULFATE PRN MG: 4 INJECTION, SOLUTION INTRAMUSCULAR; INTRAVENOUS at 14:42

## 2019-07-18 RX ADMIN — Medication SCH CAP: at 09:29

## 2019-07-18 RX ADMIN — LABETALOL HCL SCH MG: 100 TABLET, FILM COATED ORAL at 09:29

## 2019-07-18 RX ADMIN — MORPHINE SULFATE PRN MG: 4 INJECTION, SOLUTION INTRAMUSCULAR; INTRAVENOUS at 10:43

## 2019-07-18 RX ADMIN — LIDOCAINE SCH PATCH: 50 PATCH CUTANEOUS at 09:00

## 2019-07-18 RX ADMIN — MORPHINE SULFATE PRN MG: 4 INJECTION, SOLUTION INTRAMUSCULAR; INTRAVENOUS at 03:47

## 2019-07-18 RX ADMIN — POLYETHYLENE GLYCOL 3350 SCH GM: 17 POWDER, FOR SOLUTION ORAL at 09:29

## 2019-07-18 RX ADMIN — MORPHINE SULFATE PRN MG: 4 INJECTION, SOLUTION INTRAMUSCULAR; INTRAVENOUS at 19:23

## 2019-07-18 RX ADMIN — GABAPENTIN SCH MG: 300 CAPSULE ORAL at 21:13

## 2019-07-18 RX ADMIN — Medication SCH EA: at 21:00

## 2019-07-18 RX ADMIN — LABETALOL HCL SCH MG: 100 TABLET, FILM COATED ORAL at 21:14

## 2019-07-18 NOTE — PDOC
PROGRESS NOTES


Assessment


Assessment


Generalized weakness.


Rhabdomyolysis.


Methamphetamine positive.


Electrolytes imbalances.


AKD.


Elevated hepatic enzymes.


Other psychiatric problems.


No evidence of MS this time.





RECOMMENDATIONS/PLAN:


Treat medical diseases.


Repeat T-spine MRI showed no abnormal findings this time.


OT/PT.





CSF OCB zero.


Repeat T-spine MRI on 7/17/19: No abnormal findings of T-cord.





Past Medical History


GI:  GERD


Psych:  Anxiety, Addictions, Depression





Past Surgical History


No pertinent history





Family History


No pertinent hx





Social History


Unemployed, uses methamphetamine, smokes occasionally, rare alcohol. Was said 

out of incarceration on 7/5/19.





ALLERGY:


NKDA





MEDICATIONS:


Refer to MAR





REVIEW OF SYSTEMS:


Refer to PMH and PSH.





PHYSICAL EXAMINATION:   


General appearance in no acute distress.  


HEENT:  Normocephalic and nontraumatic.  Eyes, nose, ears, and throat are 

unremarkable. 


Neck is supple. No lymphadenopathy.  No Crepitus.


Cardiovascular:  S1, S2, regular rate and rhythm.  


Pulmonary:  Clear to auscultation bilaterally. 


Abdomen:  Bowel sounds are positive.    


Extremities:  No rash, lesions, or edema.  


No restriction of range of motion





NEUROLOGICAL  EXAMINATION:


Sleepiness but arousable.


Oriented to time, place and person.


PERRL.


EOMI.


CN: no focal findings.


Muscle tone: within normal.


Muscle strength: 5


DTR: 2


Plantar reflex: Flexor response bilaterally 


Gait: Able to walk.


Sensory exam: no acute abnormal findings.


No cerebellar signs elicited.


F-T-N test fine.





Objective


Objective





Vital Signs








  Date Time  Temp Pulse Resp B/P (MAP) Pulse Ox O2 Delivery O2 Flow Rate FiO2


 


7/18/19 17:00      Room Air  


 


7/18/19 15:00 97.8 108 19 164/91 (115) 92   





 97.8       


 


7/18/19 08:53       3.0 














Intake and Output 


 


 7/18/19





 07:00


 


Intake Total 120 ml


 


Balance 120 ml


 


 


 


Intake Oral 120 ml











Vitals Signs


Vitals





                          VS - Last 72 Hours, by Label








  Date Time  Temp Pulse Resp B/P (MAP) Pulse Ox O2 Delivery O2 Flow Rate FiO2


 


7/18/19 17:00      Room Air  


 


7/18/19 15:30      Room Air  


 


7/18/19 15:00 97.8 108 19 164/91 (115) 92 Room Air  





 97.8       


 


7/18/19 14:42      Room Air  


 


7/18/19 14:00      Room Air  


 


7/18/19 12:51      Room Air  


 


7/18/19 11:00 98.2 72 19 124/77 (93) 97 Room Air  





 98.2       


 


7/18/19 10:43      Room Air  


 


7/18/19 10:02      Room Air  


 


7/18/19 09:29  89  141/92    


 


7/18/19 08:53  82 12  96 Nasal Cannula 3.0 


 


7/18/19 08:45   12     


 


7/18/19 08:30      Room Air  


 


7/18/19 07:00 98.3 83 19 142/78 (99) 94 Room Air  





 98.3       


 


7/18/19 03:00 98.0 83 18 133/72 (92) 91 Room Air 90.0 





 98.0       


 


7/18/19 01:44   20   Room Air  


 


7/17/19 23:13   18   Room Air  


 


7/17/19 23:00 98.4 92 18 132/77 (95) 91 Room Air 98.0 





 98.4       


 


7/17/19 20:56   20   Room Air  


 


7/17/19 20:55  86  138/78    


 


7/17/19 20:00      Room Air  


 


7/17/19 19:22   18     


 


7/17/19 19:00 98.1 86 18 138/78 (98) 98 Room Air 98.0 





 98.1       


 


7/17/19 18:52   18  94 Room Air 98.0 


 


7/17/19 16:41   18     


 


7/17/19 16:39  102  152/88    


 


7/17/19 16:35 98.1 102 20 152/88 (109) 94 Room Air  





 98.1       


 


7/17/19 15:41   18   Room Air  


 


7/17/19 15:07     94   


 


7/17/19 14:37   18   Room Air  


 


7/17/19 11:00 98.2 91 16 143/91 (108) 94 Room Air  





 98.2       


 


7/17/19 10:39   18   Room Air  


 


7/17/19 07:00 97.7 91 18 150/91 (110) 93 Room Air  





 97.7       











Laboratory


Laboratory





Laboratory Tests








Test


 7/17/19


20:13 7/18/19


06:15


 


Glucose (Fingerstick)


 111 mg/dL


(70-99) 





 


Sodium Level


 


 133 mmol/L


(136-145)


 


Potassium Level


 


 4.0 mmol/L


(3.5-5.1)


 


Chloride Level


 


 97 mmol/L


()


 


Carbon Dioxide Level


 


 29 mmol/L


(21-32)


 


Anion Gap  7 (6-14) 


 


Blood Urea Nitrogen


 


 26 mg/dL


(8-26)


 


Creatinine


 


 5.8 mg/dL


(0.7-1.3)


 


Estimated GFR


(Cockcroft-Gault) 


 11.5 





 


Glucose Level


 


 101 mg/dL


(70-99)


 


Calcium Level


 


 8.7 mg/dL


(8.5-10.1)


 


Phosphorus Level


 


 4.6 mg/dL


(2.6-4.7)


 


Creatine Kinase


 


 2272 U/L


()


 


Albumin


 


 2.0 g/dL


(3.4-5.0)








Microbiology


7/10/19 Blood Culture - Final, Complete


          NO GROWTH AFTER 5 DAYS


7/10/19 CSF Gram Stain - Final, Complete


          


7/9/19 Urine Culture - Final, Complete


         


7/9/19 Urine Culture Result 1 (RUI) - Final, Complete





Medication


Medications





Current Medications


Cefazolin Sodium 50 ml @  100 mls/hr 1X  ONCE IV  Last administered on 7/18/19at

08:45;  Start 7/18/19 at 08:45;  Stop 7/18/19 at 09:14;  Status DC


Cefazolin Sodium 50 ml @  100 mls/hr 1X  ONCE IV  Last administered on 7/18/19at

08:45;  Start 7/18/19 at 08:45;  Stop 7/18/19 at 09:14;  Status DC


Cefazolin Sodium 50 ml @ As Directed STK-MED ONCE IV ;  Start 7/18/19 at 08:18; 

Stop 7/18/19 at 08:19;  Status DC


Cefazolin Sodium 50 ml @ As Directed STK-MED ONCE IV ;  Start 7/18/19 at 08:18; 

Stop 7/18/19 at 08:19;  Status DC


Fentanyl Citrate (Fentanyl 2ml Vial) 100 mcg 1X  ONCE IV  Last administered on 

7/18/19at 08:45;  Start 7/18/19 at 08:45;  Stop 7/18/19 at 08:52;  Status DC


Fentanyl Citrate (Fentanyl 2ml Vial) 100 mcg STK-MED ONCE .ROUTE ;  Start 

7/18/19 at 08:12;  Stop 7/18/19 at 08:13;  Status DC


Furosemide (Lasix) 40 mg 1X  ONCE IVP  Last administered on 7/18/19at 14:44;  

Start 7/18/19 at 14:30;  Stop 7/18/19 at 14:31;  Status DC


Lidocaine/ Epinephrine (LIDOCAINE 1%-EPI 1:100,000 Multi-Dose) 20 ml 1X  ONCE IJ

 Last administered on 7/18/19at 08:45;  Start 7/18/19 at 08:45;  Stop 7/18/19 at

08:52;  Status DC


Lidocaine/ Epinephrine (LIDOCAINE 1%-EPI 1:100,000 Multi-Dose) 20 ml STK-MED 

ONCE .ROUTE ;  Start 7/18/19 at 08:13;  Stop 7/18/19 at 08:14;  Status DC


Midazolam HCl (Versed) 2 mg 1X  ONCE IV  Last administered on 7/18/19at 08:45;  

Start 7/18/19 at 08:45;  Stop 7/18/19 at 08:52;  Status DC


Midazolam HCl (Versed) 2 mg STK-MED ONCE .ROUTE ;  Start 7/18/19 at 08:12;  Stop

7/18/19 at 08:13;  Status DC


Midazolam HCl (Versed) 2 mg STK-MED ONCE .ROUTE ;  Start 7/18/19 at 08:39;  Stop

7/18/19 at 08:40;  Status DC





Comment


Review of Relevant


I have reviewed the following items estrella (where applicable) has been applied.











BAY GROVES MD                 Jul 18, 2019 17:55

## 2019-07-18 NOTE — PDOC
SUBJECTIVE


ROS


No acute events overnight , minimum uop





OBJECTIVE


Vital Signs





Vital Signs








  Date Time  Temp Pulse Resp B/P (MAP) Pulse Ox O2 Delivery O2 Flow Rate FiO2


 


7/18/19 10:43      Room Air  


 


7/18/19 09:29  89  141/92    


 


7/18/19 08:53   12  96  3.0 


 


7/18/19 07:00 98.3       





 98.3       








I & 0











Intake and Output 


 


 7/18/19





 07:00


 


Intake Total 120 ml


 


Balance 120 ml


 


 


 


Intake Oral 120 ml











PHYSICAL EXAM


Physical Exam


GENERAL:  NAD, 


EENT:   OM moist  


NECK:  Supple.


LUNGS:  Clear bilaterally.  No wheezing.


HEART:  S1, S2, regular, 


ABDOMEN:  Soft, nontender, BS present


: Culp removed  


EXTREMITIES:  Generalized trace edema,  


 NEURO:  Alert, answering appropriately


RIJ/HDC





DIAGNOSIS/ASSESSMENT


Assessment & Plan


EL - ATN2/2 Rhabdo , Meth use  


Urinary retention at Presentation,Anuric


Culp removed, Monitor for UOP with intermittent Bladder scan  


 Requiring  HD - 1 st Tx  7/10  ,CRRT x 1  


 Currently on MWF schedule, continue monitoring for renal recovery  


Tunnelled HDC placed this am  , awaiting OP chair time





Rhabdo-  CPK>100,000 at presentation  





Hyperkalemia- Normal K 





Hyponatremia- stable, mildly low  





Severe Metabolic acidosis- 2/2 all above 


Resolved  





Transaminitis - Improving LFT's 





Hypocalcemia- severe 2/2  Rhabdo


Normal Ca now  





Dw Pt and RN





COMMENT/RELEVANT DATA


Meds





Current Medications








 Medications


  (Trade)  Dose


 Ordered  Sig/Travis  Start Time


 Stop Time Status Last Admin


Dose Admin


 


 Acetaminophen


  (Tylenol)  500 mg  PRN Q6HRS  PRN  7/15/19 09:30


     





 


 Albumin Human  200 ml @ 


 200 mls/hr  1X PRN  PRN  7/15/19 11:30


 7/15/19 17:29 DC  





 


 Albuterol Sulfate


  (Ventolin Neb


 Soln)  10 mg  1X  ONCE  7/9/19 23:00


 7/9/19 23:01 DC 7/10/19 00:30


10 MG


 


 Atropine Sulfate


  (ATROPINE 0.5mg


 SYRINGE)  0.5 mg  PRN Q5MIN  PRN  7/10/19 14:15


     





 


 Calcium Gluconate


  (Calcium


 Gluconate)  2,000 mg  1X  ONCE  7/10/19 12:00


 7/10/19 12:01 DC 7/10/19 13:38


2,000 MG


 


 Calcium Gluconate


 2000 mg/Dextrose  120 ml @ 


 220 mls/hr  1X  ONCE  7/11/19 10:00


 7/11/19 10:32 DC 7/11/19 10:05


220 MLS/HR


 


 Cefazolin Sodium  50 ml @ 


 100 mls/hr  1X  ONCE  7/18/19 08:45


 7/18/19 09:14 DC 7/18/19 08:45


100 MLS/HR


 


 Ceftriaxone Sodium


  (Rocephin)  2 gm  Q24H  7/11/19 13:00


 7/15/19 10:06 DC 7/14/19 12:14


2 GM


 


 Dexmedetomidine


 HCl 200 mcg/


 Sodium Chloride  50 ml @ 0


 mls/hr  CONT  PRN  7/10/19 14:15


 7/15/19 10:17 DC 7/13/19 10:28


12.6 MLS/HR


 


 Dextrose


  (Dextrose


 50%-Water Syringe)  25 gm  1X  ONCE  7/9/19 23:00


 7/9/19 23:01 DC 7/9/19 22:42


25 GM


 


 Diphenhydramine


 HCl


  (Benadryl)  25 mg  1X PRN  PRN  7/17/19 10:30


 7/18/19 10:29 DC  





 


 Fentanyl Citrate


  (Fentanyl 2ml


 Vial)  100 mcg  1X  ONCE  7/18/19 08:45


 7/18/19 08:52 DC 7/18/19 08:45


75 MCG


 


 Gabapentin


  (Neurontin)  300 mg  QHS  7/14/19 21:00


    7/17/19 20:55


300 MG


 


 Haloperidol


  (Haldol)  0.5 mg  PRN QID  PRN  7/16/19 09:00


 7/16/19 09:03 DC  





 


 Haloperidol


 Lactate


  (HALDOL 2mg ORAL


 CONC)  0.5 mg  PRN QID  PRN  7/16/19 09:03


     





 


 Haloperidol


 Lactate


  (Haldol Inj)  1 mg  PRN Q8HRS  PRN  7/10/19 14:00


    7/12/19 07:56


1 MG


 


 Heparin Sodium


  (Porcine)


  (Heparin Sodium)  2,500 unit  PRN Q6HRS  PRN  7/12/19 12:00


 7/15/19 13:16 DC  





 


 Heparin Sodium/


 Dextrose  500 ml @ 0


 mls/hr  CONT  PRN  7/12/19 12:00


 7/14/19 08:03 DC 7/13/19 15:05


28.3 MLS/HR


 


 Info


  (PHARMACY


 MONITORING -- do


 not chart)  1 each  PRN DAILY  PRN  7/17/19 10:30


   UNV  





 


 Insulin Human


 Regular


  (HumuLIN R VIAL)  10 unit  1X  ONCE  7/9/19 23:00


 7/9/19 23:01 DC 7/9/19 23:51


10 UNIT


 


 Labetalol HCl


  (Normodyne Iv


 Push)  20 mg  PRN Q4HRS  PRN  7/15/19 09:30


 7/16/19 19:10 DC  





 


 Labetalol HCl


  (Trandate)  200 mg  BID  7/17/19 10:00


    7/18/19 09:29


200 MG


 


 Lactobacillus


 Rhamnosus


  (Culturelle)  1 cap  BID  7/14/19 21:00


    7/18/19 09:29


1 CAP


 


 Lidocaine


  (Lidoderm)  1 patch  DAILY  7/15/19 10:00


    7/16/19 08:44


1 PATCH


 


 Lidocaine/


 Epinephrine


  (LIDOCAINE


 1%-EPI 1:100,000


 Multi-Dose)  20 ml  1X  ONCE  7/18/19 08:45


 7/18/19 08:52 DC 7/18/19 08:45


9 ML


 


 Lidocaine/Sodium


 Bicarbonate


  (Buffered


 Lidocaine 1%)  6 ml  1X  ONCE  7/10/19 14:15


 7/10/19 14:16 DC  





 


 Linezolid


  (Zyvox)  600 mg  BID  7/11/19 09:00


 7/17/19 22:00 DC 7/17/19 20:55


600 MG


 


 Lorazepam


  (Ativan Inj)  1 mg  PRN Q8HRS  PRN  7/14/19 16:00


     





 


 Lorazepam


  (Ativan)  0.5 mg  PRN Q8HRS  PRN  7/16/19 09:00


     





 


 Metoprolol


 Tartrate


  (Lopressor Vial)  10 mg  PRN Q6HRS  PRN  7/16/19 19:15


     





 


 Midazolam HCl


  (Versed)  2 mg  1X  ONCE  7/18/19 08:45


 7/18/19 08:52 DC 7/18/19 08:45


3 MG


 


 Miscellaneous


  (Lidoderm Patch


 Removal)  1 ea  QHS  7/15/19 21:00


    7/15/19 21:00


1 EA


 


 Morphine Sulfate


  (Morphine


 Sulfate)  4 mg  PRN Q4HRS  PRN  7/10/19 09:00


    7/18/19 10:43


4 MG


 


 Ondansetron HCl


  (Zofran)  4 mg  PRN Q6HRS  PRN  7/15/19 09:30


    7/16/19 19:39


4 MG


 


 Oxycodone HCl


  (Roxicodone)  5 mg  PRN Q4HRS  PRN  7/14/19 11:45


    7/18/19 01:44


5 MG


 


 Pantoprazole


 Sodium


  (PROTONIX VIAL


 for IV PUSH)  40 mg  DAILYAC  7/11/19 07:30


 7/15/19 11:54 DC 7/15/19 10:04


40 MG


 


 Pantoprazole


 Sodium


  (Protonix)  40 mg  DAILYAC  7/16/19 07:30


    7/18/19 09:29


40 MG


 


 Polyethylene


 Glycol


  (miraLAX PACKET)  17 gm  DAILY  7/10/19 13:30


    7/18/19 09:29


17 GM


 


 Potassium


 Chloride 10 meq/


 Bicarbonate


 Dialysis Soln w/


 out KCl  5,005 ml @ 


 1,500 mls/hr  Q3H21M  7/12/19 13:00


 7/12/19 13:00 DC  





 


 Potassium


 Chloride 10 meq/


 Calcium Chloride


 12.5 meq/


 Bicarbonate


 Dialysis Soln w/


 out KCl  5,013.9286


 ml @ 1,000


 mls/hr  Q5H1M  7/12/19 13:00


 7/12/19 13:00 DC  





 


 Potassium


 Chloride 20 meq/


 Bicarbonate


 Dialysis Soln w/


 out KCl  5,010 ml @ 


 1,500 mls/hr  Q3H21M  7/12/19 20:00


 7/14/19 08:03 DC 7/14/19 00:18


1,500 MLS/HR


 


 Potassium


 Chloride 20 meq/


 Calcium Chloride


 12.5 meq/


 Bicarbonate


 Dialysis Soln w/


 out KCl  5,018.9286


 ml @ 1,500


 mls/hr  Q3H21M  7/12/19 13:00


 7/12/19 13:00 DC  





 


 Potassium


 Chloride 30 meq/


 Bicarbonate


 Dialysis Soln w/


 out KCl  5,015 ml @ 


 1,500 mls/hr  Q3H21M  7/12/19 18:00


 7/12/19 19:10 DC  





 


 Potassium


 Chloride 40 meq/


 Bicarbonate


 Dialysis Soln w/


 out KCl  5,020 ml @ 


 1,500 mls/hr  Q3H21M  7/12/19 13:00


 7/12/19 13:10 DC  





 


 Potassium


 Chloride 60 meq/


 Bicarbonate


 Dialysis Soln w/


 out KCl  5,030 ml @ 


 1,500 mls/hr  Q3H22M  7/12/19 13:00


 7/12/19 13:07 DC  





 


 Silver


 Sulfadiazine


  (Silvadene)  1 mack  BID  7/10/19 13:00


 7/11/19 15:04 DC 7/11/19 10:06


1 MACK


 


 Sodium Bicarbonate


  (Sodium Bicarb


 Adult 8.4% Syr)  100 meq  1X  ONCE  7/10/19 10:00


 7/10/19 10:01 DC 7/10/19 10:02


100 MEQ


 


 Sodium Chloride  1,000 ml @ 


 400 mls/hr  Q2H30M PRN  7/17/19 10:18


 7/17/19 22:17 DC  





 


 Sodium Chloride


  (Normal Saline


 Flush)  10 ml  1X PRN  PRN  7/15/19 11:30


 7/16/19 11:29 DC  











Lab





Laboratory Tests








Test


 7/17/19


16:35 7/17/19


20:13 7/18/19


06:15


 


Glucose (Fingerstick)


 121 mg/dL


(70-99) 111 mg/dL


(70-99) 





 


Sodium Level


 


 


 133 mmol/L


(136-145)


 


Potassium Level


 


 


 4.0 mmol/L


(3.5-5.1)


 


Chloride Level


 


 


 97 mmol/L


()


 


Carbon Dioxide Level


 


 


 29 mmol/L


(21-32)


 


Anion Gap   7 (6-14) 


 


Blood Urea Nitrogen


 


 


 26 mg/dL


(8-26)


 


Creatinine


 


 


 5.8 mg/dL


(0.7-1.3)


 


Estimated GFR


(Cockcroft-Gault) 


 


 11.5 





 


Glucose Level


 


 


 101 mg/dL


(70-99)


 


Calcium Level


 


 


 8.7 mg/dL


(8.5-10.1)


 


Phosphorus Level


 


 


 4.6 mg/dL


(2.6-4.7)


 


Creatine Kinase


 


 


 2272 U/L


()


 


Albumin


 


 


 2.0 g/dL


(3.4-5.0)








Results


All relevant outside records, renal labs, imaging studies, telemetry/EKG's were 

reviewed.











BENIGNO JOHNSON MD                Jul 18, 2019 11:48

## 2019-07-18 NOTE — PDOC
[facility] PROGRESS NOTE


Chief Complaint


Chief Complaint


Status post tunneled catheter placement this morning


New ESRD-initiation of HD/ ccrt at some point


Hypotension, resolved


Anasarca


Hepatorenal syndrome


Elevated troponin in the background of sepsis


UTI


Methamphetamine abuse


Metabolic encephalopathy, improved


etoh drinker


Sepsis with hypotension, resolved


MOD to sever PCM


Oliguric renal failure


Accelerated hypertension, better





History of Present Illness


History of Present Illness


[id number]


Patient seen and examined


Discussed with RN and case management


He just got his tunnel catheter this morning


I believe the plan is to going to start dialysis tomorrow


Complicating issues is the fact he does not have insurance and I think he might 

be homeless too?





Vitals/I&O


Vitals/I&O:





                                   Vital Signs








  Date Time  Temp Pulse Resp B/P (MAP) Pulse Ox O2 Delivery O2 Flow Rate FiO2


 


7/18/19 10:43      Room Air  


 


7/18/19 09:29  89  141/92    


 


7/18/19 08:53   12  96  3.0 


 


7/18/19 07:00 98.3       





 98.3       














                                    I & O   


 


 7/17/19 7/17/19 7/18/19





 15:00 23:00 07:00


 


Intake Total  120 ml 


 


Balance  120 ml 











Physical Exam


Physical Exam:


GENERAL:  Standing with PT then sat down, NAD, coop 


EENT:  Pupils equal, oral cavity clear


NECK:  Supple.


LUNGS:  Clear bilaterally.  No wheezing.


HEART:  S1, S2, regular, tachy 108s


ABDOMEN:  Soft, nontender, BS present


: Culp


EXTREMITIES:  Generalized trace edema, no cyanosis. Right index with min 

swelling.  Lesions are stable - mild maceration


CENTRAL NERVOUS SYSTEM:  Alert, answering appropriately


RIJ/HDC without signs of complications


PIV


General:  Oriented X3, Cooperative, mild distress


Heart:  Regular rate, Normal S1, No murmurs


Lungs:  Clear


Abdomen:  Normal bowel sounds, Soft, No hepatosplenomegaly, Other (obese, no 

fluid wave)


Extremities:  No clubbing, No cyanosis, No edema, Other (burn to right index 

finger APPEARS OLD  associated  cellulitis)





Labs


Labs:





Laboratory Tests








Test


 7/17/19


16:35 7/17/19


20:13 7/18/19


06:15


 


Glucose (Fingerstick)


 121 mg/dL


(70-99) 111 mg/dL


(70-99) 





 


Sodium Level


 


 


 133 mmol/L


(136-145)


 


Potassium Level


 


 


 4.0 mmol/L


(3.5-5.1)


 


Chloride Level


 


 


 97 mmol/L


()


 


Carbon Dioxide Level


 


 


 29 mmol/L


(21-32)


 


Anion Gap   7 (6-14) 


 


Blood Urea Nitrogen


 


 


 26 mg/dL


(8-26)


 


Creatinine


 


 


 5.8 mg/dL


(0.7-1.3)


 


Estimated GFR


(Cockcroft-Gault) 


 


 11.5 





 


Glucose Level


 


 


 101 mg/dL


(70-99)


 


Calcium Level


 


 


 8.7 mg/dL


(8.5-10.1)


 


Phosphorus Level


 


 


 4.6 mg/dL


(2.6-4.7)


 


Creatine Kinase


 


 


 2272 U/L


()


 


Albumin


 


 


 2.0 g/dL


(3.4-5.0)











Assessment and Plan


Assessmemt and Plan


Problems


Medical Problems:


(1) Elevated troponin


Status: Acute  





(2) Hepatorenal syndrome


Status: Acute  





(3) Hyperkalemia


Status: Acute  





(4) Hyponatremia


Status: Acute  





(5) Methamphetamine abuse


Status: Acute  





(6) Neurological complaint


Status: Acute  





(7) Rhabdomyolysis


Status: Acute  





(8) Urinary retention


Status: Acute  





(9) Urinary tract infection


Status: Acute  





Status post tunneled catheter placement this morning


New ESRD-initiation of HD/ ccrt at some point


Hypotension, resolved


Anasarca


Hepatorenal syndrome


Elevated troponin in the background of sepsis


UTI


Methamphetamine abuse


Metabolic encephalopathy, improved


etoh drinker


Sepsis with hypotension, resolved


MOD to sever PCM


Oliguric renal failure


Accelerated hypertension, better





Plan


Starting dialysis probably tomorrow


I talked to case management they are aware that he does not have insurance, and 

may be homeless. May take a couple days to get this all arranged, especially 

chair time?


DVT prophylaxis


Full code


Home meds


Long-term prognosis guarded


Appreciate subspecialist input





Comment


Review of Relevant


I have reviewed the following items estrella (where applicable) has been applied.


Medications:





Current Medications








 Medications


  (Trade)  Dose


 Ordered  Sig/Travis


 Route


 PRN Reason  Start Time


 Stop Time Status Last Admin


Dose Admin


 


 Midazolam HCl


  (Versed)  2 mg  1X  ONCE


 IV


   7/18/19 08:45


 7/18/19 08:52 DC 7/18/19 08:45





 


 Fentanyl Citrate


  (Fentanyl 2ml


 Vial)  100 mcg  1X  ONCE


 IV


   7/18/19 08:45


 7/18/19 08:52 DC 7/18/19 08:45





 


 Lidocaine/


 Epinephrine


  (LIDOCAINE


 1%-EPI 1:100,000


 Multi-Dose)  20 ml  1X  ONCE


 IJ


   7/18/19 08:45


 7/18/19 08:52 DC 7/18/19 08:45





 


 Cefazolin Sodium  50 ml @ 


 100 mls/hr  1X  ONCE


 IV


   7/18/19 08:45


 7/18/19 09:14 DC 7/18/19 08:45





 


 Cefazolin Sodium  50 ml @ 


 100 mls/hr  1X  ONCE


 IV


   7/18/19 08:45


 7/18/19 09:14 DC 7/18/19 08:45




















CANDELARIA MUNOZ III DO           Jul 18, 2019 12:16

## 2019-07-18 NOTE — NUR
SS following up with discharge planning. HCF[facility] continuing to follow for self pay status. Pt 
is homeless. Nephrology following. Pt may need OPHD at discharge. SS will continue to follow 
for discharge planning.

## 2019-07-18 NOTE — NUR
Patient bladder scanned per request of Dr. Kelley/ has not urinated for me this shift/ patient 
retaining approximately 163mL of urine with no urge to urinate. Patient complaining of 
swollen legs and feet and now scrotal swelling. Dr. Kelley notified of bladder scan findings 
and the scrotal swelling and without the patient making much urine she stated other than 
dialysis that there is not much that can be done as of right now. However,Dr. Kelley did give 
a verbal order for a 1 x dose of 40 mg IVP Lasix. Will continue to monitor patient for any 
new changes.

## 2019-07-18 NOTE — RAD
Conversion of a right internal jugular temporary dialysis catheter to tunneled

catheter 7/18/2019 7:00 AM



Indication: Need for longer term dialysis access



Discussion: The risks and benefits of the procedure were discussed the

patient.  Informed consent was obtained.  Timeout procedure was performed. 

The right neck and chest prepped and draped using sterile barrier technique. 

All elements of maximal sterile barrier technique including the use of a cap,

mask, sterile gown, sterile gloves, large sterile sheet, appropriate hand

hygiene, and 2% chlorhexidine for cutaneous antisepsis (or acceptable

alternative antiseptic per current guidelines) were followed for this

procedure.  



 Fluoroscopic evaluation demonstrates the pre-existing catheter to be in the

appropriate position.  A guidewire was advanced through the pre-existing

catheter, into the IVC.  A tunneled dialysis catheter was advanced from a

small dermatotomy several inches inferior to the right clavicle to the access

site.  The pre-existing catheter was removed over a wire.  A peel-away sheath

was placed.  The wire was removed and the new catheter was advanced through

the peel-away sheath such that its catheter tip was at the mid right atrium

with the patient supine.  The new catheter was found to flush and aspirate

normally.  The catheter was secured in place.  Sterile dressings were applied.



Total fluoroscopy time:   1.3 MIN 

Dose area product: 5 Gycmn2



The procedure was performed under conscious sedation including continuous

cardiopulmonary monitoring via a dedicated sedation nurse.  Face-to-face

sedation time: 30 minutes



Impression: Conversion of a temporary dialysis catheter to a tunneled dialysis

catheter as described

## 2019-07-19 VITALS — SYSTOLIC BLOOD PRESSURE: 133 MMHG | DIASTOLIC BLOOD PRESSURE: 88 MMHG

## 2019-07-19 VITALS — DIASTOLIC BLOOD PRESSURE: 71 MMHG | SYSTOLIC BLOOD PRESSURE: 147 MMHG

## 2019-07-19 VITALS — SYSTOLIC BLOOD PRESSURE: 141 MMHG | DIASTOLIC BLOOD PRESSURE: 90 MMHG

## 2019-07-19 VITALS — DIASTOLIC BLOOD PRESSURE: 79 MMHG | SYSTOLIC BLOOD PRESSURE: 161 MMHG

## 2019-07-19 VITALS — DIASTOLIC BLOOD PRESSURE: 77 MMHG | SYSTOLIC BLOOD PRESSURE: 147 MMHG

## 2019-07-19 VITALS — DIASTOLIC BLOOD PRESSURE: 67 MMHG | SYSTOLIC BLOOD PRESSURE: 140 MMHG

## 2019-07-19 VITALS — SYSTOLIC BLOOD PRESSURE: 128 MMHG | DIASTOLIC BLOOD PRESSURE: 69 MMHG

## 2019-07-19 LAB
ALBUMIN SERPL-MCNC: 2.2 G/DL (ref 3.4–5)
ANION GAP SERPL CALC-SCNC: 15 MMOL/L (ref 6–14)
BUN SERPL-MCNC: 38 MG/DL (ref 8–26)
CALCIUM SERPL-MCNC: 8.4 MG/DL (ref 8.5–10.1)
CHLORIDE SERPL-SCNC: 94 MMOL/L (ref 98–107)
CK SERPL-CCNC: 1778 U/L (ref 39–308)
CO2 SERPL-SCNC: 21 MMOL/L (ref 21–32)
CREAT SERPL-MCNC: 7.6 MG/DL (ref 0.7–1.3)
GFR SERPLBLD BASED ON 1.73 SQ M-ARVRAT: 8.4 ML/MIN
GLUCOSE SERPL-MCNC: 104 MG/DL (ref 70–99)
PHOSPHATE SERPL-MCNC: 5.3 MG/DL (ref 2.6–4.7)
POTASSIUM SERPL-SCNC: 4.5 MMOL/L (ref 3.5–5.1)
SODIUM SERPL-SCNC: 130 MMOL/L (ref 136–145)

## 2019-07-19 PROCEDURE — 5A1D70Z PERFORMANCE OF URINARY FILTRATION, INTERMITTENT, LESS THAN 6 HOURS PER DAY: ICD-10-PCS | Performed by: FAMILY MEDICINE

## 2019-07-19 RX ADMIN — Medication SCH CAP: at 09:28

## 2019-07-19 RX ADMIN — LABETALOL HCL SCH MG: 100 TABLET, FILM COATED ORAL at 20:30

## 2019-07-19 RX ADMIN — MORPHINE SULFATE PRN MG: 4 INJECTION, SOLUTION INTRAMUSCULAR; INTRAVENOUS at 00:16

## 2019-07-19 RX ADMIN — PANTOPRAZOLE SODIUM SCH MG: 40 TABLET, DELAYED RELEASE ORAL at 09:27

## 2019-07-19 RX ADMIN — GABAPENTIN SCH MG: 300 CAPSULE ORAL at 20:30

## 2019-07-19 RX ADMIN — Medication SCH CAP: at 20:30

## 2019-07-19 RX ADMIN — MORPHINE SULFATE PRN MG: 4 INJECTION, SOLUTION INTRAMUSCULAR; INTRAVENOUS at 15:04

## 2019-07-19 RX ADMIN — MORPHINE SULFATE PRN MG: 4 INJECTION, SOLUTION INTRAMUSCULAR; INTRAVENOUS at 04:19

## 2019-07-19 RX ADMIN — LIDOCAINE SCH PATCH: 50 PATCH CUTANEOUS at 09:00

## 2019-07-19 RX ADMIN — LABETALOL HCL SCH MG: 100 TABLET, FILM COATED ORAL at 09:27

## 2019-07-19 RX ADMIN — POLYETHYLENE GLYCOL 3350 SCH GM: 17 POWDER, FOR SOLUTION ORAL at 09:25

## 2019-07-19 RX ADMIN — ONDANSETRON PRN MG: 2 INJECTION INTRAMUSCULAR; INTRAVENOUS at 15:07

## 2019-07-19 RX ADMIN — Medication SCH EA: at 20:31

## 2019-07-19 RX ADMIN — MORPHINE SULFATE PRN MG: 4 INJECTION, SOLUTION INTRAMUSCULAR; INTRAVENOUS at 20:31

## 2019-07-19 NOTE — CONS
DATE OF CONSULTATION:  07/19/2019



I was asked to see the patient because of continued problems with lower

extremity weakness.  The patient was recently released from incarceration and

had some difficulties with methamphetamine.  He was admitted about 10 days ago

with a relatively profound diffuse weakness, which was more pronounced in his

lower extremities and his upper extremities.  He was in renal failure.  He had

rhabdomyolysis and underwent dialysis as well as supportive care.  In speaking

with the patient he reports that he has gradually noticed an increase in

strength.  He says that his upper extremities feel as though they have normal

strength at this time.  His lower extremities, he says also feels quite strong. 

He is able to walk with his walker.  He does notice, however, problems with

swelling, primarily in his lower extremities and says that he feels as though

there is a tingling sensation in both of his legs and feet.  When asked about

pain, he reports significant lower back pain.



PAST MEDICAL HISTORY, PERSONAL HISTORY, REVIEW OF SYSTEMS:  Well evaluated and

on the chart.  He is later today going for further hemodialysis.



PHYSICAL EXAMINATION:

GENERAL:  At this point, he is alert, pleasant, cooperative, an excellent

historian.

NEUROLOGIC:  Cranial nerves were intact.  Motor examination:  His strength is

5/5 in upper extremities and 4+ to 5/5 in his lower extremities.  On sensory

examination, he is intact to light touch in his upper extremities with 1+

reflexes.  In the lower extremities, he reported that subjective change in

sensation with 1+ reflexes knees and ankles.  Negative straight leg raising. 

There are no pathologic reflexes.  Examination of the lumbar spine:  There is

tenderness diffusely in the very lower lumbar spine and the midline adjacent

paraspinal regions.



I have reviewed a recent thoracic MRI scan.  He does have an element of epidural

lipomatosis and mild thoracic stenosis, but I did not see significant thoracic

stenosis at any level.



IMPRESSION:  He is making progress and slowly improving neurologically.  My

recommendation is to continue with his present management plan.

 



______________________________

ROSARIO NEWELL MD



DR:  JONO/osmin  JOB#:  [id number] / [id number]

DD:  07/19/2019 17:40  DT:  07/19/2019 23:06

## 2019-07-19 NOTE — PDOC
PROGRESS NOTES


Assessment


Assessment


Generalized weakness.


Rhabdomyolysis.


Methamphetamine positive.


Electrolytes imbalances.


AKD.


Elevated hepatic enzymes.


No evidence of MS this time.


No evidence of T-spinal cord pathological findings this time.


Defuse dorsal epidural lipomatosis.





RECOMMENDATIONS/PLAN:


Treat medical diseases.


Repeated T-spine MRI on 7/18/19 showed no abnormal findings of T-cord this time.


Consulted NS on 7/18/19 for diffuse epidural lipomatosis.


Consulted Urology for testicle edema.


OT/PT.





CSF OCB zero.


Repeat T-spine MRI on 7/17/19: No abnormal findings of T-cord except 

lipomatosis.





Past Medical History


GI:  GERD


Psych:  Anxiety, Addictions, Depression





Past Surgical History


No pertinent history





Family History


No pertinent hx





Social History


Unemployed, uses methamphetamine, smokes occasionally, rare alcohol. Was said 

out of incarceration on 7/5/19.





ALLERGY:


NKDA





MEDICATIONS:


Refer to MAR





REVIEW OF SYSTEMS:


Refer to PMH and PSH.





PHYSICAL EXAMINATION:   


General appearance in no acute distress.  


HEENT:  Normocephalic and nontraumatic.  Eyes, nose, ears, and throat are 

unremarkable. 


Neck is supple. No lymphadenopathy.  No Crepitus.


Cardiovascular:  S1, S2, regular rate and rhythm.  


Pulmonary:  Clear to auscultation bilaterally. 


Abdomen:  Bowel sounds are positive.    


Extremities:  No rash, lesions, or edema.  


No restriction of range of motion





NEUROLOGICAL  EXAMINATION:


Sleepiness but arousable.


Oriented to time, place and person.


PERRL.


EOMI.


CN: no focal findings.


Muscle tone: within normal.


Muscle strength: 5


DTR: 2


Plantar reflex: Flexor response bilaterally 


Gait: Able to walk with a walker.


Sensory exam: no acute abnormal findings.


No cerebellar signs elicited.


F-T-N test fine.





Objective


Objective





Vital Signs








  Date Time  Temp Pulse Resp B/P (MAP) Pulse Ox O2 Delivery O2 Flow Rate FiO2


 


7/19/19 15:38      Room Air  


 


7/19/19 15:00 98.2 94 18 147/71 (96) 94   





 98.2       


 


7/19/19 07:11       90.0 














Intake and Output 


 


 7/19/19





 07:00


 


Intake Total 480 ml


 


Balance 480 ml


 


 


 


Intake Oral 480 ml


 


# Voids 1











Vitals Signs


Vitals





                          VS - Last 72 Hours, by Label








  Date Time  Temp Pulse Resp B/P (MAP) Pulse Ox O2 Delivery O2 Flow Rate FiO2


 


7/19/19 15:38      Room Air  


 


7/19/19 15:04      Room Air  


 


7/19/19 15:00 98.2 94 18 147/71 (96) 94 Room Air  





 98.2       


 


7/19/19 12:55  81  161/79 (106)    


 


7/19/19 12:00      Room Air  


 


7/19/19 11:04   18   Room Air  


 


7/19/19 11:00 98.2 86 18 140/67 (91) 95   





 98.2       


 


7/19/19 09:27  88  147/77    


 


7/19/19 08:00      Room Air  


 


7/19/19 07:11   18  100  90.0 


 


7/19/19 07:00 98.2 88 18 147/77 (100) 100   





 98.2       


 


7/19/19 06:11      Room Air  


 


7/19/19 04:19      Room Air  


 


7/19/19 03:00 98.4 94 18 141/90 (107) 90   





 98.4       


 


7/19/19 01:24      Room Air  


 


7/19/19 00:16      Room Air  


 


7/18/19 23:00 99.6 97 20 142/94 (110) 93   





 99.6       


 


7/18/19 21:14  99  155/100    


 


7/18/19 21:13      Room Air  


 


7/18/19 19:40      Room Air  


 


7/18/19 19:23      Room Air  


 


7/18/19 19:00 99.9 99 20 155/100 (118) 92   





 99.9       


 


7/18/19 17:00      Room Air  


 


7/18/19 15:00 97.8 108 19 164/91 (115) 92 Room Air  





 97.8       


 


7/18/19 14:42      Room Air  


 


7/18/19 12:51      Room Air  


 


7/18/19 11:00 98.2 72 19 124/77 (93) 97 Room Air  





 98.2       


 


7/18/19 10:43      Room Air  


 


7/18/19 10:02      Room Air  


 


7/18/19 09:29  89  141/92    


 


7/18/19 08:53  82 12  96 Nasal Cannula 3.0 


 


7/18/19 08:45   12     


 


7/18/19 08:30      Room Air  


 


7/18/19 07:00 98.3 83 19 142/78 (99) 94 Room Air  





 98.3       











Laboratory


Laboratory





Laboratory Tests








Test


 7/19/19


04:04


 


Sodium Level


 130 mmol/L


(136-145)


 


Potassium Level


 4.5 mmol/L


(3.5-5.1)


 


Chloride Level


 94 mmol/L


()


 


Carbon Dioxide Level


 21 mmol/L


(21-32)


 


Anion Gap 15 (6-14) 


 


Blood Urea Nitrogen


 38 mg/dL


(8-26)


 


Creatinine


 7.6 mg/dL


(0.7-1.3)


 


Estimated GFR


(Cockcroft-Gault) 8.4 





 


Glucose Level


 104 mg/dL


(70-99)


 


Calcium Level


 8.4 mg/dL


(8.5-10.1)


 


Phosphorus Level


 5.3 mg/dL


(2.6-4.7)


 


Creatine Kinase


 1778 U/L


()


 


Albumin


 2.2 g/dL


(3.4-5.0)








Microbiology


7/10/19 Blood Culture - Final, Complete


          NO GROWTH AFTER 5 DAYS


7/10/19 CSF Gram Stain - Final, Complete


          


7/9/19 Urine Culture - Final, Complete


         


7/9/19 Urine Culture Result 1 (RUI) - Final, Complete





Medication


Medications





Current Medications


Furosemide (Lasix) 40 mg 1X  ONCE IVP  Last administered on 7/19/19at 12:49;  

Start 7/19/19 at 12:30;  Stop 7/19/19 at 12:31;  Status DC





Comment


Review of Relevant


I have reviewed the following items estrella (where applicable) has been applied.











BAY GROVES MD                 Jul 19, 2019 16:29

## 2019-07-19 NOTE — PDOC
PROGRESS NOTES


Chief Complaint


Chief Complaint


Status post tunneled catheter placement this morning


New ESRD-initiation of HD/ ccrt at some point


Hypotension, resolved


Anasarca


Hepatorenal syndrome


Elevated troponin in the background of sepsis


UTI


Methamphetamine abuse


Metabolic encephalopathy, improved


etoh drinker


Sepsis with hypotension, resolved


MOD to sever PCM


Oliguric renal failure


Accelerated hypertension, better





History of Present Illness


History of Present Illness


Complains scrotum is swollen and it is


Creatinine 7.6 from 5


Minimal urine output


Did get Lasix yesterday by renal 7/18/19


Anasarca


Minimal urine output-Culp catheter out





Plan: Lasix 40 IV push now


Check scrotal ultrasound just to make sure still good blood flow-likely just 

bilateral hydrocele


Need to gets fluid off during dialysis


Elevate scrotum with chucks or towels please


BMP again tomorrow


Social work working on outpatient HD set up-some reports there is medicaid other

reports he is self-pay 





 discussed with renal





Vitals


Vitals





Vital Signs








  Date Time  Temp Pulse Resp B/P (MAP) Pulse Ox O2 Delivery O2 Flow Rate FiO2


 


7/19/19 11:04   18   Room Air  


 


7/19/19 11:00 98.2 86  140/67 (91) 95   





 98.2       


 


7/19/19 07:11       90.0 











Physical Exam


Physical Exam


GENERAL:  Standing with PT then sat down, NAD, coop 


EENT:  Pupils equal, oral cavity clear


NECK:  Supple.


LUNGS:  Clear bilaterally.  No wheezing.


HEART:  S1, S2, regular, tachy 108s


ABDOMEN:  Soft, nontender, BS present


: Culp


EXTREMITIES:  Generalized trace edema, no cyanosis. Right index with min 

swelling.  Lesions are stable - mild maceration


CENTRAL NERVOUS SYSTEM:  Alert, answering appropriately


RIJ/HDC without signs of complications


PIV


General:  Oriented X3, Cooperative, mild distress


Heart:  Regular rate, Normal S1, No murmurs


Lungs:  Clear


Abdomen:  Normal bowel sounds, Soft, No hepatosplenomegaly, Other (obese, no 

fluid wave)


Extremities:  No clubbing, No cyanosis, No edema, Other (burn to right index 

finger APPEARS OLD  associated  cellulitis)





Labs


LABS





Laboratory Tests








Test


 7/19/19


04:04


 


Sodium Level


 130 mmol/L


(136-145)


 


Potassium Level


 4.5 mmol/L


(3.5-5.1)


 


Chloride Level


 94 mmol/L


()


 


Carbon Dioxide Level


 21 mmol/L


(21-32)


 


Anion Gap 15 (6-14) 


 


Blood Urea Nitrogen


 38 mg/dL


(8-26)


 


Creatinine


 7.6 mg/dL


(0.7-1.3)


 


Estimated GFR


(Cockcroft-Gault) 8.4 





 


Glucose Level


 104 mg/dL


(70-99)


 


Calcium Level


 8.4 mg/dL


(8.5-10.1)


 


Phosphorus Level


 5.3 mg/dL


(2.6-4.7)


 


Creatine Kinase


 1778 U/L


()


 


Albumin


 2.2 g/dL


(3.4-5.0)











Review of Systems


Review of Systems


swollen scrotum, weak, but claims ambulatory


Anasarca





Assessment and Plan


Assessmemt and Plan


Problems


Medical Problems:


(1) Elevated troponin


Status: Acute  





(2) Hepatorenal syndrome


Status: Acute  





(3) Hyperkalemia


Status: Acute  





(4) Hyponatremia


Status: Acute  





(5) Methamphetamine abuse


Status: Acute  





(6) Neurological complaint


Status: Acute  





(7) Rhabdomyolysis


Status: Acute  





(8) Urinary retention


Status: Acute  





(9) Urinary tract infection


Status: Acute  











Comment


Review of Relevant


I have reviewed the following items estrella (where applicable) has been applied.


Labs





Laboratory Tests








Test


 7/17/19


16:35 7/17/19


20:13 7/18/19


06:15 7/19/19


04:04


 


Glucose (Fingerstick)


 121 mg/dL


(70-99) 111 mg/dL


(70-99) 


 





 


Sodium Level


 


 


 133 mmol/L


(136-145) 130 mmol/L


(136-145)


 


Potassium Level


 


 


 4.0 mmol/L


(3.5-5.1) 4.5 mmol/L


(3.5-5.1)


 


Chloride Level


 


 


 97 mmol/L


() 94 mmol/L


()


 


Carbon Dioxide Level


 


 


 29 mmol/L


(21-32) 21 mmol/L


(21-32)


 


Anion Gap   7 (6-14)  15 (6-14) 


 


Blood Urea Nitrogen


 


 


 26 mg/dL


(8-26) 38 mg/dL


(8-26)


 


Creatinine


 


 


 5.8 mg/dL


(0.7-1.3) 7.6 mg/dL


(0.7-1.3)


 


Estimated GFR


(Cockcroft-Gault) 


 


 11.5 


 8.4 





 


Glucose Level


 


 


 101 mg/dL


(70-99) 104 mg/dL


(70-99)


 


Calcium Level


 


 


 8.7 mg/dL


(8.5-10.1) 8.4 mg/dL


(8.5-10.1)


 


Phosphorus Level


 


 


 4.6 mg/dL


(2.6-4.7) 5.3 mg/dL


(2.6-4.7)


 


Creatine Kinase


 


 


 2272 U/L


() 1778 U/L


()


 


Albumin


 


 


 2.0 g/dL


(3.4-5.0) 2.2 g/dL


(3.4-5.0)








Laboratory Tests








Test


 7/19/19


04:04


 


Sodium Level


 130 mmol/L


(136-145)


 


Potassium Level


 4.5 mmol/L


(3.5-5.1)


 


Chloride Level


 94 mmol/L


()


 


Carbon Dioxide Level


 21 mmol/L


(21-32)


 


Anion Gap 15 (6-14) 


 


Blood Urea Nitrogen


 38 mg/dL


(8-26)


 


Creatinine


 7.6 mg/dL


(0.7-1.3)


 


Estimated GFR


(Cockcroft-Gault) 8.4 





 


Glucose Level


 104 mg/dL


(70-99)


 


Calcium Level


 8.4 mg/dL


(8.5-10.1)


 


Phosphorus Level


 5.3 mg/dL


(2.6-4.7)


 


Creatine Kinase


 1778 U/L


()


 


Albumin


 2.2 g/dL


(3.4-5.0)








Microbiology


7/10/19 Blood Culture - Final, Complete


          NO GROWTH AFTER 5 DAYS


7/10/19 CSF Gram Stain - Final, Complete


          


7/9/19 Urine Culture - Final, Complete


         


7/9/19 Urine Culture Result 1 (RUI) - Final, Complete


Medications





Current Medications


Morphine Sulfate (Morphine Sulfate) 4 mg 1X  ONCE IV  Last administered on 

7/9/19at 21:20;  Start 7/9/19 at 21:30;  Stop 7/9/19 at 21:31;  Status DC


Sodium Chloride 1,000 ml @  1,000 mls/hr 1X  ONCE IV  Last administered on 

7/9/19at 22:30;  Start 7/9/19 at 22:30;  Stop 7/9/19 at 23:29;  Status DC


Calcium Gluconate (Calcium Gluconate) 1,000 mg 1X  ONCE IVP  Last administered 

on 7/9/19at 22:43;  Start 7/9/19 at 23:00;  Stop 7/9/19 at 23:01;  Status DC


Insulin Human Regular (HumuLIN R VIAL) 10 unit 1X  ONCE IV  Last administered on

7/9/19at 23:51;  Start 7/9/19 at 23:00;  Stop 7/9/19 at 23:01;  Status DC


Dextrose (Dextrose 50%-Water Syringe) 25 gm 1X  ONCE IV  Last administered on 7 /9/19at 22:42;  Start 7/9/19 at 23:00;  Stop 7/9/19 at 23:01;  Status DC


Sodium Bicarbonate (Sodium Bicarb Adult 8.4% Syr) 50 meq 1X  ONCE IV  Last 

administered on 7/9/19at 23:49;  Start 7/9/19 at 23:00;  Stop 7/9/19 at 23:01;  

Status DC


Sodium Chloride 1,000 ml @  1,000 mls/hr 1X  ONCE IV  Last administered on 

7/9/19at 23:50;  Start 7/9/19 at 23:00;  Stop 7/9/19 at 23:59;  Status DC


Albuterol Sulfate (Ventolin Neb Soln) 10 mg 1X  ONCE CONT NEB  Last administered

on 7/10/19at 00:30;  Start 7/9/19 at 23:00;  Stop 7/9/19 at 23:01;  Status DC


Ceftriaxone Sodium (Rocephin) 1 gm 1X  ONCE IVP  Last administered on 7/9/19at 

22:43;  Start 7/9/19 at 23:00;  Stop 7/9/19 at 23:01;  Status DC


Sodium Chloride 1,000 ml @  1,000 mls/hr 1X  ONCE IV  Last administered on 7/1 0/19at 01:00;  Start 7/10/19 at 01:00;  Stop 7/10/19 at 01:59;  Status DC


Lorazepam (Ativan Inj) 0.5 mg PRN Q6HRS  PRN IV ANXIETY / AGITATION Last 

administered on 7/10/19at 11:00;  Start 7/10/19 at 01:15;  Stop 7/11/19 at 

08:48;  Status DC


Ondansetron HCl (Zofran) 4 mg PRN Q6HRS  PRN IV NAUSEA/VOMITING 1ST CHOICE;  

Start 7/10/19 at 01:15;  Stop 7/10/19 at 01:22;  Status DC


Sodium Chloride (Normal Saline Flush) 3 ml QSHIFT  PRN IV AFTER MEDS AND BLOOD 

DRAWS;  Start 7/10/19 at 01:15


Sodium Chloride 1,000 ml @  175 mls/hr Q5H43M IV ;  Start 7/10/19 at 01:08;  

Stop 7/10/19 at 01:23;  Status DC


Ondansetron HCl (Zofran) 4 mg PRN Q8HRS  PRN IV NAUSEA/VOMITING  1ST CHOICE;  

Start 7/10/19 at 01:15;  Stop 7/11/19 at 01:14;  Status DC


Sodium Chloride 1,000 ml @  150 mls/hr Q6H40M IV ;  Start 7/10/19 at 01:30;  

Stop 7/10/19 at 18:49;  Status DC


Morphine Sulfate (Morphine Sulfate) 4 mg PRN Q4HRS  PRN IV SEVERE PAIN Last 

administered on 7/19/19at 04:19;  Start 7/10/19 at 09:00


Sodium Bicarbonate (Sodium Bicarb Adult 8.4% Syr) 100 meq 1X  ONCE IV  Last 

administered on 7/10/19at 10:02;  Start 7/10/19 at 10:00;  Stop 7/10/19 at 

10:01;  Status DC


Sodium Chloride 1,000 ml @  1,000 mls/hr 1X  ONCE IV  Last administered on 

7/10/19at 09:56;  Start 7/10/19 at 10:00;  Stop 7/10/19 at 10:59;  Status DC


Lidocaine/Sodium Bicarbonate (Buffered Lidocaine 1%) 3 ml STK-MED ONCE .ROUTE ; 

Start 7/10/19 at 11:07;  Stop 7/10/19 at 11:08;  Status DC


Calcium Gluconate (Calcium Gluconate) 2,000 mg 1X  ONCE IVP  Last administered 

on 7/10/19at 13:38;  Start 7/10/19 at 12:00;  Stop 7/10/19 at 12:01;  Status DC


Ceftriaxone Sodium (Rocephin) 1 gm Q24H IVP  Last administered on 7/10/19at 

12:49;  Start 7/10/19 at 13:00;  Stop 7/11/19 at 07:45;  Status DC


Silver Sulfadiazine (Silvadene) 1 mack BID TP  Last administered on 7/11/19at 

10:06;  Start 7/10/19 at 13:00;  Stop 7/11/19 at 15:04;  Status DC


Sodium Chloride 1,000 ml @  1,000 mls/hr 1X  ONCE IV  Last administered on 

7/10/19at 12:47;  Start 7/10/19 at 12:45;  Stop 7/10/19 at 13:44;  Status DC


Pantoprazole Sodium (Protonix) 40 mg DAILYAC PO ;  Start 7/10/19 at 13:30;  Stop

7/10/19 at 19:22;  Status DC


Polyethylene Glycol (miraLAX PACKET) 17 gm DAILY PO  Last administered on 

7/19/19at 09:25;  Start 7/10/19 at 13:30


Lidocaine/Sodium Bicarbonate (Buffered Lidocaine 1%) 3 ml STK-MED ONCE .ROUTE ; 

Start 7/10/19 at 13:48;  Stop 7/10/19 at 13:49;  Status DC


Haloperidol Lactate (Haldol Inj) 1 mg PRN Q8HRS  PRN IVP AGITATION Last 

administered on 7/12/19at 07:56;  Start 7/10/19 at 14:00


Lorazepam (Ativan Inj) 2 mg PRN Q2HRS  PRN IV ANXIETY. Last administered on 

7/14/19at 21:22;  Start 7/10/19 at 14:00;  Stop 7/15/19 at 10:17;  Status DC


Lidocaine/Sodium Bicarbonate (Buffered Lidocaine 1%) 6 ml 1X  ONCE INJ ;  Start 

7/10/19 at 14:15;  Stop 7/10/19 at 14:16;  Status DC


Dexmedetomidine HCl 200 mcg/ Sodium Chloride 50 ml @ 0 mls/hr CONT  PRN IV PER 

PROTOCOL Last administered on 7/13/19at 10:28;  Start 7/10/19 at 14:15;  Stop 

7/15/19 at 10:17;  Status DC


Sodium Chloride 500 ml @  500 mls/hr 1X PRN  PRN IV SEE COMMENTS;  Start 7/10/19

at 14:15;  Stop 7/18/19 at 14:54;  Status DC


Atropine Sulfate (ATROPINE 0.5mg SYRINGE) 0.5 mg PRN Q5MIN  PRN IV SEE COMMENTS;

 Start 7/10/19 at 14:15


Sodium Chloride 1,000 ml @  1,000 mls/hr Q1H PRN IV hypotension;  Start 7/10/19 

at 15:08;  Stop 7/10/19 at 21:07;  Status DC


Diphenhydramine HCl (Benadryl) 25 mg 1X PRN  PRN IV ITCHING;  Start 7/10/19 at 

15:15;  Stop 7/11/19 at 11:19;  Status DC


Diphenhydramine HCl (Benadryl) 25 mg 1X PRN  PRN IV ITCHING;  Start 7/10/19 at 

15:15;  Stop 7/11/19 at 11:19;  Status DC


Sodium Chloride 1,000 ml @  400 mls/hr Q2H30M PRN IV PATENCY;  Start 7/10/19 at 

15:08;  Stop 7/11/19 at 03:07;  Status DC


Info (PHARMACY MONITORING -- do not chart) 1 each PRN DAILY  PRN MC SEE 

COMMENTS;  Start 7/10/19 at 15:15;  Stop 7/11/19 at 11:18;  Status DC


Pantoprazole Sodium (PROTONIX VIAL for IV PUSH) 40 mg DAILYAC IVP  Last 

administered on 7/15/19at 10:04;  Start 7/11/19 at 07:30;  Stop 7/15/19 at 

11:54;  Status DC


Linezolid (Zyvox) 600 mg BID PO  Last administered on 7/17/19at 20:55;  Start 

7/11/19 at 09:00;  Stop 7/17/19 at 22:00;  Status DC


Ceftriaxone Sodium (Rocephin) 2 gm Q24H IVP  Last administered on 7/14/19at 

12:14;  Start 7/11/19 at 13:00;  Stop 7/15/19 at 10:06;  Status DC


Calcium Gluconate 2000 mg/Dextrose 120 ml @  220 mls/hr 1X  ONCE IV  Last 

administered on 7/11/19at 10:05;  Start 7/11/19 at 10:00;  Stop 7/11/19 at 

10:32;  Status DC


Sodium Chloride 1,000 ml @  1,000 mls/hr Q1H PRN IV hypotension;  Start 7/11/19 

at 11:06;  Stop 7/11/19 at 17:05;  Status DC


Diphenhydramine HCl (Benadryl) 25 mg 1X PRN  PRN IV ITCHING;  Start 7/11/19 at 

11:15;  Stop 7/12/19 at 11:14;  Status DC


Diphenhydramine HCl (Benadryl) 25 mg 1X PRN  PRN IV ITCHING;  Start 7/11/19 at 

11:15;  Stop 7/12/19 at 11:14;  Status DC


Sodium Chloride 1,000 ml @  400 mls/hr Q2H30M PRN IV PATENCY;  Start 7/11/19 at 

11:06;  Stop 7/11/19 at 23:05;  Status DC


Info (PHARMACY MONITORING -- do not chart) 1 each PRN DAILY  PRN MC SEE 

COMMENTS;  Start 7/11/19 at 11:15;  Status Cancel


Potassium Chloride 10 meq/ Calcium Chloride 12.5 meq/ Bicarbonate Dialysis Soln 

w/ out KCl 5,013.9286 ml @ 1,000 mls/hr Q5H1M IV ;  Start 7/12/19 at 13:00;  

Status Cancel


Potassium Chloride 20 meq/ Calcium Chloride 12.5 meq/ Bicarbonate Dialysis Soln 

w/ out KCl 5,018.9286 ml @ 1,500 mls/hr Q3H21M IV ;  Start 7/12/19 at 13:00;  

Stop 7/12/19 at 13:00;  Status DC


Heparin Sodium (Porcine) (Heparin Sodium) 4,000 unit 1X  ONCE IV  Last 

administered on 7/12/19at 19:54;  Start 7/12/19 at 12:00;  Stop 7/12/19 at 

12:11;  Status DC


Heparin Sodium/ Dextrose 500 ml @ 0 mls/hr CONT  PRN IV SEE I/O RECORD Last 

administered on 7/13/19at 15:05;  Start 7/12/19 at 12:00;  Stop 7/14/19 at 

08:03;  Status DC


Heparin Sodium (Porcine) (Heparin Sodium) 2,500 unit PRN Q6HRS  PRN IV FOR UFH 

LEVEL LESS THAN 0.2;  Start 7/12/19 at 12:00;  Stop 7/15/19 at 13:16;  Status DC


Potassium Chloride 10 meq/ Calcium Chloride 12.5 meq/ Bicarbonate Dialysis Soln 

w/ out KCl 5,013.9286 ml @ 1,000 mls/hr Q5H1M IV ;  Start 7/12/19 at 13:00;  

Stop 7/12/19 at 13:00;  Status DC


Potassium Chloride 10 meq/ Bicarbonate Dialysis Soln w/ out KCl 5,005 ml @  

1,000 mls/hr Q5H1M IV ;  Start 7/12/19 at 13:00;  Stop 7/12/19 at 13:00;  Status

DC


Potassium Chloride 20 meq/ Bicarbonate Dialysis Soln w/ out KCl 5,010 ml @  

1,500 mls/hr Q3H21M IV ;  Start 7/12/19 at 13:00;  Stop 7/12/19 at 13:00;  

Status DC


Potassium Chloride 10 meq/ Bicarbonate Dialysis Soln w/ out KCl 5,005 ml @  1,

500 mls/hr Q3H21M IV ;  Start 7/12/19 at 13:00;  Stop 7/12/19 at 13:00;  Status 

DC


Potassium Chloride 20 meq/ Bicarbonate Dialysis Soln w/ out KCl 5,010 ml @  

1,000 mls/hr Q5H1M IV  Last administered on 7/13/19at 20:43;  Start 7/12/19 at 

13:00;  Stop 7/14/19 at 08:03;  Status DC


Potassium Chloride 20 meq/ Bicarbonate Dialysis Soln w/ out KCl 5,010 ml @  

1,500 mls/hr Q3H21M IV ;  Start 7/12/19 at 12:45;  Stop 7/12/19 at 12:45;  

Status DC


Potassium Chloride 40 meq/ Bicarbonate Dialysis Soln w/ out KCl 5,020 ml @  

1,500 mls/hr Q3H21M IV ;  Start 7/12/19 at 13:00;  Stop 7/12/19 at 13:10;  

Status DC


Potassium Chloride 60 meq/ Bicarbonate Dialysis Soln w/ out KCl 5,030 ml @  

1,500 mls/hr Q3H22M IV ;  Start 7/12/19 at 13:00;  Stop 7/12/19 at 13:07;  

Status DC


Potassium Chloride 30 meq/ Bicarbonate Dialysis Soln w/ out KCl 5,015 ml @  

1,500 mls/hr Q3H21M IV ;  Start 7/12/19 at 13:15;  Stop 7/12/19 at 18:00;  

Status DC


Potassium Chloride 20 meq/ Bicarbonate Dialysis Soln w/ out KCl 5,010 ml @  

1,500 mls/hr Q3H21M IV  Last administered on 7/12/19at 21:06;  Start 7/12/19 at 

13:10;  Stop 7/12/19 at 18:00;  Status DC


Potassium Chloride 30 meq/ Bicarbonate Dialysis Soln w/ out KCl 5,015 ml @  

1,500 mls/hr Q3H21M IV ;  Start 7/12/19 at 18:00;  Stop 7/12/19 at 19:10;  

Status DC


Potassium Chloride 20 meq/ Bicarbonate Dialysis Soln w/ out KCl 5,010 ml @  

1,500 mls/hr Q3H21M IV  Last administered on 7/14/19at 00:15;  Start 7/12/19 at 

18:01;  Stop 7/14/19 at 08:03;  Status DC


Potassium Chloride 20 meq/ Bicarbonate Dialysis Soln w/ out KCl 5,010 ml @  

1,500 mls/hr Q3H21M IV  Last administered on 7/14/19at 00:18;  Start 7/12/19 at 

20:00;  Stop 7/14/19 at 08:03;  Status DC


Lactobacillus Rhamnosus (Culturelle) 1 cap BID PO  Last administered on 

7/19/19at 09:28;  Start 7/14/19 at 21:00


Oxycodone HCl (Roxicodone) 5 mg PRN Q4HRS  PRN PO MODERATE TO SEVERE PAIN Last 

administered on 7/19/19at 11:04;  Start 7/14/19 at 11:45


Lorazepam (Ativan Inj) 1 mg PRN Q8HRS  PRN IV ANXIETY.;  Start 7/14/19 at 16:00


Gabapentin (Neurontin) 300 mg QHS PO  Last administered on 7/18/19at 21:13;  

Start 7/14/19 at 21:00


Labetalol HCl (Normodyne Iv Push) 10 mg PRN Q4HRS  PRN IVP HYPERTENSION Last 

administered on 7/15/19at 03:33;  Start 7/14/19 at 23:30;  Stop 7/15/19 at 

09:26;  Status DC


Labetalol HCl (Normodyne Iv Push) 20 mg PRN Q4HRS  PRN IVP HYPERTENSION;  Start 

7/15/19 at 09:30;  Stop 7/16/19 at 19:10;  Status DC


Ondansetron HCl (Zofran) 4 mg PRN Q6HRS  PRN IV NAUSEA/VOMITING Last 

administered on 7/16/19at 19:39;  Start 7/15/19 at 09:30


Acetaminophen (Tylenol) 500 mg PRN Q6HRS  PRN PO MILD PAIN / TEMP;  Start 

7/15/19 at 09:30


Lidocaine (Lidoderm) 1 patch DAILY TD  Last administered on 7/16/19at 08:44;  

Start 7/15/19 at 10:00


Miscellaneous (Lidoderm Patch Removal) 1 ea QHS MC  Last administered on 

7/18/19at 21:00;  Start 7/15/19 at 21:00


Sodium Chloride 1,000 ml @  1,000 mls/hr Q1H PRN IV hypotension;  Start 7/15/19 

at 11:26;  Stop 7/15/19 at 17:25;  Status DC


Albumin Human 200 ml @  200 mls/hr 1X PRN  PRN IV Hypotension;  Start 7/15/19 at

11:30;  Stop 7/15/19 at 17:29;  Status DC


Sodium Chloride (Normal Saline Flush) 10 ml 1X PRN  PRN IV AP catheter pack;  

Start 7/15/19 at 11:30;  Stop 7/16/19 at 11:29;  Status DC


Sodium Chloride (Normal Saline Flush) 10 ml 1X PRN  PRN IV  catheter pack;  

Start 7/15/19 at 11:30;  Stop 7/16/19 at 11:29;  Status DC


Sodium Chloride 1,000 ml @  400 mls/hr Q2H30M PRN IV PATENCY;  Start 7/15/19 at 

11:26;  Stop 7/15/19 at 23:25;  Status DC


Info (PHARMACY MONITORING -- do not chart) 1 each PRN DAILY  PRN MC SEE 

COMMENTS;  Start 7/15/19 at 11:30;  Status UNV


Info (PHARMACY MONITORING -- do not chart) 1 each PRN DAILY  PRN MC SEE 

COMMENTS;  Start 7/15/19 at 11:30


Pantoprazole Sodium (Protonix) 40 mg DAILYAC PO  Last administered on 7/19/19at 

09:27;  Start 7/16/19 at 07:30


Labetalol HCl (Trandate) 100 mg BID PO  Last administered on 7/16/19at 19:40;  

Start 7/16/19 at 09:00;  Stop 7/17/19 at 09:13;  Status DC


Lorazepam (Ativan) 0.5 mg PRN Q8HRS  PRN PO ANXIETY / AGITATION;  Start 7/16/19 

at 09:00


Haloperidol (Haldol) 0.5 mg PRN QID  PRN PO AGITATION, 2ND CHOICE;  Start 

7/16/19 at 09:00;  Stop 7/16/19 at 09:03;  Status DC


Haloperidol Lactate (HALDOL 2mg ORAL CONC) 0.5 mg PRN QID  PRN PO AGITATION, 2ND

CHOICE;  Start 7/16/19 at 09:03


Metoprolol Tartrate (Lopressor Vial) 10 mg PRN Q6HRS  PRN IVP HYPERTENSION;  

Start 7/16/19 at 19:15


Labetalol HCl (Trandate) 200 mg BID PO  Last administered on 7/19/19at 09:27;  

Start 7/17/19 at 10:00


Sodium Chloride 1,000 ml @  1,000 mls/hr Q1H PRN IV hypotension;  Start 7/17/19 

at 10:18;  Stop 7/17/19 at 16:17;  Status DC


Diphenhydramine HCl (Benadryl) 25 mg 1X PRN  PRN IV ITCHING;  Start 7/17/19 at 

10:30;  Stop 7/18/19 at 10:29;  Status DC


Diphenhydramine HCl (Benadryl) 25 mg 1X PRN  PRN IV ITCHING;  Start 7/17/19 at 

10:30;  Stop 7/18/19 at 10:29;  Status DC


Sodium Chloride 1,000 ml @  400 mls/hr Q2H30M PRN IV PATENCY;  Start 7/17/19 at 

10:18;  Stop 7/17/19 at 22:17;  Status DC


Info (PHARMACY MONITORING -- do not chart) 1 each PRN DAILY  PRN MC SEE 

COMMENTS;  Start 7/17/19 at 10:30;  Status UNV


Midazolam HCl (Versed) 2 mg STK-MED ONCE .ROUTE ;  Start 7/18/19 at 08:12;  Stop

7/18/19 at 08:13;  Status DC


Fentanyl Citrate (Fentanyl 2ml Vial) 100 mcg STK-MED ONCE .ROUTE ;  Start 

7/18/19 at 08:12;  Stop 7/18/19 at 08:13;  Status DC


Lidocaine/ Epinephrine (LIDOCAINE 1%-EPI 1:100,000 Multi-Dose) 20 ml STK-MED 

ONCE .ROUTE ;  Start 7/18/19 at 08:13;  Stop 7/18/19 at 08:14;  Status DC


Cefazolin Sodium 50 ml @ As Directed STK-MED ONCE IV ;  Start 7/18/19 at 08:18; 

Stop 7/18/19 at 08:19;  Status DC


Cefazolin Sodium 50 ml @ As Directed STK-MED ONCE IV ;  Start 7/18/19 at 08:18; 

Stop 7/18/19 at 08:19;  Status DC


Midazolam HCl (Versed) 2 mg STK-MED ONCE .ROUTE ;  Start 7/18/19 at 08:39;  Stop

7/18/19 at 08:40;  Status DC


Midazolam HCl (Versed) 2 mg 1X  ONCE IV  Last administered on 7/18/19at 08:45;  

Start 7/18/19 at 08:45;  Stop 7/18/19 at 08:52;  Status DC


Fentanyl Citrate (Fentanyl 2ml Vial) 100 mcg 1X  ONCE IV  Last administered on 

7/18/19at 08:45;  Start 7/18/19 at 08:45;  Stop 7/18/19 at 08:52;  Status DC


Lidocaine/ Epinephrine (LIDOCAINE 1%-EPI 1:100,000 Multi-Dose) 20 ml 1X  ONCE IJ

 Last administered on 7/18/19at 08:45;  Start 7/18/19 at 08:45;  Stop 7/18/19 at

08:52;  Status DC


Cefazolin Sodium 50 ml @  100 mls/hr 1X  ONCE IV  Last administered on 7/18/19at

08:45;  Start 7/18/19 at 08:45;  Stop 7/18/19 at 09:14;  Status DC


Cefazolin Sodium 50 ml @  100 mls/hr 1X  ONCE IV  Last administered on 7/18/19at

08:45;  Start 7/18/19 at 08:45;  Stop 7/18/19 at 09:14;  Status DC


Furosemide (Lasix) 40 mg 1X  ONCE IVP  Last administered on 7/18/19at 14:44;  

Start 7/18/19 at 14:30;  Stop 7/18/19 at 14:31;  Status DC


Furosemide (Lasix) 40 mg 1X  ONCE IVP ;  Start 7/19/19 at 12:30;  Stop 7/19/19 

at 12:31;  Status UNV





Active Scripts


Active


Reported


Protonix (Pantoprazole Sodium) 20 Mg Tablet.dr 2 Tab PO DAILY


Zyprexa (Olanzapine) 20 Mg Tablet 2 Tab PO QHS


Buspirone Hcl 30 Mg Tablet 1 Tab PO BID


Vitals/I & O





Vital Sign - Last 24 Hours








 7/18/19 7/18/19 7/18/19 7/18/19





 12:51 14:42 15:00 17:00


 


Temp   97.8 





   97.8 


 


Pulse   108 


 


Resp   19 


 


B/P (MAP)   164/91 (115) 


 


Pulse Ox   92 


 


O2 Delivery Room Air Room Air Room Air Room Air


 


    





    





 7/18/19 7/18/19 7/18/19 7/18/19





 19:00 19:23 19:40 21:13


 


Temp 99.9   





 99.9   


 


Pulse 99   


 


Resp 20   


 


B/P (MAP) 155/100 (118)   


 


Pulse Ox 92   


 


O2 Delivery  Room Air Room Air Room Air


 


    





    





 7/18/19 7/18/19 7/19/19 7/19/19





 21:14 23:00 00:16 01:24


 


Temp  99.6  





  99.6  


 


Pulse 99 97  


 


Resp  20  


 


B/P (MAP) 155/100 142/94 (110)  


 


Pulse Ox  93  


 


O2 Delivery   Room Air Room Air


 


    





    





 7/19/19 7/19/19 7/19/19 7/19/19





 03:00 04:19 05:00 06:11


 


Temp 98.4   





 98.4   


 


Pulse 94   


 


Resp 18   


 


B/P (MAP) 141/90 (107)   


 


Pulse Ox 90   


 


O2 Delivery  Room Air Room Air Room Air


 


    





    





 7/19/19 7/19/19 7/19/19 7/19/19





 07:00 07:11 08:00 09:27


 


Temp 98.2   





 98.2   


 


Pulse 88   88


 


Resp 18 18  


 


B/P (MAP) 147/77 (100)   147/77


 


Pulse Ox 100 100  


 


O2 Delivery  Room Air Room Air 


 


O2 Flow Rate  90.0  


 


    





    





 7/19/19 7/19/19  





 11:00 11:04  


 


Temp 98.2   





 98.2   


 


Pulse 86   


 


Resp 18 18  


 


B/P (MAP) 140/67 (91)   


 


Pulse Ox 95   


 


O2 Delivery  Room Air  














Intake and Output   


 


 7/18/19 7/18/19 7/19/19





 15:00 23:00 07:00


 


Intake Total  120 ml 360 ml


 


Balance  120 ml 360 ml

















GIUSEPPE MONREAL MD           Jul 19, 2019 12:33

## 2019-07-19 NOTE — RAD
Indication: Swollen testicle for 4 days

 

TECHNIQUE: Grayscale, color Doppler and spectral waveform images of the 

bilateral testicles

 

COMPARISON: None

 

FINDINGS:

The right testicle measures 3.7 x 2.3 x 2.0 cm and is homogeneous in 

echogenicity without focal lesion. The right testicle demonstrates 

evidence of blood flow. Diffuse Significant scrotal wall thickening is 

seen measuring 2.5 cm. No right hydrocele.

 

Left testicle measures 3.4 x 2.3 x 2.47 m and is homogeneous in 

echogenicity without focal lesion. Small left hydrocele. Left testicle 

shows evidence of blood flow.

 

IMPRESSION:

1. No focal testicular lesion.

2. Both testicles show evidence of blood flow.

3. Diffusely thickened scrotal wall with edema. Clinically correlate with 

scrotal wall cellulitis.

 

Electronically signed by: Luis Gann DO (7/19/2019 3:47 PM) [facility]

## 2019-07-19 NOTE — PDOC
SUBJECTIVE


ROS


No acute events overnight , Bladder scan yesterday with 160 ml of Urine  , No 

urge to void





OBJECTIVE


Vital Signs





Vital Signs








  Date Time  Temp Pulse Resp B/P (MAP) Pulse Ox O2 Delivery O2 Flow Rate FiO2


 


7/19/19 09:27  88  147/77    


 


7/19/19 07:11   18  100 Room Air 90.0 


 


7/19/19 07:00 98.2       





 98.2       








I & 0











Intake and Output 


 


 7/19/19





 07:00


 


Intake Total 480 ml


 


Balance 480 ml


 


 


 


Intake Oral 480 ml


 


# Voids 1











PHYSICAL EXAM


Physical Exam


GENERAL:  NAD, 


EENT:   OM moist  


NECK:  Supple.


LUNGS:  Clear bilaterally.  No wheezing.


HEART:  S1, S2, regular, 


ABDOMEN:  Soft, nontender, BS present


: Culp removed  


EXTREMITIES:  Generalized trace edema,  


 NEURO:  Alert, answering appropriately


 Tunneled HDC 7/18





DIAGNOSIS/ASSESSMENT


Assessment & Plan





EL - ATN2/2 Rhabdo , Meth use  


Urinary retention at Presentation,Anuric


Culp removed, Monitor for UOP with intermittent Bladder scan  


 Requiring  HD - 1 st Tx  7/10  ,CRRT x 1  


 Currently on MWF schedule, continue monitoring for renal recovery  


HD today as ordered, Keith Garces 


Tunnelled HDC placed this am  , awaiting OP chair time





Rhabdo-  CPK>100,000 at presentation  





Hyperkalemia- Normal K 





Hyponatremia- stable, 


UF  as tolerated  





Severe Metabolic acidosis- 2/2 all above 


Resolved  





Transaminitis - Improving LFT's 





Hypocalcemia- severe 2/2  Rhabdo


Normal Ca now





COMMENT/RELEVANT DATA


Meds





Current Medications








 Medications


  (Trade)  Dose


 Ordered  Sig/Travis  Start Time


 Stop Time Status Last Admin


Dose Admin


 


 Acetaminophen


  (Tylenol)  500 mg  PRN Q6HRS  PRN  7/15/19 09:30


     





 


 Albumin Human  200 ml @ 


 200 mls/hr  1X PRN  PRN  7/15/19 11:30


 7/15/19 17:29 DC  





 


 Albuterol Sulfate


  (Ventolin Neb


 Soln)  10 mg  1X  ONCE  7/9/19 23:00


 7/9/19 23:01 DC 7/10/19 00:30


10 MG


 


 Atropine Sulfate


  (ATROPINE 0.5mg


 SYRINGE)  0.5 mg  PRN Q5MIN  PRN  7/10/19 14:15


     





 


 Calcium Gluconate


  (Calcium


 Gluconate)  2,000 mg  1X  ONCE  7/10/19 12:00


 7/10/19 12:01 DC 7/10/19 13:38


2,000 MG


 


 Calcium Gluconate


 2000 mg/Dextrose  120 ml @ 


 220 mls/hr  1X  ONCE  7/11/19 10:00


 7/11/19 10:32 DC 7/11/19 10:05


220 MLS/HR


 


 Cefazolin Sodium  50 ml @ 


 100 mls/hr  1X  ONCE  7/18/19 08:45


 7/18/19 09:14 DC 7/18/19 08:45


100 MLS/HR


 


 Ceftriaxone Sodium


  (Rocephin)  2 gm  Q24H  7/11/19 13:00


 7/15/19 10:06 DC 7/14/19 12:14


2 GM


 


 Dexmedetomidine


 HCl 200 mcg/


 Sodium Chloride  50 ml @ 0


 mls/hr  CONT  PRN  7/10/19 14:15


 7/15/19 10:17 DC 7/13/19 10:28


12.6 MLS/HR


 


 Dextrose


  (Dextrose


 50%-Water Syringe)  25 gm  1X  ONCE  7/9/19 23:00


 7/9/19 23:01 DC 7/9/19 22:42


25 GM


 


 Diphenhydramine


 HCl


  (Benadryl)  25 mg  1X PRN  PRN  7/17/19 10:30


 7/18/19 10:29 DC  





 


 Fentanyl Citrate


  (Fentanyl 2ml


 Vial)  100 mcg  1X  ONCE  7/18/19 08:45


 7/18/19 08:52 DC 7/18/19 08:45


75 MCG


 


 Furosemide


  (Lasix)  40 mg  1X  ONCE  7/18/19 14:30


 7/18/19 14:31 DC 7/18/19 14:44


40 MG


 


 Gabapentin


  (Neurontin)  300 mg  QHS  7/14/19 21:00


    7/18/19 21:13


300 MG


 


 Haloperidol


  (Haldol)  0.5 mg  PRN QID  PRN  7/16/19 09:00


 7/16/19 09:03 DC  





 


 Haloperidol


 Lactate


  (HALDOL 2mg ORAL


 CONC)  0.5 mg  PRN QID  PRN  7/16/19 09:03


     





 


 Haloperidol


 Lactate


  (Haldol Inj)  1 mg  PRN Q8HRS  PRN  7/10/19 14:00


    7/12/19 07:56


1 MG


 


 Heparin Sodium


  (Porcine)


  (Heparin Sodium)  2,500 unit  PRN Q6HRS  PRN  7/12/19 12:00


 7/15/19 13:16 DC  





 


 Heparin Sodium/


 Dextrose  500 ml @ 0


 mls/hr  CONT  PRN  7/12/19 12:00


 7/14/19 08:03 DC 7/13/19 15:05


28.3 MLS/HR


 


 Info


  (PHARMACY


 MONITORING -- do


 not chart)  1 each  PRN DAILY  PRN  7/17/19 10:30


   UNV  





 


 Insulin Human


 Regular


  (HumuLIN R VIAL)  10 unit  1X  ONCE  7/9/19 23:00


 7/9/19 23:01 DC 7/9/19 23:51


10 UNIT


 


 Labetalol HCl


  (Normodyne Iv


 Push)  20 mg  PRN Q4HRS  PRN  7/15/19 09:30


 7/16/19 19:10 DC  





 


 Labetalol HCl


  (Trandate)  200 mg  BID  7/17/19 10:00


    7/19/19 09:27


200 MG


 


 Lactobacillus


 Rhamnosus


  (Culturelle)  1 cap  BID  7/14/19 21:00


    7/19/19 09:28


1 CAP


 


 Lidocaine


  (Lidoderm)  1 patch  DAILY  7/15/19 10:00


    7/16/19 08:44


1 PATCH


 


 Lidocaine/


 Epinephrine


  (LIDOCAINE


 1%-EPI 1:100,000


 Multi-Dose)  20 ml  1X  ONCE  7/18/19 08:45


 7/18/19 08:52 DC 7/18/19 08:45


9 ML


 


 Lidocaine/Sodium


 Bicarbonate


  (Buffered


 Lidocaine 1%)  6 ml  1X  ONCE  7/10/19 14:15


 7/10/19 14:16 DC  





 


 Linezolid


  (Zyvox)  600 mg  BID  7/11/19 09:00


 7/17/19 22:00 DC 7/17/19 20:55


600 MG


 


 Lorazepam


  (Ativan Inj)  1 mg  PRN Q8HRS  PRN  7/14/19 16:00


     





 


 Lorazepam


  (Ativan)  0.5 mg  PRN Q8HRS  PRN  7/16/19 09:00


     





 


 Metoprolol


 Tartrate


  (Lopressor Vial)  10 mg  PRN Q6HRS  PRN  7/16/19 19:15


     





 


 Midazolam HCl


  (Versed)  2 mg  1X  ONCE  7/18/19 08:45


 7/18/19 08:52 DC 7/18/19 08:45


3 MG


 


 Miscellaneous


  (Lidoderm Patch


 Removal)  1 ea  QHS  7/15/19 21:00


    7/18/19 21:00


1 EA


 


 Morphine Sulfate


  (Morphine


 Sulfate)  4 mg  PRN Q4HRS  PRN  7/10/19 09:00


    7/19/19 04:19


4 MG


 


 Ondansetron HCl


  (Zofran)  4 mg  PRN Q6HRS  PRN  7/15/19 09:30


    7/16/19 19:39


4 MG


 


 Oxycodone HCl


  (Roxicodone)  5 mg  PRN Q4HRS  PRN  7/14/19 11:45


    7/19/19 06:11


5 MG


 


 Pantoprazole


 Sodium


  (PROTONIX VIAL


 for IV PUSH)  40 mg  DAILYAC  7/11/19 07:30


 7/15/19 11:54 DC 7/15/19 10:04


40 MG


 


 Pantoprazole


 Sodium


  (Protonix)  40 mg  DAILYAC  7/16/19 07:30


    7/19/19 09:27


40 MG


 


 Polyethylene


 Glycol


  (miraLAX PACKET)  17 gm  DAILY  7/10/19 13:30


    7/19/19 09:25


17 GM


 


 Potassium


 Chloride 10 meq/


 Bicarbonate


 Dialysis Soln w/


 out KCl  5,005 ml @ 


 1,500 mls/hr  Q3H21M  7/12/19 13:00


 7/12/19 13:00 DC  





 


 Potassium


 Chloride 10 meq/


 Calcium Chloride


 12.5 meq/


 Bicarbonate


 Dialysis Soln w/


 out KCl  5,013.9286


 ml @ 1,000


 mls/hr  Q5H1M  7/12/19 13:00


 7/12/19 13:00 DC  





 


 Potassium


 Chloride 20 meq/


 Bicarbonate


 Dialysis Soln w/


 out KCl  5,010 ml @ 


 1,500 mls/hr  Q3H21M  7/12/19 20:00


 7/14/19 08:03 DC 7/14/19 00:18


1,500 MLS/HR


 


 Potassium


 Chloride 20 meq/


 Calcium Chloride


 12.5 meq/


 Bicarbonate


 Dialysis Soln w/


 out KCl  5,018.9286


 ml @ 1,500


 mls/hr  Q3H21M  7/12/19 13:00


 7/12/19 13:00 DC  





 


 Potassium


 Chloride 30 meq/


 Bicarbonate


 Dialysis Soln w/


 out KCl  5,015 ml @ 


 1,500 mls/hr  Q3H21M  7/12/19 18:00


 7/12/19 19:10 DC  





 


 Potassium


 Chloride 40 meq/


 Bicarbonate


 Dialysis Soln w/


 out KCl  5,020 ml @ 


 1,500 mls/hr  Q3H21M  7/12/19 13:00


 7/12/19 13:10 DC  





 


 Potassium


 Chloride 60 meq/


 Bicarbonate


 Dialysis Soln w/


 out KCl  5,030 ml @ 


 1,500 mls/hr  Q3H22M  7/12/19 13:00


 7/12/19 13:07 DC  





 


 Silver


 Sulfadiazine


  (Silvadene)  1 mack  BID  7/10/19 13:00


 7/11/19 15:04 DC 7/11/19 10:06


1 MACK


 


 Sodium Bicarbonate


  (Sodium Bicarb


 Adult 8.4% Syr)  100 meq  1X  ONCE  7/10/19 10:00


 7/10/19 10:01 DC 7/10/19 10:02


100 MEQ


 


 Sodium Chloride  1,000 ml @ 


 400 mls/hr  Q2H30M PRN  7/17/19 10:18


 7/17/19 22:17 DC  





 


 Sodium Chloride


  (Normal Saline


 Flush)  10 ml  1X PRN  PRN  7/15/19 11:30


 7/16/19 11:29 DC  











Lab





Laboratory Tests








Test


 7/19/19


04:04


 


Sodium Level


 130 mmol/L


(136-145)


 


Potassium Level


 4.5 mmol/L


(3.5-5.1)


 


Chloride Level


 94 mmol/L


()


 


Carbon Dioxide Level


 21 mmol/L


(21-32)


 


Anion Gap 15 (6-14) 


 


Blood Urea Nitrogen


 38 mg/dL


(8-26)


 


Creatinine


 7.6 mg/dL


(0.7-1.3)


 


Estimated GFR


(Cockcroft-Gault) 8.4 





 


Glucose Level


 104 mg/dL


(70-99)


 


Calcium Level


 8.4 mg/dL


(8.5-10.1)


 


Phosphorus Level


 5.3 mg/dL


(2.6-4.7)


 


Creatine Kinase


 1778 U/L


()


 


Albumin


 2.2 g/dL


(3.4-5.0)








Results


All relevant outside records, renal labs, imaging studies, telemetry/EKG's were 

reviewed.











BENIGNO JOHNSON MD                Jul 19, 2019 10:28

## 2019-07-19 NOTE — NUR
Pt scrotum is enlarged like size of grapefruit. Have it elevated with a towel. Lower 
extremities +3. Going to dialysis this afternoon. Cont. monitor.

## 2019-07-20 VITALS — DIASTOLIC BLOOD PRESSURE: 70 MMHG | SYSTOLIC BLOOD PRESSURE: 114 MMHG

## 2019-07-20 VITALS — DIASTOLIC BLOOD PRESSURE: 82 MMHG | SYSTOLIC BLOOD PRESSURE: 142 MMHG

## 2019-07-20 VITALS — SYSTOLIC BLOOD PRESSURE: 144 MMHG | DIASTOLIC BLOOD PRESSURE: 83 MMHG

## 2019-07-20 VITALS — DIASTOLIC BLOOD PRESSURE: 77 MMHG | SYSTOLIC BLOOD PRESSURE: 131 MMHG

## 2019-07-20 VITALS — SYSTOLIC BLOOD PRESSURE: 150 MMHG | DIASTOLIC BLOOD PRESSURE: 86 MMHG

## 2019-07-20 VITALS — SYSTOLIC BLOOD PRESSURE: 138 MMHG | DIASTOLIC BLOOD PRESSURE: 87 MMHG

## 2019-07-20 LAB
ALBUMIN SERPL-MCNC: 2.3 G/DL (ref 3.4–5)
ANION GAP SERPL CALC-SCNC: 8 MMOL/L (ref 6–14)
BUN SERPL-MCNC: 28 MG/DL (ref 8–26)
CALCIUM SERPL-MCNC: 8.5 MG/DL (ref 8.5–10.1)
CHLORIDE SERPL-SCNC: 96 MMOL/L (ref 98–107)
CO2 SERPL-SCNC: 28 MMOL/L (ref 21–32)
CREAT SERPL-MCNC: 6.1 MG/DL (ref 0.7–1.3)
GFR SERPLBLD BASED ON 1.73 SQ M-ARVRAT: 10.9 ML/MIN
GLUCOSE SERPL-MCNC: 106 MG/DL (ref 70–99)
PHOSPHATE SERPL-MCNC: 3.8 MG/DL (ref 2.6–4.7)
POTASSIUM SERPL-SCNC: 3.9 MMOL/L (ref 3.5–5.1)
SODIUM SERPL-SCNC: 132 MMOL/L (ref 136–145)

## 2019-07-20 RX ADMIN — MORPHINE SULFATE PRN MG: 4 INJECTION, SOLUTION INTRAMUSCULAR; INTRAVENOUS at 23:43

## 2019-07-20 RX ADMIN — PANTOPRAZOLE SODIUM SCH MG: 40 TABLET, DELAYED RELEASE ORAL at 07:59

## 2019-07-20 RX ADMIN — MORPHINE SULFATE PRN MG: 4 INJECTION, SOLUTION INTRAMUSCULAR; INTRAVENOUS at 08:00

## 2019-07-20 RX ADMIN — GABAPENTIN SCH MG: 300 CAPSULE ORAL at 21:16

## 2019-07-20 RX ADMIN — Medication SCH CAP: at 21:16

## 2019-07-20 RX ADMIN — MORPHINE SULFATE PRN MG: 4 INJECTION, SOLUTION INTRAMUSCULAR; INTRAVENOUS at 18:44

## 2019-07-20 RX ADMIN — POLYETHYLENE GLYCOL 3350 SCH GM: 17 POWDER, FOR SOLUTION ORAL at 07:59

## 2019-07-20 RX ADMIN — LIDOCAINE SCH PATCH: 50 PATCH CUTANEOUS at 07:59

## 2019-07-20 RX ADMIN — Medication SCH CAP: at 07:59

## 2019-07-20 RX ADMIN — Medication SCH EA: at 21:00

## 2019-07-20 RX ADMIN — LABETALOL HCL SCH MG: 100 TABLET, FILM COATED ORAL at 08:00

## 2019-07-20 RX ADMIN — MORPHINE SULFATE PRN MG: 4 INJECTION, SOLUTION INTRAMUSCULAR; INTRAVENOUS at 02:40

## 2019-07-20 RX ADMIN — LABETALOL HCL SCH MG: 100 TABLET, FILM COATED ORAL at 21:19

## 2019-07-20 RX ADMIN — MORPHINE SULFATE PRN MG: 4 INJECTION, SOLUTION INTRAMUSCULAR; INTRAVENOUS at 14:23

## 2019-07-20 RX ADMIN — CEFTRIAXONE SCH GM: 1 INJECTION, POWDER, FOR SOLUTION INTRAMUSCULAR; INTRAVENOUS at 11:19

## 2019-07-20 NOTE — PDOC
Renal-Progress Notes


Subjective Notes


Notes


NO NEW COMPLAINTS





History of Present Illness


Hx of present illness


STABLE





Vitals


Vitals





Vital Signs








  Date Time  Temp Pulse Resp B/P (MAP) Pulse Ox O2 Delivery O2 Flow Rate FiO2


 


7/20/19 11:19      Room Air  


 


7/20/19 11:00 98.4 90 18 150/86 (107) 94   





 98.4       


 


7/19/19 21:01       90.0 








Weight


Weight [ ]





I.O.


Intake and Output











Intake and Output 


 


 7/20/19





 07:00


 


Intake Total 270 ml


 


Output Total 500 ml


 


Balance -230 ml


 


 


 


Intake Oral 270 ml


 


Emesis 500 ml


 


# Voids 1











Labs


Labs





Laboratory Tests








Test


 7/20/19


04:30


 


Sodium Level


 132 mmol/L


(136-145)


 


Potassium Level


 3.9 mmol/L


(3.5-5.1)


 


Chloride Level


 96 mmol/L


()


 


Carbon Dioxide Level


 28 mmol/L


(21-32)


 


Anion Gap 8 (6-14) 


 


Blood Urea Nitrogen


 28 mg/dL


(8-26)


 


Creatinine


 6.1 mg/dL


(0.7-1.3)


 


Estimated GFR


(Cockcroft-Gault) 10.9 





 


Glucose Level


 106 mg/dL


(70-99)


 


Calcium Level


 8.5 mg/dL


(8.5-10.1)


 


Phosphorus Level


 3.8 mg/dL


(2.6-4.7)


 


Creatine Kinase


 1440 U/L


()


 


Albumin


 2.3 g/dL


(3.4-5.0)











Micro


Micro





Microbiology


7/10/19 Blood Culture - Final, Complete


          NO GROWTH AFTER 5 DAYS


7/10/19 CSF Gram Stain - Final, Complete


          


7/9/19 Urine Culture - Final, Complete


         


7/9/19 Urine Culture Result 1 (RUI) - Final, Complete





Review of Systems


Constitutional:  yes: weakness, alert, oriented


Ears/Nose/Throat:  Yes: no symptom reported


Eyes:  Yes: no symptom reported


Pulmonary:  Yes no symptom reported


Cardiovascular:  Yes no symptom reported


Gastrointestional:  Yes: no symptom reported


Genitourinary:  Yes: no symptom reported


Skin:  Yes no symptom reported


Psychiatric/Neurological:  Yes: no symptom reported


Endocrine:  Yes: no symptom reported





Physical Exam


Skin:  warm


Respiratory:  decreased breath sounds


Heart:  S1S2, RRR


Abdomen:  soft, bowel sounds present


Genitourinary:  bladder flat


Extremities:  pulses present


Neurology:  alert, oriented





Assessment


Assessment


IMP





EL DUE RHABDO-NO RECOVERY YET-ANURIC


RHABDOMYOLYSIS-CPK OF OVER 100,000 AT PEAK


URINARY RETENTION





PLAN





START ARANESP


WILL NEED TO HAVE SW SET UP OP HD


HD MWF











JORGE CRYSTAL MD                 Jul 20, 2019 11:54

## 2019-07-20 NOTE — PDOC
PROGRESS NOTES


Chief Complaint


Chief Complaint


Status post tunneled catheter placement this morning


New ESRD-initiation of HD/ ccrt at some point


Hypotension, resolved


Anasarca


Hepatorenal syndrome


Elevated troponin in the background of sepsis


scrotal cellulitis-7/20/19


Methamphetamine abuse


Metabolic encephalopathy, improved


etoh drinker


Sepsis with hypotension, resolved


MOD to sever PCM


Oliguric renal failure


Accelerated hypertension, better





History of Present Illness


History of Present Illness


continues to have anasarca


I think they get fluid off during dialysis


Scrotum  large


Ultrasound:





IMPRESSION:


1. No focal testicular lesion.


2. Both testicles show evidence of blood flow.


3. Diffusely thickened scrotal wall with edema. Clinically correlate with 


scrotal wall cellulitis.





NO abx Allergies


Blood pressure better since I started labetalol 200 twice a day, 2 days ago





Plan: social work on board, self-pay versus Medicaid??


Will need dialysis as outpatient as I have verified with Dr. Kelley


Start Rocephin for scrotal cellulitis


continues to be oliguric





Vitals


Vitals





Vital Signs








  Date Time  Temp Pulse Resp B/P (MAP) Pulse Ox O2 Delivery O2 Flow Rate FiO2


 


7/20/19 08:32      Room Air  


 


7/20/19 08:00  94  142/82    


 


7/20/19 07:00 98.4  18  94   





 98.4       


 


7/19/19 21:01       90.0 











Physical Exam


Physical Exam


GENERAL:  Standing with PT then sat down, NAD, coop 


EENT:  Pupils equal, oral cavity clear


NECK:  Supple.


LUNGS:  Clear bilaterally.  No wheezing.


HEART:  S1, S2, regular, tachy 108s


ABDOMEN:  Soft, nontender, BS present


: Culp


EXTREMITIES:  Generalized trace edema, no cyanosis. Right index with min 

swelling.  Lesions are stable - mild maceration


CENTRAL NERVOUS SYSTEM:  Alert, answering appropriately


RIJ/HDC without signs of complications


PIV


General:  Oriented X3, Cooperative, mild distress


Heart:  Regular rate, Normal S1, No murmurs


Lungs:  Clear


Abdomen:  Normal bowel sounds, Soft, No hepatosplenomegaly, Other (obese, no 

fluid wave)


Extremities:  No clubbing, No cyanosis, No edema, Other (burn to right index 

finger APPEARS OLD  associated  cellulitis)





Labs


LABS





Laboratory Tests








Test


 7/20/19


04:30


 


Sodium Level


 132 mmol/L


(136-145)


 


Potassium Level


 3.9 mmol/L


(3.5-5.1)


 


Chloride Level


 96 mmol/L


()


 


Carbon Dioxide Level


 28 mmol/L


(21-32)


 


Anion Gap 8 (6-14) 


 


Blood Urea Nitrogen


 28 mg/dL


(8-26)


 


Creatinine


 6.1 mg/dL


(0.7-1.3)


 


Estimated GFR


(Cockcroft-Gault) 10.9 





 


Glucose Level


 106 mg/dL


(70-99)


 


Calcium Level


 8.5 mg/dL


(8.5-10.1)


 


Phosphorus Level


 3.8 mg/dL


(2.6-4.7)


 


Creatine Kinase


 1440 U/L


()


 


Albumin


 2.3 g/dL


(3.4-5.0)











Review of Systems


Review of Systems


biLateral scrotum painful and swollen otherwise the rest of ROS 14 point 

negative





Assessment and Plan


Assessmemt and Plan


Problems


Medical Problems:


(1) Elevated troponin


Status: Acute  





(2) Hepatorenal syndrome


Status: Acute  





(3) Hyperkalemia


Status: Acute  





(4) Hyponatremia


Status: Acute  





(5) Methamphetamine abuse


Status: Acute  





(6) Neurological complaint


Status: Acute  





(7) Rhabdomyolysis


Status: Acute  





(8) Urinary retention


Status: Acute  





(9) Urinary tract infection


Status: Acute  











Comment


Review of Relevant


I have reviewed the following items estrella (where applicable) has been applied.


Labs





Laboratory Tests








Test


 7/19/19


04:04 7/20/19


04:30


 


Sodium Level


 130 mmol/L


(136-145) 132 mmol/L


(136-145)


 


Potassium Level


 4.5 mmol/L


(3.5-5.1) 3.9 mmol/L


(3.5-5.1)


 


Chloride Level


 94 mmol/L


() 96 mmol/L


()


 


Carbon Dioxide Level


 21 mmol/L


(21-32) 28 mmol/L


(21-32)


 


Anion Gap 15 (6-14)  8 (6-14) 


 


Blood Urea Nitrogen


 38 mg/dL


(8-26) 28 mg/dL


(8-26)


 


Creatinine


 7.6 mg/dL


(0.7-1.3) 6.1 mg/dL


(0.7-1.3)


 


Estimated GFR


(Cockcroft-Gault) 8.4 


 10.9 





 


Glucose Level


 104 mg/dL


(70-99) 106 mg/dL


(70-99)


 


Calcium Level


 8.4 mg/dL


(8.5-10.1) 8.5 mg/dL


(8.5-10.1)


 


Phosphorus Level


 5.3 mg/dL


(2.6-4.7) 3.8 mg/dL


(2.6-4.7)


 


Creatine Kinase


 1778 U/L


() 1440 U/L


()


 


Albumin


 2.2 g/dL


(3.4-5.0) 2.3 g/dL


(3.4-5.0)








Laboratory Tests








Test


 7/20/19


04:30


 


Sodium Level


 132 mmol/L


(136-145)


 


Potassium Level


 3.9 mmol/L


(3.5-5.1)


 


Chloride Level


 96 mmol/L


()


 


Carbon Dioxide Level


 28 mmol/L


(21-32)


 


Anion Gap 8 (6-14) 


 


Blood Urea Nitrogen


 28 mg/dL


(8-26)


 


Creatinine


 6.1 mg/dL


(0.7-1.3)


 


Estimated GFR


(Cockcroft-Gault) 10.9 





 


Glucose Level


 106 mg/dL


(70-99)


 


Calcium Level


 8.5 mg/dL


(8.5-10.1)


 


Phosphorus Level


 3.8 mg/dL


(2.6-4.7)


 


Creatine Kinase


 1440 U/L


()


 


Albumin


 2.3 g/dL


(3.4-5.0)








Microbiology


7/10/19 Blood Culture - Final, Complete


          NO GROWTH AFTER 5 DAYS


7/10/19 CSF Gram Stain - Final, Complete


          


7/9/19 Urine Culture - Final, Complete


         


7/9/19 Urine Culture Result 1 (RUI) - Final, Complete


Medications





Current Medications


Morphine Sulfate (Morphine Sulfate) 4 mg 1X  ONCE IV  Last administered on 

7/9/19at 21:20;  Start 7/9/19 at 21:30;  Stop 7/9/19 at 21:31;  Status DC


Sodium Chloride 1,000 ml @  1,000 mls/hr 1X  ONCE IV  Last administered on 

7/9/19at 22:30;  Start 7/9/19 at 22:30;  Stop 7/9/19 at 23:29;  Status DC


Calcium Gluconate (Calcium Gluconate) 1,000 mg 1X  ONCE IVP  Last administered 

on 7/9/19at 22:43;  Start 7/9/19 at 23:00;  Stop 7/9/19 at 23:01;  Status DC


Insulin Human Regular (HumuLIN R VIAL) 10 unit 1X  ONCE IV  Last administered on

7/9/19at 23:51;  Start 7/9/19 at 23:00;  Stop 7/9/19 at 23:01;  Status DC


Dextrose (Dextrose 50%-Water Syringe) 25 gm 1X  ONCE IV  Last administered on 

7/9/19at 22:42;  Start 7/9/19 at 23:00;  Stop 7/9/19 at 23:01;  Status DC


Sodium Bicarbonate (Sodium Bicarb Adult 8.4% Syr) 50 meq 1X  ONCE IV  Last 

administered on 7/9/19at 23:49;  Start 7/9/19 at 23:00;  Stop 7/9/19 at 23:01;  

Status DC


Sodium Chloride 1,000 ml @  1,000 mls/hr 1X  ONCE IV  Last administered on 

7/9/19at 23:50;  Start 7/9/19 at 23:00;  Stop 7/9/19 at 23:59;  Status DC


Albuterol Sulfate (Ventolin Neb Soln) 10 mg 1X  ONCE CONT NEB  Last administered

on 7/10/19at 00:30;  Start 7/9/19 at 23:00;  Stop 7/9/19 at 23:01;  Status DC


Ceftriaxone Sodium (Rocephin) 1 gm 1X  ONCE IVP  Last administered on 7/9/19at 

22:43;  Start 7/9/19 at 23:00;  Stop 7/9/19 at 23:01;  Status DC


Sodium Chloride 1,000 ml @  1,000 mls/hr 1X  ONCE IV  Last administered on 

7/10/19at 01:00;  Start 7/10/19 at 01:00;  Stop 7/10/19 at 01:59;  Status DC


Lorazepam (Ativan Inj) 0.5 mg PRN Q6HRS  PRN IV ANXIETY / AGITATION Last 

administered on 7/10/19at 11:00;  Start 7/10/19 at 01:15;  Stop 7/11/19 at 

08:48;  Status DC


Ondansetron HCl (Zofran) 4 mg PRN Q6HRS  PRN IV NAUSEA/VOMITING 1ST CHOICE;  

Start 7/10/19 at 01:15;  Stop 7/10/19 at 01:22;  Status DC


Sodium Chloride (Normal Saline Flush) 3 ml QSHIFT  PRN IV AFTER MEDS AND BLOOD 

DRAWS;  Start 7/10/19 at 01:15


Sodium Chloride 1,000 ml @  175 mls/hr Q5H43M IV ;  Start 7/10/19 at 01:08;  

Stop 7/10/19 at 01:23;  Status DC


Ondansetron HCl (Zofran) 4 mg PRN Q8HRS  PRN IV NAUSEA/VOMITING  1ST CHOICE;  

Start 7/10/19 at 01:15;  Stop 7/11/19 at 01:14;  Status DC


Sodium Chloride 1,000 ml @  150 mls/hr Q6H40M IV ;  Start 7/10/19 at 01:30;  

Stop 7/10/19 at 18:49;  Status DC


Morphine Sulfate (Morphine Sulfate) 4 mg PRN Q4HRS  PRN IV SEVERE PAIN Last 

administered on 7/20/19at 08:00;  Start 7/10/19 at 09:00


Sodium Bicarbonate (Sodium Bicarb Adult 8.4% Syr) 100 meq 1X  ONCE IV  Last 

administered on 7/10/19at 10:02;  Start 7/10/19 at 10:00;  Stop 7/10/19 at 

10:01;  Status DC


Sodium Chloride 1,000 ml @  1,000 mls/hr 1X  ONCE IV  Last administered on 

7/10/19at 09:56;  Start 7/10/19 at 10:00;  Stop 7/10/19 at 10:59;  Status DC


Lidocaine/Sodium Bicarbonate (Buffered Lidocaine 1%) 3 ml STK-MED ONCE .ROUTE ; 

Start 7/10/19 at 11:07;  Stop 7/10/19 at 11:08;  Status DC


Calcium Gluconate (Calcium Gluconate) 2,000 mg 1X  ONCE IVP  Last administered 

on 7/10/19at 13:38;  Start 7/10/19 at 12:00;  Stop 7/10/19 at 12:01;  Status DC


Ceftriaxone Sodium (Rocephin) 1 gm Q24H IVP  Last administered on 7/10/19at 12:

49;  Start 7/10/19 at 13:00;  Stop 7/11/19 at 07:45;  Status DC


Silver Sulfadiazine (Silvadene) 1 mack BID TP  Last administered on 7/11/19at 

10:06;  Start 7/10/19 at 13:00;  Stop 7/11/19 at 15:04;  Status DC


Sodium Chloride 1,000 ml @  1,000 mls/hr 1X  ONCE IV  Last administered on 

7/10/19at 12:47;  Start 7/10/19 at 12:45;  Stop 7/10/19 at 13:44;  Status DC


Pantoprazole Sodium (Protonix) 40 mg DAILYAC PO ;  Start 7/10/19 at 13:30;  Stop

7/10/19 at 19:22;  Status DC


Polyethylene Glycol (miraLAX PACKET) 17 gm DAILY PO  Last administered on 

7/20/19at 07:59;  Start 7/10/19 at 13:30


Lidocaine/Sodium Bicarbonate (Buffered Lidocaine 1%) 3 ml STK-MED ONCE .ROUTE ; 

Start 7/10/19 at 13:48;  Stop 7/10/19 at 13:49;  Status DC


Haloperidol Lactate (Haldol Inj) 1 mg PRN Q8HRS  PRN IVP AGITATION Last 

administered on 7/12/19at 07:56;  Start 7/10/19 at 14:00


Lorazepam (Ativan Inj) 2 mg PRN Q2HRS  PRN IV ANXIETY. Last administered on 

7/14/19at 21:22;  Start 7/10/19 at 14:00;  Stop 7/15/19 at 10:17;  Status DC


Lidocaine/Sodium Bicarbonate (Buffered Lidocaine 1%) 6 ml 1X  ONCE INJ ;  Start 

7/10/19 at 14:15;  Stop 7/10/19 at 14:16;  Status DC


Dexmedetomidine HCl 200 mcg/ Sodium Chloride 50 ml @ 0 mls/hr CONT  PRN IV PER 

PROTOCOL Last administered on 7/13/19at 10:28;  Start 7/10/19 at 14:15;  Stop 

7/15/19 at 10:17;  Status DC


Sodium Chloride 500 ml @  500 mls/hr 1X PRN  PRN IV SEE COMMENTS;  Start 7/10/19

at 14:15;  Stop 7/18/19 at 14:54;  Status DC


Atropine Sulfate (ATROPINE 0.5mg SYRINGE) 0.5 mg PRN Q5MIN  PRN IV SEE COMMENTS;

 Start 7/10/19 at 14:15


Sodium Chloride 1,000 ml @  1,000 mls/hr Q1H PRN IV hypotension;  Start 7/10/19 

at 15:08;  Stop 7/10/19 at 21:07;  Status DC


Diphenhydramine HCl (Benadryl) 25 mg 1X PRN  PRN IV ITCHING;  Start 7/10/19 at 

15:15;  Stop 7/11/19 at 11:19;  Status DC


Diphenhydramine HCl (Benadryl) 25 mg 1X PRN  PRN IV ITCHING;  Start 7/10/19 at 

15:15;  Stop 7/11/19 at 11:19;  Status DC


Sodium Chloride 1,000 ml @  400 mls/hr Q2H30M PRN IV PATENCY;  Start 7/10/19 at 

15:08;  Stop 7/11/19 at 03:07;  Status DC


Info (PHARMACY MONITORING -- do not chart) 1 each PRN DAILY  PRN MC SEE 

COMMENTS;  Start 7/10/19 at 15:15;  Stop 7/11/19 at 11:18;  Status DC


Pantoprazole Sodium (PROTONIX VIAL for IV PUSH) 40 mg DAILYAC IVP  Last 

administered on 7/15/19at 10:04;  Start 7/11/19 at 07:30;  Stop 7/15/19 at 

11:54;  Status DC


Linezolid (Zyvox) 600 mg BID PO  Last administered on 7/17/19at 20:55;  Start 7 /11/19 at 09:00;  Stop 7/17/19 at 22:00;  Status DC


Ceftriaxone Sodium (Rocephin) 2 gm Q24H IVP  Last administered on 7/14/19at 

12:14;  Start 7/11/19 at 13:00;  Stop 7/15/19 at 10:06;  Status DC


Calcium Gluconate 2000 mg/Dextrose 120 ml @  220 mls/hr 1X  ONCE IV  Last 

administered on 7/11/19at 10:05;  Start 7/11/19 at 10:00;  Stop 7/11/19 at 

10:32;  Status DC


Sodium Chloride 1,000 ml @  1,000 mls/hr Q1H PRN IV hypotension;  Start 7/11/19 

at 11:06;  Stop 7/11/19 at 17:05;  Status DC


Diphenhydramine HCl (Benadryl) 25 mg 1X PRN  PRN IV ITCHING;  Start 7/11/19 at 

11:15;  Stop 7/12/19 at 11:14;  Status DC


Diphenhydramine HCl (Benadryl) 25 mg 1X PRN  PRN IV ITCHING;  Start 7/11/19 at 

11:15;  Stop 7/12/19 at 11:14;  Status DC


Sodium Chloride 1,000 ml @  400 mls/hr Q2H30M PRN IV PATENCY;  Start 7/11/19 at 

11:06;  Stop 7/11/19 at 23:05;  Status DC


Info (PHARMACY MONITORING -- do not chart) 1 each PRN DAILY  PRN MC SEE 

COMMENTS;  Start 7/11/19 at 11:15;  Status Cancel


Potassium Chloride 10 meq/ Calcium Chloride 12.5 meq/ Bicarbonate Dialysis Soln 

w/ out KCl 5,013.9286 ml @ 1,000 mls/hr Q5H1M IV ;  Start 7/12/19 at 13:00;  

Status Cancel


Potassium Chloride 20 meq/ Calcium Chloride 12.5 meq/ Bicarbonate Dialysis Soln 

w/ out KCl 5,018.9286 ml @ 1,500 mls/hr Q3H21M IV ;  Start 7/12/19 at 13:00;  

Stop 7/12/19 at 13:00;  Status DC


Heparin Sodium (Porcine) (Heparin Sodium) 4,000 unit 1X  ONCE IV  Last a

dministered on 7/12/19at 19:54;  Start 7/12/19 at 12:00;  Stop 7/12/19 at 12:11;

 Status DC


Heparin Sodium/ Dextrose 500 ml @ 0 mls/hr CONT  PRN IV SEE I/O RECORD Last 

administered on 7/13/19at 15:05;  Start 7/12/19 at 12:00;  Stop 7/14/19 at 

08:03;  Status DC


Heparin Sodium (Porcine) (Heparin Sodium) 2,500 unit PRN Q6HRS  PRN IV FOR UFH 

LEVEL LESS THAN 0.2;  Start 7/12/19 at 12:00;  Stop 7/15/19 at 13:16;  Status DC


Potassium Chloride 10 meq/ Calcium Chloride 12.5 meq/ Bicarbonate Dialysis Soln 

w/ out KCl 5,013.9286 ml @ 1,000 mls/hr Q5H1M IV ;  Start 7/12/19 at 13:00;  

Stop 7/12/19 at 13:00;  Status DC


Potassium Chloride 10 meq/ Bicarbonate Dialysis Soln w/ out KCl 5,005 ml @  

1,000 mls/hr Q5H1M IV ;  Start 7/12/19 at 13:00;  Stop 7/12/19 at 13:00;  Status

DC


Potassium Chloride 20 meq/ Bicarbonate Dialysis Soln w/ out KCl 5,010 ml @  

1,500 mls/hr Q3H21M IV ;  Start 7/12/19 at 13:00;  Stop 7/12/19 at 13:00;  

Status DC


Potassium Chloride 10 meq/ Bicarbonate Dialysis Soln w/ out KCl 5,005 ml @  

1,500 mls/hr Q3H21M IV ;  Start 7/12/19 at 13:00;  Stop 7/12/19 at 13:00;  

Status DC


Potassium Chloride 20 meq/ Bicarbonate Dialysis Soln w/ out KCl 5,010 ml @  

1,000 mls/hr Q5H1M IV  Last administered on 7/13/19at 20:43;  Start 7/12/19 at 

13:00;  Stop 7/14/19 at 08:03;  Status DC


Potassium Chloride 20 meq/ Bicarbonate Dialysis Soln w/ out KCl 5,010 ml @  

1,500 mls/hr Q3H21M IV ;  Start 7/12/19 at 12:45;  Stop 7/12/19 at 12:45;  

Status DC


Potassium Chloride 40 meq/ Bicarbonate Dialysis Soln w/ out KCl 5,020 ml @  

1,500 mls/hr Q3H21M IV ;  Start 7/12/19 at 13:00;  Stop 7/12/19 at 13:10;  

Status DC


Potassium Chloride 60 meq/ Bicarbonate Dialysis Soln w/ out KCl 5,030 ml @  

1,500 mls/hr Q3H22M IV ;  Start 7/12/19 at 13:00;  Stop 7/12/19 at 13:07;  

Status DC


Potassium Chloride 30 meq/ Bicarbonate Dialysis Soln w/ out KCl 5,015 ml @  

1,500 mls/hr Q3H21M IV ;  Start 7/12/19 at 13:15;  Stop 7/12/19 at 18:00;  

Status DC


Potassium Chloride 20 meq/ Bicarbonate Dialysis Soln w/ out KCl 5,010 ml @  

1,500 mls/hr Q3H21M IV  Last administered on 7/12/19at 21:06;  Start 7/12/19 at 

13:10;  Stop 7/12/19 at 18:00;  Status DC


Potassium Chloride 30 meq/ Bicarbonate Dialysis Soln w/ out KCl 5,015 ml @  

1,500 mls/hr Q3H21M IV ;  Start 7/12/19 at 18:00;  Stop 7/12/19 at 19:10;  

Status DC


Potassium Chloride 20 meq/ Bicarbonate Dialysis Soln w/ out KCl 5,010 ml @  

1,500 mls/hr Q3H21M IV  Last administered on 7/14/19at 00:15;  Start 7/12/19 at 

18:01;  Stop 7/14/19 at 08:03;  Status DC


Potassium Chloride 20 meq/ Bicarbonate Dialysis Soln w/ out KCl 5,010 ml @  

1,500 mls/hr Q3H21M IV  Last administered on 7/14/19at 00:18;  Start 7/12/19 at 

20:00;  Stop 7/14/19 at 08:03;  Status DC


Lactobacillus Rhamnosus (Culturelle) 1 cap BID PO  Last administered on 

7/20/19at 07:59;  Start 7/14/19 at 21:00


Oxycodone HCl (Roxicodone) 5 mg PRN Q4HRS  PRN PO MODERATE TO SEVERE PAIN Last 

administered on 7/20/19at 04:42;  Start 7/14/19 at 11:45


Lorazepam (Ativan Inj) 1 mg PRN Q8HRS  PRN IV ANXIETY.;  Start 7/14/19 at 16:00


Gabapentin (Neurontin) 300 mg QHS PO  Last administered on 7/19/19at 20:30;  

Start 7/14/19 at 21:00


Labetalol HCl (Normodyne Iv Push) 10 mg PRN Q4HRS  PRN IVP HYPERTENSION Last 

administered on 7/15/19at 03:33;  Start 7/14/19 at 23:30;  Stop 7/15/19 at 

09:26;  Status DC


Labetalol HCl (Normodyne Iv Push) 20 mg PRN Q4HRS  PRN IVP HYPERTENSION;  Start 

7/15/19 at 09:30;  Stop 7/16/19 at 19:10;  Status DC


Ondansetron HCl (Zofran) 4 mg PRN Q6HRS  PRN IV NAUSEA/VOMITING Last 

administered on 7/19/19at 15:07;  Start 7/15/19 at 09:30


Acetaminophen (Tylenol) 500 mg PRN Q6HRS  PRN PO MILD PAIN / TEMP;  Start 

7/15/19 at 09:30


Lidocaine (Lidoderm) 1 patch DAILY TD  Last administered on 7/20/19at 07:59;  

Start 7/15/19 at 10:00


Miscellaneous (Lidoderm Patch Removal) 1 ea QHS MC  Last administered on 

7/18/19at 21:00;  Start 7/15/19 at 21:00


Sodium Chloride 1,000 ml @  1,000 mls/hr Q1H PRN IV hypotension;  Start 7/15/19 

at 11:26;  Stop 7/15/19 at 17:25;  Status DC


Albumin Human 200 ml @  200 mls/hr 1X PRN  PRN IV Hypotension;  Start 7/15/19 at

11:30;  Stop 7/15/19 at 17:29;  Status DC


Sodium Chloride (Normal Saline Flush) 10 ml 1X PRN  PRN IV AP catheter pack;  

Start 7/15/19 at 11:30;  Stop 7/16/19 at 11:29;  Status DC


Sodium Chloride (Normal Saline Flush) 10 ml 1X PRN  PRN IV  catheter pack;  

Start 7/15/19 at 11:30;  Stop 7/16/19 at 11:29;  Status DC


Sodium Chloride 1,000 ml @  400 mls/hr Q2H30M PRN IV PATENCY;  Start 7/15/19 at 

11:26;  Stop 7/15/19 at 23:25;  Status DC


Info (PHARMACY MONITORING -- do not chart) 1 each PRN DAILY  PRN MC SEE 

COMMENTS;  Start 7/15/19 at 11:30;  Status UNV


Info (PHARMACY MONITORING -- do not chart) 1 each PRN DAILY  PRN MC SEE 

COMMENTS;  Start 7/15/19 at 11:30


Pantoprazole Sodium (Protonix) 40 mg DAILYAC PO  Last administered on 7/20/19at 

07:59;  Start 7/16/19 at 07:30


Labetalol HCl (Trandate) 100 mg BID PO  Last administered on 7/16/19at 19:40;  

Start 7/16/19 at 09:00;  Stop 7/17/19 at 09:13;  Status DC


Lorazepam (Ativan) 0.5 mg PRN Q8HRS  PRN PO ANXIETY / AGITATION;  Start 7/16/19 

at 09:00


Haloperidol (Haldol) 0.5 mg PRN QID  PRN PO AGITATION, 2ND CHOICE;  Start 

7/16/19 at 09:00;  Stop 7/16/19 at 09:03;  Status DC


Haloperidol Lactate (HALDOL 2mg ORAL CONC) 0.5 mg PRN QID  PRN PO AGITATION, 2ND

CHOICE;  Start 7/16/19 at 09:03


Metoprolol Tartrate (Lopressor Vial) 10 mg PRN Q6HRS  PRN IVP HYPERTENSION;  

Start 7/16/19 at 19:15


Labetalol HCl (Trandate) 200 mg BID PO  Last administered on 7/20/19at 08:00;  

Start 7/17/19 at 10:00


Sodium Chloride 1,000 ml @  1,000 mls/hr Q1H PRN IV hypotension;  Start 7/17/19 

at 10:18;  Stop 7/17/19 at 16:17;  Status DC


Diphenhydramine HCl (Benadryl) 25 mg 1X PRN  PRN IV ITCHING;  Start 7/17/19 at 

10:30;  Stop 7/18/19 at 10:29;  Status DC


Diphenhydramine HCl (Benadryl) 25 mg 1X PRN  PRN IV ITCHING;  Start 7/17/19 at 

10:30;  Stop 7/18/19 at 10:29;  Status DC


Sodium Chloride 1,000 ml @  400 mls/hr Q2H30M PRN IV PATENCY;  Start 7/17/19 at 

10:18;  Stop 7/17/19 at 22:17;  Status DC


Info (PHARMACY MONITORING -- do not chart) 1 each PRN DAILY  PRN MC SEE 

COMMENTS;  Start 7/17/19 at 10:30;  Status UNV


Midazolam HCl (Versed) 2 mg STK-MED ONCE .ROUTE ;  Start 7/18/19 at 08:12;  Stop

7/18/19 at 08:13;  Status DC


Fentanyl Citrate (Fentanyl 2ml Vial) 100 mcg STK-MED ONCE .ROUTE ;  Start 

7/18/19 at 08:12;  Stop 7/18/19 at 08:13;  Status DC


Lidocaine/ Epinephrine (LIDOCAINE 1%-EPI 1:100,000 Multi-Dose) 20 ml STK-MED 

ONCE .ROUTE ;  Start 7/18/19 at 08:13;  Stop 7/18/19 at 08:14;  Status DC


Cefazolin Sodium 50 ml @ As Directed STK-MED ONCE IV ;  Start 7/18/19 at 08:18; 

Stop 7/18/19 at 08:19;  Status DC


Cefazolin Sodium 50 ml @ As Directed STK-MED ONCE IV ;  Start 7/18/19 at 08:18; 

Stop 7/18/19 at 08:19;  Status DC


Midazolam HCl (Versed) 2 mg STK-MED ONCE .ROUTE ;  Start 7/18/19 at 08:39;  Stop

7/18/19 at 08:40;  Status DC


Midazolam HCl (Versed) 2 mg 1X  ONCE IV  Last administered on 7/18/19at 08:45;  

Start 7/18/19 at 08:45;  Stop 7/18/19 at 08:52;  Status DC


Fentanyl Citrate (Fentanyl 2ml Vial) 100 mcg 1X  ONCE IV  Last administered on 

7/18/19at 08:45;  Start 7/18/19 at 08:45;  Stop 7/18/19 at 08:52;  Status DC


Lidocaine/ Epinephrine (LIDOCAINE 1%-EPI 1:100,000 Multi-Dose) 20 ml 1X  ONCE IJ

 Last administered on 7/18/19at 08:45;  Start 7/18/19 at 08:45;  Stop 7/18/19 at

08:52;  Status DC


Cefazolin Sodium 50 ml @  100 mls/hr 1X  ONCE IV  Last administered on 7/18/19at

08:45;  Start 7/18/19 at 08:45;  Stop 7/18/19 at 09:14;  Status DC


Cefazolin Sodium 50 ml @  100 mls/hr 1X  ONCE IV  Last administered on 7/18/19at

08:45;  Start 7/18/19 at 08:45;  Stop 7/18/19 at 09:14;  Status DC


Furosemide (Lasix) 40 mg 1X  ONCE IVP  Last administered on 7/18/19at 14:44;  

Start 7/18/19 at 14:30;  Stop 7/18/19 at 14:31;  Status DC


Furosemide (Lasix) 40 mg 1X  ONCE IVP  Last administered on 7/19/19at 12:49;  

Start 7/19/19 at 12:30;  Stop 7/19/19 at 12:31;  Status DC


Sodium Chloride 1,000 ml @  1,000 mls/hr Q1H PRN IV hypotension;  Start 7/19/19 

at 16:54;  Stop 7/19/19 at 22:53;  Status DC


Sodium Chloride 1,000 ml @  400 mls/hr Q2H30M PRN IV PATENCY;  Start 7/19/19 at 

16:54;  Stop 7/20/19 at 04:53;  Status DC


Info (PHARMACY MONITORING -- do not chart) 1 each PRN DAILY  PRN MC SEE 

COMMENTS;  Start 7/19/19 at 17:00


Info (PHARMACY MONITORING -- do not chart) 1 each PRN DAILY  PRN MC SEE 

COMMENTS;  Start 7/19/19 at 17:00;  Status UNV





Active Scripts


Active


Reported


Protonix (Pantoprazole Sodium) 20 Mg Tablet. 2 Tab PO DAILY


Zyprexa (Olanzapine) 20 Mg Tablet 2 Tab PO QHS


Buspirone Hcl 30 Mg Tablet 1 Tab PO BID


Vitals/I & O





Vital Sign - Last 24 Hours








 7/19/19 7/19/19 7/19/19 7/19/19





 11:00 11:04 12:00 12:55


 


Temp 98.2   





 98.2   


 


Pulse 86   81


 


Resp 18 18  


 


B/P (MAP) 140/67 (91)   161/79 (106)


 


Pulse Ox 95   


 


O2 Delivery  Room Air Room Air 


 


    





    





 7/19/19 7/19/19 7/19/19 7/19/19





 15:00 15:04 20:00 20:00


 


Temp 98.2  99.3 





 98.2  99.3 


 


Pulse 94  106 


 


Resp 18  20 


 


B/P (MAP) 147/71 (96)  133/88 (103) 


 


Pulse Ox 94  93 


 


O2 Delivery Room Air Room Air  Room Air


 


    





    





 7/19/19 7/19/19 7/19/19 7/19/19





 20:30 20:31 21:01 22:15


 


Pulse 106   


 


B/P (MAP) 133/88   


 


Pulse Ox  93 93 


 


O2 Delivery  Room Air  Room Air


 


O2 Flow Rate  90.0 90.0 





 7/19/19 7/20/19 7/20/19 7/20/19





 23:00 02:40 03:00 04:42


 


Temp 99.8  99.0 





 99.8  99.0 


 


Pulse 110  91 


 


Resp 20  18 


 


B/P (MAP) 128/69 (88)  114/70 (85) 


 


Pulse Ox 91  92 


 


O2 Delivery  Room Air  Room Air


 


    





    





 7/20/19 7/20/19 7/20/19 7/20/19





 07:00 08:00 08:00 08:32


 


Temp 98.4   





 98.4   


 


Pulse 94  94 


 


Resp 18   


 


B/P (MAP) 142/82 (102)  142/82 


 


Pulse Ox 94   


 


O2 Delivery Room Air Room Air  Room Air











l


Intake and Output   


 


 7/19/19 7/19/19 7/20/19





 14:59 22:59 06:59


 


Intake Total 150 ml 0 ml 120 ml


 


Output Total 500 ml  


 


Balance -350 ml 0 ml 120 ml

















GIUSEPPE MONREAL MD           Jul 20, 2019 09:31

## 2019-07-20 NOTE — PDOC
PROGRESS NOTES


Assessment


Assessment


Generalized weakness related to defuse epidural lipomatosis likely.


Rhabdomyolysis.


Methamphetamine positive.


Electrolytes imbalances.


AKD.


Elevated hepatic enzymes.


No evidence of MS this time.


No evidence of T-spinal cord pathological findings this time.


Defuse dorsal epidural lipomatosis.





RECOMMENDATIONS/PLAN:


Treat medical diseases.


Repeated T-spine MRI on 7/18/19 showed no abnormal findings of T-cord this time.


Consulted NS on 7/18/19 for diffuse epidural lipomatosis.


Consulted Urology for testicle edema.


OT/PT.





CSF OCB zero.


Repeat T-spine MRI on 7/17/19: No abnormal findings of T-cord except 

lipomatosis.





Past Medical History


GI:  GERD


Psych:  Anxiety, Addictions, Depression





Past Surgical History


No pertinent history





Family History


No pertinent hx





Social History


Unemployed, uses methamphetamine, smokes occasionally, rare alcohol. Was said 

out of incarceration on 7/5/19.





ALLERGY:


NKDA





MEDICATIONS:


Refer to MAR





REVIEW OF SYSTEMS:


Refer to PMH and PSH.





PHYSICAL EXAMINATION:   


General appearance in no acute distress.  


HEENT:  Normocephalic and nontraumatic.  Eyes, nose, ears, and throat are 

unremarkable. 


Neck is supple. No lymphadenopathy.  No Crepitus.


Cardiovascular:  S1, S2, regular rate and rhythm.  


Pulmonary:  Clear to auscultation bilaterally. 


Abdomen:  Bowel sounds are positive.    


Extremities:  No rash, lesions, or edema.  


No restriction of range of motion





NEUROLOGICAL  EXAMINATION:


Awake.


Oriented to time, place and person.


PERRL.


EOMI.


CN: no focal findings.


Muscle tone: within normal.


Muscle strength: 5


DTR: 2


Plantar reflex: Flexor response bilaterally 


Gait: Able to walk with a walker.


Sensory exam: no acute abnormal findings.


No cerebellar signs elicited.


F-T-N test fine.





Objective


Objective





Vital Signs








  Date Time  Temp Pulse Resp B/P (MAP) Pulse Ox O2 Delivery O2 Flow Rate FiO2


 


7/20/19 15:56     95 Room Air  


 


7/20/19 15:00 98.4 88 18 144/83 (103)    





 98.4       


 


7/20/19 14:53       90.0 














Intake and Output 


 


 7/20/19





 06:59


 


Intake Total 270 ml


 


Output Total 500 ml


 


Balance -230 ml


 


 


 


Intake Oral 270 ml


 


Emesis 500 ml


 


# Voids 1











Vitals Signs


Vitals





                          VS - Last 72 Hours, by Label








  Date Time  Temp Pulse Resp B/P (MAP) Pulse Ox O2 Delivery O2 Flow Rate FiO2


 


7/20/19 15:56     95 Room Air  


 


7/20/19 15:00 98.4 88 18 144/83 (103) 94 Room Air  





 98.4       


 


7/20/19 14:53     94 Room Air 90.0 


 


7/20/19 14:23     94 Room Air  


 


7/20/19 13:27     94 Room Air  


 


7/20/19 11:19      Room Air  


 


7/20/19 11:00 98.4 90 18 150/86 (107) 94 Room Air  





 98.4       


 


7/20/19 08:00  94  142/82    


 


7/20/19 08:00      Room Air  


 


7/20/19 08:00      Room Air  


 


7/20/19 07:00 98.4 94 18 142/82 (102) 94 Room Air  





 98.4       


 


7/20/19 04:42      Room Air  


 


7/20/19 03:00 99.0 91 18 114/70 (85) 92   





 99.0       


 


7/20/19 02:40      Room Air  


 


7/19/19 23:00 99.8 110 20 128/69 (88) 91   





 99.8       


 


7/19/19 22:15      Room Air  


 


7/19/19 20:31     93 Room Air 90.0 


 


7/19/19 20:30  106  133/88    


 


7/19/19 20:00      Room Air  


 


7/19/19 20:00 99.3 106 20 133/88 (103) 93   





 99.3       


 


7/19/19 15:04      Room Air  


 


7/19/19 15:00 98.2 94 18 147/71 (96) 94 Room Air  





 98.2       


 


7/19/19 12:55  81  161/79 (106)    


 


7/19/19 11:04   18   Room Air  


 


7/19/19 11:00 98.2 86 18 140/67 (91) 95   





 98.2       


 


7/19/19 09:27  88  147/77    


 


7/19/19 08:00      Room Air  


 


7/19/19 07:11   18    90.0 


 


7/19/19 07:00 98.2 88 18 147/77 (100) 100   





 98.2       











Laboratory


Laboratory





Laboratory Tests








Test


 7/20/19


04:30


 


Sodium Level


 132 mmol/L


(136-145)


 


Potassium Level


 3.9 mmol/L


(3.5-5.1)


 


Chloride Level


 96 mmol/L


()


 


Carbon Dioxide Level


 28 mmol/L


(21-32)


 


Anion Gap 8 (6-14) 


 


Blood Urea Nitrogen


 28 mg/dL


(8-26)


 


Creatinine


 6.1 mg/dL


(0.7-1.3)


 


Estimated GFR


(Cockcroft-Gault) 10.9 





 


Glucose Level


 106 mg/dL


(70-99)


 


Calcium Level


 8.5 mg/dL


(8.5-10.1)


 


Phosphorus Level


 3.8 mg/dL


(2.6-4.7)


 


Creatine Kinase


 1440 U/L


()


 


Albumin


 2.3 g/dL


(3.4-5.0)








Microbiology


7/10/19 Blood Culture - Final, Complete


          NO GROWTH AFTER 5 DAYS


7/10/19 CSF Gram Stain - Final, Complete


          


7/9/19 Urine Culture - Final, Complete


         


7/9/19 Urine Culture Result 1 (RUI) - Final, Complete





Medication


Medications





Current Medications


Ceftriaxone Sodium (Rocephin) 1 gm Q24H IVP  Last administered on 7/20/19at 

11:19;  Start 7/20/19 at 10:00


Darbepoetin Blayne (ARANESP for DIALYSIS PTS) 60 mcg WEEKLYHS SQ ;  Start 7/20/19 

at 21:00





Comment


Review of Relevant


I have reviewed the following items estrella (where applicable) has been applied.











BAY GROVES MD                 Jul 20, 2019 17:09

## 2019-07-21 VITALS — DIASTOLIC BLOOD PRESSURE: 71 MMHG | SYSTOLIC BLOOD PRESSURE: 119 MMHG

## 2019-07-21 VITALS — DIASTOLIC BLOOD PRESSURE: 70 MMHG | SYSTOLIC BLOOD PRESSURE: 120 MMHG

## 2019-07-21 VITALS — SYSTOLIC BLOOD PRESSURE: 128 MMHG | DIASTOLIC BLOOD PRESSURE: 83 MMHG

## 2019-07-21 VITALS — DIASTOLIC BLOOD PRESSURE: 59 MMHG | SYSTOLIC BLOOD PRESSURE: 124 MMHG

## 2019-07-21 VITALS — DIASTOLIC BLOOD PRESSURE: 81 MMHG | SYSTOLIC BLOOD PRESSURE: 126 MMHG

## 2019-07-21 VITALS — DIASTOLIC BLOOD PRESSURE: 91 MMHG | SYSTOLIC BLOOD PRESSURE: 131 MMHG

## 2019-07-21 LAB
ALBUMIN SERPL-MCNC: 2.3 G/DL (ref 3.4–5)
ANION GAP SERPL CALC-SCNC: 9 MMOL/L (ref 6–14)
BUN SERPL-MCNC: 39 MG/DL (ref 8–26)
CALCIUM SERPL-MCNC: 8.3 MG/DL (ref 8.5–10.1)
CHLORIDE SERPL-SCNC: 92 MMOL/L (ref 98–107)
CO2 SERPL-SCNC: 27 MMOL/L (ref 21–32)
CREAT SERPL-MCNC: 7.9 MG/DL (ref 0.7–1.3)
GFR SERPLBLD BASED ON 1.73 SQ M-ARVRAT: 8.1 ML/MIN
GLUCOSE SERPL-MCNC: 102 MG/DL (ref 70–99)
PHOSPHATE SERPL-MCNC: 4.8 MG/DL (ref 2.6–4.7)
POTASSIUM SERPL-SCNC: 4.3 MMOL/L (ref 3.5–5.1)
SODIUM SERPL-SCNC: 128 MMOL/L (ref 136–145)

## 2019-07-21 RX ADMIN — Medication SCH EA: at 21:00

## 2019-07-21 RX ADMIN — POLYETHYLENE GLYCOL 3350 SCH GM: 17 POWDER, FOR SOLUTION ORAL at 07:37

## 2019-07-21 RX ADMIN — LIDOCAINE SCH PATCH: 50 PATCH CUTANEOUS at 07:36

## 2019-07-21 RX ADMIN — MORPHINE SULFATE PRN MG: 4 INJECTION, SOLUTION INTRAMUSCULAR; INTRAVENOUS at 21:51

## 2019-07-21 RX ADMIN — MORPHINE SULFATE PRN MG: 4 INJECTION, SOLUTION INTRAMUSCULAR; INTRAVENOUS at 17:40

## 2019-07-21 RX ADMIN — Medication SCH CAP: at 07:36

## 2019-07-21 RX ADMIN — GABAPENTIN SCH MG: 300 CAPSULE ORAL at 21:51

## 2019-07-21 RX ADMIN — Medication SCH CAP: at 21:51

## 2019-07-21 RX ADMIN — PANTOPRAZOLE SODIUM SCH MG: 40 TABLET, DELAYED RELEASE ORAL at 07:36

## 2019-07-21 RX ADMIN — LABETALOL HCL SCH MG: 100 TABLET, FILM COATED ORAL at 21:54

## 2019-07-21 RX ADMIN — CEFTRIAXONE SCH GM: 1 INJECTION, POWDER, FOR SOLUTION INTRAMUSCULAR; INTRAVENOUS at 08:52

## 2019-07-21 RX ADMIN — MORPHINE SULFATE PRN MG: 4 INJECTION, SOLUTION INTRAMUSCULAR; INTRAVENOUS at 13:19

## 2019-07-21 RX ADMIN — ONDANSETRON PRN MG: 2 INJECTION INTRAMUSCULAR; INTRAVENOUS at 08:51

## 2019-07-21 RX ADMIN — MORPHINE SULFATE PRN MG: 4 INJECTION, SOLUTION INTRAMUSCULAR; INTRAVENOUS at 08:52

## 2019-07-21 RX ADMIN — LABETALOL HCL SCH MG: 100 TABLET, FILM COATED ORAL at 07:37

## 2019-07-21 RX ADMIN — MORPHINE SULFATE PRN MG: 4 INJECTION, SOLUTION INTRAMUSCULAR; INTRAVENOUS at 04:22

## 2019-07-21 NOTE — PDOC
PROGRESS NOTES


Chief Complaint


Chief Complaint


New ESRD will be needing outpatient HD set up


Self-pay?


Hypotension resolved


Anasarca


Hepatorenal syndrome, status post


Hydrocele, good scrotal fow


Methamphetamine use


Metabolic encephalopathy resolved


Alcohol drinker


Status post sepsis with hypotension


Oliguric renal failure





History of Present Illness


History of Present Illness


continues to have anasarca


I think they get fluid off during dialysis


Scrotum  large but not tight - good flow on sono, hydrocoele only





Blood pressure better since I started labetalol 200 twice a day, 3 days ago


MS now normal





He claims he did 3 rounds in the hallway yesterday





Plan: social work on board, self-pay versus Medicaid??


Will need dialysis as outpatient as I have verified with Dr. Allie Mederosephin for scrotal cellulitis - will be able to shift to po on dc or 

even today


continues to be oliguric





Vitals


Vitals





Vital Signs








  Date Time  Temp Pulse Resp B/P (MAP) Pulse Ox O2 Delivery O2 Flow Rate FiO2


 


7/21/19 11:00 98.4 84 16 120/70 (87) 95 Room Air  





 98.4       


 


7/20/19 18:44       90.0 











Physical Exam


Physical Exam


GENERAL:  Standing with PT then sat down, NAD, coop 


EENT:  Pupils equal, oral cavity clear


NECK:  Supple.


LUNGS:  Clear bilaterally.  No wheezing.


HEART:  S1, S2, regular, tachy 108s


ABDOMEN:  Soft, nontender, BS present


: Culp


EXTREMITIES:  Generalized trace edema, no cyanosis. Right index with min 

swelling.  Lesions are stable - mild maceration


CENTRAL NERVOUS SYSTEM:  Alert, answering appropriately


RIJ/HDC without signs of complications


PIV


General:  Oriented X3, Cooperative, mild distress


Heart:  Regular rate, Normal S1, No murmurs


Lungs:  Clear


Abdomen:  Normal bowel sounds, Soft, No hepatosplenomegaly, Other (obese, no 

fluid wave)


Extremities:  No clubbing, No cyanosis, No edema, Other (burn to right index 

finger APPEARS OLD  associated  cellulitis)





Labs


LABS





Laboratory Tests








Test


 7/21/19


06:10


 


Sodium Level


 128 mmol/L


(136-145)


 


Potassium Level


 4.3 mmol/L


(3.5-5.1)


 


Chloride Level


 92 mmol/L


()


 


Carbon Dioxide Level


 27 mmol/L


(21-32)


 


Anion Gap 9 (6-14) 


 


Blood Urea Nitrogen


 39 mg/dL


(8-26)


 


Creatinine


 7.9 mg/dL


(0.7-1.3)


 


Estimated GFR


(Cockcroft-Gault) 8.1 





 


Glucose Level


 102 mg/dL


(70-99)


 


Calcium Level


 8.3 mg/dL


(8.5-10.1)


 


Phosphorus Level


 4.8 mg/dL


(2.6-4.7)


 


Albumin


 2.3 g/dL


(3.4-5.0)











Review of Systems


Review of Systems


Anasarca, hydrocele, the rest of ROS 14 point negative





Assessment and Plan


Assessmemt and Plan


Problems


Medical Problems:


(1) Elevated troponin


Status: Acute  





(2) Hepatorenal syndrome


Status: Acute  





(3) Hyperkalemia


Status: Acute  





(4) Hyponatremia


Status: Acute  





(5) Methamphetamine abuse


Status: Acute  





(6) Neurological complaint


Status: Acute  





(7) Rhabdomyolysis


Status: Acute  





(8) Urinary retention


Status: Acute  





(9) Urinary tract infection


Status: Acute  











Comment


Review of Relevant


I have reviewed the following items estrella (where applicable) has been applied.


Labs





Laboratory Tests








Test


 7/20/19


04:30 7/21/19


06:10


 


Sodium Level


 132 mmol/L


(136-145) 128 mmol/L


(136-145)


 


Potassium Level


 3.9 mmol/L


(3.5-5.1) 4.3 mmol/L


(3.5-5.1)


 


Chloride Level


 96 mmol/L


() 92 mmol/L


()


 


Carbon Dioxide Level


 28 mmol/L


(21-32) 27 mmol/L


(21-32)


 


Anion Gap 8 (6-14)  9 (6-14) 


 


Blood Urea Nitrogen


 28 mg/dL


(8-26) 39 mg/dL


(8-26)


 


Creatinine


 6.1 mg/dL


(0.7-1.3) 7.9 mg/dL


(0.7-1.3)


 


Estimated GFR


(Cockcroft-Gault) 10.9 


 8.1 





 


Glucose Level


 106 mg/dL


(70-99) 102 mg/dL


(70-99)


 


Calcium Level


 8.5 mg/dL


(8.5-10.1) 8.3 mg/dL


(8.5-10.1)


 


Phosphorus Level


 3.8 mg/dL


(2.6-4.7) 4.8 mg/dL


(2.6-4.7)


 


Creatine Kinase


 1440 U/L


() 





 


Albumin


 2.3 g/dL


(3.4-5.0) 2.3 g/dL


(3.4-5.0)








Laboratory Tests








Test


 7/21/19


06:10


 


Sodium Level


 128 mmol/L


(136-145)


 


Potassium Level


 4.3 mmol/L


(3.5-5.1)


 


Chloride Level


 92 mmol/L


()


 


Carbon Dioxide Level


 27 mmol/L


(21-32)


 


Anion Gap 9 (6-14) 


 


Blood Urea Nitrogen


 39 mg/dL


(8-26)


 


Creatinine


 7.9 mg/dL


(0.7-1.3)


 


Estimated GFR


(Cockcroft-Gault) 8.1 





 


Glucose Level


 102 mg/dL


(70-99)


 


Calcium Level


 8.3 mg/dL


(8.5-10.1)


 


Phosphorus Level


 4.8 mg/dL


(2.6-4.7)


 


Albumin


 2.3 g/dL


(3.4-5.0)








Microbiology


7/10/19 Blood Culture - Final, Complete


          NO GROWTH AFTER 5 DAYS


7/10/19 CSF Gram Stain - Final, Complete


          


7/9/19 Urine Culture - Final, Complete


         


7/9/19 Urine Culture Result 1 (RUI) - Final, Complete


Medications





Current Medications


Morphine Sulfate (Morphine Sulfate) 4 mg 1X  ONCE IV  Last administered on 

7/9/19at 21:20;  Start 7/9/19 at 21:30;  Stop 7/9/19 at 21:31;  Status DC


Sodium Chloride 1,000 ml @  1,000 mls/hr 1X  ONCE IV  Last administered on 

7/9/19at 22:30;  Start 7/9/19 at 22:30;  Stop 7/9/19 at 23:29;  Status DC


Calcium Gluconate (Calcium Gluconate) 1,000 mg 1X  ONCE IVP  Last administered 

on 7/9/19at 22:43;  Start 7/9/19 at 23:00;  Stop 7/9/19 at 23:01;  Status DC


Insulin Human Regular (HumuLIN R VIAL) 10 unit 1X  ONCE IV  Last administered on

7/9/19at 23:51;  Start 7/9/19 at 23:00;  Stop 7/9/19 at 23:01;  Status DC


Dextrose (Dextrose 50%-Water Syringe) 25 gm 1X  ONCE IV  Last administered on 

7/9/19at 22:42;  Start 7/9/19 at 23:00;  Stop 7/9/19 at 23:01;  Status DC


Sodium Bicarbonate (Sodium Bicarb Adult 8.4% Syr) 50 meq 1X  ONCE IV  Last 

administered on 7/9/19at 23:49;  Start 7/9/19 at 23:00;  Stop 7/9/19 at 23:01;  

Status DC


Sodium Chloride 1,000 ml @  1,000 mls/hr 1X  ONCE IV  Last administered on 

7/9/19at 23:50;  Start 7/9/19 at 23:00;  Stop 7/9/19 at 23:59;  Status DC


Albuterol Sulfate (Ventolin Neb Soln) 10 mg 1X  ONCE CONT NEB  Last administered

on 7/10/19at 00:30;  Start 7/9/19 at 23:00;  Stop 7/9/19 at 23:01;  Status DC


Ceftriaxone Sodium (Rocephin) 1 gm 1X  ONCE IVP  Last administered on 7/9/19at 

22:43;  Start 7/9/19 at 23:00;  Stop 7/9/19 at 23:01;  Status DC


Sodium Chloride 1,000 ml @  1,000 mls/hr 1X  ONCE IV  Last administered on 

7/10/19at 01:00;  Start 7/10/19 at 01:00;  Stop 7/10/19 at 01:59;  Status DC


Lorazepam (Ativan Inj) 0.5 mg PRN Q6HRS  PRN IV ANXIETY / AGITATION Last 

administered on 7/10/19at 11:00;  Start 7/10/19 at 01:15;  Stop 7/11/19 at 08:

48;  Status DC


Ondansetron HCl (Zofran) 4 mg PRN Q6HRS  PRN IV NAUSEA/VOMITING 1ST CHOICE;  

Start 7/10/19 at 01:15;  Stop 7/10/19 at 01:22;  Status DC


Sodium Chloride (Normal Saline Flush) 3 ml QSHIFT  PRN IV AFTER MEDS AND BLOOD 

DRAWS;  Start 7/10/19 at 01:15


Sodium Chloride 1,000 ml @  175 mls/hr Q5H43M IV ;  Start 7/10/19 at 01:08;  

Stop 7/10/19 at 01:23;  Status DC


Ondansetron HCl (Zofran) 4 mg PRN Q8HRS  PRN IV NAUSEA/VOMITING  1ST CHOICE;  

Start 7/10/19 at 01:15;  Stop 7/11/19 at 01:14;  Status DC


Sodium Chloride 1,000 ml @  150 mls/hr Q6H40M IV ;  Start 7/10/19 at 01:30;  

Stop 7/10/19 at 18:49;  Status DC


Morphine Sulfate (Morphine Sulfate) 4 mg PRN Q4HRS  PRN IV SEVERE PAIN Last 

administered on 7/21/19at 08:52;  Start 7/10/19 at 09:00


Sodium Bicarbonate (Sodium Bicarb Adult 8.4% Syr) 100 meq 1X  ONCE IV  Last 

administered on 7/10/19at 10:02;  Start 7/10/19 at 10:00;  Stop 7/10/19 at 

10:01;  Status DC


Sodium Chloride 1,000 ml @  1,000 mls/hr 1X  ONCE IV  Last administered on 

7/10/19at 09:56;  Start 7/10/19 at 10:00;  Stop 7/10/19 at 10:59;  Status DC


Lidocaine/Sodium Bicarbonate (Buffered Lidocaine 1%) 3 ml ZhilabsK-MED ONCE .ROUTE ; 

Start 7/10/19 at 11:07;  Stop 7/10/19 at 11:08;  Status DC


Calcium Gluconate (Calcium Gluconate) 2,000 mg 1X  ONCE IVP  Last administered 

on 7/10/19at 13:38;  Start 7/10/19 at 12:00;  Stop 7/10/19 at 12:01;  Status DC


Ceftriaxone Sodium (Rocephin) 1 gm Q24H IVP  Last administered on 7/10/19at 

12:49;  Start 7/10/19 at 13:00;  Stop 7/11/19 at 07:45;  Status DC


Silver Sulfadiazine (Silvadene) 1 mack BID TP  Last administered on 7/11/19at 

10:06;  Start 7/10/19 at 13:00;  Stop 7/11/19 at 15:04;  Status DC


Sodium Chloride 1,000 ml @  1,000 mls/hr 1X  ONCE IV  Last administered on 

7/10/19at 12:47;  Start 7/10/19 at 12:45;  Stop 7/10/19 at 13:44;  Status DC


Pantoprazole Sodium (Protonix) 40 mg DAILYAC PO ;  Start 7/10/19 at 13:30;  Stop

7/10/19 at 19:22;  Status DC


Polyethylene Glycol (miraLAX PACKET) 17 gm DAILY PO  Last administered on 

7/21/19at 07:37;  Start 7/10/19 at 13:30


Lidocaine/Sodium Bicarbonate (Buffered Lidocaine 1%) 3 ml STK-MED ONCE .ROUTE ; 

Start 7/10/19 at 13:48;  Stop 7/10/19 at 13:49;  Status DC


Haloperidol Lactate (Haldol Inj) 1 mg PRN Q8HRS  PRN IVP AGITATION Last 

administered on 7/12/19at 07:56;  Start 7/10/19 at 14:00


Lorazepam (Ativan Inj) 2 mg PRN Q2HRS  PRN IV ANXIETY. Last administered on 

7/14/19at 21:22;  Start 7/10/19 at 14:00;  Stop 7/15/19 at 10:17;  Status DC


Lidocaine/Sodium Bicarbonate (Buffered Lidocaine 1%) 6 ml 1X  ONCE INJ ;  Start 

7/10/19 at 14:15;  Stop 7/10/19 at 14:16;  Status DC


Dexmedetomidine HCl 200 mcg/ Sodium Chloride 50 ml @ 0 mls/hr CONT  PRN IV PER 

PROTOCOL Last administered on 7/13/19at 10:28;  Start 7/10/19 at 14:15;  Stop 

7/15/19 at 10:17;  Status DC


Sodium Chloride 500 ml @  500 mls/hr 1X PRN  PRN IV SEE COMMENTS;  Start 7/10/19

at 14:15;  Stop 7/18/19 at 14:54;  Status DC


Atropine Sulfate (ATROPINE 0.5mg SYRINGE) 0.5 mg PRN Q5MIN  PRN IV SEE COMMENTS;

 Start 7/10/19 at 14:15


Sodium Chloride 1,000 ml @  1,000 mls/hr Q1H PRN IV hypotension;  Start 7/10/19 

at 15:08;  Stop 7/10/19 at 21:07;  Status DC


Diphenhydramine HCl (Benadryl) 25 mg 1X PRN  PRN IV ITCHING;  Start 7/10/19 at 

15:15;  Stop 7/11/19 at 11:19;  Status DC


Diphenhydramine HCl (Benadryl) 25 mg 1X PRN  PRN IV ITCHING;  Start 7/10/19 at 

15:15;  Stop 7/11/19 at 11:19;  Status DC


Sodium Chloride 1,000 ml @  400 mls/hr Q2H30M PRN IV PATENCY;  Start 7/10/19 at 

15:08;  Stop 7/11/19 at 03:07;  Status DC


Info (PHARMACY MONITORING -- do not chart) 1 each PRN DAILY  PRN MC SEE 

COMMENTS;  Start 7/10/19 at 15:15;  Stop 7/11/19 at 11:18;  Status DC


Pantoprazole Sodium (PROTONIX VIAL for IV PUSH) 40 mg DAILYAC IVP  Last 

administered on 7/15/19at 10:04;  Start 7/11/19 at 07:30;  Stop 7/15/19 at 

11:54;  Status DC


Linezolid (Zyvox) 600 mg BID PO  Last administered on 7/17/19at 20:55;  Start 

7/11/19 at 09:00;  Stop 7/17/19 at 22:00;  Status DC


Ceftriaxone Sodium (Rocephin) 2 gm Q24H IVP  Last administered on 7/14/19at 

12:14;  Start 7/11/19 at 13:00;  Stop 7/15/19 at 10:06;  Status DC


Calcium Gluconate 2000 mg/Dextrose 120 ml @  220 mls/hr 1X  ONCE IV  Last 

administered on 7/11/19at 10:05;  Start 7/11/19 at 10:00;  Stop 7/11/19 at 

10:32;  Status DC


Sodium Chloride 1,000 ml @  1,000 mls/hr Q1H PRN IV hypotension;  Start 7/11/19 

at 11:06;  Stop 7/11/19 at 17:05;  Status DC


Diphenhydramine HCl (Benadryl) 25 mg 1X PRN  PRN IV ITCHING;  Start 7/11/19 at 

11:15;  Stop 7/12/19 at 11:14;  Status DC


Diphenhydramine HCl (Benadryl) 25 mg 1X PRN  PRN IV ITCHING;  Start 7/11/19 at 

11:15;  Stop 7/12/19 at 11:14;  Status DC


Sodium Chloride 1,000 ml @  400 mls/hr Q2H30M PRN IV PATENCY;  Start 7/11/19 at 

11:06;  Stop 7/11/19 at 23:05;  Status DC


Info (PHARMACY MONITORING -- do not chart) 1 each PRN DAILY  PRN MC SEE 

COMMENTS;  Start 7/11/19 at 11:15;  Status Cancel


Potassium Chloride 10 meq/ Calcium Chloride 12.5 meq/ Bicarbonate Dialysis Soln 

w/ out KCl 5,013.9286 ml @ 1,000 mls/hr Q5H1M IV ;  Start 7/12/19 at 13:00;  

Status Cancel


Potassium Chloride 20 meq/ Calcium Chloride 12.5 meq/ Bicarbonate Dialysis Soln 

w/ out KCl 5,018.9286 ml @ 1,500 mls/hr Q3H21M IV ;  Start 7/12/19 at 13:00;  

Stop 7/12/19 at 13:00;  Status DC


Heparin Sodium (Porcine) (Heparin Sodium) 4,000 unit 1X  ONCE IV  Last 

administered on 7/12/19at 19:54;  Start 7/12/19 at 12:00;  Stop 7/12/19 at 

12:11;  Status DC


Heparin Sodium/ Dextrose 500 ml @ 0 mls/hr CONT  PRN IV SEE I/O RECORD Last 

administered on 7/13/19at 15:05;  Start 7/12/19 at 12:00;  Stop 7/14/19 at 

08:03;  Status DC


Heparin Sodium (Porcine) (Heparin Sodium) 2,500 unit PRN Q6HRS  PRN IV FOR UFH 

LEVEL LESS THAN 0.2;  Start 7/12/19 at 12:00;  Stop 7/15/19 at 13:16;  Status DC


Potassium Chloride 10 meq/ Calcium Chloride 12.5 meq/ Bicarbonate Dialysis Soln 

w/ out KCl 5,013.9286 ml @ 1,000 mls/hr Q5H1M IV ;  Start 7/12/19 at 13:00;  

Stop 7/12/19 at 13:00;  Status DC


Potassium Chloride 10 meq/ Bicarbonate Dialysis Soln w/ out KCl 5,005 ml @  

1,000 mls/hr Q5H1M IV ;  Start 7/12/19 at 13:00;  Stop 7/12/19 at 13:00;  Status

DC


Potassium Chloride 20 meq/ Bicarbonate Dialysis Soln w/ out KCl 5,010 ml @  1,50

0 mls/hr Q3H21M IV ;  Start 7/12/19 at 13:00;  Stop 7/12/19 at 13:00;  Status DC


Potassium Chloride 10 meq/ Bicarbonate Dialysis Soln w/ out KCl 5,005 ml @  

1,500 mls/hr Q3H21M IV ;  Start 7/12/19 at 13:00;  Stop 7/12/19 at 13:00;  

Status DC


Potassium Chloride 20 meq/ Bicarbonate Dialysis Soln w/ out KCl 5,010 ml @  

1,000 mls/hr Q5H1M IV  Last administered on 7/13/19at 20:43;  Start 7/12/19 at 

13:00;  Stop 7/14/19 at 08:03;  Status DC


Potassium Chloride 20 meq/ Bicarbonate Dialysis Soln w/ out KCl 5,010 ml @  

1,500 mls/hr Q3H21M IV ;  Start 7/12/19 at 12:45;  Stop 7/12/19 at 12:45;  

Status DC


Potassium Chloride 40 meq/ Bicarbonate Dialysis Soln w/ out KCl 5,020 ml @  

1,500 mls/hr Q3H21M IV ;  Start 7/12/19 at 13:00;  Stop 7/12/19 at 13:10;  

Status DC


Potassium Chloride 60 meq/ Bicarbonate Dialysis Soln w/ out KCl 5,030 ml @  

1,500 mls/hr Q3H22M IV ;  Start 7/12/19 at 13:00;  Stop 7/12/19 at 13:07;  

Status DC


Potassium Chloride 30 meq/ Bicarbonate Dialysis Soln w/ out KCl 5,015 ml @  

1,500 mls/hr Q3H21M IV ;  Start 7/12/19 at 13:15;  Stop 7/12/19 at 18:00;  

Status DC


Potassium Chloride 20 meq/ Bicarbonate Dialysis Soln w/ out KCl 5,010 ml @  

1,500 mls/hr Q3H21M IV  Last administered on 7/12/19at 21:06;  Start 7/12/19 at 

13:10;  Stop 7/12/19 at 18:00;  Status DC


Potassium Chloride 30 meq/ Bicarbonate Dialysis Soln w/ out KCl 5,015 ml @  

1,500 mls/hr Q3H21M IV ;  Start 7/12/19 at 18:00;  Stop 7/12/19 at 19:10;  

Status DC


Potassium Chloride 20 meq/ Bicarbonate Dialysis Soln w/ out KCl 5,010 ml @  

1,500 mls/hr Q3H21M IV  Last administered on 7/14/19at 00:15;  Start 7/12/19 at 

18:01;  Stop 7/14/19 at 08:03;  Status DC


Potassium Chloride 20 meq/ Bicarbonate Dialysis Soln w/ out KCl 5,010 ml @  

1,500 mls/hr Q3H21M IV  Last administered on 7/14/19at 00:18;  Start 7/12/19 at 

20:00;  Stop 7/14/19 at 08:03;  Status DC


Lactobacillus Rhamnosus (Culturelle) 1 cap BID PO  Last administered on 

7/21/19at 07:36;  Start 7/14/19 at 21:00


Oxycodone HCl (Roxicodone) 5 mg PRN Q4HRS  PRN PO MODERATE TO SEVERE PAIN Last 

administered on 7/21/19at 06:29;  Start 7/14/19 at 11:45


Lorazepam (Ativan Inj) 1 mg PRN Q8HRS  PRN IV ANXIETY.;  Start 7/14/19 at 16:00


Gabapentin (Neurontin) 300 mg QHS PO  Last administered on 7/20/19at 21:16;  

Start 7/14/19 at 21:00


Labetalol HCl (Normodyne Iv Push) 10 mg PRN Q4HRS  PRN IVP HYPERTENSION Last 

administered on 7/15/19at 03:33;  Start 7/14/19 at 23:30;  Stop 7/15/19 at 

09:26;  Status DC


Labetalol HCl (Normodyne Iv Push) 20 mg PRN Q4HRS  PRN IVP HYPERTENSION;  Start 

7/15/19 at 09:30;  Stop 7/16/19 at 19:10;  Status DC


Ondansetron HCl (Zofran) 4 mg PRN Q6HRS  PRN IV NAUSEA/VOMITING Last 

administered on 7/21/19at 08:51;  Start 7/15/19 at 09:30


Acetaminophen (Tylenol) 500 mg PRN Q6HRS  PRN PO MILD PAIN / TEMP;  Start 

7/15/19 at 09:30


Lidocaine (Lidoderm) 1 patch DAILY TD  Last administered on 7/21/19at 07:36;  

Start 7/15/19 at 10:00


Miscellaneous (Lidoderm Patch Removal) 1 ea QHS MC  Last administered on 

7/20/19at 21:00;  Start 7/15/19 at 21:00


Sodium Chloride 1,000 ml @  1,000 mls/hr Q1H PRN IV hypotension;  Start 7/15/19 

at 11:26;  Stop 7/15/19 at 17:25;  Status DC


Albumin Human 200 ml @  200 mls/hr 1X PRN  PRN IV Hypotension;  Start 7/15/19 at

11:30;  Stop 7/15/19 at 17:29;  Status DC


Sodium Chloride (Normal Saline Flush) 10 ml 1X PRN  PRN IV AP catheter pack;  

Start 7/15/19 at 11:30;  Stop 7/16/19 at 11:29;  Status DC


Sodium Chloride (Normal Saline Flush) 10 ml 1X PRN  PRN IV  catheter pack;  

Start 7/15/19 at 11:30;  Stop 7/16/19 at 11:29;  Status DC


Sodium Chloride 1,000 ml @  400 mls/hr Q2H30M PRN IV PATENCY;  Start 7/15/19 at 

11:26;  Stop 7/15/19 at 23:25;  Status DC


Info (PHARMACY MONITORING -- do not chart) 1 each PRN DAILY  PRN MC SEE 

COMMENTS;  Start 7/15/19 at 11:30;  Status UNV


Info (PHARMACY MONITORING -- do not chart) 1 each PRN DAILY  PRN MC SEE COM

MENTS;  Start 7/15/19 at 11:30;  Status Cancel


Pantoprazole Sodium (Protonix) 40 mg DAILYAC PO  Last administered on 7/21/19at 

07:36;  Start 7/16/19 at 07:30


Labetalol HCl (Trandate) 100 mg BID PO  Last administered on 7/16/19at 19:40;  

Start 7/16/19 at 09:00;  Stop 7/17/19 at 09:13;  Status DC


Lorazepam (Ativan) 0.5 mg PRN Q8HRS  PRN PO ANXIETY / AGITATION;  Start 7/16/19 

at 09:00


Haloperidol (Haldol) 0.5 mg PRN QID  PRN PO AGITATION, 2ND CHOICE;  Start 

7/16/19 at 09:00;  Stop 7/16/19 at 09:03;  Status DC


Haloperidol Lactate (HALDOL 2mg ORAL CONC) 0.5 mg PRN QID  PRN PO AGITATION, 2ND

CHOICE;  Start 7/16/19 at 09:03


Metoprolol Tartrate (Lopressor Vial) 10 mg PRN Q6HRS  PRN IVP HYPERTENSION;  

Start 7/16/19 at 19:15


Labetalol HCl (Trandate) 200 mg BID PO  Last administered on 7/21/19at 07:37;  

Start 7/17/19 at 10:00


Sodium Chloride 1,000 ml @  1,000 mls/hr Q1H PRN IV hypotension;  Start 7/17/19 

at 10:18;  Stop 7/17/19 at 16:17;  Status DC


Diphenhydramine HCl (Benadryl) 25 mg 1X PRN  PRN IV ITCHING;  Start 7/17/19 at 

10:30;  Stop 7/18/19 at 10:29;  Status DC


Diphenhydramine HCl (Benadryl) 25 mg 1X PRN  PRN IV ITCHING;  Start 7/17/19 at 

10:30;  Stop 7/18/19 at 10:29;  Status DC


Sodium Chloride 1,000 ml @  400 mls/hr Q2H30M PRN IV PATENCY;  Start 7/17/19 at 

10:18;  Stop 7/17/19 at 22:17;  Status DC


Info (PHARMACY MONITORING -- do not chart) 1 each PRN DAILY  PRN MC SEE 

COMMENTS;  Start 7/17/19 at 10:30;  Status UNV


Midazolam HCl (Versed) 2 mg STK-MED ONCE .ROUTE ;  Start 7/18/19 at 08:12;  Stop

7/18/19 at 08:13;  Status DC


Fentanyl Citrate (Fentanyl 2ml Vial) 100 mcg STK-MED ONCE .ROUTE ;  Start 

7/18/19 at 08:12;  Stop 7/18/19 at 08:13;  Status DC


Lidocaine/ Epinephrine (LIDOCAINE 1%-EPI 1:100,000 Multi-Dose) 20 ml STK-MED 

ONCE .ROUTE ;  Start 7/18/19 at 08:13;  Stop 7/18/19 at 08:14;  Status DC


Cefazolin Sodium 50 ml @ As Directed STK-MED ONCE IV ;  Start 7/18/19 at 08:18; 

Stop 7/18/19 at 08:19;  Status DC


Cefazolin Sodium 50 ml @ As Directed STK-MED ONCE IV ;  Start 7/18/19 at 08:18; 

Stop 7/18/19 at 08:19;  Status DC


Midazolam HCl (Versed) 2 mg STK-MED ONCE .ROUTE ;  Start 7/18/19 at 08:39;  Stop

7/18/19 at 08:40;  Status DC


Midazolam HCl (Versed) 2 mg 1X  ONCE IV  Last administered on 7/18/19at 08:45;  

Start 7/18/19 at 08:45;  Stop 7/18/19 at 08:52;  Status DC


Fentanyl Citrate (Fentanyl 2ml Vial) 100 mcg 1X  ONCE IV  Last administered on 

7/18/19at 08:45;  Start 7/18/19 at 08:45;  Stop 7/18/19 at 08:52;  Status DC


Lidocaine/ Epinephrine (LIDOCAINE 1%-EPI 1:100,000 Multi-Dose) 20 ml 1X  ONCE IJ

 Last administered on 7/18/19at 08:45;  Start 7/18/19 at 08:45;  Stop 7/18/19 at

08:52;  Status DC


Cefazolin Sodium 50 ml @  100 mls/hr 1X  ONCE IV  Last administered on 7/18/19at

08:45;  Start 7/18/19 at 08:45;  Stop 7/18/19 at 09:14;  Status DC


Cefazolin Sodium 50 ml @  100 mls/hr 1X  ONCE IV  Last administered on 7/18/19at

08:45;  Start 7/18/19 at 08:45;  Stop 7/18/19 at 09:14;  Status DC


Furosemide (Lasix) 40 mg 1X  ONCE IVP  Last administered on 7/18/19at 14:44;  

Start 7/18/19 at 14:30;  Stop 7/18/19 at 14:31;  Status DC


Furosemide (Lasix) 40 mg 1X  ONCE IVP  Last administered on 7/19/19at 12:49;  

Start 7/19/19 at 12:30;  Stop 7/19/19 at 12:31;  Status DC


Sodium Chloride 1,000 ml @  1,000 mls/hr Q1H PRN IV hypotension;  Start 7/19/19 

at 16:54;  Stop 7/19/19 at 22:53;  Status DC


Sodium Chloride 1,000 ml @  400 mls/hr Q2H30M PRN IV PATENCY;  Start 7/19/19 at 

16:54;  Stop 7/20/19 at 04:53;  Status DC


Info (PHARMACY MONITORING -- do not chart) 1 each PRN DAILY  PRN MC SEE 

COMMENTS;  Start 7/19/19 at 17:00


Info (PHARMACY MONITORING -- do not chart) 1 each PRN DAILY  PRN MC SEE 

COMMENTS;  Start 7/19/19 at 17:00;  Status UNV


Ceftriaxone Sodium (Rocephin) 1 gm Q24H IVP  Last administered on 7/21/19at 

08:52;  Start 7/20/19 at 10:00


Darbepoetin Blayne (ARANESP for DIALYSIS PTS) 60 mcg WEEKLYHS SQ  Last 

administered on 7/20/19at 21:25;  Start 7/20/19 at 21:00





Active Scripts


Active


Reported


Protonix (Pantoprazole Sodium) 20 Mg Tablet.dr 2 Tab PO DAILY


Zyprexa (Olanzapine) 20 Mg Tablet 2 Tab PO QHS


Buspirone Hcl 30 Mg Tablet 1 Tab PO BID


Vitals/I & O





Vital Sign - Last 24 Hours








 7/20/19 7/20/19 7/20/19 7/20/19





 11:19 14:23 14:53 15:00


 


Temp    98.4





    98.4


 


Pulse    88


 


Resp    18


 


B/P (MAP)    144/83 (103)


 


Pulse Ox  94  94


 


O2 Delivery Room Air Room Air  Room Air


 


O2 Flow Rate   90.0 


 


    





    





 7/20/19 7/20/19 7/20/19 7/20/19





 15:56 16:56 18:44 19:00


 


Temp    99.3





    99.3


 


Pulse    96


 


Resp    19


 


B/P (MAP)    138/87 (104)


 


Pulse Ox 95 95 95 96


 


O2 Delivery Room Air  Room Air Room Air


 


O2 Flow Rate   90.0 


 


    





    





 7/20/19 7/20/19 7/20/19 7/20/19





 20:00 21:19 23:04 23:43


 


Temp   98.4 





   98.4 


 


Pulse  91 93 


 


Resp   18 16


 


B/P (MAP)  136/84 131/77 (95) 


 


Pulse Ox   94 94


 


O2 Delivery Room Air  Room Air Room Air


 


    





    





 7/21/19 7/21/19 7/21/19 7/21/19





 00:13 02:48 06:37 07:36


 


Temp  98.9 98.4 





  98.9 98.4 


 


Pulse  88 85 


 


Resp 16 16 17 


 


B/P (MAP)  124/59 (80) 128/83 (98) 


 


Pulse Ox 94 98 94 


 


O2 Delivery  Room Air Room Air Room Air


 


    





    





 7/21/19 7/21/19 7/21/19 7/21/19





 07:37 08:00 08:52 09:32


 


Pulse 85   


 


B/P (MAP) 128/83   


 


O2 Delivery  Room Air Room Air Room Air





 7/21/19   





 11:00   


 


Temp 98.4   





 98.4   


 


Pulse 84   


 


Resp 16   


 


B/P (MAP) 120/70 (87)   


 


Pulse Ox 95   


 


O2 Delivery Room Air   














Intake and Output   


 


 7/20/19 7/20/19 7/21/19





 14:59 22:59 06:59


 


Intake Total 400 ml 150 ml 250 ml


 


Balance 400 ml 150 ml 250 ml

















GIUSEPPE MONREAL MD           Jul 21, 2019 11:20

## 2019-07-21 NOTE — PDOC
Renal-Progress Notes


Subjective Notes


Notes


NO NEW COMPLAINTS





Vitals


Vitals





Vital Signs








  Date Time  Temp Pulse Resp B/P (MAP) Pulse Ox O2 Delivery O2 Flow Rate FiO2


 


7/21/19 09:32      Room Air  


 


7/21/19 07:37  85  128/83    


 


7/21/19 06:37 98.4  17  94   





 98.4       


 


7/20/19 18:44       90.0 








Weight


Weight [ ]





I.O.


Intake and Output











Intake and Output 


 


 7/21/19





 06:59


 


Intake Total 800 ml


 


Balance 800 ml


 


 


 


Intake Oral 800 ml


 


# Voids 3


 


# Bowel Movements 1











Labs


Labs





Laboratory Tests








Test


 7/21/19


06:10


 


Sodium Level


 128 mmol/L


(136-145)


 


Potassium Level


 4.3 mmol/L


(3.5-5.1)


 


Chloride Level


 92 mmol/L


()


 


Carbon Dioxide Level


 27 mmol/L


(21-32)


 


Anion Gap 9 (6-14) 


 


Blood Urea Nitrogen


 39 mg/dL


(8-26)


 


Creatinine


 7.9 mg/dL


(0.7-1.3)


 


Estimated GFR


(Cockcroft-Gault) 8.1 





 


Glucose Level


 102 mg/dL


(70-99)


 


Calcium Level


 8.3 mg/dL


(8.5-10.1)


 


Phosphorus Level


 4.8 mg/dL


(2.6-4.7)


 


Albumin


 2.3 g/dL


(3.4-5.0)











Micro


Micro





Microbiology


7/10/19 Blood Culture - Final, Complete


          NO GROWTH AFTER 5 DAYS


7/10/19 CSF Gram Stain - Final, Complete


          


7/9/19 Urine Culture - Final, Complete


         


7/9/19 Urine Culture Result 1 (RUI) - Final, Complete





Review of Systems


Constitutional:  yes: weakness, alert, oriented


Ears/Nose/Throat:  Yes: no symptom reported


Eyes:  Yes: no symptom reported


Pulmonary:  Yes no symptom reported


Cardiovascular:  Yes no symptom reported


Gastrointestional:  Yes: no symptom reported


Genitourinary:  Yes: no symptom reported


Skin:  Yes no symptom reported


Psychiatric/Neurological:  Yes: no symptom reported


Endocrine:  Yes: no symptom reported





Physical Exam


Skin:  warm


Respiratory:  decreased breath sounds


Heart:  S1S2, RRR


Abdomen:  soft, bowel sounds present


Genitourinary:  bladder flat


Extremities:  pulses present


Neurology:  alert, oriented





Assessment


Assessment


IMP





EL DUE RHABDO-NO RECOVERY YET-ANURIC


RHABDOMYOLYSIS-CPK OF OVER 100,000 AT PEAK-NOW 1440


URINARY RETENTION





PLAN





ARANESP


WILL NEED TO HAVE SW SET UP OP HD


HD MWF-TOMORROW


HAS SOME CHANCE OF RECOVERY BUT WILL PROB TAKE WEEKS IF NOT MONTHS











JORGE CRYSTAL MD                 Jul 21, 2019 11:04

## 2019-07-21 NOTE — PDOC
PROGRESS NOTES


Assessment


Assessment


Generalized weakness related to defuse epidural lipomatosis likely.


Rhabdomyolysis.


Methamphetamine positive.


Electrolytes imbalances.


AKD.


Elevated hepatic enzymes.


No evidence of MS this time.


No evidence of T-spinal cord pathological findings this time.


Defuse dorsal epidural lipomatosis.





RECOMMENDATIONS/PLAN:


Treat medical diseases.


Repeated T-spine MRI on 7/18/19 showed no abnormal findings of T-cord this time.


Consulted NS on 7/18/19 for diffuse epidural lipomatosis.


Consulted Urology for testicle edema.


OT/PT.





CSF OCB zero.


Repeat T-spine MRI on 7/17/19: No abnormal findings of T-cord except 

lipomatosis.





Past Medical History


GI:  GERD


Psych:  Anxiety, Addictions, Depression





Past Surgical History


No pertinent history





Family History


No pertinent hx





Social History


Unemployed, uses methamphetamine, smokes occasionally, rare alcohol. Was said 

out of incarceration on 7/5/19.





ALLERGY:


NKDA





MEDICATIONS:


Refer to MAR





REVIEW OF SYSTEMS:


Refer to PMH and PSH.





PHYSICAL EXAMINATION:   


General appearance in no acute distress.  


HEENT:  Normocephalic and nontraumatic.  Eyes, nose, ears, and throat are 

unremarkable. 


Neck is supple. No lymphadenopathy.  No Crepitus.


Cardiovascular:  S1, S2, regular rate and rhythm.  


Pulmonary:  Clear to auscultation bilaterally. 


Abdomen:  Bowel sounds are positive.    


Extremities:  No rash, lesions, or edema.  


No restriction of range of motion





NEUROLOGICAL  EXAMINATION:


Awake.


Oriented to time, place and person.


PERRL.


EOMI.


CN: no focal findings.


Muscle tone: within normal.


Muscle strength: 5


DTR: 2


Plantar reflex: Flexor response bilaterally 


Gait: Able to walk with a walker.


Sensory exam: no acute abnormal findings.


No cerebellar signs elicited.


F-T-N test fine.





Objective


Objective





Vital Signs








  Date Time  Temp Pulse Resp B/P (MAP) Pulse Ox O2 Delivery O2 Flow Rate FiO2


 


7/21/19 17:40      Room Air  


 


7/21/19 15:00 97.8 79 18 119/71 (87) 92   





 97.8       


 


7/20/19 18:44       90.0 














Intake and Output 


 


 7/21/19





 07:00


 


Intake Total 800 ml


 


Balance 800 ml


 


 


 


Intake Oral 800 ml


 


# Voids 3


 


# Bowel Movements 1











Vitals Signs


Vitals





                          VS - Last 72 Hours, by Label








  Date Time  Temp Pulse Resp B/P (MAP) Pulse Ox O2 Delivery O2 Flow Rate FiO2


 


7/21/19 17:40      Room Air  


 


7/21/19 15:00 97.8 79 18 119/71 (87) 92 Room Air  





 97.8       


 


7/21/19 13:19      Room Air  


 


7/21/19 12:54      Room Air  


 


7/21/19 11:47      Room Air  


 


7/21/19 11:00 98.4 84 16 120/70 (87) 95 Room Air  





 98.4       


 


7/21/19 09:32      Room Air  


 


7/21/19 08:52      Room Air  


 


7/21/19 08:00      Room Air  


 


7/21/19 07:37  85  128/83    


 


7/21/19 06:37 98.4 85 17 128/83 (98) 94 Room Air  





 98.4       


 


7/21/19 02:48 98.9 88 16 124/59 (80) 98 Room Air  





 98.9       


 


7/21/19 00:13   16  94   


 


7/20/19 23:43   16  94 Room Air  


 


7/20/19 23:04 98.4 93 18 131/77 (95) 94 Room Air  





 98.4       


 


7/20/19 21:19  91  136/84    


 


7/20/19 20:00      Room Air  


 


7/20/19 19:00 99.3 96 19 138/87 (104) 96 Room Air  





 99.3       


 


7/20/19 18:44     95 Room Air 90.0 


 


7/20/19 16:56     95   


 


7/20/19 15:56     95 Room Air  


 


7/20/19 15:00 98.4 88 18 144/83 (103) 94 Room Air  





 98.4       


 


7/20/19 14:53       90.0 


 


7/20/19 14:23     94 Room Air  


 


7/20/19 11:19      Room Air  


 


7/20/19 11:00 98.4 90 18 150/86 (107) 94 Room Air  





 98.4       


 


7/20/19 08:00  94  142/82    


 


7/20/19 08:00      Room Air  


 


7/20/19 08:00      Room Air  


 


7/20/19 07:00 98.4 94 18 142/82 (102) 94 Room Air  





 98.4       











Laboratory


Laboratory





Laboratory Tests








Test


 7/21/19


06:10


 


Sodium Level


 128 mmol/L


(136-145)


 


Potassium Level


 4.3 mmol/L


(3.5-5.1)


 


Chloride Level


 92 mmol/L


()


 


Carbon Dioxide Level


 27 mmol/L


(21-32)


 


Anion Gap 9 (6-14) 


 


Blood Urea Nitrogen


 39 mg/dL


(8-26)


 


Creatinine


 7.9 mg/dL


(0.7-1.3)


 


Estimated GFR


(Cockcroft-Gault) 8.1 





 


Glucose Level


 102 mg/dL


(70-99)


 


Calcium Level


 8.3 mg/dL


(8.5-10.1)


 


Phosphorus Level


 4.8 mg/dL


(2.6-4.7)


 


Albumin


 2.3 g/dL


(3.4-5.0)








Microbiology


7/10/19 Blood Culture - Final, Complete


          NO GROWTH AFTER 5 DAYS


7/10/19 CSF Gram Stain - Final, Complete


          


7/9/19 Urine Culture - Final, Complete


         


7/9/19 Urine Culture Result 1 (RUI) - Final, Complete





Medication


Medications





Current Medications


Cephalexin HCl (Keflex) 500 mg Q24H PO ;  Start 7/22/19 at 14:00


Cephalexin HCl (Keflex) 500 mg TID PO  Last administered on 7/21/19at 13:19;  

Start 7/21/19 at 14:00;  Stop 7/21/19 at 15:57;  Status DC


Darbepoetin Blayne (ARANESP for DIALYSIS PTS) 60 mcg WEEKLYHS SQ  Last 

administered on 7/20/19at 21:25;  Start 7/20/19 at 21:00





Comment


Review of Relevant


I have reviewed the following items estrella (where applicable) has been applied.











BAY GROVES MD                 Jul 21, 2019 17:57

## 2019-07-22 VITALS — SYSTOLIC BLOOD PRESSURE: 103 MMHG | DIASTOLIC BLOOD PRESSURE: 61 MMHG

## 2019-07-22 VITALS — SYSTOLIC BLOOD PRESSURE: 108 MMHG | DIASTOLIC BLOOD PRESSURE: 69 MMHG

## 2019-07-22 VITALS — SYSTOLIC BLOOD PRESSURE: 116 MMHG | DIASTOLIC BLOOD PRESSURE: 75 MMHG

## 2019-07-22 VITALS — DIASTOLIC BLOOD PRESSURE: 56 MMHG | SYSTOLIC BLOOD PRESSURE: 132 MMHG

## 2019-07-22 VITALS — SYSTOLIC BLOOD PRESSURE: 134 MMHG | DIASTOLIC BLOOD PRESSURE: 65 MMHG

## 2019-07-22 LAB
ALBUMIN SERPL-MCNC: 2.3 G/DL (ref 3.4–5)
ANION GAP SERPL CALC-SCNC: 11 MMOL/L (ref 6–14)
BUN SERPL-MCNC: 46 MG/DL (ref 8–26)
CALCIUM SERPL-MCNC: 8.3 MG/DL (ref 8.5–10.1)
CHLORIDE SERPL-SCNC: 89 MMOL/L (ref 98–107)
CK SERPL-CCNC: 909 U/L (ref 39–308)
CO2 SERPL-SCNC: 26 MMOL/L (ref 21–32)
CREAT SERPL-MCNC: 8.9 MG/DL (ref 0.7–1.3)
ERYTHROCYTE [DISTWIDTH] IN BLOOD BY AUTOMATED COUNT: 14 % (ref 11.5–14.5)
GFR SERPLBLD BASED ON 1.73 SQ M-ARVRAT: 7 ML/MIN
GLUCOSE SERPL-MCNC: 111 MG/DL (ref 70–99)
HCT VFR BLD CALC: 21.6 % (ref 39–53)
HGB BLD-MCNC: 7.4 G/DL (ref 13–17.5)
MCH RBC QN AUTO: 29 PG (ref 25–35)
MCHC RBC AUTO-ENTMCNC: 34 G/DL (ref 31–37)
MCV RBC AUTO: 86 FL (ref 79–100)
PHOSPHATE SERPL-MCNC: 4.5 MG/DL (ref 2.6–4.7)
PLATELET # BLD AUTO: 169 X10^3/UL (ref 140–400)
POTASSIUM SERPL-SCNC: 4.3 MMOL/L (ref 3.5–5.1)
RBC # BLD AUTO: 2.52 X10^6/UL (ref 4.3–5.7)
SODIUM SERPL-SCNC: 126 MMOL/L (ref 136–145)
WBC # BLD AUTO: 11.7 X10^3/UL (ref 4–11)

## 2019-07-22 PROCEDURE — 5A1D70Z PERFORMANCE OF URINARY FILTRATION, INTERMITTENT, LESS THAN 6 HOURS PER DAY: ICD-10-PCS | Performed by: FAMILY MEDICINE

## 2019-07-22 RX ADMIN — PANTOPRAZOLE SODIUM SCH MG: 40 TABLET, DELAYED RELEASE ORAL at 08:44

## 2019-07-22 RX ADMIN — Medication SCH CAP: at 21:19

## 2019-07-22 RX ADMIN — MORPHINE SULFATE PRN MG: 4 INJECTION, SOLUTION INTRAMUSCULAR; INTRAVENOUS at 08:45

## 2019-07-22 RX ADMIN — Medication SCH EA: at 21:00

## 2019-07-22 RX ADMIN — LIDOCAINE SCH PATCH: 50 PATCH CUTANEOUS at 08:44

## 2019-07-22 RX ADMIN — GABAPENTIN SCH MG: 300 CAPSULE ORAL at 21:19

## 2019-07-22 RX ADMIN — MORPHINE SULFATE PRN MG: 4 INJECTION, SOLUTION INTRAMUSCULAR; INTRAVENOUS at 02:09

## 2019-07-22 RX ADMIN — MORPHINE SULFATE PRN MG: 4 INJECTION, SOLUTION INTRAMUSCULAR; INTRAVENOUS at 13:06

## 2019-07-22 RX ADMIN — MORPHINE SULFATE PRN MG: 4 INJECTION, SOLUTION INTRAMUSCULAR; INTRAVENOUS at 21:19

## 2019-07-22 RX ADMIN — CEPHALEXIN SCH MG: 250 CAPSULE ORAL at 13:05

## 2019-07-22 RX ADMIN — MORPHINE SULFATE PRN MG: 4 INJECTION, SOLUTION INTRAMUSCULAR; INTRAVENOUS at 17:14

## 2019-07-22 RX ADMIN — POLYETHYLENE GLYCOL 3350 SCH GM: 17 POWDER, FOR SOLUTION ORAL at 08:43

## 2019-07-22 RX ADMIN — Medication SCH CAP: at 08:44

## 2019-07-22 RX ADMIN — LABETALOL HCL SCH MG: 100 TABLET, FILM COATED ORAL at 09:00

## 2019-07-22 RX ADMIN — LABETALOL HCL SCH MG: 100 TABLET, FILM COATED ORAL at 21:19

## 2019-07-22 NOTE — PDOC
PROGRESS NOTES


Assessment


Problems


Medical Problems:


(1) Elevated troponin


Status: Acute  





(2) Hepatorenal syndrome


Status: Acute  





(3) Hyperkalemia


Status: Acute  





(4) Hyponatremia


Status: Acute  





(5) Methamphetamine abuse


Status: Acute  





(6) Neurological complaint


Status: Acute  





(7) Rhabdomyolysis


Status: Acute  





(8) Urinary retention


Status: Acute  





(9) Urinary tract infection


Status: Acute  





Bilateral leg weakness and numbness, component of factitious disorder


But he has rhabdomyolysis, edema of the psoas muscle on the MRI, lumbar epidural

lipomatosis


Methamphetamine abuse.


Scrotal edema


Patient continuing to improve


Plan


Continue supportive care for rhabdomyolysis, renal insufficiency, and other 

medical problems. He is getting dialysis


Urology to see


I don't think neurosurgery has to see him again given marked improvement


Physical and occupational therapy.


Subjective


Complains of scrotal and limb swelling


Objective





Vital Signs








  Date Time  Temp Pulse Resp B/P (MAP) Pulse Ox O2 Delivery O2 Flow Rate FiO2


 


7/22/19 11:00 98.0 94 16 132/56 (81) 88 Room Air  





 98.0       


 


7/22/19 10:30       2.0 














Intake and Output0 


 


 7/22/19





 06:59


 


Intake Total 470 ml


 


Balance 470 ml


 


 


 


Intake Oral 470 ml


 


# Voids 4








PHYSICAL EXAM


Alert. Oriented to time, place and person.


PERRL.


EOMI.


CN: no focal findings.


Muscle tone: normal.


Muscle strength: 5/5 arms, 4/5 legs


DTR: 2+


Plantar reflex: flexor


Gait: not examined in bed.


Sensory exam: no abnormal findings.


No cerebellar signs elicited.


Review of Relevant


I have reviewed the following items estrella (where applicable) has been applied.


Labs





Laboratory Tests








Test


 7/21/19


06:10 7/22/19


03:10


 


Sodium Level


 128 mmol/L


(136-145) 126 mmol/L


(136-145)


 


Potassium Level


 4.3 mmol/L


(3.5-5.1) 4.3 mmol/L


(3.5-5.1)


 


Chloride Level


 92 mmol/L


() 89 mmol/L


()


 


Carbon Dioxide Level


 27 mmol/L


(21-32) 26 mmol/L


(21-32)


 


Anion Gap 9 (6-14)  11 (6-14) 


 


Blood Urea Nitrogen


 39 mg/dL


(8-26) 46 mg/dL


(8-26)


 


Creatinine


 7.9 mg/dL


(0.7-1.3) 8.9 mg/dL


(0.7-1.3)


 


Estimated GFR


(Cockcroft-Gault) 8.1 


 7.0 





 


Glucose Level


 102 mg/dL


(70-99) 111 mg/dL


(70-99)


 


Calcium Level


 8.3 mg/dL


(8.5-10.1) 8.3 mg/dL


(8.5-10.1)


 


Phosphorus Level


 4.8 mg/dL


(2.6-4.7) 4.5 mg/dL


(2.6-4.7)


 


Albumin


 2.3 g/dL


(3.4-5.0) 2.3 g/dL


(3.4-5.0)


 


White Blood Count


 


 11.7 x10^3/uL


(4.0-11.0)


 


Red Blood Count


 


 2.52 x10^6/uL


(4.30-5.70)


 


Hemoglobin


 


 7.4 g/dL


(13.0-17.5)


 


Hematocrit


 


 21.6 %


(39.0-53.0)


 


Mean Corpuscular Volume  86 fL () 


 


Mean Corpuscular Hemoglobin  29 pg (25-35) 


 


Mean Corpuscular Hemoglobin


Concent 


 34 g/dL


(31-37)


 


Red Cell Distribution Width


 


 14.0 %


(11.5-14.5)


 


Platelet Count


 


 169 x10^3/uL


(140-400)


 


Creatine Kinase


 


 909 U/L


()








Laboratory Tests








Test


 7/22/19


03:10


 


White Blood Count


 11.7 x10^3/uL


(4.0-11.0)


 


Red Blood Count


 2.52 x10^6/uL


(4.30-5.70)


 


Hemoglobin


 7.4 g/dL


(13.0-17.5)


 


Hematocrit


 21.6 %


(39.0-53.0)


 


Mean Corpuscular Volume 86 fL () 


 


Mean Corpuscular Hemoglobin 29 pg (25-35) 


 


Mean Corpuscular Hemoglobin


Concent 34 g/dL


(31-37)


 


Red Cell Distribution Width


 14.0 %


(11.5-14.5)


 


Platelet Count


 169 x10^3/uL


(140-400)


 


Sodium Level


 126 mmol/L


(136-145)


 


Potassium Level


 4.3 mmol/L


(3.5-5.1)


 


Chloride Level


 89 mmol/L


()


 


Carbon Dioxide Level


 26 mmol/L


(21-32)


 


Anion Gap 11 (6-14) 


 


Blood Urea Nitrogen


 46 mg/dL


(8-26)


 


Creatinine


 8.9 mg/dL


(0.7-1.3)


 


Estimated GFR


(Cockcroft-Gault) 7.0 





 


Glucose Level


 111 mg/dL


(70-99)


 


Calcium Level


 8.3 mg/dL


(8.5-10.1)


 


Phosphorus Level


 4.5 mg/dL


(2.6-4.7)


 


Creatine Kinase


 909 U/L


()


 


Albumin


 2.3 g/dL


(3.4-5.0)








Microbiology


7/10/19 Blood Culture - Final, Complete


          NO GROWTH AFTER 5 DAYS


7/10/19 CSF Gram Stain - Final, Complete


          


7/9/19 Urine Culture - Final, Complete


         


7/9/19 Urine Culture Result 1 (RUI) - Final, Complete


Medications





Current Medications


Morphine Sulfate (Morphine Sulfate) 4 mg 1X  ONCE IV  Last administered on 

7/9/19at 21:20;  Start 7/9/19 at 21:30;  Stop 7/9/19 at 21:31;  Status DC


Sodium Chloride 1,000 ml @  1,000 mls/hr 1X  ONCE IV  Last administered on 

7/9/19at 22:30;  Start 7/9/19 at 22:30;  Stop 7/9/19 at 23:29;  Status DC


Calcium Gluconate (Calcium Gluconate) 1,000 mg 1X  ONCE IVP  Last administered 

on 7/9/19at 22:43;  Start 7/9/19 at 23:00;  Stop 7/9/19 at 23:01;  Status DC


Insulin Human Regular (HumuLIN R VIAL) 10 unit 1X  ONCE IV  Last administered on

7/9/19at 23:51;  Start 7/9/19 at 23:00;  Stop 7/9/19 at 23:01;  Status DC


Dextrose (Dextrose 50%-Water Syringe) 25 gm 1X  ONCE IV  Last administered on 

7/9/19at 22:42;  Start 7/9/19 at 23:00;  Stop 7/9/19 at 23:01;  Status DC


Sodium Bicarbonate (Sodium Bicarb Adult 8.4% Syr) 50 meq 1X  ONCE IV  Last 

administered on 7/9/19at 23:49;  Start 7/9/19 at 23:00;  Stop 7/9/19 at 23:01;  

Status DC


Sodium Chloride 1,000 ml @  1,000 mls/hr 1X  ONCE IV  Last administered on 

7/9/19at 23:50;  Start 7/9/19 at 23:00;  Stop 7/9/19 at 23:59;  Status DC


Albuterol Sulfate (Ventolin Neb Soln) 10 mg 1X  ONCE CONT NEB  Last administered

on 7/10/19at 00:30;  Start 7/9/19 at 23:00;  Stop 7/9/19 at 23:01;  Status DC


Ceftriaxone Sodium (Rocephin) 1 gm 1X  ONCE IVP  Last administered on 7/9/19at 

22:43;  Start 7/9/19 at 23:00;  Stop 7/9/19 at 23:01;  Status DC


Sodium Chloride 1,000 ml @  1,000 mls/hr 1X  ONCE IV  Last administered on 

7/10/19at 01:00;  Start 7/10/19 at 01:00;  Stop 7/10/19 at 01:59;  Status DC


Lorazepam (Ativan Inj) 0.5 mg PRN Q6HRS  PRN IV ANXIETY / AGITATION Last 

administered on 7/10/19at 11:00;  Start 7/10/19 at 01:15;  Stop 7/11/19 at 

08:48;  Status DC


Ondansetron HCl (Zofran) 4 mg PRN Q6HRS  PRN IV NAUSEA/VOMITING 1ST CHOICE;  

Start 7/10/19 at 01:15;  Stop 7/10/19 at 01:22;  Status DC


Sodium Chloride (Normal Saline Flush) 3 ml QSHIFT  PRN IV AFTER MEDS AND BLOOD 

DRAWS;  Start 7/10/19 at 01:15


Sodium Chloride 1,000 ml @  175 mls/hr Q5H43M IV ;  Start 7/10/19 at 01:08;  

Stop 7/10/19 at 01:23;  Status DC


Ondansetron HCl (Zofran) 4 mg PRN Q8HRS  PRN IV NAUSEA/VOMITING  1ST CHOICE;  

Start 7/10/19 at 01:15;  Stop 7/11/19 at 01:14;  Status DC


Sodium Chloride 1,000 ml @  150 mls/hr Q6H40M IV ;  Start 7/10/19 at 01:30;  

Stop 7/10/19 at 18:49;  Status DC


Morphine Sulfate (Morphine Sulfate) 4 mg PRN Q4HRS  PRN IV SEVERE PAIN Last 

administered on 7/22/19at 08:45;  Start 7/10/19 at 09:00


Sodium Bicarbonate (Sodium Bicarb Adult 8.4% Syr) 100 meq 1X  ONCE IV  Last 

administered on 7/10/19at 10:02;  Start 7/10/19 at 10:00;  Stop 7/10/19 at 

10:01;  Status DC


Sodium Chloride 1,000 ml @  1,000 mls/hr 1X  ONCE IV  Last administered on 

7/10/19at 09:56;  Start 7/10/19 at 10:00;  Stop 7/10/19 at 10:59;  Status DC


Lidocaine/Sodium Bicarbonate (Buffered Lidocaine 1%) 3 ml STK-MED ONCE .ROUTE ; 

Start 7/10/19 at 11:07;  Stop 7/10/19 at 11:08;  Status DC


Calcium Gluconate (Calcium Gluconate) 2,000 mg 1X  ONCE IVP  Last administered o

n 7/10/19at 13:38;  Start 7/10/19 at 12:00;  Stop 7/10/19 at 12:01;  Status DC


Ceftriaxone Sodium (Rocephin) 1 gm Q24H IVP  Last administered on 7/10/19at 

12:49;  Start 7/10/19 at 13:00;  Stop 7/11/19 at 07:45;  Status DC


Silver Sulfadiazine (Silvadene) 1 mack BID TP  Last administered on 7/11/19at 

10:06;  Start 7/10/19 at 13:00;  Stop 7/11/19 at 15:04;  Status DC


Sodium Chloride 1,000 ml @  1,000 mls/hr 1X  ONCE IV  Last administered on 

7/10/19at 12:47;  Start 7/10/19 at 12:45;  Stop 7/10/19 at 13:44;  Status DC


Pantoprazole Sodium (Protonix) 40 mg DAILYAC PO ;  Start 7/10/19 at 13:30;  Stop

7/10/19 at 19:22;  Status DC


Polyethylene Glycol (miraLAX PACKET) 17 gm DAILY PO  Last administered on 

7/22/19at 08:43;  Start 7/10/19 at 13:30


Lidocaine/Sodium Bicarbonate (Buffered Lidocaine 1%) 3 ml STK-MED ONCE .ROUTE ; 

Start 7/10/19 at 13:48;  Stop 7/10/19 at 13:49;  Status DC


Haloperidol Lactate (Haldol Inj) 1 mg PRN Q8HRS  PRN IVP AGITATION Last 

administered on 7/12/19at 07:56;  Start 7/10/19 at 14:00


Lorazepam (Ativan Inj) 2 mg PRN Q2HRS  PRN IV ANXIETY. Last administered on 

7/14/19at 21:22;  Start 7/10/19 at 14:00;  Stop 7/15/19 at 10:17;  Status DC


Lidocaine/Sodium Bicarbonate (Buffered Lidocaine 1%) 6 ml 1X  ONCE INJ ;  Start 

7/10/19 at 14:15;  Stop 7/10/19 at 14:16;  Status DC


Dexmedetomidine HCl 200 mcg/ Sodium Chloride 50 ml @ 0 mls/hr CONT  PRN IV PER 

PROTOCOL Last administered on 7/13/19at 10:28;  Start 7/10/19 at 14:15;  Stop 

7/15/19 at 10:17;  Status DC


Sodium Chloride 500 ml @  500 mls/hr 1X PRN  PRN IV SEE COMMENTS;  Start 7/10/19

at 14:15;  Stop 7/18/19 at 14:54;  Status DC


Atropine Sulfate (ATROPINE 0.5mg SYRINGE) 0.5 mg PRN Q5MIN  PRN IV SEE COMMENTS;

 Start 7/10/19 at 14:15


Sodium Chloride 1,000 ml @  1,000 mls/hr Q1H PRN IV hypotension;  Start 7/10/19 

at 15:08;  Stop 7/10/19 at 21:07;  Status DC


Diphenhydramine HCl (Benadryl) 25 mg 1X PRN  PRN IV ITCHING;  Start 7/10/19 at 1

5:15;  Stop 7/11/19 at 11:19;  Status DC


Diphenhydramine HCl (Benadryl) 25 mg 1X PRN  PRN IV ITCHING;  Start 7/10/19 at 

15:15;  Stop 7/11/19 at 11:19;  Status DC


Sodium Chloride 1,000 ml @  400 mls/hr Q2H30M PRN IV PATENCY;  Start 7/10/19 at 

15:08;  Stop 7/11/19 at 03:07;  Status DC


Info (PHARMACY MONITORING -- do not chart) 1 each PRN DAILY  PRN MC SEE 

COMMENTS;  Start 7/10/19 at 15:15;  Stop 7/11/19 at 11:18;  Status DC


Pantoprazole Sodium (PROTONIX VIAL for IV PUSH) 40 mg DAILYAC IVP  Last 

administered on 7/15/19at 10:04;  Start 7/11/19 at 07:30;  Stop 7/15/19 at 

11:54;  Status DC


Linezolid (Zyvox) 600 mg BID PO  Last administered on 7/17/19at 20:55;  Start 

7/11/19 at 09:00;  Stop 7/17/19 at 22:00;  Status DC


Ceftriaxone Sodium (Rocephin) 2 gm Q24H IVP  Last administered on 7/14/19at 

12:14;  Start 7/11/19 at 13:00;  Stop 7/15/19 at 10:06;  Status DC


Calcium Gluconate 2000 mg/Dextrose 120 ml @  220 mls/hr 1X  ONCE IV  Last 

administered on 7/11/19at 10:05;  Start 7/11/19 at 10:00;  Stop 7/11/19 at 

10:32;  Status DC


Sodium Chloride 1,000 ml @  1,000 mls/hr Q1H PRN IV hypotension;  Start 7/11/19 

at 11:06;  Stop 7/11/19 at 17:05;  Status DC


Diphenhydramine HCl (Benadryl) 25 mg 1X PRN  PRN IV ITCHING;  Start 7/11/19 at 

11:15;  Stop 7/12/19 at 11:14;  Status DC


Diphenhydramine HCl (Benadryl) 25 mg 1X PRN  PRN IV ITCHING;  Start 7/11/19 at 

11:15;  Stop 7/12/19 at 11:14;  Status DC


Sodium Chloride 1,000 ml @  400 mls/hr Q2H30M PRN IV PATENCY;  Start 7/11/19 at 

11:06;  Stop 7/11/19 at 23:05;  Status DC


Info (PHARMACY MONITORING -- do not chart) 1 each PRN DAILY  PRN MC SEE 

COMMENTS;  Start 7/11/19 at 11:15;  Status Cancel


Potassium Chloride 10 meq/ Calcium Chloride 12.5 meq/ Bicarbonate Dialysis Soln 

w/ out KCl 5,013.9286 ml @ 1,000 mls/hr Q5H1M IV ;  Start 7/12/19 at 13:00;  

Status Cancel


Potassium Chloride 20 meq/ Calcium Chloride 12.5 meq/ Bicarbonate Dialysis Soln 

w/ out KCl 5,018.9286 ml @ 1,500 mls/hr Q3H21M IV ;  Start 7/12/19 at 13:00;  

Stop 7/12/19 at 13:00;  Status DC


Heparin Sodium (Porcine) (Heparin Sodium) 4,000 unit 1X  ONCE IV  Last 

administered on 7/12/19at 19:54;  Start 7/12/19 at 12:00;  Stop 7/12/19 at 

12:11;  Status DC


Heparin Sodium/ Dextrose 500 ml @ 0 mls/hr CONT  PRN IV SEE I/O RECORD Last 

administered on 7/13/19at 15:05;  Start 7/12/19 at 12:00;  Stop 7/14/19 at 

08:03;  Status DC


Heparin Sodium (Porcine) (Heparin Sodium) 2,500 unit PRN Q6HRS  PRN IV FOR UFH 

LEVEL LESS THAN 0.2;  Start 7/12/19 at 12:00;  Stop 7/15/19 at 13:16;  Status DC


Potassium Chloride 10 meq/ Calcium Chloride 12.5 meq/ Bicarbonate Dialysis Soln 

w/ out KCl 5,013.9286 ml @ 1,000 mls/hr Q5H1M IV ;  Start 7/12/19 at 13:00;  

Stop 7/12/19 at 13:00;  Status DC


Potassium Chloride 10 meq/ Bicarbonate Dialysis Soln w/ out KCl 5,005 ml @  

1,000 mls/hr Q5H1M IV ;  Start 7/12/19 at 13:00;  Stop 7/12/19 at 13:00;  Status

DC


Potassium Chloride 20 meq/ Bicarbonate Dialysis Soln w/ out KCl 5,010 ml @  

1,500 mls/hr Q3H21M IV ;  Start 7/12/19 at 13:00;  Stop 7/12/19 at 13:00;  

Status DC


Potassium Chloride 10 meq/ Bicarbonate Dialysis Soln w/ out KCl 5,005 ml @  

1,500 mls/hr Q3H21M IV ;  Start 7/12/19 at 13:00;  Stop 7/12/19 at 13:00;  

Status DC


Potassium Chloride 20 meq/ Bicarbonate Dialysis Soln w/ out KCl 5,010 ml @  

1,000 mls/hr Q5H1M IV  Last administered on 7/13/19at 20:43;  Start 7/12/19 at 1

3:00;  Stop 7/14/19 at 08:03;  Status DC


Potassium Chloride 20 meq/ Bicarbonate Dialysis Soln w/ out KCl 5,010 ml @  

1,500 mls/hr Q3H21M IV ;  Start 7/12/19 at 12:45;  Stop 7/12/19 at 12:45;  

Status DC


Potassium Chloride 40 meq/ Bicarbonate Dialysis Soln w/ out KCl 5,020 ml @  

1,500 mls/hr Q3H21M IV ;  Start 7/12/19 at 13:00;  Stop 7/12/19 at 13:10;  

Status DC


Potassium Chloride 60 meq/ Bicarbonate Dialysis Soln w/ out KCl 5,030 ml @  

1,500 mls/hr Q3H22M IV ;  Start 7/12/19 at 13:00;  Stop 7/12/19 at 13:07;  

Status DC


Potassium Chloride 30 meq/ Bicarbonate Dialysis Soln w/ out KCl 5,015 ml @  

1,500 mls/hr Q3H21M IV ;  Start 7/12/19 at 13:15;  Stop 7/12/19 at 18:00;  

Status DC


Potassium Chloride 20 meq/ Bicarbonate Dialysis Soln w/ out KCl 5,010 ml @  

1,500 mls/hr Q3H21M IV  Last administered on 7/12/19at 21:06;  Start 7/12/19 at 

13:10;  Stop 7/12/19 at 18:00;  Status DC


Potassium Chloride 30 meq/ Bicarbonate Dialysis Soln w/ out KCl 5,015 ml @  

1,500 mls/hr Q3H21M IV ;  Start 7/12/19 at 18:00;  Stop 7/12/19 at 19:10;  

Status DC


Potassium Chloride 20 meq/ Bicarbonate Dialysis Soln w/ out KCl 5,010 ml @  

1,500 mls/hr Q3H21M IV  Last administered on 7/14/19at 00:15;  Start 7/12/19 at 

18:01;  Stop 7/14/19 at 08:03;  Status DC


Potassium Chloride 20 meq/ Bicarbonate Dialysis Soln w/ out KCl 5,010 ml @  

1,500 mls/hr Q3H21M IV  Last administered on 7/14/19at 00:18;  Start 7/12/19 at 

20:00;  Stop 7/14/19 at 08:03;  Status DC


Lactobacillus Rhamnosus (Culturelle) 1 cap BID PO  Last administered on 

7/22/19at 08:44;  Start 7/14/19 at 21:00


Oxycodone HCl (Roxicodone) 5 mg PRN Q4HRS  PRN PO MODERATE TO SEVERE PAIN Last 

administered on 7/22/19at 05:49;  Start 7/14/19 at 11:45


Lorazepam (Ativan Inj) 1 mg PRN Q8HRS  PRN IV ANXIETY.;  Start 7/14/19 at 16:00


Gabapentin (Neurontin) 300 mg QHS PO  Last administered on 7/21/19at 21:51;  

Start 7/14/19 at 21:00


Labetalol HCl (Normodyne Iv Push) 10 mg PRN Q4HRS  PRN IVP HYPERTENSION Last 

administered on 7/15/19at 03:33;  Start 7/14/19 at 23:30;  Stop 7/15/19 at 

09:26;  Status DC


Labetalol HCl (Normodyne Iv Push) 20 mg PRN Q4HRS  PRN IVP HYPERTENSION;  Start 

7/15/19 at 09:30;  Stop 7/16/19 at 19:10;  Status DC


Ondansetron HCl (Zofran) 4 mg PRN Q6HRS  PRN IV NAUSEA/VOMITING Last 

administered on 7/21/19at 08:51;  Start 7/15/19 at 09:30


Acetaminophen (Tylenol) 500 mg PRN Q6HRS  PRN PO MILD PAIN / TEMP;  Start 

7/15/19 at 09:30


Lidocaine (Lidoderm) 1 patch DAILY TD  Last administered on 7/22/19at 08:44;  

Start 7/15/19 at 10:00


Miscellaneous (Lidoderm Patch Removal) 1 ea QHS MC  Last administered on 

7/21/19at 21:00;  Start 7/15/19 at 21:00


Sodium Chloride 1,000 ml @  1,000 mls/hr Q1H PRN IV hypotension;  Start 7/15/19 

at 11:26;  Stop 7/15/19 at 17:25;  Status DC


Albumin Human 200 ml @  200 mls/hr 1X PRN  PRN IV Hypotension;  Start 7/15/19 at

11:30;  Stop 7/15/19 at 17:29;  Status DC


Sodium Chloride (Normal Saline Flush) 10 ml 1X PRN  PRN IV AP catheter pack;  

Start 7/15/19 at 11:30;  Stop 7/16/19 at 11:29;  Status DC


Sodium Chloride (Normal Saline Flush) 10 ml 1X PRN  PRN IV  catheter pack;  

Start 7/15/19 at 11:30;  Stop 7/16/19 at 11:29;  Status DC


Sodium Chloride 1,000 ml @  400 mls/hr Q2H30M PRN IV PATENCY;  Start 7/15/19 at 

11:26;  Stop 7/15/19 at 23:25;  Status DC


Info (PHARMACY MONITORING -- do not chart) 1 each PRN DAILY  PRN MC SEE COMMENTS

;  Start 7/15/19 at 11:30;  Status UNV


Info (PHARMACY MONITORING -- do not chart) 1 each PRN DAILY  PRN MC SEE 

COMMENTS;  Start 7/15/19 at 11:30;  Status Cancel


Pantoprazole Sodium (Protonix) 40 mg DAILYAC PO  Last administered on 7/22/19at 

08:44;  Start 7/16/19 at 07:30


Labetalol HCl (Trandate) 100 mg BID PO  Last administered on 7/16/19at 19:40;  

Start 7/16/19 at 09:00;  Stop 7/17/19 at 09:13;  Status DC


Lorazepam (Ativan) 0.5 mg PRN Q8HRS  PRN PO ANXIETY / AGITATION;  Start 7/16/19 

at 09:00


Haloperidol (Haldol) 0.5 mg PRN QID  PRN PO AGITATION, 2ND CHOICE;  Start 

7/16/19 at 09:00;  Stop 7/16/19 at 09:03;  Status DC


Haloperidol Lactate (HALDOL 2mg ORAL CONC) 0.5 mg PRN QID  PRN PO AGITATION, 2ND

CHOICE;  Start 7/16/19 at 09:03


Metoprolol Tartrate (Lopressor Vial) 10 mg PRN Q6HRS  PRN IVP HYPERTENSION;  

Start 7/16/19 at 19:15


Labetalol HCl (Trandate) 200 mg BID PO  Last administered on 7/21/19at 21:54;  

Start 7/17/19 at 10:00


Sodium Chloride 1,000 ml @  1,000 mls/hr Q1H PRN IV hypotension;  Start 7/17/19 

at 10:18;  Stop 7/17/19 at 16:17;  Status DC


Diphenhydramine HCl (Benadryl) 25 mg 1X PRN  PRN IV ITCHING;  Start 7/17/19 at 

10:30;  Stop 7/18/19 at 10:29;  Status DC


Diphenhydramine HCl (Benadryl) 25 mg 1X PRN  PRN IV ITCHING;  Start 7/17/19 at 

10:30;  Stop 7/18/19 at 10:29;  Status DC


Sodium Chloride 1,000 ml @  400 mls/hr Q2H30M PRN IV PATENCY;  Start 7/17/19 at 

10:18;  Stop 7/17/19 at 22:17;  Status DC


Info (PHARMACY MONITORING -- do not chart) 1 each PRN DAILY  PRN MC SEE 

COMMENTS;  Start 7/17/19 at 10:30;  Status UNV


Midazolam HCl (Versed) 2 mg STK-MED ONCE .ROUTE ;  Start 7/18/19 at 08:12;  Stop

7/18/19 at 08:13;  Status DC


Fentanyl Citrate (Fentanyl 2ml Vial) 100 mcg STK-MED ONCE .ROUTE ;  Start 

7/18/19 at 08:12;  Stop 7/18/19 at 08:13;  Status DC


Lidocaine/ Epinephrine (LIDOCAINE 1%-EPI 1:100,000 Multi-Dose) 20 ml STK-MED 

ONCE .ROUTE ;  Start 7/18/19 at 08:13;  Stop 7/18/19 at 08:14;  Status DC


Cefazolin Sodium 50 ml @ As Directed STK-MED ONCE IV ;  Start 7/18/19 at 08:18; 

Stop 7/18/19 at 08:19;  Status DC


Cefazolin Sodium 50 ml @ As Directed STK-MED ONCE IV ;  Start 7/18/19 at 08:18; 

Stop 7/18/19 at 08:19;  Status DC


Midazolam HCl (Versed) 2 mg STK-MED ONCE .ROUTE ;  Start 7/18/19 at 08:39;  Stop

7/18/19 at 08:40;  Status DC


Midazolam HCl (Versed) 2 mg 1X  ONCE IV  Last administered on 7/18/19at 08:45;  

Start 7/18/19 at 08:45;  Stop 7/18/19 at 08:52;  Status DC


Fentanyl Citrate (Fentanyl 2ml Vial) 100 mcg 1X  ONCE IV  Last administered on 

7/18/19at 08:45;  Start 7/18/19 at 08:45;  Stop 7/18/19 at 08:52;  Status DC


Lidocaine/ Epinephrine (LIDOCAINE 1%-EPI 1:100,000 Multi-Dose) 20 ml 1X  ONCE IJ

 Last administered on 7/18/19at 08:45;  Start 7/18/19 at 08:45;  Stop 7/18/19 at

08:52;  Status DC


Cefazolin Sodium 50 ml @  100 mls/hr 1X  ONCE IV  Last administered on 7/18/19at

08:45;  Start 7/18/19 at 08:45;  Stop 7/18/19 at 09:14;  Status DC


Cefazolin Sodium 50 ml @  100 mls/hr 1X  ONCE IV  Last administered on 7/18/19at

08:45;  Start 7/18/19 at 08:45;  Stop 7/18/19 at 09:14;  Status DC


Furosemide (Lasix) 40 mg 1X  ONCE IVP  Last administered on 7/18/19at 14:44;  

Start 7/18/19 at 14:30;  Stop 7/18/19 at 14:31;  Status DC


Furosemide (Lasix) 40 mg 1X  ONCE IVP  Last administered on 7/19/19at 12:49;  

Start 7/19/19 at 12:30;  Stop 7/19/19 at 12:31;  Status DC


Sodium Chloride 1,000 ml @  1,000 mls/hr Q1H PRN IV hypotension;  Start 7/19/19 

at 16:54;  Stop 7/19/19 at 22:53;  Status DC


Sodium Chloride 1,000 ml @  400 mls/hr Q2H30M PRN IV PATENCY;  Start 7/19/19 at 

16:54;  Stop 7/20/19 at 04:53;  Status DC


Info (PHARMACY MONITORING -- do not chart) 1 each PRN DAILY  PRN MC SEE 

COMMENTS;  Start 7/19/19 at 17:00


Info (PHARMACY MONITORING -- do not chart) 1 each PRN DAILY  PRN MC SEE 

COMMENTS;  Start 7/19/19 at 17:00;  Status UNV


Ceftriaxone Sodium (Rocephin) 1 gm Q24H IVP  Last administered on 7/21/19at 

08:52;  Start 7/20/19 at 10:00;  Stop 7/21/19 at 11:21;  Status DC


Darbepoetin Blayne (ARANESP for DIALYSIS PTS) 60 mcg WEEKLYHS SQ  Last 

administered on 7/20/19at 21:25;  Start 7/20/19 at 21:00


Cephalexin HCl (Keflex) 500 mg TID PO  Last administered on 7/21/19at 13:19;  

Start 7/21/19 at 14:00;  Stop 7/21/19 at 15:57;  Status DC


Cephalexin HCl (Keflex) 500 mg Q24H PO ;  Start 7/22/19 at 14:00


Zolpidem Tartrate (Ambien) 5 mg PRN QHS  PRN PO INSOMNIA;  Start 7/22/19 at 

09:15





Active Scripts


Active


Reported


Protonix (Pantoprazole Sodium) 20 Mg Tablet.dr 2 Tab PO DAILY


Zyprexa (Olanzapine) 20 Mg Tablet 2 Tab PO QHS


Buspirone Hcl 30 Mg Tablet 1 Tab PO BID


Vitals/I & O





Vital Sign - Last 24 Hours








 7/21/19 7/21/19 7/21/19 7/21/19





 13:19 15:00 17:40 18:22


 


Temp  97.8  





  97.8  


 


Pulse  79  


 


Resp  18  


 


B/P (MAP)  119/71 (87)  


 


Pulse Ox  92  


 


O2 Delivery Room Air Room Air Room Air Room Air


 


    





    





 7/21/19 7/21/19 7/21/19 7/21/19





 19:00 20:00 21:54 22:40


 


Temp 98.6   98.8





 98.6   98.8


 


Pulse 88  93 93


 


Resp 18   18


 


B/P (MAP) 131/91 (104)  118/79 126/81 (96)


 


Pulse Ox 93   91


 


O2 Delivery Room Air Room Air  Room Air


 


    





    





 7/22/19 7/22/19 7/22/19 7/22/19





 03:00 06:49 07:00 08:00


 


Temp 98.5  98.8 





 98.5  98.8 


 


Pulse 103  96 


 


Resp 19 20 16 


 


B/P (MAP) 116/75 (89)  108/69 (82) 


 


Pulse Ox 96 96 94 


 


O2 Delivery Room Air Room Air Room Air Room Air


 


    





    





 7/22/19 7/22/19 7/22/19 7/22/19





 08:45 09:00 09:15 09:30


 


Pulse  96  


 


B/P (MAP)  108/69  


 


O2 Delivery Room Air  Room Air 





 7/22/19 7/22/19  





 10:30 11:00  


 


Temp  98.0  





  98.0  


 


Pulse  94  


 


Resp  16  


 


B/P (MAP)  132/56 (81)  


 


Pulse Ox  88  


 


O2 Delivery Nasal Cannula Room Air  


 


O2 Flow Rate 2.0   














Intake and Output   


 


 7/21/19 7/21/19 7/22/19





 14:59 22:59 06:59


 


Intake Total 120 ml 350 ml 


 


Balance 120 ml 350 ml 

















BATSHEVA WARREN MD           Jul 22, 2019 12:18

## 2019-07-22 NOTE — PDOC
PROGRESS NOTES


Chief Complaint


Chief Complaint


New ESRD will be needing outpatient HD set up


Self-pay?


Hypotension resolved


Anasarca


Hepatorenal syndrome, status post


Hydrocele, good scrotal flow


Methamphetamine use


Metabolic encephalopathy resolved


Alcohol drinker


Status post sepsis with hypotension


Oliguric renal failure


Insomnia





History of Present Illness


History of Present Illness


continues to have anasarca


I think they get fluid off during dialysis


Scrotum  large but not tight - good flow on sono, hydrocoele only


HE is concerned and now penis also swollen





Blood pressure better since I started labetalol 200 twice a day, 4 days ago


MS now normal


AMbulating well with PT and I have witnessed








Plan: social work on board, self-pay versus Medicaid??


Will need dialysis as outpatient as I have verified with Dr. Kelley


ON kefflex renal dosing q24 hrs for "cellulitis scrotal wall on US"


COnsult urology re persistent hydrocoeles and penile swelling


Trial of ambien qhs


continues to be oliguric


MAy dc PT/OT





Vitals


Vitals





Vital Signs








  Date Time  Temp Pulse Resp B/P (MAP) Pulse Ox O2 Delivery O2 Flow Rate FiO2


 


7/22/19 08:45      Room Air  


 


7/22/19 07:00 98.8 96 16 108/69 (82) 94   





 98.8       











Physical Exam


Physical Exam


GENERAL:  Standing with PT then sat down, NAD, coop 


EENT:  Pupils equal, oral cavity clear


NECK:  Supple.


LUNGS:  Clear bilaterally.  No wheezing.


HEART:  S1, S2, regular, tachy 108s


ABDOMEN:  Soft, nontender, BS present


: Culp


EXTREMITIES:  Generalized trace edema, no cyanosis. Right index with min 

swelling.  Lesions are stable - mild maceration


CENTRAL NERVOUS SYSTEM:  Alert, answering appropriately


RIJ/HDC without signs of complications


PIV


General:  Oriented X3, Cooperative, mild distress


Heart:  Regular rate, Normal S1, No murmurs


Lungs:  Clear


Abdomen:  Normal bowel sounds, Soft, No hepatosplenomegaly, Other (obese, no 

fluid wave)


Extremities:  No clubbing, No cyanosis, No edema, Other (burn to right index 

finger APPEARS OLD  associated  cellulitis)





Labs


LABS





Laboratory Tests








Test


 7/22/19


03:10


 


White Blood Count


 11.7 x10^3/uL


(4.0-11.0)


 


Red Blood Count


 2.52 x10^6/uL


(4.30-5.70)


 


Hemoglobin


 7.4 g/dL


(13.0-17.5)


 


Hematocrit


 21.6 %


(39.0-53.0)


 


Mean Corpuscular Volume 86 fL () 


 


Mean Corpuscular Hemoglobin 29 pg (25-35) 


 


Mean Corpuscular Hemoglobin


Concent 34 g/dL


(31-37)


 


Red Cell Distribution Width


 14.0 %


(11.5-14.5)


 


Platelet Count


 169 x10^3/uL


(140-400)


 


Sodium Level


 126 mmol/L


(136-145)


 


Potassium Level


 4.3 mmol/L


(3.5-5.1)


 


Chloride Level


 89 mmol/L


()


 


Carbon Dioxide Level


 26 mmol/L


(21-32)


 


Anion Gap 11 (6-14) 


 


Blood Urea Nitrogen


 46 mg/dL


(8-26)


 


Creatinine


 8.9 mg/dL


(0.7-1.3)


 


Estimated GFR


(Cockcroft-Gault) 7.0 





 


Glucose Level


 111 mg/dL


(70-99)


 


Calcium Level


 8.3 mg/dL


(8.5-10.1)


 


Phosphorus Level


 4.5 mg/dL


(2.6-4.7)


 


Creatine Kinase


 909 U/L


()


 


Albumin


 2.3 g/dL


(3.4-5.0)











Review of Systems


Review of Systems


scrotum and penis swollen, the rest of ROS 14 point negative





Assessment and Plan


Assessmemt and Plan


Problems


Medical Problems:


(1) Elevated troponin


Status: Acute  





(2) Hepatorenal syndrome


Status: Acute  





(3) Hyperkalemia


Status: Acute  





(4) Hyponatremia


Status: Acute  





(5) Methamphetamine abuse


Status: Acute  





(6) Neurological complaint


Status: Acute  





(7) Rhabdomyolysis


Status: Acute  





(8) Urinary retention


Status: Acute  





(9) Urinary tract infection


Status: Acute  











Comment


Review of Relevant


I have reviewed the following items estrella (where applicable) has been applied.


Labs





Laboratory Tests








Test


 7/21/19


06:10 7/22/19


03:10


 


Sodium Level


 128 mmol/L


(136-145) 126 mmol/L


(136-145)


 


Potassium Level


 4.3 mmol/L


(3.5-5.1) 4.3 mmol/L


(3.5-5.1)


 


Chloride Level


 92 mmol/L


() 89 mmol/L


()


 


Carbon Dioxide Level


 27 mmol/L


(21-32) 26 mmol/L


(21-32)


 


Anion Gap 9 (6-14)  11 (6-14) 


 


Blood Urea Nitrogen


 39 mg/dL


(8-26) 46 mg/dL


(8-26)


 


Creatinine


 7.9 mg/dL


(0.7-1.3) 8.9 mg/dL


(0.7-1.3)


 


Estimated GFR


(Cockcroft-Gault) 8.1 


 7.0 





 


Glucose Level


 102 mg/dL


(70-99) 111 mg/dL


(70-99)


 


Calcium Level


 8.3 mg/dL


(8.5-10.1) 8.3 mg/dL


(8.5-10.1)


 


Phosphorus Level


 4.8 mg/dL


(2.6-4.7) 4.5 mg/dL


(2.6-4.7)


 


Albumin


 2.3 g/dL


(3.4-5.0) 2.3 g/dL


(3.4-5.0)


 


White Blood Count


 


 11.7 x10^3/uL


(4.0-11.0)


 


Red Blood Count


 


 2.52 x10^6/uL


(4.30-5.70)


 


Hemoglobin


 


 7.4 g/dL


(13.0-17.5)


 


Hematocrit


 


 21.6 %


(39.0-53.0)


 


Mean Corpuscular Volume  86 fL () 


 


Mean Corpuscular Hemoglobin  29 pg (25-35) 


 


Mean Corpuscular Hemoglobin


Concent 


 34 g/dL


(31-37)


 


Red Cell Distribution Width


 


 14.0 %


(11.5-14.5)


 


Platelet Count


 


 169 x10^3/uL


(140-400)


 


Creatine Kinase


 


 909 U/L


()








Laboratory Tests








Test


 7/22/19


03:10


 


White Blood Count


 11.7 x10^3/uL


(4.0-11.0)


 


Red Blood Count


 2.52 x10^6/uL


(4.30-5.70)


 


Hemoglobin


 7.4 g/dL


(13.0-17.5)


 


Hematocrit


 21.6 %


(39.0-53.0)


 


Mean Corpuscular Volume 86 fL () 


 


Mean Corpuscular Hemoglobin 29 pg (25-35) 


 


Mean Corpuscular Hemoglobin


Concent 34 g/dL


(31-37)


 


Red Cell Distribution Width


 14.0 %


(11.5-14.5)


 


Platelet Count


 169 x10^3/uL


(140-400)


 


Sodium Level


 126 mmol/L


(136-145)


 


Potassium Level


 4.3 mmol/L


(3.5-5.1)


 


Chloride Level


 89 mmol/L


()


 


Carbon Dioxide Level


 26 mmol/L


(21-32)


 


Anion Gap 11 (6-14) 


 


Blood Urea Nitrogen


 46 mg/dL


(8-26)


 


Creatinine


 8.9 mg/dL


(0.7-1.3)


 


Estimated GFR


(Cockcroft-Gault) 7.0 





 


Glucose Level


 111 mg/dL


(70-99)


 


Calcium Level


 8.3 mg/dL


(8.5-10.1)


 


Phosphorus Level


 4.5 mg/dL


(2.6-4.7)


 


Creatine Kinase


 909 U/L


()


 


Albumin


 2.3 g/dL


(3.4-5.0)








Microbiology


7/10/19 Blood Culture - Final, Complete


          NO GROWTH AFTER 5 DAYS


7/10/19 CSF Gram Stain - Final, Complete


          


7/9/19 Urine Culture - Final, Complete


         


7/9/19 Urine Culture Result 1 (RUI) - Final, Complete


Medications





Current Medications


Morphine Sulfate (Morphine Sulfate) 4 mg 1X  ONCE IV  Last administered on 

7/9/19at 21:20;  Start 7/9/19 at 21:30;  Stop 7/9/19 at 21:31;  Status DC


Sodium Chloride 1,000 ml @  1,000 mls/hr 1X  ONCE IV  Last administered on 

7/9/19at 22:30;  Start 7/9/19 at 22:30;  Stop 7/9/19 at 23:29;  Status DC


Calcium Gluconate (Calcium Gluconate) 1,000 mg 1X  ONCE IVP  Last administered 

on 7/9/19at 22:43;  Start 7/9/19 at 23:00;  Stop 7/9/19 at 23:01;  Status DC


Insulin Human Regular (HumuLIN R VIAL) 10 unit 1X  ONCE IV  Last administered on

7/9/19at 23:51;  Start 7/9/19 at 23:00;  Stop 7/9/19 at 23:01;  Status DC


Dextrose (Dextrose 50%-Water Syringe) 25 gm 1X  ONCE IV  Last administered on 

7/9/19at 22:42;  Start 7/9/19 at 23:00;  Stop 7/9/19 at 23:01;  Status DC


Sodium Bicarbonate (Sodium Bicarb Adult 8.4% Syr) 50 meq 1X  ONCE IV  Last 

administered on 7/9/19at 23:49;  Start 7/9/19 at 23:00;  Stop 7/9/19 at 23:01;  

Status DC


Sodium Chloride 1,000 ml @  1,000 mls/hr 1X  ONCE IV  Last administered on 

7/9/19at 23:50;  Start 7/9/19 at 23:00;  Stop 7/9/19 at 23:59;  Status DC


Albuterol Sulfate (Ventolin Neb Soln) 10 mg 1X  ONCE CONT NEB  Last administered

on 7/10/19at 00:30;  Start 7/9/19 at 23:00;  Stop 7/9/19 at 23:01;  Status DC


Ceftriaxone Sodium (Rocephin) 1 gm 1X  ONCE IVP  Last administered on 7/9/19at 

22:43;  Start 7/9/19 at 23:00;  Stop 7/9/19 at 23:01;  Status DC


Sodium Chloride 1,000 ml @  1,000 mls/hr 1X  ONCE IV  Last administered on 

7/10/19at 01:00;  Start 7/10/19 at 01:00;  Stop 7/10/19 at 01:59;  Status DC


Lorazepam (Ativan Inj) 0.5 mg PRN Q6HRS  PRN IV ANXIETY / AGITATION Last 

administered on 7/10/19at 11:00;  Start 7/10/19 at 01:15;  Stop 7/11/19 at 

08:48;  Status DC


Ondansetron HCl (Zofran) 4 mg PRN Q6HRS  PRN IV NAUSEA/VOMITING 1ST CHOICE;  

Start 7/10/19 at 01:15;  Stop 7/10/19 at 01:22;  Status DC


Sodium Chloride (Normal Saline Flush) 3 ml QSHIFT  PRN IV AFTER MEDS AND BLOOD 

DRAWS;  Start 7/10/19 at 01:15


Sodium Chloride 1,000 ml @  175 mls/hr Q5H43M IV ;  Start 7/10/19 at 01:08;  

Stop 7/10/19 at 01:23;  Status DC


Ondansetron HCl (Zofran) 4 mg PRN Q8HRS  PRN IV NAUSEA/VOMITING  1ST CHOICE;  

Start 7/10/19 at 01:15;  Stop 7/11/19 at 01:14;  Status DC


Sodium Chloride 1,000 ml @  150 mls/hr Q6H40M IV ;  Start 7/10/19 at 01:30;  

Stop 7/10/19 at 18:49;  Status DC


Morphine Sulfate (Morphine Sulfate) 4 mg PRN Q4HRS  PRN IV SEVERE PAIN Last 

administered on 7/22/19at 08:45;  Start 7/10/19 at 09:00


Sodium Bicarbonate (Sodium Bicarb Adult 8.4% Syr) 100 meq 1X  ONCE IV  Last 

administered on 7/10/19at 10:02;  Start 7/10/19 at 10:00;  Stop 7/10/19 at 

10:01;  Status DC


Sodium Chloride 1,000 ml @  1,000 mls/hr 1X  ONCE IV  Last administered on 

7/10/19at 09:56;  Start 7/10/19 at 10:00;  Stop 7/10/19 at 10:59;  Status DC


Lidocaine/Sodium Bicarbonate (Buffered Lidocaine 1%) 3 ml STK-MED ONCE .ROUTE ; 

Start 7/10/19 at 11:07;  Stop 7/10/19 at 11:08;  Status DC


Calcium Gluconate (Calcium Gluconate) 2,000 mg 1X  ONCE IVP  Last administered 

on 7/10/19at 13:38;  Start 7/10/19 at 12:00;  Stop 7/10/19 at 12:01;  Status DC


Ceftriaxone Sodium (Rocephin) 1 gm Q24H IVP  Last administered on 7/10/19at 12

:49;  Start 7/10/19 at 13:00;  Stop 7/11/19 at 07:45;  Status DC


Silver Sulfadiazine (Silvadene) 1 mack BID TP  Last administered on 7/11/19at 

10:06;  Start 7/10/19 at 13:00;  Stop 7/11/19 at 15:04;  Status DC


Sodium Chloride 1,000 ml @  1,000 mls/hr 1X  ONCE IV  Last administered on 

7/10/19at 12:47;  Start 7/10/19 at 12:45;  Stop 7/10/19 at 13:44;  Status DC


Pantoprazole Sodium (Protonix) 40 mg DAILYAC PO ;  Start 7/10/19 at 13:30;  Stop

7/10/19 at 19:22;  Status DC


Polyethylene Glycol (miraLAX PACKET) 17 gm DAILY PO  Last administered on 

7/22/19at 08:43;  Start 7/10/19 at 13:30


Lidocaine/Sodium Bicarbonate (Buffered Lidocaine 1%) 3 ml STK-MED ONCE .ROUTE ; 

Start 7/10/19 at 13:48;  Stop 7/10/19 at 13:49;  Status DC


Haloperidol Lactate (Haldol Inj) 1 mg PRN Q8HRS  PRN IVP AGITATION Last 

administered on 7/12/19at 07:56;  Start 7/10/19 at 14:00


Lorazepam (Ativan Inj) 2 mg PRN Q2HRS  PRN IV ANXIETY. Last administered on 

7/14/19at 21:22;  Start 7/10/19 at 14:00;  Stop 7/15/19 at 10:17;  Status DC


Lidocaine/Sodium Bicarbonate (Buffered Lidocaine 1%) 6 ml 1X  ONCE INJ ;  Start 

7/10/19 at 14:15;  Stop 7/10/19 at 14:16;  Status DC


Dexmedetomidine HCl 200 mcg/ Sodium Chloride 50 ml @ 0 mls/hr CONT  PRN IV PER 

PROTOCOL Last administered on 7/13/19at 10:28;  Start 7/10/19 at 14:15;  Stop 

7/15/19 at 10:17;  Status DC


Sodium Chloride 500 ml @  500 mls/hr 1X PRN  PRN IV SEE COMMENTS;  Start 7/10/19

at 14:15;  Stop 7/18/19 at 14:54;  Status DC


Atropine Sulfate (ATROPINE 0.5mg SYRINGE) 0.5 mg PRN Q5MIN  PRN IV SEE COMMENTS;

 Start 7/10/19 at 14:15


Sodium Chloride 1,000 ml @  1,000 mls/hr Q1H PRN IV hypotension;  Start 7/10/19 

at 15:08;  Stop 7/10/19 at 21:07;  Status DC


Diphenhydramine HCl (Benadryl) 25 mg 1X PRN  PRN IV ITCHING;  Start 7/10/19 at 

15:15;  Stop 7/11/19 at 11:19;  Status DC


Diphenhydramine HCl (Benadryl) 25 mg 1X PRN  PRN IV ITCHING;  Start 7/10/19 at 

15:15;  Stop 7/11/19 at 11:19;  Status DC


Sodium Chloride 1,000 ml @  400 mls/hr Q2H30M PRN IV PATENCY;  Start 7/10/19 at 

15:08;  Stop 7/11/19 at 03:07;  Status DC


Info (PHARMACY MONITORING -- do not chart) 1 each PRN DAILY  PRN MC SEE 

COMMENTS;  Start 7/10/19 at 15:15;  Stop 7/11/19 at 11:18;  Status DC


Pantoprazole Sodium (PROTONIX VIAL for IV PUSH) 40 mg DAILYAC IVP  Last 

administered on 7/15/19at 10:04;  Start 7/11/19 at 07:30;  Stop 7/15/19 at 

11:54;  Status DC


Linezolid (Zyvox) 600 mg BID PO  Last administered on 7/17/19at 20:55;  Start 

7/11/19 at 09:00;  Stop 7/17/19 at 22:00;  Status DC


Ceftriaxone Sodium (Rocephin) 2 gm Q24H IVP  Last administered on 7/14/19at 

12:14;  Start 7/11/19 at 13:00;  Stop 7/15/19 at 10:06;  Status DC


Calcium Gluconate 2000 mg/Dextrose 120 ml @  220 mls/hr 1X  ONCE IV  Last 

administered on 7/11/19at 10:05;  Start 7/11/19 at 10:00;  Stop 7/11/19 at 

10:32;  Status DC


Sodium Chloride 1,000 ml @  1,000 mls/hr Q1H PRN IV hypotension;  Start 7/11/19 

at 11:06;  Stop 7/11/19 at 17:05;  Status DC


Diphenhydramine HCl (Benadryl) 25 mg 1X PRN  PRN IV ITCHING;  Start 7/11/19 at 

11:15;  Stop 7/12/19 at 11:14;  Status DC


Diphenhydramine HCl (Benadryl) 25 mg 1X PRN  PRN IV ITCHING;  Start 7/11/19 at 

11:15;  Stop 7/12/19 at 11:14;  Status DC


Sodium Chloride 1,000 ml @  400 mls/hr Q2H30M PRN IV PATENCY;  Start 7/11/19 at 

11:06;  Stop 7/11/19 at 23:05;  Status DC


Info (PHARMACY MONITORING -- do not chart) 1 each PRN DAILY  PRN MC SEE 

COMMENTS;  Start 7/11/19 at 11:15;  Status Cancel


Potassium Chloride 10 meq/ Calcium Chloride 12.5 meq/ Bicarbonate Dialysis Soln 

w/ out KCl 5,013.9286 ml @ 1,000 mls/hr Q5H1M IV ;  Start 7/12/19 at 13:00;  

Status Cancel


Potassium Chloride 20 meq/ Calcium Chloride 12.5 meq/ Bicarbonate Dialysis Soln 

w/ out KCl 5,018.9286 ml @ 1,500 mls/hr Q3H21M IV ;  Start 7/12/19 at 13:00;  

Stop 7/12/19 at 13:00;  Status DC


Heparin Sodium (Porcine) (Heparin Sodium) 4,000 unit 1X  ONCE IV  Last 

administered on 7/12/19at 19:54;  Start 7/12/19 at 12:00;  Stop 7/12/19 at 

12:11;  Status DC


Heparin Sodium/ Dextrose 500 ml @ 0 mls/hr CONT  PRN IV SEE I/O RECORD Last 

administered on 7/13/19at 15:05;  Start 7/12/19 at 12:00;  Stop 7/14/19 at 

08:03;  Status DC


Heparin Sodium (Porcine) (Heparin Sodium) 2,500 unit PRN Q6HRS  PRN IV FOR UFH 

LEVEL LESS THAN 0.2;  Start 7/12/19 at 12:00;  Stop 7/15/19 at 13:16;  Status DC


Potassium Chloride 10 meq/ Calcium Chloride 12.5 meq/ Bicarbonate Dialysis Soln 

w/ out KCl 5,013.9286 ml @ 1,000 mls/hr Q5H1M IV ;  Start 7/12/19 at 13:00;  

Stop 7/12/19 at 13:00;  Status DC


Potassium Chloride 10 meq/ Bicarbonate Dialysis Soln w/ out KCl 5,005 ml @  

1,000 mls/hr Q5H1M IV ;  Start 7/12/19 at 13:00;  Stop 7/12/19 at 13:00;  Status

DC


Potassium Chloride 20 meq/ Bicarbonate Dialysis Soln w/ out KCl 5,010 ml @  

1,500 mls/hr Q3H21M IV ;  Start 7/12/19 at 13:00;  Stop 7/12/19 at 13:00;  

Status DC


Potassium Chloride 10 meq/ Bicarbonate Dialysis Soln w/ out KCl 5,005 ml @  

1,500 mls/hr Q3H21M IV ;  Start 7/12/19 at 13:00;  Stop 7/12/19 at 13:00;  

Status DC


Potassium Chloride 20 meq/ Bicarbonate Dialysis Soln w/ out KCl 5,010 ml @  

1,000 mls/hr Q5H1M IV  Last administered on 7/13/19at 20:43;  Start 7/12/19 at 

13:00;  Stop 7/14/19 at 08:03;  Status DC


Potassium Chloride 20 meq/ Bicarbonate Dialysis Soln w/ out KCl 5,010 ml @  

1,500 mls/hr Q3H21M IV ;  Start 7/12/19 at 12:45;  Stop 7/12/19 at 12:45;  

Status DC


Potassium Chloride 40 meq/ Bicarbonate Dialysis Soln w/ out KCl 5,020 ml @  

1,500 mls/hr Q3H21M IV ;  Start 7/12/19 at 13:00;  Stop 7/12/19 at 13:10;  

Status DC


Potassium Chloride 60 meq/ Bicarbonate Dialysis Soln w/ out KCl 5,030 ml @  

1,500 mls/hr Q3H22M IV ;  Start 7/12/19 at 13:00;  Stop 7/12/19 at 13:07;  

Status DC


Potassium Chloride 30 meq/ Bicarbonate Dialysis Soln w/ out KCl 5,015 ml @  

1,500 mls/hr Q3H21M IV ;  Start 7/12/19 at 13:15;  Stop 7/12/19 at 18:00;  

Status DC


Potassium Chloride 20 meq/ Bicarbonate Dialysis Soln w/ out KCl 5,010 ml @  

1,500 mls/hr Q3H21M IV  Last administered on 7/12/19at 21:06;  Start 7/12/19 at 

13:10;  Stop 7/12/19 at 18:00;  Status DC


Potassium Chloride 30 meq/ Bicarbonate Dialysis Soln w/ out KCl 5,015 ml @  

1,500 mls/hr Q3H21M IV ;  Start 7/12/19 at 18:00;  Stop 7/12/19 at 19:10;  

Status DC


Potassium Chloride 20 meq/ Bicarbonate Dialysis Soln w/ out KCl 5,010 ml @  

1,500 mls/hr Q3H21M IV  Last administered on 7/14/19at 00:15;  Start 7/12/19 at 

18:01;  Stop 7/14/19 at 08:03;  Status DC


Potassium Chloride 20 meq/ Bicarbonate Dialysis Soln w/ out KCl 5,010 ml @  

1,500 mls/hr Q3H21M IV  Last administered on 7/14/19at 00:18;  Start 7/12/19 at 

20:00;  Stop 7/14/19 at 08:03;  Status DC


Lactobacillus Rhamnosus (Culturelle) 1 cap BID PO  Last administered on 

7/22/19at 08:44;  Start 7/14/19 at 21:00


Oxycodone HCl (Roxicodone) 5 mg PRN Q4HRS  PRN PO MODERATE TO SEVERE PAIN Last 

administered on 7/22/19at 05:49;  Start 7/14/19 at 11:45


Lorazepam (Ativan Inj) 1 mg PRN Q8HRS  PRN IV ANXIETY.;  Start 7/14/19 at 16:00


Gabapentin (Neurontin) 300 mg QHS PO  Last administered on 7/21/19at 21:51;  

Start 7/14/19 at 21:00


Labetalol HCl (Normodyne Iv Push) 10 mg PRN Q4HRS  PRN IVP HYPERTENSION Last adm

inistered on 7/15/19at 03:33;  Start 7/14/19 at 23:30;  Stop 7/15/19 at 09:26;  

Status DC


Labetalol HCl (Normodyne Iv Push) 20 mg PRN Q4HRS  PRN IVP HYPERTENSION;  Start 

7/15/19 at 09:30;  Stop 7/16/19 at 19:10;  Status DC


Ondansetron HCl (Zofran) 4 mg PRN Q6HRS  PRN IV NAUSEA/VOMITING Last 

administered on 7/21/19at 08:51;  Start 7/15/19 at 09:30


Acetaminophen (Tylenol) 500 mg PRN Q6HRS  PRN PO MILD PAIN / TEMP;  Start 

7/15/19 at 09:30


Lidocaine (Lidoderm) 1 patch DAILY TD  Last administered on 7/22/19at 08:44;  

Start 7/15/19 at 10:00


Miscellaneous (Lidoderm Patch Removal) 1 ea QHS MC  Last administered on 

7/21/19at 21:00;  Start 7/15/19 at 21:00


Sodium Chloride 1,000 ml @  1,000 mls/hr Q1H PRN IV hypotension;  Start 7/15/19 

at 11:26;  Stop 7/15/19 at 17:25;  Status DC


Albumin Human 200 ml @  200 mls/hr 1X PRN  PRN IV Hypotension;  Start 7/15/19 at

11:30;  Stop 7/15/19 at 17:29;  Status DC


Sodium Chloride (Normal Saline Flush) 10 ml 1X PRN  PRN IV AP catheter pack;  

Start 7/15/19 at 11:30;  Stop 7/16/19 at 11:29;  Status DC


Sodium Chloride (Normal Saline Flush) 10 ml 1X PRN  PRN IV  catheter pack;  

Start 7/15/19 at 11:30;  Stop 7/16/19 at 11:29;  Status DC


Sodium Chloride 1,000 ml @  400 mls/hr Q2H30M PRN IV PATENCY;  Start 7/15/19 at 

11:26;  Stop 7/15/19 at 23:25;  Status DC


Info (PHARMACY MONITORING -- do not chart) 1 each PRN DAILY  PRN MC SEE 

COMMENTS;  Start 7/15/19 at 11:30;  Status UNV


Info (PHARMACY MONITORING -- do not chart) 1 each PRN DAILY  PRN MC SEE 

COMMENTS;  Start 7/15/19 at 11:30;  Status Cancel


Pantoprazole Sodium (Protonix) 40 mg DAILYAC PO  Last administered on 7/22/19at 

08:44;  Start 7/16/19 at 07:30


Labetalol HCl (Trandate) 100 mg BID PO  Last administered on 7/16/19at 19:40;  

Start 7/16/19 at 09:00;  Stop 7/17/19 at 09:13;  Status DC


Lorazepam (Ativan) 0.5 mg PRN Q8HRS  PRN PO ANXIETY / AGITATION;  Start 7/16/19 

at 09:00


Haloperidol (Haldol) 0.5 mg PRN QID  PRN PO AGITATION, 2ND CHOICE;  Start 

7/16/19 at 09:00;  Stop 7/16/19 at 09:03;  Status DC


Haloperidol Lactate (HALDOL 2mg ORAL CONC) 0.5 mg PRN QID  PRN PO AGITATION, 2ND

CHOICE;  Start 7/16/19 at 09:03


Metoprolol Tartrate (Lopressor Vial) 10 mg PRN Q6HRS  PRN IVP HYPERTENSION;  

Start 7/16/19 at 19:15


Labetalol HCl (Trandate) 200 mg BID PO  Last administered on 7/21/19at 21:54;  

Start 7/17/19 at 10:00


Sodium Chloride 1,000 ml @  1,000 mls/hr Q1H PRN IV hypotension;  Start 7/17/19 

at 10:18;  Stop 7/17/19 at 16:17;  Status DC


Diphenhydramine HCl (Benadryl) 25 mg 1X PRN  PRN IV ITCHING;  Start 7/17/19 at 

10:30;  Stop 7/18/19 at 10:29;  Status DC


Diphenhydramine HCl (Benadryl) 25 mg 1X PRN  PRN IV ITCHING;  Start 7/17/19 at 

10:30;  Stop 7/18/19 at 10:29;  Status DC


Sodium Chloride 1,000 ml @  400 mls/hr Q2H30M PRN IV PATENCY;  Start 7/17/19 at 

10:18;  Stop 7/17/19 at 22:17;  Status DC


Info (PHARMACY MONITORING -- do not chart) 1 each PRN DAILY  PRN MC SEE 

COMMENTS;  Start 7/17/19 at 10:30;  Status UNV


Midazolam HCl (Versed) 2 mg STK-MED ONCE .ROUTE ;  Start 7/18/19 at 08:12;  Stop

7/18/19 at 08:13;  Status DC


Fentanyl Citrate (Fentanyl 2ml Vial) 100 mcg STK-MED ONCE .ROUTE ;  Start 

7/18/19 at 08:12;  Stop 7/18/19 at 08:13;  Status DC


Lidocaine/ Epinephrine (LIDOCAINE 1%-EPI 1:100,000 Multi-Dose) 20 ml STK-MED 

ONCE .ROUTE ;  Start 7/18/19 at 08:13;  Stop 7/18/19 at 08:14;  Status DC


Cefazolin Sodium 50 ml @ As Directed STK-MED ONCE IV ;  Start 7/18/19 at 08:18; 

Stop 7/18/19 at 08:19;  Status DC


Cefazolin Sodium 50 ml @ As Directed STK-MED ONCE IV ;  Start 7/18/19 at 08:18; 

Stop 7/18/19 at 08:19;  Status DC


Midazolam HCl (Versed) 2 mg STK-MED ONCE .ROUTE ;  Start 7/18/19 at 08:39;  Stop

7/18/19 at 08:40;  Status DC


Midazolam HCl (Versed) 2 mg 1X  ONCE IV  Last administered on 7/18/19at 08:45;  

Start 7/18/19 at 08:45;  Stop 7/18/19 at 08:52;  Status DC


Fentanyl Citrate (Fentanyl 2ml Vial) 100 mcg 1X  ONCE IV  Last administered on 

7/18/19at 08:45;  Start 7/18/19 at 08:45;  Stop 7/18/19 at 08:52;  Status DC


Lidocaine/ Epinephrine (LIDOCAINE 1%-EPI 1:100,000 Multi-Dose) 20 ml 1X  ONCE IJ

 Last administered on 7/18/19at 08:45;  Start 7/18/19 at 08:45;  Stop 7/18/19 at

08:52;  Status DC


Cefazolin Sodium 50 ml @  100 mls/hr 1X  ONCE IV  Last administered on 7/18/19at

08:45;  Start 7/18/19 at 08:45;  Stop 7/18/19 at 09:14;  Status DC


Cefazolin Sodium 50 ml @  100 mls/hr 1X  ONCE IV  Last administered on 7/18/19at

08:45;  Start 7/18/19 at 08:45;  Stop 7/18/19 at 09:14;  Status DC


Furosemide (Lasix) 40 mg 1X  ONCE IVP  Last administered on 7/18/19at 14:44;  

Start 7/18/19 at 14:30;  Stop 7/18/19 at 14:31;  Status DC


Furosemide (Lasix) 40 mg 1X  ONCE IVP  Last administered on 7/19/19at 12:49;  

Start 7/19/19 at 12:30;  Stop 7/19/19 at 12:31;  Status DC


Sodium Chloride 1,000 ml @  1,000 mls/hr Q1H PRN IV hypotension;  Start 7/19/19 

at 16:54;  Stop 7/19/19 at 22:53;  Status DC


Sodium Chloride 1,000 ml @  400 mls/hr Q2H30M PRN IV PATENCY;  Start 7/19/19 at 

16:54;  Stop 7/20/19 at 04:53;  Status DC


Info (PHARMACY MONITORING -- do not chart) 1 each PRN DAILY  PRN MC SEE 

COMMENTS;  Start 7/19/19 at 17:00


Info (PHARMACY MONITORING -- do not chart) 1 each PRN DAILY  PRN MC SEE 

COMMENTS;  Start 7/19/19 at 17:00;  Status UNV


Ceftriaxone Sodium (Rocephin) 1 gm Q24H IVP  Last administered on 7/21/19at 

08:52;  Start 7/20/19 at 10:00;  Stop 7/21/19 at 11:21;  Status DC


Darbepoetin Blayne (ARANESP for DIALYSIS PTS) 60 mcg WEEKLYHS SQ  Last 

administered on 7/20/19at 21:25;  Start 7/20/19 at 21:00


Cephalexin HCl (Keflex) 500 mg TID PO  Last administered on 7/21/19at 13:19;  

Start 7/21/19 at 14:00;  Stop 7/21/19 at 15:57;  Status DC


Cephalexin HCl (Keflex) 500 mg Q24H PO ;  Start 7/22/19 at 14:00





Active Scripts


Active


Reported


Protonix (Pantoprazole Sodium) 20 Mg Tablet.dr 2 Tab PO DAILY


Zyprexa (Olanzapine) 20 Mg Tablet 2 Tab PO QHS


Buspirone Hcl 30 Mg Tablet 1 Tab PO BID


Vitals/I & O





Vital Sign - Last 24 Hours








 7/21/19 7/21/19 7/21/19 7/21/19





 11:00 11:47 13:19 15:00


 


Temp 98.4   97.8





 98.4   97.8


 


Pulse 84   79


 


Resp 16   18


 


B/P (MAP) 120/70 (87)   119/71 (87)


 


Pulse Ox 95   92


 


O2 Delivery Room Air Room Air Room Air Room Air


 


    





    





 7/21/19 7/21/19 7/21/19 7/21/19





 17:40 18:22 18:22 19:00


 


Temp    98.6





    98.6


 


Pulse    88


 


Resp    18


 


B/P (MAP)    131/91 (104)


 


Pulse Ox    93


 


O2 Delivery Room Air Room Air Room Air Room Air


 


    





    





 7/21/19 7/21/19 7/21/19 7/22/19





 20:00 21:54 22:40 03:00


 


Temp   98.8 98.5





   98.8 98.5


 


Pulse  93 93 103


 


Resp   18 19


 


B/P (MAP)  118/79 126/81 (96) 116/75 (89)


 


Pulse Ox   91 96


 


O2 Delivery Room Air  Room Air Room Air


 


    





    





 7/22/19 7/22/19 7/22/19 





 06:49 07:00 08:45 


 


Temp  98.8  





  98.8  


 


Pulse  96  


 


Resp 20 16  


 


B/P (MAP)  108/69 (82)  


 


Pulse Ox 96 94  


 


O2 Delivery Room Air Room Air Room Air 














Intake and Output   


 


 7/21/19 7/21/19 7/22/19





 15:00 23:00 07:00


 


Intake Total 120 ml 350 ml 


 


Balance 120 ml 350 ml 

















GIUSEPPE MONREAL MD           Jul 22, 2019 09:04

## 2019-07-22 NOTE — NUR
ADELAIDA following up with pt dc plan. ADELAIDA is working to get pt's dialysis set up. Per Cathryn 
with [facility], pt has a tentative chair time at the hospitals location on [place], [place], S third 
[place]. [facility] is faxing over a financial form that will have to be completed by pt and 
returned. This form will have to be reviewed and approved prior to starting service. SW will 
await form and provide it to pt. [facility] contact is: Aubrey at [phone] x [phone].

## 2019-07-22 NOTE — NUR
SW following up with pt dc plan. SW attempted to meet with pt to provide financial form that 
must be complete for dialysis set up. Pt was not in room. RN will give pt the form when he 
returns to room for completion. Once its completed, RN will contact SW. SW will collect form 
and send it to [facility]. SW will await completion and proceed accordingly.

## 2019-07-22 NOTE — PDOC2
UROLOGY CONSULT


Date of Consult


Date of Consult


DATE: 7/22/19 


TIME: 09:30





Identification/Chief Complaint


Chief Complaint


Scrotal swelling.





History of Present Illness


Reason for Visit:


This gentleman just got out of alf and unfortunately celebrated by overdosing

on meth.  This choice negatively affected his kidneys and led to EL, 

Rhabdomyolysis and severe metabolic acidosis.  There are plans for dialysis soon

per nephrology team. Urology was consulted because patient has had persistent 

scrotal swelling over the weekend and US revealed a hydrocele.  PT reports that 

he had complained of scrotal pain when walking but when he is at rest he does 

not have this pain.  He first noticed the scrotal swelling four days ago, but 

did not have any pain in this area until yesterday.  The pain is not bad when he

is still, but he does notice scrotal pain with examination or with ambulation.  

He denies a history of falling or any other injury to the area. He reports: 

"this just started happening."  He only urinates once every four days now, in 

light of his severe kidney injury, but does not notice any pain or difficulty 

with urination when he does this.  His urine resembles dark tea/light coffee 

whenever he does go with light sediment present; he just voided this morning.  

Prior to his kidney injury, his urine was yellow and he denies seeing any 

hematuria up until this point.  He denies prostate, bladder, or kidney problems 

in his medical history.  Per nursing they just changed his antibiotic yesterday 

to Keflex 500 q day





Past Medical History


GI:  GERD


Psych:  Anxiety, Addictions, Depression





Past Surgical History


Past Surgical History:  No pertinent history





Family History


Family History:  Hypertension





Social History


<1 pack per day


ALCOHOL:  occassional


Drugs:  Crystal meth, Other (h/o IVDU)





Current Medications


Current Medications





Current Medications


Cephalexin HCl (Keflex) 500 mg Q24H PO ;  Start 7/22/19 at 14:00


Cephalexin HCl (Keflex) 500 mg TID PO  Last administered on 7/21/19at 13:19;  

Start 7/21/19 at 14:00;  Stop 7/21/19 at 15:57;  Status DC


Zolpidem Tartrate (Ambien) 5 mg PRN QHS  PRN PO INSOMNIA;  Start 7/22/19 at 

09:15





Allergies


Allergies:  


Coded Allergies:  


     I S O L A T I O N *CONTACT* (Verified  Allergy, Unknown, 7/11/19)


   


   mrsa


     No Known Medication Allergies (Verified  Allergy, Unknown, 7/11/19)





ROS


Review Of Systems:


CONSTITUTIONAL:        No fever or chills


EYES:                          No recent changes


SKIN:               No rash or itching


CARDIOVASCULAR:     No chest pain, syncope, palpitations, or edema


RESPIRATORY:            No SOB or cough


GASTROINTESTINAL:    Abd swelling


NEUROLOGICAL:          No headaches or weakness


ENDOCRINE:               No cold or heat intolerance


GENITOURINARY:         No urgency or frequency of urination.  Swollen scrotum 


MUSCULOSKELETAL:   No back pain or joint pain


LYMPHATICS:               No enlarged lymph nodes


PSYCHIATRIC:              No anxiety or depression





Physical Exam


Physical Exam:


General: Pleasant, no acute distress, well groomed


Eyes: conjunctiva anicteric, eyes full range of motion


ENT: moist oral mucosa, normal dentition


Neck: Trachea midline, no masses


Respiratory: unlabored breathing, not using accessory muscles


Abdomen: nontender, + generalized edema noted 


Pelvic: Bladder scan reveals PVR of 0. Head of phallus has swelling just below 

head, scrotum shows swelling, unable to appreciate testicles secondary to this. 

No open areas/drainage noted.  Pt reports pain4/10 with exam. Urine visualized: 

dark tea colored with scant "coffee ground"sediment present. 


Skin: no rashes or skin lesions on visualized skin


Extremities: Bilat lower extremities reveal edema 2-3+ pitting from mid thigh 

through ankles.


Psych: normal mood, affect. Alert and oriented x 3.





Vitals


VITALS





Vital Signs








  Date Time  Temp Pulse Resp B/P (MAP) Pulse Ox O2 Delivery O2 Flow Rate FiO2


 


7/22/19 08:45      Room Air  


 


7/22/19 07:00 98.8 96 16 108/69 (82) 94   





 98.8       











Labs


Labs





Laboratory Tests








Test


 7/21/19


06:10 7/22/19


03:10


 


Sodium Level


 128 mmol/L


(136-145) 126 mmol/L


(136-145)


 


Potassium Level


 4.3 mmol/L


(3.5-5.1) 4.3 mmol/L


(3.5-5.1)


 


Chloride Level


 92 mmol/L


() 89 mmol/L


()


 


Carbon Dioxide Level


 27 mmol/L


(21-32) 26 mmol/L


(21-32)


 


Anion Gap 9 (6-14)  11 (6-14) 


 


Blood Urea Nitrogen


 39 mg/dL


(8-26) 46 mg/dL


(8-26)


 


Creatinine


 7.9 mg/dL


(0.7-1.3) 8.9 mg/dL


(0.7-1.3)


 


Estimated GFR


(Cockcroft-Gault) 8.1 


 7.0 





 


Glucose Level


 102 mg/dL


(70-99) 111 mg/dL


(70-99)


 


Calcium Level


 8.3 mg/dL


(8.5-10.1) 8.3 mg/dL


(8.5-10.1)


 


Phosphorus Level


 4.8 mg/dL


(2.6-4.7) 4.5 mg/dL


(2.6-4.7)


 


Albumin


 2.3 g/dL


(3.4-5.0) 2.3 g/dL


(3.4-5.0)


 


White Blood Count


 


 11.7 x10^3/uL


(4.0-11.0)


 


Red Blood Count


 


 2.52 x10^6/uL


(4.30-5.70)


 


Hemoglobin


 


 7.4 g/dL


(13.0-17.5)


 


Hematocrit


 


 21.6 %


(39.0-53.0)


 


Mean Corpuscular Volume  86 fL () 


 


Mean Corpuscular Hemoglobin  29 pg (25-35) 


 


Mean Corpuscular Hemoglobin


Concent 


 34 g/dL


(31-37)


 


Red Cell Distribution Width


 


 14.0 %


(11.5-14.5)


 


Platelet Count


 


 169 x10^3/uL


(140-400)


 


Creatine Kinase


 


 909 U/L


()








Laboratory Tests








Test


 7/22/19


03:10


 


White Blood Count


 11.7 x10^3/uL


(4.0-11.0)


 


Red Blood Count


 2.52 x10^6/uL


(4.30-5.70)


 


Hemoglobin


 7.4 g/dL


(13.0-17.5)


 


Hematocrit


 21.6 %


(39.0-53.0)


 


Mean Corpuscular Volume 86 fL () 


 


Mean Corpuscular Hemoglobin 29 pg (25-35) 


 


Mean Corpuscular Hemoglobin


Concent 34 g/dL


(31-37)


 


Red Cell Distribution Width


 14.0 %


(11.5-14.5)


 


Platelet Count


 169 x10^3/uL


(140-400)


 


Sodium Level


 126 mmol/L


(136-145)


 


Potassium Level


 4.3 mmol/L


(3.5-5.1)


 


Chloride Level


 89 mmol/L


()


 


Carbon Dioxide Level


 26 mmol/L


(21-32)


 


Anion Gap 11 (6-14) 


 


Blood Urea Nitrogen


 46 mg/dL


(8-26)


 


Creatinine


 8.9 mg/dL


(0.7-1.3)


 


Estimated GFR


(Cockcroft-Gault) 7.0 





 


Glucose Level


 111 mg/dL


(70-99)


 


Calcium Level


 8.3 mg/dL


(8.5-10.1)


 


Phosphorus Level


 4.5 mg/dL


(2.6-4.7)


 


Creatine Kinase


 909 U/L


()


 


Albumin


 2.3 g/dL


(3.4-5.0)











Images


Images


IMPRESSION:


1. No focal testicular lesion.


2. Both testicles show evidence of blood flow.


3. Diffusely thickened scrotal wall with edema. Clinically correlate with 


scrotal wall cellulitis.





Assessment/Plan


Assessment/Plan


Scrotal swelling secondary to third spacing of fluid vs cellulitis.  


Antibiotic just changed yesterday, continue per medical team (Keflex 500 q day).


Please keep scrotum elevated with towels to promote drainage. Patient may also 

have ice packs to scrotum PRN pain=Orders given for nursing.


US reviewed.  Recommend repeating if swelling increases dramatically. 


Bladder scan shows PVR of 0 times three= no retention. 


I did recommend that he try to void once per day, even if he doesn't have the 

sensation to do so, just to keep his bladder empty. 


Dr. Lara to round on patient later.











TERE PEREZ               Jul 22, 2019 09:33

## 2019-07-22 NOTE — PDOC
PULMONARY PROGRESS NOTES


Subjective


asked by RN to see him as he has c/o soa


on canula


Vitals





Vital Signs








  Date Time  Temp Pulse Resp B/P (MAP) Pulse Ox O2 Delivery O2 Flow Rate FiO2


 


7/22/19 11:00 98.0 94 16 132/56 (81) 88 Room Air  





 98.0       


 


7/22/19 10:30       2.0 








General:  No acute distress


Lungs:  Clear


Cardiovascular:  S1, S2


Abdomen:  Soft, Non-tender, Other (no mass)


Extremities:  Other (1+edema,)


Skin:  Warm


Labs





Laboratory Tests








Test


 7/21/19


06:10 7/22/19


03:10


 


Sodium Level


 128 mmol/L


(136-145) 126 mmol/L


(136-145)


 


Potassium Level


 4.3 mmol/L


(3.5-5.1) 4.3 mmol/L


(3.5-5.1)


 


Chloride Level


 92 mmol/L


() 89 mmol/L


()


 


Carbon Dioxide Level


 27 mmol/L


(21-32) 26 mmol/L


(21-32)


 


Anion Gap 9 (6-14)  11 (6-14) 


 


Blood Urea Nitrogen


 39 mg/dL


(8-26) 46 mg/dL


(8-26)


 


Creatinine


 7.9 mg/dL


(0.7-1.3) 8.9 mg/dL


(0.7-1.3)


 


Estimated GFR


(Cockcroft-Gault) 8.1 


 7.0 





 


Glucose Level


 102 mg/dL


(70-99) 111 mg/dL


(70-99)


 


Calcium Level


 8.3 mg/dL


(8.5-10.1) 8.3 mg/dL


(8.5-10.1)


 


Phosphorus Level


 4.8 mg/dL


(2.6-4.7) 4.5 mg/dL


(2.6-4.7)


 


Albumin


 2.3 g/dL


(3.4-5.0) 2.3 g/dL


(3.4-5.0)


 


White Blood Count


 


 11.7 x10^3/uL


(4.0-11.0)


 


Red Blood Count


 


 2.52 x10^6/uL


(4.30-5.70)


 


Hemoglobin


 


 7.4 g/dL


(13.0-17.5)


 


Hematocrit


 


 21.6 %


(39.0-53.0)


 


Mean Corpuscular Volume  86 fL () 


 


Mean Corpuscular Hemoglobin  29 pg (25-35) 


 


Mean Corpuscular Hemoglobin


Concent 


 34 g/dL


(31-37)


 


Red Cell Distribution Width


 


 14.0 %


(11.5-14.5)


 


Platelet Count


 


 169 x10^3/uL


(140-400)


 


Creatine Kinase


 


 909 U/L


()








Laboratory Tests








Test


 7/22/19


03:10


 


White Blood Count


 11.7 x10^3/uL


(4.0-11.0)


 


Red Blood Count


 2.52 x10^6/uL


(4.30-5.70)


 


Hemoglobin


 7.4 g/dL


(13.0-17.5)


 


Hematocrit


 21.6 %


(39.0-53.0)


 


Mean Corpuscular Volume 86 fL () 


 


Mean Corpuscular Hemoglobin 29 pg (25-35) 


 


Mean Corpuscular Hemoglobin


Concent 34 g/dL


(31-37)


 


Red Cell Distribution Width


 14.0 %


(11.5-14.5)


 


Platelet Count


 169 x10^3/uL


(140-400)


 


Sodium Level


 126 mmol/L


(136-145)


 


Potassium Level


 4.3 mmol/L


(3.5-5.1)


 


Chloride Level


 89 mmol/L


()


 


Carbon Dioxide Level


 26 mmol/L


(21-32)


 


Anion Gap 11 (6-14) 


 


Blood Urea Nitrogen


 46 mg/dL


(8-26)


 


Creatinine


 8.9 mg/dL


(0.7-1.3)


 


Estimated GFR


(Cockcroft-Gault) 7.0 





 


Glucose Level


 111 mg/dL


(70-99)


 


Calcium Level


 8.3 mg/dL


(8.5-10.1)


 


Phosphorus Level


 4.5 mg/dL


(2.6-4.7)


 


Creatine Kinase


 909 U/L


()


 


Albumin


 2.3 g/dL


(3.4-5.0)








Medications





Active Scripts








 Medications  Dose


 Route/Sig


 Max Daily Dose Days Date Category


 


 Protonix


  (Pantoprazole


 Sodium) 20 Mg


 Tablet.dr  2 Tab


 PO DAILY


   7/10/19 Reported


 


 Zyprexa


  (Olanzapine) 20


 Mg Tablet  2 Tab


 PO QHS


   7/10/19 Reported


 


 Buspirone Hcl 30


 Mg Tablet  1 Tab


 PO BID


   7/10/19 Reported








Comments


7/14, cxr reviewed  increased vm





Impression


.


1.  Status post fall with acute rhabdomyolysis with markedly high CPKs and acute


renal failure.


2.  Acute kidney injury secondary to rhabdomyolysis.


3.  Severe metabolic acidosis secondary to acute kidney injury.  Clinically,


less likely sepsis.  Lactic acid is 1.1, improved MA


4.  Hyponatremia.improving


5.  Moderate protein-calorie malnutrition.


6.  Leukocytosis, likely reactive.


7.  Status post fall with abnormal thoracic MRI.  Neurosurgery following.


8.  Abnormal liver function test secondary to hypoperfusion and rhabdomyolysis.


9.  Hypocalcemia.improving


10. Dyspnea today, will get cxr to r/o CHF





Plan


.





1.  cxr today


2.  Follow Renal's recommendation.


3.  HD





Discussed with SIENA JOY MD                 Jul 22, 2019 11:41

## 2019-07-22 NOTE — PDOC
Renal-Progress Notes


Subjective Notes


Notes


NOTHING NEW





History of Present Illness


Hx of present illness


STABLE





Vitals


Vitals





Vital Signs








  Date Time  Temp Pulse Resp B/P (MAP) Pulse Ox O2 Delivery O2 Flow Rate FiO2


 


7/22/19 10:30      Nasal Cannula 2.0 


 


7/22/19 09:00  96  108/69    


 


7/22/19 07:00 98.8  16  94   





 98.8       








Weight


Weight [ ]





I.O.


Intake and Output











Intake and Output 


 


 7/22/19





 06:59


 


Intake Total 470 ml


 


Balance 470 ml


 


 


 


Intake Oral 470 ml


 


# Voids 4











Labs


Labs





Laboratory Tests








Test


 7/22/19


03:10


 


White Blood Count


 11.7 x10^3/uL


(4.0-11.0)


 


Red Blood Count


 2.52 x10^6/uL


(4.30-5.70)


 


Hemoglobin


 7.4 g/dL


(13.0-17.5)


 


Hematocrit


 21.6 %


(39.0-53.0)


 


Mean Corpuscular Volume 86 fL () 


 


Mean Corpuscular Hemoglobin 29 pg (25-35) 


 


Mean Corpuscular Hemoglobin


Concent 34 g/dL


(31-37)


 


Red Cell Distribution Width


 14.0 %


(11.5-14.5)


 


Platelet Count


 169 x10^3/uL


(140-400)


 


Sodium Level


 126 mmol/L


(136-145)


 


Potassium Level


 4.3 mmol/L


(3.5-5.1)


 


Chloride Level


 89 mmol/L


()


 


Carbon Dioxide Level


 26 mmol/L


(21-32)


 


Anion Gap 11 (6-14) 


 


Blood Urea Nitrogen


 46 mg/dL


(8-26)


 


Creatinine


 8.9 mg/dL


(0.7-1.3)


 


Estimated GFR


(Cockcroft-Gault) 7.0 





 


Glucose Level


 111 mg/dL


(70-99)


 


Calcium Level


 8.3 mg/dL


(8.5-10.1)


 


Phosphorus Level


 4.5 mg/dL


(2.6-4.7)


 


Creatine Kinase


 909 U/L


()


 


Albumin


 2.3 g/dL


(3.4-5.0)











Micro


Micro





Microbiology


7/10/19 Blood Culture - Final, Complete


          NO GROWTH AFTER 5 DAYS


7/10/19 CSF Gram Stain - Final, Complete


          


7/9/19 Urine Culture - Final, Complete


         


7/9/19 Urine Culture Result 1 (RUI) - Final, Complete





Review of Systems


Constitutional:  yes: weakness, alert, oriented


Ears/Nose/Throat:  Yes: no symptom reported


Eyes:  Yes: no symptom reported


Pulmonary:  Yes no symptom reported


Cardiovascular:  Yes no symptom reported


Gastrointestional:  Yes: no symptom reported


Genitourinary:  Yes: no symptom reported


Skin:  Yes no symptom reported


Psychiatric/Neurological:  Yes: no symptom reported


Endocrine:  Yes: no symptom reported





Physical Exam


Skin:  warm


Respiratory:  decreased breath sounds


Heart:  S1S2, RRR


Abdomen:  soft, bowel sounds present


Genitourinary:  bladder flat


Extremities:  pulses present


Neurology:  alert, oriented





Assessment


Assessment


IMP





EL DUE RHABDO-NO RECOVERY YET-ANURIC


RHABDOMYOLYSIS-CPK OF OVER 100,000 AT PEAK-NOW 1440


URINARY RETENTION


SCROTAL EDEMA





PLAN





ARANESP


WILL NEED TO HAVE SW SET UP OP HD


HD TODAY


UF TO [facility] AND [facility]


HAS SOME CHANCE OF RECOVERY BUT WILL PROB TAKE WEEKS IF NOT MONTHS











JROGE CRYTSAL MD                 Jul 22, 2019 11:13

## 2019-07-23 VITALS — DIASTOLIC BLOOD PRESSURE: 68 MMHG | SYSTOLIC BLOOD PRESSURE: 109 MMHG

## 2019-07-23 VITALS — SYSTOLIC BLOOD PRESSURE: 120 MMHG | DIASTOLIC BLOOD PRESSURE: 64 MMHG

## 2019-07-23 VITALS — DIASTOLIC BLOOD PRESSURE: 65 MMHG | SYSTOLIC BLOOD PRESSURE: 122 MMHG

## 2019-07-23 VITALS — SYSTOLIC BLOOD PRESSURE: 119 MMHG | DIASTOLIC BLOOD PRESSURE: 65 MMHG

## 2019-07-23 LAB
ALBUMIN SERPL-MCNC: 2.4 G/DL (ref 3.4–5)
ANION GAP SERPL CALC-SCNC: 11 MMOL/L (ref 6–14)
BUN SERPL-MCNC: 33 MG/DL (ref 8–26)
CALCIUM SERPL-MCNC: 8.4 MG/DL (ref 8.5–10.1)
CHLORIDE SERPL-SCNC: 97 MMOL/L (ref 98–107)
CO2 SERPL-SCNC: 28 MMOL/L (ref 21–32)
CREAT SERPL-MCNC: 7.1 MG/DL (ref 0.7–1.3)
GFR SERPLBLD BASED ON 1.73 SQ M-ARVRAT: 9.1 ML/MIN
GLUCOSE SERPL-MCNC: 111 MG/DL (ref 70–99)
HCT VFR BLD CALC: 21.6 % (ref 39–53)
HGB BLD-MCNC: 7.4 G/DL (ref 13–17.5)
MCHC RBC AUTO-ENTMCNC: 34 G/DL (ref 31–37)
PHOSPHATE SERPL-MCNC: 4 MG/DL (ref 2.6–4.7)
POTASSIUM SERPL-SCNC: 4.5 MMOL/L (ref 3.5–5.1)
SODIUM SERPL-SCNC: 136 MMOL/L (ref 136–145)

## 2019-07-23 RX ADMIN — LABETALOL HCL SCH MG: 100 TABLET, FILM COATED ORAL at 08:37

## 2019-07-23 RX ADMIN — POLYETHYLENE GLYCOL 3350 SCH GM: 17 POWDER, FOR SOLUTION ORAL at 08:38

## 2019-07-23 RX ADMIN — LIDOCAINE SCH PATCH: 50 PATCH CUTANEOUS at 08:37

## 2019-07-23 RX ADMIN — PANTOPRAZOLE SODIUM SCH MG: 40 TABLET, DELAYED RELEASE ORAL at 08:37

## 2019-07-23 RX ADMIN — Medication SCH CAP: at 08:37

## 2019-07-23 RX ADMIN — CEPHALEXIN SCH MG: 250 CAPSULE ORAL at 13:56

## 2019-07-23 RX ADMIN — MORPHINE SULFATE PRN MG: 4 INJECTION, SOLUTION INTRAMUSCULAR; INTRAVENOUS at 02:18

## 2019-07-23 NOTE — PDOC
PROGRESS NOTES


Assessment


Problems


Medical Problems:


(1) Elevated troponin


Status: Acute  





(2) Hepatorenal syndrome


Status: Acute  





(3) Hyperkalemia


Status: Acute  





(4) Hyponatremia


Status: Acute  





(5) Methamphetamine abuse


Status: Acute  





(6) Neurological complaint


Status: Acute  





(7) Rhabdomyolysis


Status: Acute  





(8) Urinary retention


Status: Acute  





(9) Urinary tract infection


Status: Acute  





Bilateral leg weakness and numbness, component of factitious disorder


But he has rhabdomyolysis, edema of the psoas muscle on the MRI, lumbar epidural

lipomatosis


Methamphetamine abuse.


Scrotal edema


Patient continuing to improve


He requests to stay 1-2 more days


Plan


Continue supportive care for rhabdomyolysis, renal insufficiency, and other 

medical problems. He is getting dialysis


Urology saw


I don't think neurosurgery has to see him again given marked improvement


Physical and occupational therapy.


Follow-up with neurology as needed


Subjective


Complains of scrotal and leg pain, back pain


Objective





Vital Signs








  Date Time  Temp Pulse Resp B/P (MAP) Pulse Ox O2 Delivery O2 Flow Rate FiO2


 


7/23/19 11:00 98.0 74 18 120/64 (82) 94 Room Air  





 98.0       


 


7/22/19 19:00       2.0 














Intake and Output 


 


 7/23/19





 07:00


 


Intake Total 1560 ml


 


Output Total 300 ml


 


Balance 1260 ml


 


 


 


Intake Oral 1560 ml


 


Output Urine Total 300 ml


 


# Voids 1








PHYSICAL EXAM


Alert. Oriented to time, place and person.


PERRL.


EOMI.


CN: no focal findings.


Muscle tone: normal.


Muscle strength: 5/5 arms, 4/5 legs


DTR: 2+


Plantar reflex: flexor


Gait: Actually walks fairly well with his walker, nurse observed him walking 

independently of walker.


Sensory exam: no abnormal findings.


No cerebellar signs elicited.


Review of Relevant


I have reviewed the following items estrella (where applicable) has been applied.


Labs





Laboratory Tests








Test


 7/22/19


03:10 7/23/19


04:00


 


White Blood Count


 11.7 x10^3/uL


(4.0-11.0) 





 


Red Blood Count


 2.52 x10^6/uL


(4.30-5.70) 





 


Hemoglobin


 7.4 g/dL


(13.0-17.5) 7.4 g/dL


(13.0-17.5)


 


Hematocrit


 21.6 %


(39.0-53.0) 21.6 %


(39.0-53.0)


 


Mean Corpuscular Volume 86 fL ()  


 


Mean Corpuscular Hemoglobin 29 pg (25-35)  


 


Mean Corpuscular Hemoglobin


Concent 34 g/dL


(31-37) 34 g/dL


(31-37)


 


Red Cell Distribution Width


 14.0 %


(11.5-14.5) 





 


Platelet Count


 169 x10^3/uL


(140-400) 





 


Sodium Level


 126 mmol/L


(136-145) 136 mmol/L


(136-145)


 


Potassium Level


 4.3 mmol/L


(3.5-5.1) 4.5 mmol/L


(3.5-5.1)


 


Chloride Level


 89 mmol/L


() 97 mmol/L


()


 


Carbon Dioxide Level


 26 mmol/L


(21-32) 28 mmol/L


(21-32)


 


Anion Gap 11 (6-14)  11 (6-14) 


 


Blood Urea Nitrogen


 46 mg/dL


(8-26) 33 mg/dL


(8-26)


 


Creatinine


 8.9 mg/dL


(0.7-1.3) 7.1 mg/dL


(0.7-1.3)


 


Estimated GFR


(Cockcroft-Gault) 7.0 


 9.1 





 


Glucose Level


 111 mg/dL


(70-99) 111 mg/dL


(70-99)


 


Calcium Level


 8.3 mg/dL


(8.5-10.1) 8.4 mg/dL


(8.5-10.1)


 


Phosphorus Level


 4.5 mg/dL


(2.6-4.7) 4.0 mg/dL


(2.6-4.7)


 


Creatine Kinase


 909 U/L


() 





 


Albumin


 2.3 g/dL


(3.4-5.0) 2.4 g/dL


(3.4-5.0)








Laboratory Tests








Test


 7/23/19


04:00


 


Hemoglobin


 7.4 g/dL


(13.0-17.5)


 


Hematocrit


 21.6 %


(39.0-53.0)


 


Mean Corpuscular Hemoglobin


Concent 34 g/dL


(31-37)


 


Sodium Level


 136 mmol/L


(136-145)


 


Potassium Level


 4.5 mmol/L


(3.5-5.1)


 


Chloride Level


 97 mmol/L


()


 


Carbon Dioxide Level


 28 mmol/L


(21-32)


 


Anion Gap 11 (6-14) 


 


Blood Urea Nitrogen


 33 mg/dL


(8-26)


 


Creatinine


 7.1 mg/dL


(0.7-1.3)


 


Estimated GFR


(Cockcroft-Gault) 9.1 





 


Glucose Level


 111 mg/dL


(70-99)


 


Calcium Level


 8.4 mg/dL


(8.5-10.1)


 


Phosphorus Level


 4.0 mg/dL


(2.6-4.7)


 


Albumin


 2.4 g/dL


(3.4-5.0)








Microbiology


7/10/19 Blood Culture - Final, Complete


          NO GROWTH AFTER 5 DAYS


7/10/19 CSF Gram Stain - Final, Complete


          


7/9/19 Urine Culture - Final, Complete


         


7/9/19 Urine Culture Result 1 (RUI) - Final, Complete


Medications





Current Medications


Morphine Sulfate (Morphine Sulfate) 4 mg 1X  ONCE IV  Last administered on 

7/9/19at 21:20;  Start 7/9/19 at 21:30;  Stop 7/9/19 at 21:31;  Status DC


Sodium Chloride 1,000 ml @  1,000 mls/hr 1X  ONCE IV  Last administered on 

7/9/19at 22:30;  Start 7/9/19 at 22:30;  Stop 7/9/19 at 23:29;  Status DC


Calcium Gluconate (Calcium Gluconate) 1,000 mg 1X  ONCE IVP  Last administered 

on 7/9/19at 22:43;  Start 7/9/19 at 23:00;  Stop 7/9/19 at 23:01;  Status DC


Insulin Human Regular (HumuLIN R VIAL) 10 unit 1X  ONCE IV  Last administered on

7/9/19at 23:51;  Start 7/9/19 at 23:00;  Stop 7/9/19 at 23:01;  Status DC


Dextrose (Dextrose 50%-Water Syringe) 25 gm 1X  ONCE IV  Last administered on 

7/9/19at 22:42;  Start 7/9/19 at 23:00;  Stop 7/9/19 at 23:01;  Status DC


Sodium Bicarbonate (Sodium Bicarb Adult 8.4% Syr) 50 meq 1X  ONCE IV  Last 

administered on 7/9/19at 23:49;  Start 7/9/19 at 23:00;  Stop 7/9/19 at 23:01;  

Status DC


Sodium Chloride 1,000 ml @  1,000 mls/hr 1X  ONCE IV  Last administered on 

7/9/19at 23:50;  Start 7/9/19 at 23:00;  Stop 7/9/19 at 23:59;  Status DC


Albuterol Sulfate (Ventolin Neb Soln) 10 mg 1X  ONCE CONT NEB  Last administered

on 7/10/19at 00:30;  Start 7/9/19 at 23:00;  Stop 7/9/19 at 23:01;  Status DC


Ceftriaxone Sodium (Rocephin) 1 gm 1X  ONCE IVP  Last administered on 7/9/19at 

22:43;  Start 7/9/19 at 23:00;  Stop 7/9/19 at 23:01;  Status DC


Sodium Chloride 1,000 ml @  1,000 mls/hr 1X  ONCE IV  Last administered on 

7/10/19at 01:00;  Start 7/10/19 at 01:00;  Stop 7/10/19 at 01:59;  Status DC


Lorazepam (Ativan Inj) 0.5 mg PRN Q6HRS  PRN IV ANXIETY / AGITATION Last 

administered on 7/10/19at 11:00;  Start 7/10/19 at 01:15;  Stop 7/11/19 at 

08:48;  Status DC


Ondansetron HCl (Zofran) 4 mg PRN Q6HRS  PRN IV NAUSEA/VOMITING 1ST CHOICE;  

Start 7/10/19 at 01:15;  Stop 7/10/19 at 01:22;  Status DC


Sodium Chloride (Normal Saline Flush) 3 ml QSHIFT  PRN IV AFTER MEDS AND BLOOD 

DRAWS;  Start 7/10/19 at 01:15


Sodium Chloride 1,000 ml @  175 mls/hr Q5H43M IV ;  Start 7/10/19 at 01:08;  

Stop 7/10/19 at 01:23;  Status DC


Ondansetron HCl (Zofran) 4 mg PRN Q8HRS  PRN IV NAUSEA/VOMITING  1ST CHOICE;  St

art 7/10/19 at 01:15;  Stop 7/11/19 at 01:14;  Status DC


Sodium Chloride 1,000 ml @  150 mls/hr Q6H40M IV ;  Start 7/10/19 at 01:30;  

Stop 7/10/19 at 18:49;  Status DC


Morphine Sulfate (Morphine Sulfate) 4 mg PRN Q4HRS  PRN IV SEVERE PAIN Last 

administered on 7/23/19at 02:18;  Start 7/10/19 at 09:00


Sodium Bicarbonate (Sodium Bicarb Adult 8.4% Syr) 100 meq 1X  ONCE IV  Last 

administered on 7/10/19at 10:02;  Start 7/10/19 at 10:00;  Stop 7/10/19 at 

10:01;  Status DC


Sodium Chloride 1,000 ml @  1,000 mls/hr 1X  ONCE IV  Last administered on 

7/10/19at 09:56;  Start 7/10/19 at 10:00;  Stop 7/10/19 at 10:59;  Status DC


Lidocaine/Sodium Bicarbonate (Buffered Lidocaine 1%) 3 ml STK-MED ONCE .ROUTE ; 

Start 7/10/19 at 11:07;  Stop 7/10/19 at 11:08;  Status DC


Calcium Gluconate (Calcium Gluconate) 2,000 mg 1X  ONCE IVP  Last administered 

on 7/10/19at 13:38;  Start 7/10/19 at 12:00;  Stop 7/10/19 at 12:01;  Status DC


Ceftriaxone Sodium (Rocephin) 1 gm Q24H IVP  Last administered on 7/10/19at 

12:49;  Start 7/10/19 at 13:00;  Stop 7/11/19 at 07:45;  Status DC


Silver Sulfadiazine (Silvadene) 1 mack BID TP  Last administered on 7/11/19at 

10:06;  Start 7/10/19 at 13:00;  Stop 7/11/19 at 15:04;  Status DC


Sodium Chloride 1,000 ml @  1,000 mls/hr 1X  ONCE IV  Last administered on 

7/10/19at 12:47;  Start 7/10/19 at 12:45;  Stop 7/10/19 at 13:44;  Status DC


Pantoprazole Sodium (Protonix) 40 mg DAILYAC PO ;  Start 7/10/19 at 13:30;  Stop

7/10/19 at 19:22;  Status DC


Polyethylene Glycol (miraLAX PACKET) 17 gm DAILY PO  Last administered on 

7/23/19at 08:38;  Start 7/10/19 at 13:30


Lidocaine/Sodium Bicarbonate (Buffered Lidocaine 1%) 3 ml STK-MED ONCE .ROUTE ; 

Start 7/10/19 at 13:48;  Stop 7/10/19 at 13:49;  Status DC


Haloperidol Lactate (Haldol Inj) 1 mg PRN Q8HRS  PRN IVP AGITATION Last 

administered on 7/12/19at 07:56;  Start 7/10/19 at 14:00


Lorazepam (Ativan Inj) 2 mg PRN Q2HRS  PRN IV ANXIETY. Last administered on 

7/14/19at 21:22;  Start 7/10/19 at 14:00;  Stop 7/15/19 at 10:17;  Status DC


Lidocaine/Sodium Bicarbonate (Buffered Lidocaine 1%) 6 ml 1X  ONCE INJ ;  Start 

7/10/19 at 14:15;  Stop 7/10/19 at 14:16;  Status DC


Dexmedetomidine HCl 200 mcg/ Sodium Chloride 50 ml @ 0 mls/hr CONT  PRN IV PER 

PROTOCOL Last administered on 7/13/19at 10:28;  Start 7/10/19 at 14:15;  Stop 

7/15/19 at 10:17;  Status DC


Sodium Chloride 500 ml @  500 mls/hr 1X PRN  PRN IV SEE COMMENTS;  Start 7/10/19

at 14:15;  Stop 7/18/19 at 14:54;  Status DC


Atropine Sulfate (ATROPINE 0.5mg SYRINGE) 0.5 mg PRN Q5MIN  PRN IV SEE COMMENTS;

 Start 7/10/19 at 14:15


Sodium Chloride 1,000 ml @  1,000 mls/hr Q1H PRN IV hypotension;  Start 7/10/19 

at 15:08;  Stop 7/10/19 at 21:07;  Status DC


Diphenhydramine HCl (Benadryl) 25 mg 1X PRN  PRN IV ITCHING;  Start 7/10/19 at 

15:15;  Stop 7/11/19 at 11:19;  Status DC


Diphenhydramine HCl (Benadryl) 25 mg 1X PRN  PRN IV ITCHING;  Start 7/10/19 at 

15:15;  Stop 7/11/19 at 11:19;  Status DC


Sodium Chloride 1,000 ml @  400 mls/hr Q2H30M PRN IV PATENCY;  Start 7/10/19 at 

15:08;  Stop 7/11/19 at 03:07;  Status DC


Info (PHARMACY MONITORING -- do not chart) 1 each PRN DAILY  PRN MC SEE 

COMMENTS;  Start 7/10/19 at 15:15;  Stop 7/11/19 at 11:18;  Status DC


Pantoprazole Sodium (PROTONIX VIAL for IV PUSH) 40 mg DAILYAC IVP  Last 

administered on 7/15/19at 10:04;  Start 7/11/19 at 07:30;  Stop 7/15/19 at 

11:54;  Status DC


Linezolid (Zyvox) 600 mg BID PO  Last administered on 7/17/19at 20:55;  Start 

7/11/19 at 09:00;  Stop 7/17/19 at 22:00;  Status DC


Ceftriaxone Sodium (Rocephin) 2 gm Q24H IVP  Last administered on 7/14/19at 

12:14;  Start 7/11/19 at 13:00;  Stop 7/15/19 at 10:06;  Status DC


Calcium Gluconate 2000 mg/Dextrose 120 ml @  220 mls/hr 1X  ONCE IV  Last 

administered on 7/11/19at 10:05;  Start 7/11/19 at 10:00;  Stop 7/11/19 at 

10:32;  Status DC


Sodium Chloride 1,000 ml @  1,000 mls/hr Q1H PRN IV hypotension;  Start 7/11/19 

at 11:06;  Stop 7/11/19 at 17:05;  Status DC


Diphenhydramine HCl (Benadryl) 25 mg 1X PRN  PRN IV ITCHING;  Start 7/11/19 at 

11:15;  Stop 7/12/19 at 11:14;  Status DC


Diphenhydramine HCl (Benadryl) 25 mg 1X PRN  PRN IV ITCHING;  Start 7/11/19 at 

11:15;  Stop 7/12/19 at 11:14;  Status DC


Sodium Chloride 1,000 ml @  400 mls/hr Q2H30M PRN IV PATENCY;  Start 7/11/19 at 

11:06;  Stop 7/11/19 at 23:05;  Status DC


Info (PHARMACY MONITORING -- do not chart) 1 each PRN DAILY  PRN MC SEE 

COMMENTS;  Start 7/11/19 at 11:15;  Status Cancel


Potassium Chloride 10 meq/ Calcium Chloride 12.5 meq/ Bicarbonate Dialysis Soln 

w/ out KCl 5,013.9286 ml @ 1,000 mls/hr Q5H1M IV ;  Start 7/12/19 at 13:00;  

Status Cancel


Potassium Chloride 20 meq/ Calcium Chloride 12.5 meq/ Bicarbonate Dialysis Soln 

w/ out KCl 5,018.9286 ml @ 1,500 mls/hr Q3H21M IV ;  Start 7/12/19 at 13:00;  St

op 7/12/19 at 13:00;  Status DC


Heparin Sodium (Porcine) (Heparin Sodium) 4,000 unit 1X  ONCE IV  Last 

administered on 7/12/19at 19:54;  Start 7/12/19 at 12:00;  Stop 7/12/19 at 

12:11;  Status DC


Heparin Sodium/ Dextrose 500 ml @ 0 mls/hr CONT  PRN IV SEE I/O RECORD Last 

administered on 7/13/19at 15:05;  Start 7/12/19 at 12:00;  Stop 7/14/19 at 

08:03;  Status DC


Heparin Sodium (Porcine) (Heparin Sodium) 2,500 unit PRN Q6HRS  PRN IV FOR UFH 

LEVEL LESS THAN 0.2;  Start 7/12/19 at 12:00;  Stop 7/15/19 at 13:16;  Status DC


Potassium Chloride 10 meq/ Calcium Chloride 12.5 meq/ Bicarbonate Dialysis Soln 

w/ out KCl 5,013.9286 ml @ 1,000 mls/hr Q5H1M IV ;  Start 7/12/19 at 13:00;  

Stop 7/12/19 at 13:00;  Status DC


Potassium Chloride 10 meq/ Bicarbonate Dialysis Soln w/ out KCl 5,005 ml @  

1,000 mls/hr Q5H1M IV ;  Start 7/12/19 at 13:00;  Stop 7/12/19 at 13:00;  Status

DC


Potassium Chloride 20 meq/ Bicarbonate Dialysis Soln w/ out KCl 5,010 ml @  

1,500 mls/hr Q3H21M IV ;  Start 7/12/19 at 13:00;  Stop 7/12/19 at 13:00;  

Status DC


Potassium Chloride 10 meq/ Bicarbonate Dialysis Soln w/ out KCl 5,005 ml @  

1,500 mls/hr Q3H21M IV ;  Start 7/12/19 at 13:00;  Stop 7/12/19 at 13:00;  

Status DC


Potassium Chloride 20 meq/ Bicarbonate Dialysis Soln w/ out KCl 5,010 ml @  

1,000 mls/hr Q5H1M IV  Last administered on 7/13/19at 20:43;  Start 7/12/19 at 

13:00;  Stop 7/14/19 at 08:03;  Status DC


Potassium Chloride 20 meq/ Bicarbonate Dialysis Soln w/ out KCl 5,010 ml @  

1,500 mls/hr Q3H21M IV ;  Start 7/12/19 at 12:45;  Stop 7/12/19 at 12:45;  

Status DC


Potassium Chloride 40 meq/ Bicarbonate Dialysis Soln w/ out KCl 5,020 ml @  

1,500 mls/hr Q3H21M IV ;  Start 7/12/19 at 13:00;  Stop 7/12/19 at 13:10;  

Status DC


Potassium Chloride 60 meq/ Bicarbonate Dialysis Soln w/ out KCl 5,030 ml @  

1,500 mls/hr Q3H22M IV ;  Start 7/12/19 at 13:00;  Stop 7/12/19 at 13:07;  

Status DC


Potassium Chloride 30 meq/ Bicarbonate Dialysis Soln w/ out KCl 5,015 ml @  

1,500 mls/hr Q3H21M IV ;  Start 7/12/19 at 13:15;  Stop 7/12/19 at 18:00;  

Status DC


Potassium Chloride 20 meq/ Bicarbonate Dialysis Soln w/ out KCl 5,010 ml @  

1,500 mls/hr Q3H21M IV  Last administered on 7/12/19at 21:06;  Start 7/12/19 at 

13:10;  Stop 7/12/19 at 18:00;  Status DC


Potassium Chloride 30 meq/ Bicarbonate Dialysis Soln w/ out KCl 5,015 ml @  

1,500 mls/hr Q3H21M IV ;  Start 7/12/19 at 18:00;  Stop 7/12/19 at 19:10;  

Status DC


Potassium Chloride 20 meq/ Bicarbonate Dialysis Soln w/ out KCl 5,010 ml @  

1,500 mls/hr Q3H21M IV  Last administered on 7/14/19at 00:15;  Start 7/12/19 at 

18:01;  Stop 7/14/19 at 08:03;  Status DC


Potassium Chloride 20 meq/ Bicarbonate Dialysis Soln w/ out KCl 5,010 ml @  

1,500 mls/hr Q3H21M IV  Last administered on 7/14/19at 00:18;  Start 7/12/19 at 

20:00;  Stop 7/14/19 at 08:03;  Status DC


Lactobacillus Rhamnosus (Culturelle) 1 cap BID PO  Last administered on 

7/23/19at 08:37;  Start 7/14/19 at 21:00


Oxycodone HCl (Roxicodone) 5 mg PRN Q4HRS  PRN PO MODERATE TO SEVERE PAIN Last 

administered on 7/23/19at 08:38;  Start 7/14/19 at 11:45


Lorazepam (Ativan Inj) 1 mg PRN Q8HRS  PRN IV ANXIETY.;  Start 7/14/19 at 16:00


Gabapentin (Neurontin) 300 mg QHS PO  Last administered on 7/22/19at 21:19;  

Start 7/14/19 at 21:00


Labetalol HCl (Normodyne Iv Push) 10 mg PRN Q4HRS  PRN IVP HYPERTENSION Last 

administered on 7/15/19at 03:33;  Start 7/14/19 at 23:30;  Stop 7/15/19 at 

09:26;  Status DC


Labetalol HCl (Normodyne Iv Push) 20 mg PRN Q4HRS  PRN IVP HYPERTENSION;  Start 

7/15/19 at 09:30;  Stop 7/16/19 at 19:10;  Status DC


Ondansetron HCl (Zofran) 4 mg PRN Q6HRS  PRN IV NAUSEA/VOMITING Last 

administered on 7/21/19at 08:51;  Start 7/15/19 at 09:30


Acetaminophen (Tylenol) 500 mg PRN Q6HRS  PRN PO MILD PAIN / TEMP;  Start 

7/15/19 at 09:30


Lidocaine (Lidoderm) 1 patch DAILY TD  Last administered on 7/23/19at 08:37;  

Start 7/15/19 at 10:00


Miscellaneous (Lidoderm Patch Removal) 1 ea QHS MC  Last administered on 

7/21/19at 21:00;  Start 7/15/19 at 21:00


Sodium Chloride 1,000 ml @  1,000 mls/hr Q1H PRN IV hypotension;  Start 7/15/19 

at 11:26;  Stop 7/15/19 at 17:25;  Status DC


Albumin Human 200 ml @  200 mls/hr 1X PRN  PRN IV Hypotension;  Start 7/15/19 at

11:30;  Stop 7/15/19 at 17:29;  Status DC


Sodium Chloride (Normal Saline Flush) 10 ml 1X PRN  PRN IV AP catheter pack;  

Start 7/15/19 at 11:30;  Stop 7/16/19 at 11:29;  Status DC


Sodium Chloride (Normal Saline Flush) 10 ml 1X PRN  PRN IV  catheter pack;  

Start 7/15/19 at 11:30;  Stop 7/16/19 at 11:29;  Status DC


Sodium Chloride 1,000 ml @  400 mls/hr Q2H30M PRN IV PATENCY;  Start 7/15/19 at 

11:26;  Stop 7/15/19 at 23:25;  Status DC


Info (PHARMACY MONITORING -- do not chart) 1 each PRN DAILY  PRN MC SEE 

COMMENTS;  Start 7/15/19 at 11:30;  Status UNV


Info (PHARMACY MONITORING -- do not chart) 1 each PRN DAILY  PRN MC SEE 

COMMENTS;  Start 7/15/19 at 11:30;  Status Cancel


Pantoprazole Sodium (Protonix) 40 mg DAILYAC PO  Last administered on 7/23/19at 

08:37;  Start 7/16/19 at 07:30


Labetalol HCl (Trandate) 100 mg BID PO  Last administered on 7/16/19at 19:40;  

Start 7/16/19 at 09:00;  Stop 7/17/19 at 09:13;  Status DC


Lorazepam (Ativan) 0.5 mg PRN Q8HRS  PRN PO ANXIETY / AGITATION, 1st CHOIC;  

Start 7/16/19 at 09:00


Haloperidol (Haldol) 0.5 mg PRN QID  PRN PO AGITATION, 2ND CHOICE;  Start 

7/16/19 at 09:00;  Stop 7/16/19 at 09:03;  Status DC


Haloperidol Lactate (HALDOL 2mg ORAL CONC) 0.5 mg PRN QID  PRN PO AGITATION, 2ND

CHOICE;  Start 7/16/19 at 09:03


Metoprolol Tartrate (Lopressor Vial) 10 mg PRN Q6HRS  PRN IVP HYPERTENSION;  

Start 7/16/19 at 19:15


Labetalol HCl (Trandate) 200 mg BID PO  Last administered on 7/23/19at 08:37;  

Start 7/17/19 at 10:00


Sodium Chloride 1,000 ml @  1,000 mls/hr Q1H PRN IV hypotension;  Start 7/17/19 

at 10:18;  Stop 7/17/19 at 16:17;  Status DC


Diphenhydramine HCl (Benadryl) 25 mg 1X PRN  PRN IV ITCHING;  Start 7/17/19 at 

10:30;  Stop 7/18/19 at 10:29;  Status DC


Diphenhydramine HCl (Benadryl) 25 mg 1X PRN  PRN IV ITCHING;  Start 7/17/19 at 

10:30;  Stop 7/18/19 at 10:29;  Status DC


Sodium Chloride 1,000 ml @  400 mls/hr Q2H30M PRN IV PATENCY;  Start 7/17/19 at 

10:18;  Stop 7/17/19 at 22:17;  Status DC


Info (PHARMACY MONITORING -- do not chart) 1 each PRN DAILY  PRN MC SEE 

COMMENTS;  Start 7/17/19 at 10:30;  Status UNV


Midazolam HCl (Versed) 2 mg STK-MED ONCE .ROUTE ;  Start 7/18/19 at 08:12;  Stop

7/18/19 at 08:13;  Status DC


Fentanyl Citrate (Fentanyl 2ml Vial) 100 mcg STK-MED ONCE .ROUTE ;  Start 

7/18/19 at 08:12;  Stop 7/18/19 at 08:13;  Status DC


Lidocaine/ Epinephrine (LIDOCAINE 1%-EPI 1:100,000 Multi-Dose) 20 ml STK-MED 

ONCE .ROUTE ;  Start 7/18/19 at 08:13;  Stop 7/18/19 at 08:14;  Status DC


Cefazolin Sodium 50 ml @ As Directed STK-MED ONCE IV ;  Start 7/18/19 at 08:18; 

Stop 7/18/19 at 08:19;  Status DC


Cefazolin Sodium 50 ml @ As Directed STK-MED ONCE IV ;  Start 7/18/19 at 08:18; 

Stop 7/18/19 at 08:19;  Status DC


Midazolam HCl (Versed) 2 mg STK-MED ONCE .ROUTE ;  Start 7/18/19 at 08:39;  Stop

7/18/19 at 08:40;  Status DC


Midazolam HCl (Versed) 2 mg 1X  ONCE IV  Last administered on 7/18/19at 08:45;  

Start 7/18/19 at 08:45;  Stop 7/18/19 at 08:52;  Status DC


Fentanyl Citrate (Fentanyl 2ml Vial) 100 mcg 1X  ONCE IV  Last administered on 

7/18/19at 08:45;  Start 7/18/19 at 08:45;  Stop 7/18/19 at 08:52;  Status DC


Lidocaine/ Epinephrine (LIDOCAINE 1%-EPI 1:100,000 Multi-Dose) 20 ml 1X  ONCE IJ

 Last administered on 7/18/19at 08:45;  Start 7/18/19 at 08:45;  Stop 7/18/19 at

08:52;  Status DC


Cefazolin Sodium 50 ml @  100 mls/hr 1X  ONCE IV  Last administered on 7/18/19at

08:45;  Start 7/18/19 at 08:45;  Stop 7/18/19 at 09:14;  Status DC


Cefazolin Sodium 50 ml @  100 mls/hr 1X  ONCE IV  Last administered on 7/18/19at

08:45;  Start 7/18/19 at 08:45;  Stop 7/18/19 at 09:14;  Status DC


Furosemide (Lasix) 40 mg 1X  ONCE IVP  Last administered on 7/18/19at 14:44;  

Start 7/18/19 at 14:30;  Stop 7/18/19 at 14:31;  Status DC


Furosemide (Lasix) 40 mg 1X  ONCE IVP  Last administered on 7/19/19at 12:49;  

Start 7/19/19 at 12:30;  Stop 7/19/19 at 12:31;  Status DC


Sodium Chloride 1,000 ml @  1,000 mls/hr Q1H PRN IV hypotension;  Start 7/19/19 

at 16:54;  Stop 7/19/19 at 22:53;  Status DC


Sodium Chloride 1,000 ml @  400 mls/hr Q2H30M PRN IV PATENCY;  Start 7/19/19 at 

16:54;  Stop 7/20/19 at 04:53;  Status DC


Info (PHARMACY MONITORING -- do not chart) 1 each PRN DAILY  PRN MC SEE 

COMMENTS;  Start 7/19/19 at 17:00;  Stop 7/22/19 at 14:03;  Status DC


Info (PHARMACY MONITORING -- do not chart) 1 each PRN DAILY  PRN MC SEE 

COMMENTS;  Start 7/19/19 at 17:00;  Status UNV


Ceftriaxone Sodium (Rocephin) 1 gm Q24H IVP  Last administered on 7/21/19at 

08:52;  Start 7/20/19 at 10:00;  Stop 7/21/19 at 11:21;  Status DC


Darbepoetin Blayne (ARANESP for DIALYSIS PTS) 60 mcg WEEKLYHS SQ  Last 

administered on 7/20/19at 21:25;  Start 7/20/19 at 21:00


Cephalexin HCl (Keflex) 500 mg TID PO  Last administered on 7/21/19at 13:19;  

Start 7/21/19 at 14:00;  Stop 7/21/19 at 15:57;  Status DC


Cephalexin HCl (Keflex) 500 mg Q24H PO  Last administered on 7/22/19at 13:05;  

Start 7/22/19 at 14:00


Zolpidem Tartrate (Ambien) 5 mg PRN QHS  PRN PO INSOMNIA;  Start 7/22/19 at 

09:15


Sodium Chloride 1,000 ml @  1,000 mls/hr Q1H PRN IV hypotension;  Start 7/22/19 

at 13:57;  Stop 7/22/19 at 19:56;  Status DC


Albumin Human 200 ml @  200 mls/hr 1X PRN  PRN IV Hypotension;  Start 7/22/19 at

14:00;  Stop 7/22/19 at 19:59;  Status DC


Sodium Chloride (Normal Saline Flush) 10 ml 1X PRN  PRN IV AP catheter pack;  

Start 7/22/19 at 14:00;  Stop 7/22/19 at 21:00;  Status DC


Sodium Chloride (Normal Saline Flush) 10 ml 1X PRN  PRN IV  catheter pack;  

Start 7/22/19 at 14:00;  Stop 7/22/19 at 21:00;  Status DC


Sodium Chloride 1,000 ml @  400 mls/hr Q2H30M PRN IV PATENCY;  Start 7/22/19 at 

13:57;  Stop 7/23/19 at 01:56;  Status DC


Info (PHARMACY MONITORING -- do not chart) 1 each PRN DAILY  PRN MC SEE 

COMMENTS;  Start 7/22/19 at 14:00;  Stop 7/22/19 at 14:03;  Status DC


Info (PHARMACY MONITORING -- do not chart) 1 each PRN DAILY  PRN MC SEE 

COMMENTS;  Start 7/22/19 at 14:00





Active Scripts


Active


Renal Caps Softgel (Folic Acid/Vitamin B Comp W-C) 1 Mg Capsule 1 Cap PO DAILY


Lidocaine PATCH  ** (Lidocaine) 1 Each Adh..patch 1 Patch TD DAILY


Gabapentin 300 Mg Capsule 300 Mg PO QHS


Oxycodone Hcl Immed.release ** (Oxycodone Hcl) 5 Mg Tablet 5 Mg PO PRN Q4HRS PRN


Labetalol Hcl 100 Mg Tablet 200 Mg PO BID


Cephalexin 250 Mg Capsule 500 Mg PO Q24H


Reported


Protonix (Pantoprazole Sodium) 20 Mg Tablet.dr 2 Tab PO DAILY


Zyprexa (Olanzapine) 20 Mg Tablet 2 Tab PO QHS


Buspirone Hcl 30 Mg Tablet 1 Tab PO BID


Vitals/I & O





Vital Sign - Last 24 Hours








 7/22/19 7/22/19 7/22/19 7/22/19





 13:06 17:14 17:44 19:00


 


Temp    99.2





    99.2


 


Pulse    110


 


Resp   20 20


 


B/P (MAP)    134/65 (88)


 


Pulse Ox    96


 


O2 Delivery Nasal Cannula Nasal Cannula Room Air Nasal Cannula


 


O2 Flow Rate    2.0


 


    





    





 7/22/19 7/22/19 7/22/19 7/23/19





 20:00 21:19 23:00 03:00


 


Temp   99.7 98.5





   99.7 98.5


 


Pulse  110 98 110


 


Resp   18 16


 


B/P (MAP)  134/65 103/61 (75) 109/68 (82)


 


Pulse Ox   95 91


 


O2 Delivery Room Air   


 


    





    





 7/23/19 7/23/19 7/23/19 7/23/19





 07:00 08:00 08:37 08:38


 


Temp 98.2   





 98.2   


 


Pulse 76  76 


 


Resp 18   


 


B/P (MAP) 122/65 (84)  122/65 


 


Pulse Ox 93   


 


O2 Delivery Room Air Room Air  Room Air


 


    





    





 7/23/19 7/23/19  





 09:45 11:00  


 


Temp  98.0  





  98.0  


 


Pulse  74  


 


Resp  18  


 


B/P (MAP)  120/64 (82)  


 


Pulse Ox  94  


 


O2 Delivery Room Air Room Air  














Intake and Output   


 


 7/22/19 7/22/19 7/23/19





 15:00 23:00 07:00


 


Intake Total 840 ml 720 ml 0 ml


 


Output Total 300 ml  


 


Balance 540 ml 720 ml 0 ml

















BATSHEVA WARREN MD           Jul 23, 2019 12:19

## 2019-07-23 NOTE — PDOC
PULMONARY PROGRESS NOTES


Subjective


feels better


off O2


Vitals





Vital Signs








  Date Time  Temp Pulse Resp B/P (MAP) Pulse Ox O2 Delivery O2 Flow Rate FiO2


 


7/23/19 09:45      Room Air  


 


7/23/19 08:37  76  122/65    


 


7/23/19 07:00 98.2  18  93   





 98.2       


 


7/22/19 19:00       2.0 








General:  No acute distress


Lungs:  Clear


Cardiovascular:  S1, S2


Abdomen:  Soft, Non-tender


Extremities:  Other (1+edema,)


Skin:  Warm


Labs





Laboratory Tests








Test


 7/22/19


03:10 7/23/19


04:00


 


White Blood Count


 11.7 x10^3/uL


(4.0-11.0) 





 


Red Blood Count


 2.52 x10^6/uL


(4.30-5.70) 





 


Hemoglobin


 7.4 g/dL


(13.0-17.5) 7.4 g/dL


(13.0-17.5)


 


Hematocrit


 21.6 %


(39.0-53.0) 21.6 %


(39.0-53.0)


 


Mean Corpuscular Volume 86 fL ()  


 


Mean Corpuscular Hemoglobin 29 pg (25-35)  


 


Mean Corpuscular Hemoglobin


Concent 34 g/dL


(31-37) 34 g/dL


(31-37)


 


Red Cell Distribution Width


 14.0 %


(11.5-14.5) 





 


Platelet Count


 169 x10^3/uL


(140-400) 





 


Sodium Level


 126 mmol/L


(136-145) 136 mmol/L


(136-145)


 


Potassium Level


 4.3 mmol/L


(3.5-5.1) 4.5 mmol/L


(3.5-5.1)


 


Chloride Level


 89 mmol/L


() 97 mmol/L


()


 


Carbon Dioxide Level


 26 mmol/L


(21-32) 28 mmol/L


(21-32)


 


Anion Gap 11 (6-14)  11 (6-14) 


 


Blood Urea Nitrogen


 46 mg/dL


(8-26) 33 mg/dL


(8-26)


 


Creatinine


 8.9 mg/dL


(0.7-1.3) 7.1 mg/dL


(0.7-1.3)


 


Estimated GFR


(Cockcroft-Gault) 7.0 


 9.1 





 


Glucose Level


 111 mg/dL


(70-99) 111 mg/dL


(70-99)


 


Calcium Level


 8.3 mg/dL


(8.5-10.1) 8.4 mg/dL


(8.5-10.1)


 


Phosphorus Level


 4.5 mg/dL


(2.6-4.7) 4.0 mg/dL


(2.6-4.7)


 


Creatine Kinase


 909 U/L


() 





 


Albumin


 2.3 g/dL


(3.4-5.0) 2.4 g/dL


(3.4-5.0)








Laboratory Tests








Test


 7/23/19


04:00


 


Hemoglobin


 7.4 g/dL


(13.0-17.5)


 


Hematocrit


 21.6 %


(39.0-53.0)


 


Mean Corpuscular Hemoglobin


Concent 34 g/dL


(31-37)


 


Sodium Level


 136 mmol/L


(136-145)


 


Potassium Level


 4.5 mmol/L


(3.5-5.1)


 


Chloride Level


 97 mmol/L


()


 


Carbon Dioxide Level


 28 mmol/L


(21-32)


 


Anion Gap 11 (6-14) 


 


Blood Urea Nitrogen


 33 mg/dL


(8-26)


 


Creatinine


 7.1 mg/dL


(0.7-1.3)


 


Estimated GFR


(Cockcroft-Gault) 9.1 





 


Glucose Level


 111 mg/dL


(70-99)


 


Calcium Level


 8.4 mg/dL


(8.5-10.1)


 


Phosphorus Level


 4.0 mg/dL


(2.6-4.7)


 


Albumin


 2.4 g/dL


(3.4-5.0)








Medications





Active Scripts








 Medications  Dose


 Route/Sig


 Max Daily Dose Days Date Category


 


 Protonix


  (Pantoprazole


 Sodium) 20 Mg


 Tablet.dr  2 Tab


 PO DAILY


   7/10/19 Reported


 


 Zyprexa


  (Olanzapine) 20


 Mg Tablet  2 Tab


 PO QHS


   7/10/19 Reported


 


 Buspirone Hcl 30


 Mg Tablet  1 Tab


 PO BID


   7/10/19 Reported








Comments


7/22, cxr reviewed CHF





Impression


.


1.  Status post fall with acute rhabdomyolysis with markedly high CPKs and acute


renal failure. improved


2.  Acute kidney injury secondary to rhabdomyolysis.


3.  Severe metabolic acidosis secondary to acute kidney injury poa, resolved.  


4.  Hyponatremia.improving


5.  Moderate protein-calorie malnutrition.


6.  Leukocytosis, likely reactive.


7. Dyspnea 7/22 due to CHF, better post HD, on RA





Plan


.





1.  clinically better post HD


2.  Follow Renal's recommendation.


3.  HD with UF


4.  will sign off











SIENA KHALIL MD                 Jul 23, 2019 10:21

## 2019-07-23 NOTE — PDOC
SUBJECTIVE


Subjective


Pt complaining of increased pain in his scrotum.  Swelling is about the same.  

Claims ice is helping.





OBJECTIVE


Objective


Physical Exam: 


General appearance: Alert and Oriented


Head: Normocephalic, without obvious abnormality


Eyes: conjunctivae/corneas clear. PERRL, EOM's intact. Fundi benign


Lungs: Regular respirations, non labored breathing 


Abdomen: soft, non-tender, lower abdominal edema.  


Pelvic: + scrotal swelling, increased penile swelling.  


Extremities: extremities normal, atraumatic, + edema bilateral lower extremities


Vital Signs





Vital Signs








  Date Time  Temp Pulse Resp B/P (MAP) Pulse Ox O2 Delivery O2 Flow Rate FiO2


 


7/23/19 08:38      Room Air  


 


7/23/19 08:37  76  122/65    


 


7/23/19 07:00 98.2 76 18 122/65 (84) 93 Room Air  





 98.2       


 


7/23/19 03:00 98.5 110 16 109/68 (82) 91   





 98.5       


 


7/22/19 23:00 99.7 98 18 103/61 (75) 95   





 99.7       


 


7/22/19 21:19  110  134/65    


 


7/22/19 20:00      Room Air  


 


7/22/19 19:00 99.2 110 20 134/65 (88) 96 Nasal Cannula 2.0 





 99.2       


 


7/22/19 17:44   20   Room Air  


 


7/22/19 17:14      Nasal Cannula  


 


7/22/19 13:06      Nasal Cannula  


 


7/22/19 11:00 98.0 94 16 132/56 (81) 88 Room Air  





 98.0       


 


7/22/19 10:30      Nasal Cannula 2.0 


 


7/22/19 09:30        








I & O











Intake and Output 


 


 7/23/19





 06:59


 


Intake Total 1560 ml


 


Output Total 300 ml


 


Balance 1260 ml


 


 


 


Intake Oral 1560 ml


 


Output Urine Total 300 ml


 


# Voids 1











PHYSICAL EXAM


Physical Exam


Physical Exam: 


General appearance: Alert and Oriented


Head: Normocephalic, without obvious abnormality


Eyes: conjunctivae/corneas clear. PERRL, EOM's intact. Fundi benign


Lungs: Regular respirations, non labored breathing 


Abdomen: soft, non-tender, lower abdominal edema.  


Pelvic: + scrotal swelling, increased penile swelling.  


Extremities: extremities normal, atraumatic, + edema bilateral lower extremities





ASSESSMENT/PLAN


Assessment/Plan


Continue ice/scrotal elevation with towels.


Continue Keflex


Repeat Scrotal US today.


Pt may go home today if dialysis chair/time is set up prior to discharge. Ok 

with Urology


A follow up appointment has been arranged for patient to see  of [facility] – [place], [place] 

on 7/29/19 at 1120 am.  Appointment card and new patient paperwork given to 

patient.


All questions answered.





COMMENT


Lab





Laboratory Tests








Test


 7/23/19


04:00


 


Hemoglobin


 7.4 g/dL


(13.0-17.5)


 


Hematocrit


 21.6 %


(39.0-53.0)


 


Mean Corpuscular Hemoglobin


Concent 34 g/dL


(31-37)


 


Sodium Level


 136 mmol/L


(136-145)


 


Potassium Level


 4.5 mmol/L


(3.5-5.1)


 


Chloride Level


 97 mmol/L


()


 


Carbon Dioxide Level


 28 mmol/L


(21-32)


 


Anion Gap 11 (6-14) 


 


Blood Urea Nitrogen


 33 mg/dL


(8-26)


 


Creatinine


 7.1 mg/dL


(0.7-1.3)


 


Estimated GFR


(Cockcroft-Gault) 9.1 





 


Glucose Level


 111 mg/dL


(70-99)


 


Calcium Level


 8.4 mg/dL


(8.5-10.1)


 


Phosphorus Level


 4.0 mg/dL


(2.6-4.7)


 


Albumin


 2.4 g/dL


(3.4-5.0)

















TERE PEREZ               Jul 23, 2019 09:15

## 2019-07-23 NOTE — RAD
Scrotal ultrasound

 

HISTORY: Scrotal swelling, increasing pain.

 

COMPARISON: July 19, 2019.

 

TECHNIQUE: Grayscale, color and spectral waveform analysis.

 

FINDINGS:

Right testicle measures 3.6 x 2.6 x 2.4 cm. the right testicle appears 

overall mildly hypervascular, more so than what was seen on the prior 

study. Right testicle echotexture appears homogeneous without evidence of 

a focal lesion. Intact right testicle blood supply. Right epididymis does 

not demonstrate hypervascularity. Small right hydrocele.

 

Left testicle measures 3.6 x 2.4 x 2.4 cm with homogeneous echotexture, 

and normal vascularity and blood supply. No focal lesion. Left epididymis 

appears unremarkable without hypervascularity. Small left hydrocele.

 

Severe scrotal wall thickening/edema is again identified, similar to the 

previous exam.

 

IMPRESSION:

1. Mild overall hypervascularity of the right testicle, as compared with 

prior study as well as compared with the contralateral testicle. This is 

nonspecific, but is concerning for orchitis. Other etiologies such as a 

recently detorsed testicle could also be considered.

2. Bilateral scrotal wall thickening is again identified. Correlate for 

clinical cellulitis.

 

Electronically signed by: Nirmal Le MD (7/23/2019 3:11 PM) [id number]

## 2019-07-23 NOTE — PDOC
Renal-Progress Notes


Subjective Notes


Notes


AMBULATING BETTER





History of Present Illness


Hx of present illness


STABLE





Vitals


Vitals





Vital Signs








  Date Time  Temp Pulse Resp B/P (MAP) Pulse Ox O2 Delivery O2 Flow Rate FiO2


 


7/23/19 11:00 98.0 74 18 120/64 (82) 94 Room Air  





 98.0       


 


7/22/19 19:00       2.0 








Weight


Weight [ ]





I.O.


Intake and Output











Intake and Output 


 


 7/23/19





 07:00


 


Intake Total 1560 ml


 


Output Total 300 ml


 


Balance 1260 ml


 


 


 


Intake Oral 1560 ml


 


Output Urine Total 300 ml


 


# Voids 1











Labs


Labs





Laboratory Tests








Test


 7/23/19


04:00


 


Hemoglobin


 7.4 g/dL


(13.0-17.5)


 


Hematocrit


 21.6 %


(39.0-53.0)


 


Mean Corpuscular Hemoglobin


Concent 34 g/dL


(31-37)


 


Sodium Level


 136 mmol/L


(136-145)


 


Potassium Level


 4.5 mmol/L


(3.5-5.1)


 


Chloride Level


 97 mmol/L


()


 


Carbon Dioxide Level


 28 mmol/L


(21-32)


 


Anion Gap 11 (6-14) 


 


Blood Urea Nitrogen


 33 mg/dL


(8-26)


 


Creatinine


 7.1 mg/dL


(0.7-1.3)


 


Estimated GFR


(Cockcroft-Gault) 9.1 





 


Glucose Level


 111 mg/dL


(70-99)


 


Calcium Level


 8.4 mg/dL


(8.5-10.1)


 


Phosphorus Level


 4.0 mg/dL


(2.6-4.7)


 


Albumin


 2.4 g/dL


(3.4-5.0)











Micro


Micro





Microbiology


7/10/19 Blood Culture - Final, Complete


          NO GROWTH AFTER 5 DAYS


7/10/19 CSF Gram Stain - Final, Complete


          


7/9/19 Urine Culture - Final, Complete


         


7/9/19 Urine Culture Result 1 (RUI) - Final, Complete





Review of Systems


Constitutional:  yes: weakness, alert, oriented


Ears/Nose/Throat:  Yes: no symptom reported


Eyes:  Yes: no symptom reported


Pulmonary:  Yes no symptom reported


Cardiovascular:  Yes no symptom reported


Gastrointestional:  Yes: no symptom reported


Genitourinary:  Yes: no symptom reported


Skin:  Yes no symptom reported


Psychiatric/Neurological:  Yes: no symptom reported


Endocrine:  Yes: no symptom reported





Physical Exam


Skin:  warm


Respiratory:  decreased breath sounds


Heart:  S1S2, RRR


Abdomen:  soft, bowel sounds present


Genitourinary:  bladder flat


Extremities:  pulses present


Neurology:  alert, oriented





Assessment


Assessment


IMP





EL DUE RHABDO-NO RECOVERY YET-ANURIC


RHABDOMYOLYSIS-CPK OF OVER 100,000 AT PEAK-NOW 1440


URINARY RETENTION


SCROTAL EDEMA





PLAN





ARANESP


WILL NEED TO HAVE SW SET UP OP HD


HD TOMORROW


UF TO [facility] AND [facility]


HAS SOME CHANCE OF RECOVERY BUT WILL PROB TAKE WEEKS IF NOT MONTHS


D/C PLANS IN THE WORKS











JORGE CRYSTAL MD                 Jul 23, 2019 12:09

## 2019-07-23 NOTE — PDOC3
Discharge Summary


Visit Information


Date of Admission:  Jul 10, 2019


Date of Discharge:  Jul 23, 2019


Admitting Diagnosis Comment:


New ESRD  needing outpatient HD set up





Hypotension resolved


Anasarca


Hepatorenal syndrome, status post


Hydrocele, good scrotal flow


Methamphetamine use


Metabolic encephalopathy resolved


Alcohol drinker


Status post sepsis with hypotension


Oliguric renal failure


Insomnia


Final Diagnosis


Problems


Medical Problems:


(1) Elevated troponin


Status: Acute  





(2) Hepatorenal syndrome


Status: Acute  





(3) Hyperkalemia


Status: Acute  





(4) Hyponatremia


Status: Acute  





(5) Methamphetamine abuse


Status: Acute  





(6) Neurological complaint


Status: Acute  





(7) Rhabdomyolysis


Status: Acute  





(8) Urinary retention


Status: Acute  





(9) Urinary tract infection


Status: Acute  











Brief Hospital Course


Allergies





                                    Allergies








Coded Allergies Type Severity Reaction Last Updated Verified


 


  I S O L A T I O N *CONTACT* Allergy Unknown  7/11/19 Yes


 


  No Known Medication Allergies Allergy Unknown  7/11/19 Yes








Vital Signs





Vital Signs








  Date Time  Temp Pulse Resp B/P (MAP) Pulse Ox O2 Delivery O2 Flow Rate FiO2


 


7/23/19 08:38      Room Air  


 


7/23/19 08:37  76  122/65    


 


7/23/19 07:00 98.2  18  93   





 98.2       


 


7/22/19 19:00       2.0 








Lab Results





Laboratory Tests








Test


 7/22/19


03:10 7/23/19


04:00


 


White Blood Count


 11.7 x10^3/uL


(4.0-11.0) 





 


Red Blood Count


 2.52 x10^6/uL


(4.30-5.70) 





 


Hemoglobin


 7.4 g/dL


(13.0-17.5) 7.4 g/dL


(13.0-17.5)


 


Hematocrit


 21.6 %


(39.0-53.0) 21.6 %


(39.0-53.0)


 


Mean Corpuscular Volume 86 fL ()  


 


Mean Corpuscular Hemoglobin 29 pg (25-35)  


 


Mean Corpuscular Hemoglobin


Concent 34 g/dL


(31-37) 34 g/dL


(31-37)


 


Red Cell Distribution Width


 14.0 %


(11.5-14.5) 





 


Platelet Count


 169 x10^3/uL


(140-400) 





 


Sodium Level


 126 mmol/L


(136-145) 136 mmol/L


(136-145)


 


Potassium Level


 4.3 mmol/L


(3.5-5.1) 4.5 mmol/L


(3.5-5.1)


 


Chloride Level


 89 mmol/L


() 97 mmol/L


()


 


Carbon Dioxide Level


 26 mmol/L


(21-32) 28 mmol/L


(21-32)


 


Anion Gap 11 (6-14)  11 (6-14) 


 


Blood Urea Nitrogen


 46 mg/dL


(8-26) 33 mg/dL


(8-26)


 


Creatinine


 8.9 mg/dL


(0.7-1.3) 7.1 mg/dL


(0.7-1.3)


 


Estimated GFR


(Cockcroft-Gault) 7.0 


 9.1 





 


Glucose Level


 111 mg/dL


(70-99) 111 mg/dL


(70-99)


 


Calcium Level


 8.3 mg/dL


(8.5-10.1) 8.4 mg/dL


(8.5-10.1)


 


Phosphorus Level


 4.5 mg/dL


(2.6-4.7) 4.0 mg/dL


(2.6-4.7)


 


Creatine Kinase


 909 U/L


() 





 


Albumin


 2.3 g/dL


(3.4-5.0) 2.4 g/dL


(3.4-5.0)








Laboratory Tests








Test


 7/23/19


04:00


 


Hemoglobin


 7.4 g/dL


(13.0-17.5)


 


Hematocrit


 21.6 %


(39.0-53.0)


 


Mean Corpuscular Hemoglobin


Concent 34 g/dL


(31-37)


 


Sodium Level


 136 mmol/L


(136-145)


 


Potassium Level


 4.5 mmol/L


(3.5-5.1)


 


Chloride Level


 97 mmol/L


()


 


Carbon Dioxide Level


 28 mmol/L


(21-32)


 


Anion Gap 11 (6-14) 


 


Blood Urea Nitrogen


 33 mg/dL


(8-26)


 


Creatinine


 7.1 mg/dL


(0.7-1.3)


 


Estimated GFR


(Cockcroft-Gault) 9.1 





 


Glucose Level


 111 mg/dL


(70-99)


 


Calcium Level


 8.4 mg/dL


(8.5-10.1)


 


Phosphorus Level


 4.0 mg/dL


(2.6-4.7)


 


Albumin


 2.4 g/dL


(3.4-5.0)








Brief Hospital Course


Mr. Lenz  is a 29 old male who stayed 2 weeks with us, he came in and renal 

failure oliguric with hyperkalemia and metabolic acidosis needing ICU and now 

unfortunately did not recover and is a new ESRD and we have set up dialysis TTH 

S I believe is the schedule,  He has history of recreational drug use, 

methamphetamine abuse, likes narcotic. He had some hepatorenal syndrome. His 

most pressing issue is anasarca they try to get fluid off 4 L during dialysis


He has anasarca with albumin 2.3 and is most affected region at his scrotum and 

penile area. Ultrasound of hydrocele, comanagement with urology. Advised to get 

fluid off during HD<  elevate the scrotum, may be cold compress. But that is all

we could do per patient is oliguric and insomnia. I have done meds, written 

scripts that are new to him. No PT needs. Will discharge today  once outpatient 

HD has been set up





Discharge Information


Condition at Discharge:  Improved, Stable


Follow Up:  Weeks (pcp 4 weeks or renal MD - new esrd)


Disposition/Orders:  D/C to Home


Scheduled


Buspirone Hcl (Buspirone Hcl) 30 Mg Tablet, 1 TAB PO BID for anxiety, #60 

(Reported)


   Entered as Reported by: BO MARTINEZ on 7/10/19 1603


   Last Action: New Order on 7/10/19 1603 by BO MARTINEZ


Cephalexin (Cephalexin) 250 Mg Capsule, 500 MG PO Q24H for scrotal cellulitis, 

#7


   Prescribed by: GIUSEPPE MONREAL on 7/23/19 0758


Folic Acid/Vitamin B Comp W-C (Renal Caps Softgel) 1 Mg Capsule, 1 CAP PO DAILY 

for esrd, #30 Ref 5


   Prescribed by: GIUSEPPE MONREAL on 7/23/19 0758


Gabapentin (Gabapentin) 300 Mg Capsule, 300 MG PO QHS for neurpathy, #30


   Prescribed by: GIUSEPPE MONREAL on 7/23/19 0758


Labetalol Hcl (Labetalol Hcl) 100 Mg Tablet, 200 MG PO BID for htn, #60


   Prescribed by: GIUSEPPE MONREAL on 7/23/19 0758


Lidocaine (Lidocaine PATCH  **) 1 Each Adh..patch, 1 PATCH TD DAILY for pain, 

#14


   Prescribed by: GIUSEPPE MONREAL on 7/23/19 0758


Olanzapine (Zyprexa) 20 Mg Tablet, 2 TAB PO QHS for anxiety, #30 (Reported)


   Entered as Reported by: BO MARTINEZ on 7/10/19 1603


   Last Action: New Order on 7/10/19 1603 by BO MARTINEZ


Pantoprazole Sodium (Protonix) 20 Mg Tablet.dr, 2 TAB PO DAILY for reflux, #30 

(Reported)


   Entered as Reported by: BO MARTINEZ on 7/10/19 1603


   Last Action: New Order on 7/10/19 1603 by BO MARTINEZ





Scheduled PRN


Oxycodone Hcl (Oxycodone Hcl Immed.release **) 5 Mg Tablet, 5 MG PO PRN Q4HRS 

PRN for MODERATE TO SEVERE PAIN, #20


   Prescribed by: GIUSEPPE MONREAL on 7/23/19 0758











GIUSEPPE MONREAL MD           Jul 23, 2019 09:45

## 2019-07-23 NOTE — RAD
EXAM: AP View of the chest

 

DATE: 7/22/2019 5:16 PM

 

INDICATION: CHF, dyspnea

 

COMPARISON: 7/14/2019, 7/13/2019

 

FINDINGS:

Heart is mildly enlarged. Mild prominence of pulmonary arterial trunk. 

Bilateral interstitial prominence with central vascular congestion. No 

pleural effusion or pneumothorax. No lobar consolidation.

 

 

IMPRESSION:

Mild central vascular congestion with interstitial prominence in 

conjunction with cardiomegaly may be seen with pulmonary edema.

 

Electronically signed by: Reed Hernandez MD (7/23/2019 8:52 AM) [facility]

## 2019-07-24 ENCOUNTER — HOSPITAL ENCOUNTER (INPATIENT)
Dept: HOSPITAL 61 - ER | Age: 29
Discharge: LEFT BEFORE DISCHARGE | DRG: 682 | End: 2019-07-24
Attending: INTERNAL MEDICINE | Admitting: INTERNAL MEDICINE
Payer: SELF-PAY

## 2019-07-24 VITALS — SYSTOLIC BLOOD PRESSURE: 141 MMHG | DIASTOLIC BLOOD PRESSURE: 68 MMHG

## 2019-07-24 VITALS — WEIGHT: 240 LBS | BODY MASS INDEX: 36.37 KG/M2 | HEIGHT: 68 IN

## 2019-07-24 DIAGNOSIS — F41.9: ICD-10-CM

## 2019-07-24 DIAGNOSIS — I12.0: Primary | ICD-10-CM

## 2019-07-24 DIAGNOSIS — Z53.21: ICD-10-CM

## 2019-07-24 DIAGNOSIS — E78.00: ICD-10-CM

## 2019-07-24 DIAGNOSIS — N18.6: ICD-10-CM

## 2019-07-24 DIAGNOSIS — F32.9: ICD-10-CM

## 2019-07-24 LAB
% LYMPHS: 6 % (ref 24–48)
% MONOS: 2 % (ref 0–10)
% SEGS: 90 % (ref 35–66)
ALBUMIN SERPL-MCNC: 2.5 G/DL (ref 3.4–5)
ALBUMIN/GLOB SERPL: 0.6 {RATIO} (ref 1–1.7)
ALP SERPL-CCNC: 100 U/L (ref 46–116)
ALT SERPL-CCNC: 25 U/L (ref 16–63)
ANION GAP SERPL CALC-SCNC: 12 MMOL/L (ref 6–14)
ANISOCYTOSIS BLD QL SMEAR: SLIGHT
AST SERPL-CCNC: 40 U/L (ref 15–37)
BASOPHILS # BLD AUTO: 0.1 X10^3/UL (ref 0–0.2)
BASOPHILS NFR BLD AUTO: 1 % (ref 0–3)
BASOPHILS NFR BLD: 1 % (ref 0–3)
BILIRUB SERPL-MCNC: 0.7 MG/DL (ref 0.2–1)
BUN SERPL-MCNC: 40 MG/DL (ref 8–26)
BUN/CREAT SERPL: 5 (ref 6–20)
CALCIUM SERPL-MCNC: 8.7 MG/DL (ref 8.5–10.1)
CHLORIDE SERPL-SCNC: 97 MMOL/L (ref 98–107)
CO2 SERPL-SCNC: 26 MMOL/L (ref 21–32)
CREAT SERPL-MCNC: 7.7 MG/DL (ref 0.7–1.3)
EOSINOPHIL NFR BLD AUTO: 1 % (ref 0–5)
EOSINOPHIL NFR BLD: 0.1 X10^3/UL (ref 0–0.7)
EOSINOPHIL NFR BLD: 1 % (ref 0–3)
ERYTHROCYTE [DISTWIDTH] IN BLOOD BY AUTOMATED COUNT: 14.9 % (ref 11.5–14.5)
GFR SERPLBLD BASED ON 1.73 SQ M-ARVRAT: 8.3 ML/MIN
GLOBULIN SER-MCNC: 4.2 G/DL (ref 2.2–3.8)
GLUCOSE SERPL-MCNC: 90 MG/DL (ref 70–99)
HCT VFR BLD CALC: 21.4 % (ref 39–53)
HGB BLD-MCNC: 7.3 G/DL (ref 13–17.5)
LYMPHOCYTES # BLD: 0.6 X10^3/UL (ref 1–4.8)
LYMPHOCYTES NFR BLD AUTO: 7 % (ref 24–48)
MCH RBC QN AUTO: 30 PG (ref 25–35)
MCHC RBC AUTO-ENTMCNC: 34 G/DL (ref 31–37)
MCV RBC AUTO: 87 FL (ref 79–100)
MONO #: 0.6 X10^3/UL (ref 0–1.1)
MONOCYTES NFR BLD: 6 % (ref 0–9)
NEUT #: 8.3 X10^3/UL (ref 1.8–7.7)
NEUTROPHILS NFR BLD AUTO: 85 % (ref 31–73)
PLATELET # BLD AUTO: 355 X10^3/UL (ref 140–400)
PLATELET # BLD EST: ADEQUATE 10*3/UL
PLATELET CLUMP: PRESENT
POTASSIUM SERPL-SCNC: 4.7 MMOL/L (ref 3.5–5.1)
PROT SERPL-MCNC: 6.7 G/DL (ref 6.4–8.2)
RBC # BLD AUTO: 2.46 X10^6/UL (ref 4.3–5.7)
SODIUM SERPL-SCNC: 135 MMOL/L (ref 136–145)
TARGETS BLD QL SMEAR: (no result)
WBC # BLD AUTO: 9.7 X10^3/UL (ref 4–11)

## 2019-07-24 PROCEDURE — 85025 COMPLETE CBC W/AUTO DIFF WBC: CPT

## 2019-07-24 PROCEDURE — 36415 COLL VENOUS BLD VENIPUNCTURE: CPT

## 2019-07-24 PROCEDURE — 85007 BL SMEAR W/DIFF WBC COUNT: CPT

## 2019-07-24 PROCEDURE — 80053 COMPREHEN METABOLIC PANEL: CPT

## 2019-07-24 PROCEDURE — 83880 ASSAY OF NATRIURETIC PEPTIDE: CPT

## 2019-07-24 PROCEDURE — 5A1D70Z PERFORMANCE OF URINARY FILTRATION, INTERMITTENT, LESS THAN 6 HOURS PER DAY: ICD-10-PCS | Performed by: INTERNAL MEDICINE

## 2019-07-24 NOTE — PHYS DOC
Past Medical History


Past Medical History:  Anxiety, Depression, High Cholesterol, Hypertension, 

Renal Failure


Past Surgical History:  Other


Additional Past Surgical Histo:  Right Chest dialysis catheter


Alcohol Use:  None


Drug Use:  Methamphetamine





Adult General


Chief Complaint


Chief Complaint:  DIALYSIS PROBLEM





HPI


HPI





Patient is a 29  year old male with new diagnosis of end-stage renal disease who

presents to the ED today stating he was discharged from the hospital yesterday 

and asked to come back today to be dialyzed. Patient is a poor historian giving 

any information. He states he feels swollen from abdomen all the way down to his

feet, he is also complaining of SOA. Last dialyzed  Monday





Review of Systems


Review of Systems





Constitutional: Denies fever or chills []


Eyes: Denies change in visual acuity, redness, or eye pain []


HENT: Denies nasal congestion or sore throat []


Respiratory: Denies cough or shortness of breath []


Cardiovascular: No additional information not addressed in HPI []


GI: Denies abdominal pain, nausea, vomiting, bloody stools or diarrhea []


: Denies dysuria or hematuria []


Musculoskeletal: Denies back pain or joint pain []


Integument: Denies rash or skin lesions []


Neurologic: Denies headache, focal weakness or sensory changes []


Endocrine: ESRD





All other systems were reviewed and found to be within normal limits, except as 

documented in this note.





Current Medications


Current Medications





Current Medications








 Medications


  (Trade)  Dose


 Ordered  Sig/Travis  Start Time


 Stop Time Status Last Admin


Dose Admin


 


 Acetaminophen


  (Tylenol)  500 mg  1X PRN  PRN  7/24/19 14:15


 7/24/19 22:00   





 


 Albumin Human  200 ml @ 


 200 mls/hr  1X PRN  PRN  7/24/19 14:15


 7/24/19 20:14   





 


 Diphenhydramine


 HCl


  (Benadryl)  25 mg  1X PRN  PRN  7/24/19 14:15


 7/24/19 22:00   





 


 Info


  (PHARMACY


 MONITORING -- do


 not chart)  1 each  PRN DAILY  PRN  7/24/19 14:15


     





 


 Sodium Chloride  1,000 ml @ 


 400 mls/hr  Q2H30M PRN  7/24/19 14:00


 7/24/19 22:00   














Allergies


Allergies





Allergies








Coded Allergies Type Severity Reaction Last Updated Verified


 


  I S O L A T I O N *CONTACT* Allergy Unknown  7/11/19 Yes


 


  No Known Medication Allergies Allergy Unknown  7/11/19 Yes











Physical Exam


Physical Exam





Constitutional: Well developed, well nourished, no acute distress, non-toxic 

appearance. []


HENT: Normocephalic, atraumatic, bilateral external ears normal, oropharynx 

moist, no oral exudates, nose normal. []


Eyes: PERRLA, EOMI, conjunctiva normal, no discharge. [] 


Neck: Normal range of motion, no tenderness, supple, no stridor. [] 


Cardiovascular:Heart rate regular rhythm, no murmur []


Lungs & Thorax: Crackles posterior lung bases


Abdomen: Rounded abdomen. Bowel sounds normal, soft, no tenderness, no masses, 

no pulsatile masses. [] 


Skin: Warm, dry, no erythema, no rash. [] 


Back: No tenderness, no CVA tenderness. [] 


Extremities: No tenderness, no cyanosis, no clubbing, ROM intact, edema noted to

 bilateral lower extremities, abdomen


Neurologic: Alert and oriented X 3, normal motor function, normal sensory 

function, no focal deficits noted. []


Psychologic: Affect normal, judgement normal, mood normal. []





Current Patient Data


Vital Signs





                                   Vital Signs








  Date Time  Temp Pulse Resp B/P (MAP) Pulse Ox O2 Delivery O2 Flow Rate FiO2


 


7/24/19 12:54 98.7 104 20 148/84 (105) 90 Room Air  





 98.7       








Lab Values





                                Laboratory Tests








Test


 7/24/19


13:45


 


White Blood Count


 9.7 x10^3/uL


(4.0-11.0)


 


Red Blood Count


 2.46 x10^6/uL


(4.30-5.70)  L


 


Hemoglobin


 7.3 g/dL


(13.0-17.5)  L


 


Hematocrit


 21.4 %


(39.0-53.0)  L


 


Mean Corpuscular Volume


 87 fL ()





 


Mean Corpuscular Hemoglobin 30 pg (25-35)  


 


Mean Corpuscular Hemoglobin


Concent 34 g/dL


(31-37)


 


Red Cell Distribution Width


 14.9 %


(11.5-14.5)  H


 


Platelet Count


 355 x10^3/uL


(140-400)  #


 


Neutrophils (%) (Auto) 85 % (31-73)  H


 


Lymphocytes (%) (Auto) 7 % (24-48)  L


 


Monocytes (%) (Auto) 6 % (0-9)  


 


Eosinophils (%) (Auto) 1 % (0-3)  


 


Basophils (%) (Auto) 1 % (0-3)  


 


Neutrophils # (Auto)


 8.3 x10^3/uL


(1.8-7.7)  H


 


Lymphocytes # (Auto)


 0.6 x10^3/uL


(1.0-4.8)  L


 


Monocytes # (Auto)


 0.6 x10^3/uL


(0.0-1.1)


 


Eosinophils # (Auto)


 0.1 x10^3/uL


(0.0-0.7)


 


Basophils # (Auto)


 0.1 x10^3/uL


(0.0-0.2)


 


Segmented Neutrophils % 90 % (35-66)  H


 


Lymphocytes % 6 % (24-48)  L


 


Monocytes % 2 % (0-10)  


 


Eosinophils % 1 % (0-5)  


 


Basophils % 1 % (0-3)  


 


Platelet Estimate


 Adequate


(ADEQUATE)


 


Platelet Clumps, EDTA Present  


 


Large Platelets Occ  


 


Anisocytosis Slight  


 


Target Cells Occ  


 


Sodium Level


 135 mmol/L


(136-145)  L


 


Potassium Level


 4.7 mmol/L


(3.5-5.1)


 


Chloride Level


 97 mmol/L


()  L


 


Carbon Dioxide Level


 26 mmol/L


(21-32)


 


Anion Gap 12 (6-14)  


 


Blood Urea Nitrogen


 40 mg/dL


(8-26)  H


 


Creatinine


 7.7 mg/dL


(0.7-1.3)  H


 


Estimated GFR


(Cockcroft-Gault) 8.3  





 


BUN/Creatinine Ratio 5 (6-20)  L


 


Glucose Level


 90 mg/dL


(70-99)


 


Calcium Level


 8.7 mg/dL


(8.5-10.1)


 


Total Bilirubin


 0.7 mg/dL


(0.2-1.0)


 


Aspartate Amino Transferase


(AST) 40 U/L (15-37)


H


 


Alanine Aminotransferase (ALT)


 25 U/L (16-63)





 


Alkaline Phosphatase


 100 U/L


()


 


NT-Pro-B-Type Natriuretic


Peptide 51147 pg/mL


(0-124)  H


 


Total Protein


 6.7 g/dL


(6.4-8.2)


 


Albumin


 2.5 g/dL


(3.4-5.0)  L


 


Albumin/Globulin Ratio


 0.6 (1.0-1.7)


L





                                Laboratory Tests


7/24/19 13:45








                                Laboratory Tests


7/24/19 13:45














EKG


EKG


[]





Radiology/Procedures


Radiology/Procedures


[]





Course & Med Decision Making


Course & Med Decision Making


Pertinent Labs and Imaging studies reviewed. (See chart for details)





This is a 29-year-old male patient poor historian presenting to the ED today 

requesting dialysis. Patient states he was discharged from the hospital 

yesterday with end-stage renal disease and instructed to come back today to be 

dialyzed. 





Labs are pending. Dialysis nurse called requesting we sent the patient to 

dialysis right now.





Spoke with Dr. Liz who accepted patient for admission





Dragon Disclaimer


Dragon Disclaimer


This electronic medical record was generated, in whole or in part, using a voice

 recognition dictation system.





Departure


Departure


Impression:  


   Primary Impression:  


   ESRD (end stage renal disease)


Disposition:  09 ADMITTED AS INPATIENT


Condition:  STABLE


Referrals:  


NO PCP (PCP)











GINA JEAN              Jul 24, 2019 14:53

## 2019-07-24 NOTE — NUR
Pt left AMA as soon as he was finished w/ dialysis. IV d/c'd, pt sticker given to security. 
Pt ambulated to private vehicle, was accompanied by friend.

## 2019-07-24 NOTE — NUR
The patient, NBA GREENE, 28 y/o, M admitted by CANDELARIA MUNOZ III, DO, was given written 
information regarding hospital policies, unit procedures and contact persons.  

Assessed while at dialysis.

## 2019-12-02 ENCOUNTER — HOSPITAL ENCOUNTER (EMERGENCY)
Dept: HOSPITAL 84 - D.ER | Age: 29
LOS: 1 days | Discharge: PSYCHIATRIC OR SUBSTANCE USE FACILITY | End: 2019-12-03
Payer: MEDICAID

## 2019-12-02 VITALS — WEIGHT: 155 LBS | BODY MASS INDEX: 23.49 KG/M2 | HEIGHT: 68 IN

## 2019-12-02 DIAGNOSIS — Z99.2: ICD-10-CM

## 2019-12-02 DIAGNOSIS — M54.9: ICD-10-CM

## 2019-12-02 DIAGNOSIS — F17.210: ICD-10-CM

## 2019-12-02 DIAGNOSIS — M62.82: ICD-10-CM

## 2019-12-02 DIAGNOSIS — N17.9: ICD-10-CM

## 2019-12-02 DIAGNOSIS — I12.0: ICD-10-CM

## 2019-12-02 DIAGNOSIS — R45.851: ICD-10-CM

## 2019-12-02 DIAGNOSIS — F20.9: Primary | ICD-10-CM

## 2019-12-02 DIAGNOSIS — K21.9: ICD-10-CM

## 2019-12-02 DIAGNOSIS — F19.10: ICD-10-CM

## 2019-12-02 LAB
ALBUMIN SERPL-MCNC: 3.8 G/DL (ref 3.4–5)
ALP SERPL-CCNC: 90 U/L (ref 46–116)
ALT SERPL-CCNC: 29 U/L (ref 10–68)
AMPHETAMINES UR QL SCN: POSITIVE QUAL
AMYLASE SERPL-CCNC: 47 U/L (ref 25–115)
ANION GAP SERPL CALC-SCNC: 17.3 MMOL/L (ref 8–16)
APAP SERPL-MCNC: 0 UG/ML (ref 10–30)
APPEARANCE UR: CLEAR
BARBITURATES UR QL SCN: NEGATIVE QUAL
BASOPHILS NFR BLD AUTO: 0.3 % (ref 0–2)
BENZODIAZ UR QL SCN: NEGATIVE QUAL
BILIRUB SERPL-MCNC: 1.42 MG/DL (ref 0.2–1.3)
BILIRUB SERPL-MCNC: NEGATIVE MG/DL
BUN SERPL-MCNC: 22 MG/DL (ref 7–18)
BZE UR QL SCN: NEGATIVE QUAL
CALCIUM SERPL-MCNC: 9.5 MG/DL (ref 8.5–10.1)
CANNABINOIDS UR QL SCN: POSITIVE QUAL
CHLORIDE SERPL-SCNC: 100 MMOL/L (ref 98–107)
CK MB SERPL-MCNC: 1.9 U/L (ref 0–3.6)
CK SERPL-CCNC: 518 UL (ref 21–232)
CO2 SERPL-SCNC: 27 MMOL/L (ref 21–32)
COLOR UR: YELLOW
CREAT SERPL-MCNC: 1.4 MG/DL (ref 0.6–1.3)
EOSINOPHIL NFR BLD: 1 % (ref 0–7)
ERYTHROCYTE [DISTWIDTH] IN BLOOD BY AUTOMATED COUNT: 13.4 % (ref 11.5–14.5)
ETHANOL SERPL-MCNC: 0 MG/DL (ref 0–10)
GLOBULIN SER-MCNC: 4.3 G/L
GLUCOSE SERPL-MCNC: 78 MG/DL (ref 74–106)
GLUCOSE SERPL-MCNC: NEGATIVE MG/DL
HCT VFR BLD CALC: 41.4 % (ref 42–54)
HGB BLD-MCNC: 13.6 G/DL (ref 13.5–17.5)
IMM GRANULOCYTES NFR BLD: 0.1 % (ref 0–5)
KETONES UR STRIP-MCNC: (no result) MG/DL
LIPASE SERPL-CCNC: 107 U/L (ref 73–393)
LYMPHOCYTES NFR BLD AUTO: 23.3 % (ref 15–50)
MAGNESIUM SERPL-MCNC: 2.1 MG/DL (ref 1.8–2.4)
MCH RBC QN AUTO: 28 PG (ref 26–34)
MCHC RBC AUTO-ENTMCNC: 32.9 G/DL (ref 31–37)
MCV RBC: 85.4 FL (ref 80–100)
MONOCYTES NFR BLD: 9.3 % (ref 2–11)
NEUTROPHILS NFR BLD AUTO: 66 % (ref 40–80)
NITRITE UR-MCNC: NEGATIVE MG/ML
OPIATES UR QL SCN: NEGATIVE QUAL
OSMOLALITY SERPL CALC.SUM OF ELEC: 282 MOSM/KG (ref 275–300)
PCP UR QL SCN: NEGATIVE QUAL
PH UR STRIP: 5 [PH] (ref 5–6)
PLATELET # BLD: 303 10X3/UL (ref 130–400)
PMV BLD AUTO: 10.5 FL (ref 7.4–10.4)
POTASSIUM SERPL-SCNC: 3.3 MMOL/L (ref 3.5–5.1)
PROT SERPL-MCNC: 8.1 G/DL (ref 6.4–8.2)
PROT UR-MCNC: NEGATIVE MG/DL
RBC # BLD AUTO: 4.85 10X6/UL (ref 4.2–6.1)
SODIUM SERPL-SCNC: 141 MMOL/L (ref 136–145)
SP GR UR STRIP: 1.01 (ref 1–1.02)
UROBILINOGEN UR-MCNC: NORMAL MG/DL
WBC # BLD AUTO: 9.4 10X3/UL (ref 4.8–10.8)

## 2019-12-02 NOTE — NUR
DR. TAVAREZ NOTIFED AND SITTER ORDERED. SITTER AT BEDSIDE. NOTIFIED CHARGE
NURSE AND ATTENDING IN REGARDS TO ASSESSMENT FINDINGS. RESOURCES GIVEN TO PT
AND SAFETY PLAN INITIATED.

## 2019-12-03 VITALS — DIASTOLIC BLOOD PRESSURE: 47 MMHG | SYSTOLIC BLOOD PRESSURE: 110 MMHG

## 2020-02-25 ENCOUNTER — HOSPITAL ENCOUNTER (EMERGENCY)
Dept: HOSPITAL 84 - D.ER | Age: 30
Discharge: PSYCHIATRIC OR SUBSTANCE USE FACILITY | End: 2020-02-25
Payer: MEDICAID

## 2020-02-25 VITALS — DIASTOLIC BLOOD PRESSURE: 63 MMHG | SYSTOLIC BLOOD PRESSURE: 134 MMHG

## 2020-02-25 VITALS — HEIGHT: 68 IN

## 2020-02-25 DIAGNOSIS — R45.851: Primary | ICD-10-CM

## 2020-02-25 DIAGNOSIS — I12.9: ICD-10-CM

## 2020-02-25 DIAGNOSIS — F15.10: ICD-10-CM

## 2020-02-25 DIAGNOSIS — I10: ICD-10-CM

## 2020-02-25 DIAGNOSIS — Z99.2: ICD-10-CM

## 2020-02-25 DIAGNOSIS — F23: ICD-10-CM

## 2020-02-25 DIAGNOSIS — R45.850: ICD-10-CM

## 2020-02-25 DIAGNOSIS — N18.9: ICD-10-CM

## 2020-02-25 LAB
ALBUMIN SERPL-MCNC: 4.1 G/DL (ref 3.4–5)
ALP SERPL-CCNC: 97 U/L (ref 30–120)
ALT SERPL-CCNC: 22 U/L (ref 10–68)
AMPHETAMINES UR QL SCN: POSITIVE QUAL
ANION GAP SERPL CALC-SCNC: 10.6 MMOL/L (ref 8–16)
APAP SERPL-MCNC: 0 UG/ML (ref 10–30)
BACTERIA #/AREA URNS HPF: (no result) /HPF
BARBITURATES UR QL SCN: NEGATIVE QUAL
BASOPHILS NFR BLD AUTO: 0.2 % (ref 0–2)
BENZODIAZ UR QL SCN: NEGATIVE QUAL
BILIRUB SERPL-MCNC: 0.37 MG/DL (ref 0.2–1.3)
BILIRUB SERPL-MCNC: NEGATIVE MG/DL
BUN SERPL-MCNC: 17 MG/DL (ref 7–18)
BZE UR QL SCN: NEGATIVE QUAL
CALCIUM SERPL-MCNC: 9.1 MG/DL (ref 8.5–10.1)
CANNABINOIDS UR QL SCN: POSITIVE QUAL
CHLORIDE SERPL-SCNC: 104 MMOL/L (ref 98–107)
CO2 SERPL-SCNC: 30.2 MMOL/L (ref 21–32)
CREAT SERPL-MCNC: 1.1 MG/DL (ref 0.6–1.3)
EOSINOPHIL NFR BLD: 0.2 % (ref 0–7)
ERYTHROCYTE [DISTWIDTH] IN BLOOD BY AUTOMATED COUNT: 13.9 % (ref 11.5–14.5)
ETHANOL SERPL-MCNC: 3 MG/DL (ref 0–10)
GLOBULIN SER-MCNC: 3.6 G/L
GLUCOSE SERPL-MCNC: 91 MG/DL (ref 74–106)
GLUCOSE SERPL-MCNC: NEGATIVE MG/DL
HCT VFR BLD CALC: 39.3 % (ref 42–54)
HGB BLD-MCNC: 13.1 G/DL (ref 13.5–17.5)
IMM GRANULOCYTES NFR BLD: 0.2 % (ref 0–5)
KETONES UR STRIP-MCNC: NEGATIVE MG/DL
LYMPHOCYTES NFR BLD AUTO: 18.5 % (ref 15–50)
MCH RBC QN AUTO: 28.9 PG (ref 26–34)
MCHC RBC AUTO-ENTMCNC: 33.3 G/DL (ref 31–37)
MCV RBC: 86.8 FL (ref 80–100)
MONOCYTES NFR BLD: 8.1 % (ref 2–11)
NEUTROPHILS NFR BLD AUTO: 72.8 % (ref 40–80)
NITRITE UR-MCNC: NEGATIVE MG/ML
OPIATES UR QL SCN: NEGATIVE QUAL
OSMOLALITY SERPL CALC.SUM OF ELEC: 282 MOSM/KG (ref 275–300)
PCP UR QL SCN: NEGATIVE QUAL
PH UR STRIP: 5 [PH] (ref 5–6)
PLATELET # BLD: 374 10X3/UL (ref 130–400)
PMV BLD AUTO: 9.3 FL (ref 7.4–10.4)
POTASSIUM SERPL-SCNC: 3.8 MMOL/L (ref 3.5–5.1)
PROT SERPL-MCNC: 7.7 G/DL (ref 6.4–8.2)
RBC # BLD AUTO: 4.53 10X6/UL (ref 4.2–6.1)
RBC #/AREA URNS HPF: (no result) /HPF (ref 0–5)
SODIUM SERPL-SCNC: 141 MMOL/L (ref 136–145)
SP GR UR STRIP: 1.03 (ref 1–1.02)
SQUAMOUS #/AREA URNS HPF: (no result) /HPF (ref 0–5)
UROBILINOGEN UR-MCNC: NORMAL MG/DL
WBC # BLD AUTO: 10.9 10X3/UL (ref 4.8–10.8)
WBC #/AREA URNS HPF: (no result) /HPF

## 2020-02-25 NOTE — NUR
DR TAVAREZ NOTIFIED AND REVIEWED PT'S BEHAVIOR AND ASSESSMENT. PT IS A HIGH
RISK. STATED HE HEARS VOICES TELLING HIM TO KILL HIMSELF. PATIENT HAVING
VISUAL AND AUDITORY HALLUCINATIONS AT THIS TIME. PATIENT PARANOID AND
DELUSIONAL. SITTER ORDERED AND AT BEDSIDE. REFUSED SUICIDE RESOURCES.

## 2020-09-14 ENCOUNTER — HOSPITAL ENCOUNTER (EMERGENCY)
Dept: HOSPITAL 84 - D.ER | Age: 30
LOS: 1 days | Discharge: PSYCHIATRIC OR SUBSTANCE USE FACILITY | End: 2020-09-15
Payer: MEDICAID

## 2020-09-14 VITALS — DIASTOLIC BLOOD PRESSURE: 77 MMHG | SYSTOLIC BLOOD PRESSURE: 138 MMHG

## 2020-09-14 VITALS — HEIGHT: 68 IN

## 2020-09-14 DIAGNOSIS — F20.9: ICD-10-CM

## 2020-09-14 DIAGNOSIS — R45.851: Primary | ICD-10-CM

## 2020-09-14 LAB
ALBUMIN SERPL-MCNC: 4.3 G/DL (ref 3.4–5)
ALP SERPL-CCNC: 91 U/L (ref 30–120)
ALT SERPL-CCNC: 27 U/L (ref 10–68)
AMPHETAMINES UR QL SCN: NEGATIVE QUAL
ANION GAP SERPL CALC-SCNC: 8.5 MMOL/L (ref 8–16)
APAP SERPL-MCNC: 0 UG/ML (ref 10–30)
BARBITURATES UR QL SCN: NEGATIVE QUAL
BASOPHILS NFR BLD AUTO: 0.1 % (ref 0–2)
BENZODIAZ UR QL SCN: NEGATIVE QUAL
BILIRUB SERPL-MCNC: 0.54 MG/DL (ref 0.2–1.3)
BILIRUB SERPL-MCNC: NEGATIVE MG/DL
BUN SERPL-MCNC: 19 MG/DL (ref 7–18)
BZE UR QL SCN: NEGATIVE QUAL
CALCIUM SERPL-MCNC: 9.4 MG/DL (ref 8.5–10.1)
CANNABINOIDS UR QL SCN: NEGATIVE QUAL
CHLORIDE SERPL-SCNC: 103 MMOL/L (ref 98–107)
CO2 SERPL-SCNC: 29.6 MMOL/L (ref 21–32)
CREAT SERPL-MCNC: 1.3 MG/DL (ref 0.6–1.3)
EOSINOPHIL NFR BLD: 0.2 % (ref 0–7)
ERYTHROCYTE [DISTWIDTH] IN BLOOD BY AUTOMATED COUNT: 12.6 % (ref 11.5–14.5)
ETHANOL SERPL-MCNC: 1 MG/DL (ref 0–10)
GLOBULIN SER-MCNC: 3.7 G/L
GLUCOSE SERPL-MCNC: 99 MG/DL (ref 74–106)
HCT VFR BLD CALC: 46.6 % (ref 42–54)
HGB BLD-MCNC: 15.4 G/DL (ref 13.5–17.5)
IMM GRANULOCYTES NFR BLD: 0.2 % (ref 0–5)
KETONES UR STRIP-MCNC: NEGATIVE MG/DL
LYMPHOCYTES NFR BLD AUTO: 14.7 % (ref 15–50)
MAGNESIUM SERPL-MCNC: 2.3 MG/DL (ref 1.8–2.4)
MCH RBC QN AUTO: 30 PG (ref 26–34)
MCHC RBC AUTO-ENTMCNC: 33 G/DL (ref 31–37)
MCV RBC: 90.7 FL (ref 80–100)
MONOCYTES NFR BLD: 8.6 % (ref 2–11)
NEUTROPHILS NFR BLD AUTO: 76.2 % (ref 40–80)
NITRITE UR-MCNC: NEGATIVE MG/ML
OPIATES UR QL SCN: NEGATIVE QUAL
OSMOLALITY SERPL CALC.SUM OF ELEC: 275 MOSM/KG (ref 275–300)
PCP UR QL SCN: NEGATIVE QUAL
PH UR STRIP: 7 [PH] (ref 5–8)
PLATELET # BLD: 308 10X3/UL (ref 130–400)
PMV BLD AUTO: 10.3 FL (ref 7.4–10.4)
POTASSIUM SERPL-SCNC: 4.1 MMOL/L (ref 3.5–5.1)
PROT SERPL-MCNC: 8 G/DL (ref 6.4–8.2)
RBC # BLD AUTO: 5.14 10X6/UL (ref 4.2–6.1)
SODIUM SERPL-SCNC: 137 MMOL/L (ref 136–145)
UROBILINOGEN UR-MCNC: NORMAL MG/DL (ref ?–2)
WBC # BLD AUTO: 8 10X3/UL (ref 4.8–10.8)

## 2020-12-28 NOTE — PDOC
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 (Normal Saline Flush) 3 ml QSHIFT  PRN IV AFTER MEDS AND BLOOD 

DRAWS;  Start 7/10/19 at 01:15


Sodium Chloride 1,000 ml @  175 mls/hr Q5H43M IV ;  Start 7/10/19 at 01:08;  

Stop 7/10/19 at 01:23;  Status DC


Ondansetron HCl (Zofran) 4 mg PRN Q8HRS  PRN IV NAUSEA/VOMITING  1ST CHOICE;  

Start 7/10/19 at 01:15;  Stop 7/11/19 at 01:14;  Status DC


Sodium Chloride 1,000 ml @  150 mls/hr Q6H40M IV ;  Start 7/10/19 at 01:30;  

Stop 7/10/19 at 18:49;  Status DC


Morphine Sulfate (Morphine Sulfate) 4 mg PRN Q4HRS  PRN IV SEVERE PAIN Last 

administered on 7/11/19at 04:26;  Start 7/10/19 at 09:00


Sodium Bicarbonate (Sodium Bicarb Adult 8.4% Syr) 100 meq 1X  ONCE IV  Last 

administered on 7/10/19at 10:02;  Start 7/10/19 at 10:00;  Stop 7/10/19 at 

10:01;  Status DC


Sodium Chloride 1,000 ml @  1,000 mls/hr 1X  ONCE IV  Last administered on 

7/10/19at 09:56;  Start 7/10/19 at 10:00;  Stop 7/10/19 at 10:59;  Status DC


Lidocaine/Sodium Bicarbonate (Buffered Lidocaine 1%) 3 ml STK-MED ONCE .ROUTE ; 

Start 7/10/19 at 11:07;  Stop 7/10/19 at 11:08;  Status DC


Calcium Gluconate (Calcium Gluconate) 2,000 mg 1X  ONCE IVP  Last administered 

on 7/10/19at 13:38;  Start 7/10/19 at 12:00;  Stop 7/10/19 at 12:01;  Status DC


Ceftriaxone Sodium (Rocephin) 1 gm Q24H IVP  Last administered on 7/10/19at 

12:49;  Start 7/10/19 at 13:00;  Stop 7/11/19 at 07:45;  Status DC


Silver Sulfadiazine (Silvadene) 1 mack BID TP  Last administered on 7/10/19at 

20:27;  Start 7/10/19 at 13:00


Sodium Chloride 1,000 ml @  1,000 mls/hr 1X  ONCE IV  Last administered on 

7/10/19at 12:47;  Start 7/10/19 at 12:45;  Stop 7/10/19 at 13:44;  Status DC


Pantoprazole Sodium (Protonix) 40 mg DAILYAC PO ;  Start 7/10/19 at 13:30;  Stop

7/10/19 at 19:22;  Status DC


Polyethylene Glycol (miraLAX PACKET) 17 gm DAILY PO ;  Start 7/10/19 at 13:30


Lidocaine/Sodium Bicarbonate (Buffered Lidocaine 1%) 3 ml STK-MED ONCE .ROUTE ; 

Start 7/10/19 at 13:48;  Stop 7/10/19 at 13:49;  Status DC


Haloperidol Lactate (Haldol Inj) 1 mg PRN Q8HRS  PRN IVP AGITATION Last 

administered on 7/11/19at 00:35;  Start 7/10/19 at 14:00


Lorazepam (Ativan Inj) 2 mg PRN Q2HRS  PRN IV ANXIETY / AGITATION Last 

administered on 7/11/19at 04:25;  Start 7/10/19 at 14:00


Lidocaine/Sodium Bicarbonate (Buffered Lidocaine 1%) 6 ml 1X  ONCE INJ ;  Start 

7/10/19 at 14:15;  Stop 7/10/19 at 14:16;  Status DC


Dexmedetomidine HCl 200 mcg/ Sodium Chloride 50 ml @ 0 mls/hr CONT  PRN IV PER 

PROTOCOL Last administered on 7/11/19at 06:27;  Start 7/10/19 at 14:15


Sodium Chloride 500 ml @  500 mls/hr 1X PRN  PRN IV SEE COMMENTS;  Start 7/10/19

at 14:15


Atropine Sulfate (ATROPINE 0.5mg SYRINGE) 0.5 mg PRN Q5MIN  PRN IV SEE COMMENTS;

 Start 7/10/19 at 14:15


Sodium Chloride 1,000 ml @  1,000 mls/hr Q1H PRN IV hypotension;  Start 7/10/19 

at 15:08;  Stop 7/10/19 at 21:07;  Status DC


Diphenhydramine HCl (Benadryl) 25 mg 1X PRN  PRN IV ITCHING;  Start 7/10/19 at 

15:15;  Stop 7/11/19 at 15:14


Diphenhydramine HCl (Benadryl) 25 mg 1X PRN  PRN IV ITCHING;  Start 7/10/19 at 

15:15;  Stop 7/11/19 at 15:14


Sodium Chloride 1,000 ml @  400 mls/hr Q2H30M PRN IV PATENCY;  Start 7/10/19 at 

15:08;  Stop 7/11/19 at 03:07;  Status DC


Info (PHARMACY MONITORING -- do not chart) 1 each PRN DAILY  PRN MC SEE 

COMMENTS;  Start 7/10/19 at 15:15


Pantoprazole Sodium (PROTONIX VIAL for IV PUSH) 40 mg DAILYAC IVP  Last 

administered on 7/11/19at 07:45;  Start 7/11/19 at 07:30


Linezolid (Zyvox) 600 mg BID PO ;  Start 7/11/19 at 09:00


Ceftriaxone Sodium (Rocephin) 2 gm Q24H IVP ;  Start 7/11/19 at 13:00





Active Scripts


Active


Reported


Protonix (Pantoprazole Sodium) 20 Mg Tablet.dr 2 Tab PO DAILY


Zyprexa (Olanzapine) 20 Mg Tablet 2 Tab PO QHS


Buspirone Hcl 30 Mg Tablet 1 Tab PO BID


Vitals/I & O





Vital Sign - Last 24 Hours








 7/10/19 7/10/19 7/10/19 7/10/19





 09:00 09:18 10:00 11:00


 


Pulse 100  100 102


 


Resp 22 20 20 20


 


B/P (MAP) 141/76 (97)  114/72 (86) 133/74 (93)


 


Pulse Ox 98  98 98


 


O2 Delivery Room Air Room Air Room Air Room Air





 7/10/19 7/10/19 7/10/19 7/10/19





 12:00 15:15 15:49 16:00


 


Pulse    92


 


Resp  20 18 20


 


B/P (MAP)  129/68 (88)  110/59 (76)


 


Pulse Ox  98  97


 


O2 Delivery Room Air Room Air Room Air Room Air





 7/10/19 7/10/19 7/10/19 7/10/19





 16:00 17:00 18:00 19:00


 


Temp  98.1  





  98.1  


 


Pulse  70 88 86


 


Resp  20 20 16


 


B/P (MAP)  139/76 (97) 108/49 (68) 102/52 (69)


 


Pulse Ox  97 97 98


 


O2 Delivery Room Air Room Air Room Air Room Air


 


    





    





 7/10/19 7/10/19 7/10/19 7/10/19





 19:49 20:00 20:00 21:00


 


Temp  98.2  





  98.2  


 


Pulse  89  86


 


Resp 15 20  15


 


B/P (MAP)  103/48 (66)  102/50 (67)


 


Pulse Ox 98 95  97


 


O2 Delivery Room Air Room Air Room Air Room Air


 


    





    





 7/10/19 7/10/19 7/10/19 7/11/19





 22:00 23:00 23:44 00:00


 


Temp    98.1





    98.1


 


Pulse 88 88  89


 


Resp 16 15  15


 


B/P (MAP) 109/55 (73) 109/54 (72)  96/43 (60)


 


Pulse Ox 96 96  96


 


O2 Delivery Room Air Room Air Room Air Room Air


 


    





    





 7/11/19 7/11/19 7/11/19 7/11/19





 00:36 01:00 02:00 03:00


 


Pulse  89 84 87


 


Resp 19 15 17 15


 


B/P (MAP)  115/62 (79) 129/67 (87) 105/52 (69)


 


Pulse Ox 96 94 95 96


 


O2 Delivery Room Air Room Air Room Air Room Air





 7/11/19 7/11/19 7/11/19 7/11/19





 03:37 04:00 04:26 04:56


 


Temp  98.1  





  98.1  


 


Pulse  86  


 


Resp  15 16 15


 


B/P (MAP)  105/52 (69)  


 


Pulse Ox  95 95 95


 


O2 Delivery Room Air Room Air Room Air Room Air


 


    





    





 7/11/19 7/11/19  





 05:00 06:00  


 


Pulse 86 69  


 


Resp 16 16  


 


B/P (MAP) 100/49 (66) 104/49 (67)  


 


Pulse Ox 95 96  


 


O2 Delivery Room Air Room Air  














Intake and Output   


 


 7/10/19 7/10/19 7/11/19





 14:59 22:59 06:59


 


Intake Total 2050 ml 1110 ml 166 ml


 


Output Total 835 ml 35 ml 5 ml


 


Balance 1215 ml 1075 ml 161 ml

















HIEN NUNEZ MD          Jul 11, 2019 08:39

## 2021-10-26 NOTE — NUR
Discharge Note: PT DISCHARGED HOME WITH SELF CARE, PT LEFT FACILITY WITHOUT BEING ESCORTED 
OUT STATED THAT HE WAS GOING TO GO WITH HIS FRIEND AND NOT THROUGH THE CAB PASS. PT EDUCATED 
THAT HE NEEDED TO BE AT THE [facility] BY 1830 OR HE WOULDN'T BE ABLE TO STAY THERE, 
STATED THAT HE WAS GOING TO STAY WITH HIS FRIEND ZHENG, PT WOULD NOT ALLOW STAFF TO WALK 
OUT WITH HIM AND STATED THAT "WE SHOULD JUST CALL THE FUCKING  BECAUSE HE WOULD NOT GET 
INTO THE CAB", PT EDUCATED ABOUT SAFETY ISSUES WITH DISCHARGING WITH TUNNELED CATH IN [facility] 
BUT REFUSED TO LISTEN TO REASONING. PT INFORMED THAT "IF HE DID ANYMORE DRUGS THAT HE WOULD 
DIE OR IF HE MISSED DIALYSIS APPOINTMENTS HE WOULD DIE." PT INFORMED THAT HE NEEDED TO COME 
TO [facility] FOR DIALYSIS MWF AROUND 0800 PT STATED THAT HE WOULD BE HERE FOR DIALYSIS. 
PT WAS TALKING ON CELL PHONE WHEN HE CONTINUED TO LEAVE HOSPITAL WITHOUT STAFF SUPERVISION 
AND WAS BECOMING VERY AGITATED WITH STAFF WHEN HE CONTINUED TO WALK OUT TO STAFF ELEVATORS, 
SECURITY INFORMED THAT PT WAS AGITATED AND LEAVING WITHOUT STAFF ASSISTANCE, SECURITY STATED 
THAT THEY COULDN'T DO ANYTHING TO STOP PT BECAUSE HE HAD HIS DISCHARGE PAPERWORK ALREADY 
SIGNED.  



COGBURN,KEVIN5 SOUTH



Discharge instructions and discharge home medications reviewed with Patient and a copy 
given. All questions have been answered and understanding verbalized. [No Acute Distress] : no acute distress [Normal Appearance] : normal appearance [Normal Rate/Rhythm] : normal rate/rhythm [Normal PMI] : normal pmi [Normal S1, S2] : normal s1, s2 [No Murmurs] : no murmurs [No Resp Distress] : no resp distress [No Acc Muscle Use] : no acc muscle use [Normal Rhythm and Effort] : normal rhythm and effort [Clear to Auscultation Bilaterally] : clear to auscultation bilaterally [Normal to Percussion] : normal to percussion [No Abnormalities] : no abnormalities [Normal Gait] : normal gait [No Clubbing] : no clubbing [No Cyanosis] : no cyanosis [No Edema] : no edema [FROM] : FROM [Normal Color/ Pigmentation] : normal color/ pigmentation [No Focal Deficits] : no focal deficits [No Motor Deficits] : no motor deficits [Oriented x3] : oriented x3 [Normal Affect] : normal affect

## 2024-05-08 NOTE — RAD
THORACIC SPINE WO CONTRAST

 

History: Abnormal finding on prior MRI.

 

Technique: Multiplanar, multi sequential noncontrast MR imaging was 

performed of the thoracic spine.

 

Comparison: July 10, 2019

 

Findings: 

Normal vertebral body height and alignment. No fracture.

 

Normal signal abnormality throughout the thoracic spinal cord.

 

Prominent posterior epidural lipomatosis throughout the thoracic spine. 

There is mild diffuse thoracic canal narrowing due to the epidural 

lipomatosis. Normal disc space height. Normal appearance of the thoracic 

discs. No neural foraminal narrowing.

 

Right posterior paraspinal muscle edema, decreased compared to prior. 

Previously seen signal abnormality within the region of the mid thoracic 

spinous processes is no longer identified.

 

Small right pleural effusion. Scattered left lung subsegmental 

atelectasis.

 

Impression: 

1.  No pathologic signal abnormality within the thoracic spinal cord.

2.  Diffuse dorsal epidural lipomatosis.

3.  Decreased diffuse right posterior paraspinal muscle edema.

4.  Small right pleural effusion.

 

Electronically signed by: Danie Stanton DO (7/17/2019 4:51 PM) [id number]